# Patient Record
Sex: MALE | Race: BLACK OR AFRICAN AMERICAN | NOT HISPANIC OR LATINO | URBAN - METROPOLITAN AREA
[De-identification: names, ages, dates, MRNs, and addresses within clinical notes are randomized per-mention and may not be internally consistent; named-entity substitution may affect disease eponyms.]

---

## 2018-11-27 ENCOUNTER — INPATIENT (INPATIENT)
Facility: HOSPITAL | Age: 83
LOS: 7 days | Discharge: HOME HEALTH | End: 2018-12-05
Attending: SURGERY | Admitting: SURGERY
Payer: MEDICARE

## 2018-11-27 VITALS
WEIGHT: 175.05 LBS | TEMPERATURE: 100 F | OXYGEN SATURATION: 97 % | HEART RATE: 114 BPM | SYSTOLIC BLOOD PRESSURE: 135 MMHG | RESPIRATION RATE: 18 BRPM | DIASTOLIC BLOOD PRESSURE: 62 MMHG

## 2018-11-27 DIAGNOSIS — K80.01 CALCULUS OF GALLBLADDER WITH ACUTE CHOLECYSTITIS WITH OBSTRUCTION: ICD-10-CM

## 2018-11-27 DIAGNOSIS — D64.9 ANEMIA, UNSPECIFIED: ICD-10-CM

## 2018-11-27 DIAGNOSIS — E83.39 OTHER DISORDERS OF PHOSPHORUS METABOLISM: ICD-10-CM

## 2018-11-27 DIAGNOSIS — N17.9 ACUTE KIDNEY FAILURE, UNSPECIFIED: ICD-10-CM

## 2018-11-27 DIAGNOSIS — A41.9 SEPSIS, UNSPECIFIED ORGANISM: ICD-10-CM

## 2018-11-27 DIAGNOSIS — G40.909 EPILEPSY, UNSPECIFIED, NOT INTRACTABLE, WITHOUT STATUS EPILEPTICUS: ICD-10-CM

## 2018-11-27 DIAGNOSIS — G93.41 METABOLIC ENCEPHALOPATHY: ICD-10-CM

## 2018-11-27 DIAGNOSIS — K59.00 CONSTIPATION, UNSPECIFIED: ICD-10-CM

## 2018-11-27 DIAGNOSIS — I62.01 NONTRAUMATIC ACUTE SUBDURAL HEMORRHAGE: Chronic | ICD-10-CM

## 2018-11-27 DIAGNOSIS — I10 ESSENTIAL (PRIMARY) HYPERTENSION: ICD-10-CM

## 2018-11-27 LAB
ALBUMIN SERPL ELPH-MCNC: 3.5 G/DL — SIGNIFICANT CHANGE UP (ref 3.3–5)
ALP SERPL-CCNC: 95 U/L — SIGNIFICANT CHANGE UP (ref 40–120)
ALT FLD-CCNC: 33 U/L — SIGNIFICANT CHANGE UP (ref 12–78)
AMYLASE P1 CFR SERPL: 36 U/L — SIGNIFICANT CHANGE UP (ref 25–115)
ANION GAP SERPL CALC-SCNC: 10 MMOL/L — SIGNIFICANT CHANGE UP (ref 5–17)
APPEARANCE UR: ABNORMAL
APTT BLD: 30.3 SEC — SIGNIFICANT CHANGE UP (ref 27.5–36.3)
AST SERPL-CCNC: 31 U/L — SIGNIFICANT CHANGE UP (ref 15–37)
BACTERIA # UR AUTO: ABNORMAL
BASE EXCESS BLDA CALC-SCNC: -0.8 MMOL/L — SIGNIFICANT CHANGE UP (ref -2–2)
BASOPHILS # BLD AUTO: 0.02 K/UL — SIGNIFICANT CHANGE UP (ref 0–0.2)
BASOPHILS NFR BLD AUTO: 0.2 % — SIGNIFICANT CHANGE UP (ref 0–2)
BILIRUB DIRECT SERPL-MCNC: 0.57 MG/DL — HIGH (ref 0.05–0.2)
BILIRUB INDIRECT FLD-MCNC: 1 MG/DL — SIGNIFICANT CHANGE UP (ref 0.2–1)
BILIRUB SERPL-MCNC: 1.6 MG/DL — HIGH (ref 0.2–1.2)
BILIRUB UR-MCNC: ABNORMAL
BLD GP AB SCN SERPL QL: SIGNIFICANT CHANGE UP
BLOOD GAS COMMENTS: SIGNIFICANT CHANGE UP
BLOOD GAS COMMENTS: SIGNIFICANT CHANGE UP
BLOOD GAS SOURCE: SIGNIFICANT CHANGE UP
BUN SERPL-MCNC: 28 MG/DL — HIGH (ref 7–23)
CALCIUM SERPL-MCNC: 8.4 MG/DL — LOW (ref 8.5–10.1)
CHLORIDE SERPL-SCNC: 98 MMOL/L — SIGNIFICANT CHANGE UP (ref 96–108)
CO2 SERPL-SCNC: 26 MMOL/L — SIGNIFICANT CHANGE UP (ref 22–31)
COLOR SPEC: ABNORMAL
CREAT SERPL-MCNC: 2.62 MG/DL — HIGH (ref 0.5–1.3)
DIFF PNL FLD: ABNORMAL
EOSINOPHIL # BLD AUTO: 0 K/UL — SIGNIFICANT CHANGE UP (ref 0–0.5)
EOSINOPHIL NFR BLD AUTO: 0 % — SIGNIFICANT CHANGE UP (ref 0–6)
EPI CELLS # UR: SIGNIFICANT CHANGE UP
GLUCOSE SERPL-MCNC: 142 MG/DL — HIGH (ref 70–99)
GLUCOSE UR QL: NEGATIVE MG/DL — SIGNIFICANT CHANGE UP
HCO3 BLDA-SCNC: 21 MMOL/L — SIGNIFICANT CHANGE UP (ref 21–29)
HCT VFR BLD CALC: 37.8 % — LOW (ref 39–50)
HGB BLD-MCNC: 12.8 G/DL — LOW (ref 13–17)
HOROWITZ INDEX BLDA+IHG-RTO: 28 — SIGNIFICANT CHANGE UP
IMM GRANULOCYTES NFR BLD AUTO: 0.5 % — SIGNIFICANT CHANGE UP (ref 0–1.5)
INR BLD: 1.25 RATIO — HIGH (ref 0.88–1.16)
KETONES UR-MCNC: NEGATIVE — SIGNIFICANT CHANGE UP
LACTATE SERPL-SCNC: 2.1 MMOL/L — HIGH (ref 0.7–2)
LACTATE SERPL-SCNC: 2.5 MMOL/L — HIGH (ref 0.7–2)
LEUKOCYTE ESTERASE UR-ACNC: ABNORMAL
LIDOCAIN IGE QN: 73 U/L — SIGNIFICANT CHANGE UP (ref 73–393)
LYMPHOCYTES # BLD AUTO: 0.75 K/UL — LOW (ref 1–3.3)
LYMPHOCYTES # BLD AUTO: 5.7 % — LOW (ref 13–44)
MCHC RBC-ENTMCNC: 32 PG — SIGNIFICANT CHANGE UP (ref 27–34)
MCHC RBC-ENTMCNC: 33.9 GM/DL — SIGNIFICANT CHANGE UP (ref 32–36)
MCV RBC AUTO: 94.5 FL — SIGNIFICANT CHANGE UP (ref 80–100)
MONOCYTES # BLD AUTO: 1.4 K/UL — HIGH (ref 0–0.9)
MONOCYTES NFR BLD AUTO: 10.7 % — SIGNIFICANT CHANGE UP (ref 2–14)
NEUTROPHILS # BLD AUTO: 10.9 K/UL — HIGH (ref 1.8–7.4)
NEUTROPHILS NFR BLD AUTO: 82.9 % — HIGH (ref 43–77)
NITRITE UR-MCNC: NEGATIVE — SIGNIFICANT CHANGE UP
NRBC # BLD: 0 /100 WBCS — SIGNIFICANT CHANGE UP (ref 0–0)
PCO2 BLDA: 29 MMHG — LOW (ref 32–46)
PH BLD: 7.48 — HIGH (ref 7.35–7.45)
PH UR: 5 — SIGNIFICANT CHANGE UP (ref 5–8)
PLATELET # BLD AUTO: 275 K/UL — SIGNIFICANT CHANGE UP (ref 150–400)
PO2 BLDA: 90 MMHG — SIGNIFICANT CHANGE UP (ref 74–108)
POTASSIUM SERPL-MCNC: 4.4 MMOL/L — SIGNIFICANT CHANGE UP (ref 3.5–5.3)
POTASSIUM SERPL-SCNC: 4.4 MMOL/L — SIGNIFICANT CHANGE UP (ref 3.5–5.3)
PROT SERPL-MCNC: 8.9 GM/DL — HIGH (ref 6–8.3)
PROT UR-MCNC: 100 MG/DL
PROTHROM AB SERPL-ACNC: 14.1 SEC — HIGH (ref 10–12.9)
RBC # BLD: 4 M/UL — LOW (ref 4.2–5.8)
RBC # FLD: 12.9 % — SIGNIFICANT CHANGE UP (ref 10.3–14.5)
RBC CASTS # UR COMP ASSIST: ABNORMAL /HPF (ref 0–4)
SAO2 % BLDA: 97 % — HIGH (ref 92–96)
SODIUM SERPL-SCNC: 134 MMOL/L — LOW (ref 135–145)
SP GR SPEC: 1.02 — SIGNIFICANT CHANGE UP (ref 1.01–1.02)
UROBILINOGEN FLD QL: 1 MG/DL
WBC # BLD: 13.14 K/UL — HIGH (ref 3.8–10.5)
WBC # FLD AUTO: 13.14 K/UL — HIGH (ref 3.8–10.5)
WBC UR QL: SIGNIFICANT CHANGE UP

## 2018-11-27 PROCEDURE — 74176 CT ABD & PELVIS W/O CONTRAST: CPT | Mod: 26

## 2018-11-27 PROCEDURE — 71045 X-RAY EXAM CHEST 1 VIEW: CPT | Mod: 26

## 2018-11-27 PROCEDURE — 74019 RADEX ABDOMEN 2 VIEWS: CPT | Mod: 26

## 2018-11-27 PROCEDURE — 93010 ELECTROCARDIOGRAM REPORT: CPT

## 2018-11-27 PROCEDURE — 99285 EMERGENCY DEPT VISIT HI MDM: CPT

## 2018-11-27 RX ORDER — MORPHINE SULFATE 50 MG/1
4 CAPSULE, EXTENDED RELEASE ORAL ONCE
Qty: 0 | Refills: 0 | Status: DISCONTINUED | OUTPATIENT
Start: 2018-11-27 | End: 2018-11-27

## 2018-11-27 RX ORDER — MORPHINE SULFATE 50 MG/1
2 CAPSULE, EXTENDED RELEASE ORAL EVERY 4 HOURS
Qty: 0 | Refills: 0 | Status: DISCONTINUED | OUTPATIENT
Start: 2018-11-27 | End: 2018-11-30

## 2018-11-27 RX ORDER — FOLIC ACID 0.8 MG
1 TABLET ORAL DAILY
Qty: 0 | Refills: 0 | Status: DISCONTINUED | OUTPATIENT
Start: 2018-11-27 | End: 2018-12-05

## 2018-11-27 RX ORDER — SODIUM CHLORIDE 9 MG/ML
500 INJECTION INTRAMUSCULAR; INTRAVENOUS; SUBCUTANEOUS ONCE
Qty: 0 | Refills: 0 | Status: COMPLETED | OUTPATIENT
Start: 2018-11-27 | End: 2018-11-27

## 2018-11-27 RX ORDER — ONDANSETRON 8 MG/1
8 TABLET, FILM COATED ORAL ONCE
Qty: 0 | Refills: 0 | Status: COMPLETED | OUTPATIENT
Start: 2018-11-27 | End: 2018-11-27

## 2018-11-27 RX ORDER — ACETAMINOPHEN 500 MG
650 TABLET ORAL ONCE
Qty: 0 | Refills: 0 | Status: COMPLETED | OUTPATIENT
Start: 2018-11-27 | End: 2018-11-27

## 2018-11-27 RX ORDER — ACETAMINOPHEN 500 MG
1000 TABLET ORAL ONCE
Qty: 0 | Refills: 0 | Status: COMPLETED | OUTPATIENT
Start: 2018-11-27 | End: 2018-11-27

## 2018-11-27 RX ORDER — PIPERACILLIN AND TAZOBACTAM 4; .5 G/20ML; G/20ML
3.38 INJECTION, POWDER, LYOPHILIZED, FOR SOLUTION INTRAVENOUS EVERY 8 HOURS
Qty: 0 | Refills: 0 | Status: DISCONTINUED | OUTPATIENT
Start: 2018-11-27 | End: 2018-12-05

## 2018-11-27 RX ORDER — SODIUM CHLORIDE 9 MG/ML
1000 INJECTION INTRAMUSCULAR; INTRAVENOUS; SUBCUTANEOUS ONCE
Qty: 0 | Refills: 0 | Status: COMPLETED | OUTPATIENT
Start: 2018-11-27 | End: 2018-11-27

## 2018-11-27 RX ORDER — IOHEXOL 300 MG/ML
30 INJECTION, SOLUTION INTRAVENOUS ONCE
Qty: 0 | Refills: 0 | Status: COMPLETED | OUTPATIENT
Start: 2018-11-27 | End: 2018-11-27

## 2018-11-27 RX ORDER — DIGOXIN 250 MCG
0.12 TABLET ORAL DAILY
Qty: 0 | Refills: 0 | Status: DISCONTINUED | OUTPATIENT
Start: 2018-11-27 | End: 2018-12-02

## 2018-11-27 RX ORDER — SODIUM CHLORIDE 9 MG/ML
1000 INJECTION INTRAMUSCULAR; INTRAVENOUS; SUBCUTANEOUS
Qty: 0 | Refills: 0 | Status: DISCONTINUED | OUTPATIENT
Start: 2018-11-27 | End: 2018-11-30

## 2018-11-27 RX ORDER — FAMOTIDINE 10 MG/ML
20 INJECTION INTRAVENOUS ONCE
Qty: 0 | Refills: 0 | Status: COMPLETED | OUTPATIENT
Start: 2018-11-27 | End: 2018-11-27

## 2018-11-27 RX ORDER — LISINOPRIL 2.5 MG/1
5 TABLET ORAL DAILY
Qty: 0 | Refills: 0 | Status: DISCONTINUED | OUTPATIENT
Start: 2018-11-27 | End: 2018-12-05

## 2018-11-27 RX ORDER — LEVETIRACETAM 250 MG/1
500 TABLET, FILM COATED ORAL
Qty: 0 | Refills: 0 | Status: DISCONTINUED | OUTPATIENT
Start: 2018-11-27 | End: 2018-12-05

## 2018-11-27 RX ORDER — DILTIAZEM HCL 120 MG
240 CAPSULE, EXT RELEASE 24 HR ORAL DAILY
Qty: 0 | Refills: 0 | Status: DISCONTINUED | OUTPATIENT
Start: 2018-11-27 | End: 2018-12-05

## 2018-11-27 RX ORDER — ONDANSETRON 8 MG/1
4 TABLET, FILM COATED ORAL ONCE
Qty: 0 | Refills: 0 | Status: DISCONTINUED | OUTPATIENT
Start: 2018-11-27 | End: 2018-12-05

## 2018-11-27 RX ORDER — FERROUS SULFATE 325(65) MG
325 TABLET ORAL DAILY
Qty: 0 | Refills: 0 | Status: DISCONTINUED | OUTPATIENT
Start: 2018-11-27 | End: 2018-12-05

## 2018-11-27 RX ADMIN — FAMOTIDINE 20 MILLIGRAM(S): 10 INJECTION INTRAVENOUS at 13:07

## 2018-11-27 RX ADMIN — ONDANSETRON 8 MILLIGRAM(S): 8 TABLET, FILM COATED ORAL at 13:07

## 2018-11-27 RX ADMIN — Medication 400 MILLIGRAM(S): at 21:37

## 2018-11-27 RX ADMIN — SODIUM CHLORIDE 1000 MILLILITER(S): 9 INJECTION INTRAMUSCULAR; INTRAVENOUS; SUBCUTANEOUS at 22:37

## 2018-11-27 RX ADMIN — Medication 650 MILLIGRAM(S): at 13:07

## 2018-11-27 RX ADMIN — SODIUM CHLORIDE 1000 MILLILITER(S): 9 INJECTION INTRAMUSCULAR; INTRAVENOUS; SUBCUTANEOUS at 13:09

## 2018-11-27 RX ADMIN — Medication 650 MILLIGRAM(S): at 16:27

## 2018-11-27 RX ADMIN — MORPHINE SULFATE 4 MILLIGRAM(S): 50 CAPSULE, EXTENDED RELEASE ORAL at 16:27

## 2018-11-27 RX ADMIN — SODIUM CHLORIDE 75 MILLILITER(S): 9 INJECTION INTRAMUSCULAR; INTRAVENOUS; SUBCUTANEOUS at 20:49

## 2018-11-27 RX ADMIN — MORPHINE SULFATE 4 MILLIGRAM(S): 50 CAPSULE, EXTENDED RELEASE ORAL at 13:07

## 2018-11-27 RX ADMIN — IOHEXOL 30 MILLILITER(S): 300 INJECTION, SOLUTION INTRAVENOUS at 13:09

## 2018-11-27 RX ADMIN — PIPERACILLIN AND TAZOBACTAM 25 GRAM(S): 4; .5 INJECTION, POWDER, LYOPHILIZED, FOR SOLUTION INTRAVENOUS at 22:37

## 2018-11-27 NOTE — H&P ADULT - PROBLEM SELECTOR PLAN 5
patient with altered mental status likely due to sepsis.  Hydration, broad spectrum antibiotic, tele monitoring, VS q4h, Follow up repeat lactate, Follow up blood and urine cultures  continue constant observation. Ativan prn severe agitation

## 2018-11-27 NOTE — ED ADULT TRIAGE NOTE - CHIEF COMPLAINT QUOTE
pt BIB wife with c/o abdominal pain and 3 episodes of vomiting Thursday, No bowel movements as per wife prior to thanksgiving.

## 2018-11-27 NOTE — H&P ADULT - NSHPLABSRESULTS_GEN_ALL_CORE
< from: CT Abdomen and Pelvis w/ Oral Cont (11.27.18 @ 18:08) >    IMPRESSION: Distended gallbladder with pericholecystic fluid and 1 cm   stone in the gallbladder neck. Findings are compatible with acute   cholecystitis. There is right upper quadrant fat stranding and small free   fluid in the pelvis. Ultrasound may be obtained for further evaluation if   clinically indicated.

## 2018-11-27 NOTE — ED PROVIDER NOTE - OBJECTIVE STATEMENT
83 year old male presents today c/o two day h/o abdominal pain and distention worsening today, +nausea and vomiting with fever today, he points to his lower abdomen where his pain is located, rates his pain a 10/10 (-) bowel movement in two days

## 2018-11-27 NOTE — H&P ADULT - NSHPPHYSICALEXAM_GEN_ALL_CORE
Gen: agitated, WDWN  HEENT: NCAT  CV: +S1S2   Lung: good aeration b/l  Abd: softly distended, diffuse tenderness with voluntary guarding. No rigidity  Ext: no pedal edema  Vasc: radial and DP pulses intact  Neuro: alert and conscious, does not follow commands  : campoverde catheter indwelling draining clear yellow urine

## 2018-11-27 NOTE — H&P ADULT - ASSESSMENT
84yo male 84yo male PMH seizures, SDH, HTN admitted with acute cholecystitis with gallbladder neck stone

## 2018-11-27 NOTE — H&P ADULT - HISTORY OF PRESENT ILLNESS
83 year old male with PMH seizures, SDH, HTN was seen in ED, very agitated and requiring aides to restrain him as he actively attempted to get out of the stretcher. Per ED attending, on presentation to ED, "patient had c/o two day h/o abdominal pain and distention worsening today, +nausea and vomiting with fever today, he points to his lower abdomen where his pain is located, rates his pain a 10/10 (-) bowel movement in two days"

## 2018-11-27 NOTE — ED ADULT NURSE NOTE - NSIMPLEMENTINTERV_GEN_ALL_ED
Implemented All Fall with Harm Risk Interventions:  [place] to call system. Call bell, personal items and telephone within reach. Instruct patient to call for assistance. Room bathroom lighting operational. Non-slip footwear when patient is off stretcher. Physically safe environment: no spills, clutter or unnecessary equipment. Stretcher in lowest position, wheels locked, appropriate side rails in place. Provide visual cue, wrist band, yellow gown, etc. Monitor gait and stability. Monitor for mental status changes and reorient to person, place, and time. Review medications for side effects contributing to fall risk. Reinforce activity limits and safety measures with patient and family. Provide visual clues: red socks.

## 2018-11-27 NOTE — H&P ADULT - ATTENDING COMMENTS
Elderly male 6 yrs S/P Rx subdural hematoma on seizure meds presents with two day hx constipation, abdominal bloating and pain with nausea and vomiting. CT: distended gallbladder with pericholecystic fluid and 1 cm stone in the gallbladder neck and evidence of RUQ inflammation. Admit for AB Rx, hydration and likely percutaneous cholecystostomy.

## 2018-11-27 NOTE — ED ADULT NURSE NOTE - OBJECTIVE STATEMENT
Pt. is received in bed 8. Pt c/o of abdominal pain that started after thanksgiving. PEr patients wife states that his last bowel movement was before thanksgiving and pt. has vomited since then. Pt appetite decrease. Pt. is received in bed 8. Pt c/o of abdominal pain that started after thanksgiving. Per patients wife states that his last bowel movement was before thanksgiving and pt. has vomited since then. Pt appetite decrease. Pt presents with abdominal distention

## 2018-11-27 NOTE — ED ADULT NURSE REASSESSMENT NOTE - NS ED NURSE REASSESS COMMENT FT1
Valladares catheter size 16 F inserted for 200mls of dark anselmo urine at 1631, ua/C&S OBTAINED AND SENT TO LAB, PATIENT TOLERATED Well. dr. Anaya made aware

## 2018-11-27 NOTE — H&P ADULT - PROBLEM SELECTOR PLAN 1
as discussed with Dr Christiansen, admit, NPO, IVF, IV abx  patient may require IR intervention for percutaneous cholecystectomy as discussed with Dr Christiansen, admit, NPO, IVF, IV abx  patient may require IR intervention for percutaneous cholecystectomy  Follow up am labs  constant observation for fall risk and pulling at lines

## 2018-11-28 DIAGNOSIS — K81.0 ACUTE CHOLECYSTITIS: ICD-10-CM

## 2018-11-28 DIAGNOSIS — I10 ESSENTIAL (PRIMARY) HYPERTENSION: ICD-10-CM

## 2018-11-28 DIAGNOSIS — N17.9 ACUTE KIDNEY FAILURE, UNSPECIFIED: ICD-10-CM

## 2018-11-28 DIAGNOSIS — G93.41 METABOLIC ENCEPHALOPATHY: ICD-10-CM

## 2018-11-28 DIAGNOSIS — R56.9 UNSPECIFIED CONVULSIONS: ICD-10-CM

## 2018-11-28 LAB
ALBUMIN SERPL ELPH-MCNC: 2.9 G/DL — LOW (ref 3.3–5)
ALP SERPL-CCNC: 89 U/L — SIGNIFICANT CHANGE UP (ref 40–120)
ALT FLD-CCNC: 32 U/L — SIGNIFICANT CHANGE UP (ref 12–78)
ANION GAP SERPL CALC-SCNC: 7 MMOL/L — SIGNIFICANT CHANGE UP (ref 5–17)
APTT BLD: 29.9 SEC — SIGNIFICANT CHANGE UP (ref 28.5–37)
AST SERPL-CCNC: 37 U/L — SIGNIFICANT CHANGE UP (ref 15–37)
BASOPHILS # BLD AUTO: 0.03 K/UL — SIGNIFICANT CHANGE UP (ref 0–0.2)
BASOPHILS NFR BLD AUTO: 0.3 % — SIGNIFICANT CHANGE UP (ref 0–2)
BILIRUB DIRECT SERPL-MCNC: 0.63 MG/DL — HIGH (ref 0.05–0.2)
BILIRUB INDIRECT FLD-MCNC: 1 MG/DL — SIGNIFICANT CHANGE UP (ref 0.2–1)
BILIRUB SERPL-MCNC: 1.6 MG/DL — HIGH (ref 0.2–1.2)
BUN SERPL-MCNC: 29 MG/DL — HIGH (ref 7–23)
CALCIUM SERPL-MCNC: 7.7 MG/DL — LOW (ref 8.5–10.1)
CHLORIDE SERPL-SCNC: 105 MMOL/L — SIGNIFICANT CHANGE UP (ref 96–108)
CO2 SERPL-SCNC: 26 MMOL/L — SIGNIFICANT CHANGE UP (ref 22–31)
CREAT SERPL-MCNC: 1.79 MG/DL — HIGH (ref 0.5–1.3)
CULTURE RESULTS: NO GROWTH — SIGNIFICANT CHANGE UP
DIGOXIN SERPL-MCNC: 0.77 NG/ML — LOW (ref 0.8–2)
EOSINOPHIL # BLD AUTO: 0.09 K/UL — SIGNIFICANT CHANGE UP (ref 0–0.5)
EOSINOPHIL NFR BLD AUTO: 0.8 % — SIGNIFICANT CHANGE UP (ref 0–6)
GLUCOSE SERPL-MCNC: 96 MG/DL — SIGNIFICANT CHANGE UP (ref 70–99)
HCT VFR BLD CALC: 34.2 % — LOW (ref 39–50)
HGB BLD-MCNC: 11.2 G/DL — LOW (ref 13–17)
IMM GRANULOCYTES NFR BLD AUTO: 0.4 % — SIGNIFICANT CHANGE UP (ref 0–1.5)
INR BLD: 1.32 RATIO — HIGH (ref 0.88–1.16)
LACTATE SERPL-SCNC: 1.3 MMOL/L — SIGNIFICANT CHANGE UP (ref 0.7–2)
LYMPHOCYTES # BLD AUTO: 1.05 K/UL — SIGNIFICANT CHANGE UP (ref 1–3.3)
LYMPHOCYTES # BLD AUTO: 8.9 % — LOW (ref 13–44)
MAGNESIUM SERPL-MCNC: 2.5 MG/DL — SIGNIFICANT CHANGE UP (ref 1.6–2.6)
MCHC RBC-ENTMCNC: 31.3 PG — SIGNIFICANT CHANGE UP (ref 27–34)
MCHC RBC-ENTMCNC: 32.7 GM/DL — SIGNIFICANT CHANGE UP (ref 32–36)
MCV RBC AUTO: 95.5 FL — SIGNIFICANT CHANGE UP (ref 80–100)
MONOCYTES # BLD AUTO: 1.34 K/UL — HIGH (ref 0–0.9)
MONOCYTES NFR BLD AUTO: 11.4 % — SIGNIFICANT CHANGE UP (ref 2–14)
NEUTROPHILS # BLD AUTO: 9.22 K/UL — HIGH (ref 1.8–7.4)
NEUTROPHILS NFR BLD AUTO: 78.2 % — HIGH (ref 43–77)
NRBC # BLD: 0 /100 WBCS — SIGNIFICANT CHANGE UP (ref 0–0)
PHOSPHATE SERPL-MCNC: 2.1 MG/DL — LOW (ref 2.5–4.5)
PLATELET # BLD AUTO: 233 K/UL — SIGNIFICANT CHANGE UP (ref 150–400)
POTASSIUM SERPL-MCNC: 4.2 MMOL/L — SIGNIFICANT CHANGE UP (ref 3.5–5.3)
POTASSIUM SERPL-SCNC: 4.2 MMOL/L — SIGNIFICANT CHANGE UP (ref 3.5–5.3)
PROT SERPL-MCNC: 7.8 GM/DL — SIGNIFICANT CHANGE UP (ref 6–8.3)
PROTHROM AB SERPL-ACNC: 14.9 SEC — HIGH (ref 10–12.9)
RBC # BLD: 3.58 M/UL — LOW (ref 4.2–5.8)
RBC # FLD: 12.6 % — SIGNIFICANT CHANGE UP (ref 10.3–14.5)
SODIUM SERPL-SCNC: 138 MMOL/L — SIGNIFICANT CHANGE UP (ref 135–145)
SPECIMEN SOURCE: SIGNIFICANT CHANGE UP
WBC # BLD: 11.78 K/UL — HIGH (ref 3.8–10.5)
WBC # FLD AUTO: 11.78 K/UL — HIGH (ref 3.8–10.5)

## 2018-11-28 RX ORDER — HEPARIN SODIUM 5000 [USP'U]/ML
5000 INJECTION INTRAVENOUS; SUBCUTANEOUS EVERY 12 HOURS
Qty: 0 | Refills: 0 | Status: DISCONTINUED | OUTPATIENT
Start: 2018-11-28 | End: 2018-12-05

## 2018-11-28 RX ADMIN — Medication 240 MILLIGRAM(S): at 08:25

## 2018-11-28 RX ADMIN — SODIUM CHLORIDE 75 MILLILITER(S): 9 INJECTION INTRAMUSCULAR; INTRAVENOUS; SUBCUTANEOUS at 09:29

## 2018-11-28 RX ADMIN — Medication 0.12 MILLIGRAM(S): at 05:42

## 2018-11-28 RX ADMIN — Medication 1 MILLIGRAM(S): at 11:10

## 2018-11-28 RX ADMIN — LEVETIRACETAM 500 MILLIGRAM(S): 250 TABLET, FILM COATED ORAL at 05:42

## 2018-11-28 RX ADMIN — LEVETIRACETAM 500 MILLIGRAM(S): 250 TABLET, FILM COATED ORAL at 17:26

## 2018-11-28 RX ADMIN — PIPERACILLIN AND TAZOBACTAM 25 GRAM(S): 4; .5 INJECTION, POWDER, LYOPHILIZED, FOR SOLUTION INTRAVENOUS at 17:25

## 2018-11-28 RX ADMIN — PIPERACILLIN AND TAZOBACTAM 25 GRAM(S): 4; .5 INJECTION, POWDER, LYOPHILIZED, FOR SOLUTION INTRAVENOUS at 21:54

## 2018-11-28 RX ADMIN — HEPARIN SODIUM 5000 UNIT(S): 5000 INJECTION INTRAVENOUS; SUBCUTANEOUS at 17:26

## 2018-11-28 RX ADMIN — PIPERACILLIN AND TAZOBACTAM 25 GRAM(S): 4; .5 INJECTION, POWDER, LYOPHILIZED, FOR SOLUTION INTRAVENOUS at 09:30

## 2018-11-28 RX ADMIN — SODIUM CHLORIDE 75 MILLILITER(S): 9 INJECTION INTRAMUSCULAR; INTRAVENOUS; SUBCUTANEOUS at 21:52

## 2018-11-28 RX ADMIN — Medication 325 MILLIGRAM(S): at 11:10

## 2018-11-28 NOTE — CHART NOTE - NSCHARTNOTEFT_GEN_A_CORE
IR consulted for perc gus tube  Pts white count improving, pt afebrile.    alk phos and LFT's are WNL.   Abdomen NT, ND  -Continue conservative management/abx  -please reconsult if necessary IR consulted for perc gus tube  Pts white count improving and pt is afebrile on <24 hrs abx. RUQ mild TTP.   -Continue conservative management with abx.  -If pt develops worsening sx, increased wbc count or fever, then gus tube can be placed.

## 2018-11-28 NOTE — PROGRESS NOTE ADULT - SUBJECTIVE AND OBJECTIVE BOX
Patient seen and examined at bedside with Dr. Christiansen.  Wife bedside; states patient had been complaining of constipation for the last few days. She gave him laxatives with no relief.  She was prompted to bring him to the hospital when he started complaining of abdominal pain with associated nausea.    Vital Signs Last 24 Hrs  T(F): 98.4 (11-28-18 @ 11:21), Max: 100.5 (11-27-18 @ 20:45)  HR: 91 (11-28-18 @ 11:21)  BP: 125/50 (11-28-18 @ 11:21)  RR: 18 (11-28-18 @ 11:21)  SpO2: 96% (11-28-18 @ 11:21)    GENERAL: Alert, oriented, NAD  CHEST/LUNG: Respirations nonlabored  HEART: S1S2, RRR  ABDOMEN: Soft, distended, +TTP RUQ, no rigidity  EXTREMITIES: No pedal edema b/l     LABS:                        11.2   11.78 )-----------( 233      ( 28 Nov 2018 07:50 )             34.2     11-28    138  |  105  |  29<H>  ----------------------------<  96  4.2   |  26  |  1.79<H>    Ca    7.7<L>      28 Nov 2018 07:50  Phos  2.1     11-28  Mg     2.5     11-28    TPro  7.8  /  Alb  2.9<L>  /  TBili  1.6<H>  /  DBili  .63<H>  /  AST  37  /  ALT  32  /  AlkPhos  89  11-28    PT/INR - ( 28 Nov 2018 07:50 )   PT: 14.9 sec;   INR: 1.32 ratio         PTT - ( 28 Nov 2018 07:50 )  PTT:29.9 sec    Impression: 83 year old male with PMH HTN, subdural hematoma due to injury s/p craniotomy, now admitted with acute cholecystitis and cholelithiasis   Plan:  - IR to reevaluate for percutaneous cholecystostomy   - continue antibiotics  - NPO, IV hydration  - continue home meds  - monitor labs  - pain management PRN, antiemetics PRN, antipyretics PRN  - discussed with Dr. Christiansen

## 2018-11-28 NOTE — CHART NOTE - NSCHARTNOTEFT_GEN_A_CORE
Called by RN re:   campoverde.  Patient pulled out campoverde today.  Per report - campoverde was placed in ED for patient comfort.  Patient voiding incontinently and PVR recorded 48.  Will defer placement of campoverde and cont to monitor voiding and check PVR as needed.

## 2018-11-29 DIAGNOSIS — D64.9 ANEMIA, UNSPECIFIED: ICD-10-CM

## 2018-11-29 DIAGNOSIS — K59.00 CONSTIPATION, UNSPECIFIED: ICD-10-CM

## 2018-11-29 LAB
ALBUMIN SERPL ELPH-MCNC: 2.3 G/DL — LOW (ref 3.3–5)
ALP SERPL-CCNC: 81 U/L — SIGNIFICANT CHANGE UP (ref 40–120)
ALT FLD-CCNC: 31 U/L — SIGNIFICANT CHANGE UP (ref 12–78)
ANION GAP SERPL CALC-SCNC: 7 MMOL/L — SIGNIFICANT CHANGE UP (ref 5–17)
AST SERPL-CCNC: 37 U/L — SIGNIFICANT CHANGE UP (ref 15–37)
BILIRUB DIRECT SERPL-MCNC: 0.3 MG/DL — HIGH (ref 0.05–0.2)
BILIRUB INDIRECT FLD-MCNC: 0.6 MG/DL — SIGNIFICANT CHANGE UP (ref 0.2–1)
BILIRUB SERPL-MCNC: 0.9 MG/DL — SIGNIFICANT CHANGE UP (ref 0.2–1.2)
BLD GP AB SCN SERPL QL: SIGNIFICANT CHANGE UP
BUN SERPL-MCNC: 23 MG/DL — SIGNIFICANT CHANGE UP (ref 7–23)
CALCIUM SERPL-MCNC: 7.3 MG/DL — LOW (ref 8.5–10.1)
CHLORIDE SERPL-SCNC: 108 MMOL/L — SIGNIFICANT CHANGE UP (ref 96–108)
CO2 SERPL-SCNC: 25 MMOL/L — SIGNIFICANT CHANGE UP (ref 22–31)
CREAT SERPL-MCNC: 1.12 MG/DL — SIGNIFICANT CHANGE UP (ref 0.5–1.3)
GLUCOSE SERPL-MCNC: 86 MG/DL — SIGNIFICANT CHANGE UP (ref 70–99)
HCT VFR BLD CALC: 29.1 % — LOW (ref 39–50)
HGB BLD-MCNC: 9.6 G/DL — LOW (ref 13–17)
MAGNESIUM SERPL-MCNC: 2.6 MG/DL — SIGNIFICANT CHANGE UP (ref 1.6–2.6)
MCHC RBC-ENTMCNC: 31.7 PG — SIGNIFICANT CHANGE UP (ref 27–34)
MCHC RBC-ENTMCNC: 33 GM/DL — SIGNIFICANT CHANGE UP (ref 32–36)
MCV RBC AUTO: 96 FL — SIGNIFICANT CHANGE UP (ref 80–100)
NRBC # BLD: 0 /100 WBCS — SIGNIFICANT CHANGE UP (ref 0–0)
PHOSPHATE SERPL-MCNC: 2 MG/DL — LOW (ref 2.5–4.5)
PLATELET # BLD AUTO: 230 K/UL — SIGNIFICANT CHANGE UP (ref 150–400)
POTASSIUM SERPL-MCNC: 3.9 MMOL/L — SIGNIFICANT CHANGE UP (ref 3.5–5.3)
POTASSIUM SERPL-SCNC: 3.9 MMOL/L — SIGNIFICANT CHANGE UP (ref 3.5–5.3)
PROT SERPL-MCNC: 6.5 GM/DL — SIGNIFICANT CHANGE UP (ref 6–8.3)
RBC # BLD: 3.03 M/UL — LOW (ref 4.2–5.8)
RBC # FLD: 12.7 % — SIGNIFICANT CHANGE UP (ref 10.3–14.5)
SODIUM SERPL-SCNC: 140 MMOL/L — SIGNIFICANT CHANGE UP (ref 135–145)
WBC # BLD: 7.99 K/UL — SIGNIFICANT CHANGE UP (ref 3.8–10.5)
WBC # FLD AUTO: 7.99 K/UL — SIGNIFICANT CHANGE UP (ref 3.8–10.5)

## 2018-11-29 PROCEDURE — 99223 1ST HOSP IP/OBS HIGH 75: CPT

## 2018-11-29 RX ORDER — POTASSIUM PHOSPHATE, MONOBASIC POTASSIUM PHOSPHATE, DIBASIC 236; 224 MG/ML; MG/ML
15 INJECTION, SOLUTION INTRAVENOUS ONCE
Qty: 0 | Refills: 0 | Status: COMPLETED | OUTPATIENT
Start: 2018-11-29 | End: 2018-11-29

## 2018-11-29 RX ADMIN — POTASSIUM PHOSPHATE, MONOBASIC POTASSIUM PHOSPHATE, DIBASIC 63.75 MILLIMOLE(S): 236; 224 INJECTION, SOLUTION INTRAVENOUS at 11:25

## 2018-11-29 RX ADMIN — LISINOPRIL 5 MILLIGRAM(S): 2.5 TABLET ORAL at 05:02

## 2018-11-29 RX ADMIN — PIPERACILLIN AND TAZOBACTAM 25 GRAM(S): 4; .5 INJECTION, POWDER, LYOPHILIZED, FOR SOLUTION INTRAVENOUS at 15:00

## 2018-11-29 RX ADMIN — LEVETIRACETAM 500 MILLIGRAM(S): 250 TABLET, FILM COATED ORAL at 05:01

## 2018-11-29 RX ADMIN — Medication 0.12 MILLIGRAM(S): at 05:01

## 2018-11-29 RX ADMIN — Medication 1 MILLIGRAM(S): at 11:25

## 2018-11-29 RX ADMIN — HEPARIN SODIUM 5000 UNIT(S): 5000 INJECTION INTRAVENOUS; SUBCUTANEOUS at 05:01

## 2018-11-29 RX ADMIN — PIPERACILLIN AND TAZOBACTAM 25 GRAM(S): 4; .5 INJECTION, POWDER, LYOPHILIZED, FOR SOLUTION INTRAVENOUS at 05:01

## 2018-11-29 RX ADMIN — PIPERACILLIN AND TAZOBACTAM 25 GRAM(S): 4; .5 INJECTION, POWDER, LYOPHILIZED, FOR SOLUTION INTRAVENOUS at 21:17

## 2018-11-29 RX ADMIN — Medication 325 MILLIGRAM(S): at 11:25

## 2018-11-29 RX ADMIN — LEVETIRACETAM 500 MILLIGRAM(S): 250 TABLET, FILM COATED ORAL at 17:05

## 2018-11-29 RX ADMIN — Medication 240 MILLIGRAM(S): at 05:02

## 2018-11-29 RX ADMIN — HEPARIN SODIUM 5000 UNIT(S): 5000 INJECTION INTRAVENOUS; SUBCUTANEOUS at 17:05

## 2018-11-29 RX ADMIN — SODIUM CHLORIDE 75 MILLILITER(S): 9 INJECTION INTRAMUSCULAR; INTRAVENOUS; SUBCUTANEOUS at 11:25

## 2018-11-29 RX ADMIN — SODIUM CHLORIDE 75 MILLILITER(S): 9 INJECTION INTRAMUSCULAR; INTRAVENOUS; SUBCUTANEOUS at 21:31

## 2018-11-29 NOTE — CONSULT NOTE ADULT - SUBJECTIVE AND OBJECTIVE BOX
HPI:  83 year old male with PMH seizures, SDH, HTN was seen in ED, very agitated and requiring aides to restrain him as he actively attempted to get out of the stretcher. Per ED attending, on presentation to ED, "patient had c/o two day h/o abdominal pain and distention worsening today, +nausea and vomiting with fever today, he points to his lower abdomen where his pain is located, rates his pain a 10/10 (-) bowel movement in two days" (2018 23:21)      PAST MEDICAL & SURGICAL HISTORY:  Constipation  Hypertension  Seizure  Acute subdural hematoma: (May 2014 - subdural hematoma,  pt had SX)      REVIEW OF SYSTEMS:    CONSTITUTIONAL: No fever, weight loss, or fatigue  EYES: No eye pain, visual disturbances, or discharge  ENMT:  No difficulty hearing, tinnitus, vertigo; No sinus or throat pain  NECK: No pain or stiffness  BREASTS: No pain, masses, or nipple discharge  RESPIRATORY: No cough, wheezing, chills or hemoptysis; No shortness of breath  CARDIOVASCULAR: No chest pain, palpitations, dizziness, or leg swelling  GASTROINTESTINAL: No abdominal or epigastric pain. No nausea, vomiting, or hematemesis; No diarrhea or constipation. No melena or hematochezia.  GENITOURINARY: No dysuria, frequency, hematuria, or incontinence  NEUROLOGICAL: No headaches, memory loss, loss of strength, numbness, or tremors  SKIN: No itching, burning, rashes, or lesions   LYMPH NODES: No enlarged glands  ENDOCRINE: No heat or cold intolerance; No hair loss  MUSCULOSKELETAL: No joint pain or swelling; No muscle, back, or extremity pain  PSYCHIATRIC: No depression, anxiety, mood swings, or difficulty sleeping  HEME/LYMPH: No easy bruising, or bleeding gums  ALLERGY AND IMMUNOLOGIC: No hives or eczema      MEDICATIONS  (STANDING):  digoxin     Tablet 0.125 milliGRAM(s) Oral daily  diltiazem    milliGRAM(s) Oral daily  ferrous    sulfate 325 milliGRAM(s) Oral daily  folic acid 1 milliGRAM(s) Oral daily  heparin  Injectable 5000 Unit(s) SubCutaneous every 12 hours  levETIRAcetam 500 milliGRAM(s) Oral two times a day  lisinopril 5 milliGRAM(s) Oral daily  piperacillin/tazobactam IVPB. 3.375 Gram(s) IV Intermittent every 8 hours  sodium chloride 0.9%. 1000 milliLiter(s) (75 mL/Hr) IV Continuous <Continuous>    MEDICATIONS  (PRN):  LORazepam   Injectable 0.5 milliGRAM(s) IV Push every 6 hours PRN Self-Injurious behavior  morphine  - Injectable 2 milliGRAM(s) IV Push every 4 hours PRN Severe Pain (7 - 10)  ondansetron Injectable 4 milliGRAM(s) IV Push once PRN Nausea and/or Vomiting      Allergies    No Known Allergies    Intolerances        SOCIAL HISTORY:    FAMILY HISTORY:  No pertinent family history in first degree relatives      Vital Signs Last 24 Hrs  T(C): 36.8 (2018 11:55), Max: 37 (2018 15:55)  T(F): 98.2 (2018 11:55), Max: 98.6 (2018 15:55)  HR: 60 (2018 11:55) (60 - 86)  BP: 126/82 (2018 11:55) (122/62 - 126/82)  BP(mean): --  RR: 18 (2018 11:55) (18 - 19)  SpO2: 94% (2018 11:55) (94% - 100%)    PHYSICAL EXAM:    GENERAL: NAD, well-groomed, well-developed  HEAD:  Atraumatic, Normocephalic  EYES: EOMI, PERRLA, conjunctiva and sclera clear  ENMT: No tonsillar erythema, exudates, or enlargement; Moist mucous membranes, Good dentition, No lesions  NECK: Supple, No JVD, Normal thyroid  NERVOUS SYSTEM:  Alert & Oriented X2, Motor Strength 5/5 B/L upper and lower extremities; DTRs 2+ intact and symmetric  CHEST/LUNG: Clear to percussion bilaterally; No rales, rhonchi, wheezing, or rubs  HEART: Regular rate and rhythm; No murmurs, rubs, or gallops  ABDOMEN: Mild distention, RUQ and epigastric tenderness,  Bowel sounds present  EXTREMITIES:  2+ Peripheral Pulses, No clubbing, cyanosis, or edema  LYMPH: No lymphadenopathy noted  SKIN: No rashes or lesions      LABS:                        9.6    7.99  )-----------( 230      ( 2018 07:46 )             29.1     11-29    140  |  108  |  23  ----------------------------<  86  3.9   |  25  |  1.12    Ca    7.3<L>      2018 07:46  Phos  2.0       Mg     2.6         TPro  6.5  /  Alb  2.3<L>  /  TBili  0.9  /  DBili  .30<H>  /  AST  37  /  ALT  31  /  AlkPhos  81      PT/INR - ( 2018 07:50 )   PT: 14.9 sec;   INR: 1.32 ratio         PTT - ( 2018 07:50 )  PTT:29.9 sec  Urinalysis Basic - ( 2018 16:38 )    Color: Violeta / Appearance: Slightly Turbid / S.020 / pH: x  Gluc: x / Ketone: Negative  / Bili: Small / Urobili: 1 mg/dL   Blood: x / Protein: 100 mg/dL / Nitrite: Negative   Leuk Esterase: Trace / RBC: 3-5 /HPF / WBC 0-2   Sq Epi: x / Non Sq Epi: Occasional / Bacteria: Few        RADIOLOGY & ADDITIONAL STUDIES:

## 2018-11-29 NOTE — CONSULT NOTE ADULT - PROBLEM SELECTOR RECOMMENDATION 3
combination of septic/metabolic   monitor mental status, reality reorientation, avoid sedating medications

## 2018-11-29 NOTE — CONSULT NOTE ADULT - PROBLEM SELECTOR RECOMMENDATION 9
w/Sepsis POA  c/w abx, for possible IR percutaneous cholecystostomy   -F/u cultures  Management per surgery

## 2018-11-29 NOTE — PROGRESS NOTE ADULT - SUBJECTIVE AND OBJECTIVE BOX
SURGERY PROGRESS HPI:  Pt seen and examined at bedside. Denies pain or complaints. No acute events overnight. Pt denies nausea and vomiting. +flatus. Voiding well. Pt denies chest pain, SOB, dizziness, fever, chills.    Vital Signs Last 24 Hrs  T(C): 36.8 (2018 04:53), Max: 37 (2018 15:55)  T(F): 98.2 (2018 04:53), Max: 98.6 (2018 15:55)  HR: 67 (2018 04:53) (67 - 92)  BP: 122/62 (2018 04:53) (122/62 - 126/64)  BP(mean): --  RR: 18 (2018 04:53) (18 - 19)  SpO2: 100% (2018 04:53) (96% - 100%)      PHYSICAL EXAM:    GENERAL: NAD  CHEST/LUNG: Clear to ausculation, bilaterally   HEART: RRR S1S2  ABDOMEN: softly distended, +BS, soft, RUQ and epigastric tenderness, no guarding  EXTREMITIES:  calf soft, non tender b/l    I&O's Detail    2018 07:01  -  2018 07:00  --------------------------------------------------------  IN:    IV PiggyBack: 100 mL    Oral Fluid: 50 mL    sodium chloride 0.9%.: 820 mL    Solution: 100 mL  Total IN: 1070 mL    OUT:    Incontinent per Condom Catheter: 920 mL    Voided: 325 mL  Total OUT: 1245 mL    Total NET: -175 mL      2018 07:01  -  2018 05:48  --------------------------------------------------------  IN:    sodium chloride 0.9%.: 750 mL    Solution: 100 mL  Total IN: 850 mL    OUT:    Voided: 700 mL  Total OUT: 700 mL    Total NET: 150 mL          LABS:                        11.2   11.78 )-----------( 233      ( 2018 07:50 )             34.2         138  |  105  |  29<H>  ----------------------------<  96  4.2   |  26  |  1.79<H>    Ca    7.7<L>      2018 07:50  Phos  2.1       Mg     2.5         TPro  7.8  /  Alb  2.9<L>  /  TBili  1.6<H>  /  DBili  .63<H>  /  AST  37  /  ALT  32  /  AlkPhos  89      PT/INR - ( 2018 07:50 )   PT: 14.9 sec;   INR: 1.32 ratio         PTT - ( 2018 07:50 )  PTT:29.9 sec  Urinalysis Basic - ( 2018 16:38 )    Color: Violeta / Appearance: Slightly Turbid / S.020 / pH: x  Gluc: x / Ketone: Negative  / Bili: Small / Urobili: 1 mg/dL   Blood: x / Protein: 100 mg/dL / Nitrite: Negative   Leuk Esterase: Trace / RBC: 3-5 /HPF / WBC 0-2   Sq Epi: x / Non Sq Epi: Occasional / Bacteria: Few      Culture Results:   No growth ( @ 23:00)  Culture Results:   No growth to date. ( @ 14:25)  Culture Results:   No growth to date. ( @ 14:25)        Impression: 83 year old male with PMH HTN, subdural hematoma due to injury s/p craniotomy, now admitted with acute cholecystitis and cholelithiasis   Plan:  - IR to reevaluate for percutaneous cholecystostomy   - continue antibiotics  - NPO, IV hydration  - continue home meds  - monitor labs  - pain management PRN, antiemetics PRN, antipyretics PRN  - will discuss with attending

## 2018-11-30 LAB
ALBUMIN SERPL ELPH-MCNC: 2.9 G/DL — LOW (ref 3.3–5)
ALP SERPL-CCNC: 112 U/L — SIGNIFICANT CHANGE UP (ref 40–120)
ALT FLD-CCNC: 41 U/L — SIGNIFICANT CHANGE UP (ref 12–78)
ANION GAP SERPL CALC-SCNC: 7 MMOL/L — SIGNIFICANT CHANGE UP (ref 5–17)
APTT BLD: 27.9 SEC — LOW (ref 28.5–37)
AST SERPL-CCNC: 41 U/L — HIGH (ref 15–37)
BILIRUB DIRECT SERPL-MCNC: 0.33 MG/DL — HIGH (ref 0.05–0.2)
BILIRUB INDIRECT FLD-MCNC: 0.5 MG/DL — SIGNIFICANT CHANGE UP (ref 0.2–1)
BILIRUB SERPL-MCNC: 0.8 MG/DL — SIGNIFICANT CHANGE UP (ref 0.2–1.2)
BUN SERPL-MCNC: 17 MG/DL — SIGNIFICANT CHANGE UP (ref 7–23)
CALCIUM SERPL-MCNC: 8 MG/DL — LOW (ref 8.5–10.1)
CHLORIDE SERPL-SCNC: 108 MMOL/L — SIGNIFICANT CHANGE UP (ref 96–108)
CO2 SERPL-SCNC: 27 MMOL/L — SIGNIFICANT CHANGE UP (ref 22–31)
CREAT SERPL-MCNC: 0.95 MG/DL — SIGNIFICANT CHANGE UP (ref 0.5–1.3)
FERRITIN SERPL-MCNC: 697 NG/ML — HIGH (ref 30–400)
FOLATE SERPL-MCNC: >20 NG/ML — SIGNIFICANT CHANGE UP
GLUCOSE SERPL-MCNC: 88 MG/DL — SIGNIFICANT CHANGE UP (ref 70–99)
HCT VFR BLD CALC: 33.8 % — LOW (ref 39–50)
HGB BLD-MCNC: 11.2 G/DL — LOW (ref 13–17)
INR BLD: 1.1 RATIO — SIGNIFICANT CHANGE UP (ref 0.88–1.16)
IRON SATN MFR SERPL: 17 % — SIGNIFICANT CHANGE UP (ref 16–55)
IRON SATN MFR SERPL: 31 UG/DL — LOW (ref 45–165)
MCHC RBC-ENTMCNC: 31.6 PG — SIGNIFICANT CHANGE UP (ref 27–34)
MCHC RBC-ENTMCNC: 33.1 GM/DL — SIGNIFICANT CHANGE UP (ref 32–36)
MCV RBC AUTO: 95.5 FL — SIGNIFICANT CHANGE UP (ref 80–100)
NRBC # BLD: 0 /100 WBCS — SIGNIFICANT CHANGE UP (ref 0–0)
PLATELET # BLD AUTO: 323 K/UL — SIGNIFICANT CHANGE UP (ref 150–400)
POTASSIUM SERPL-MCNC: 3.9 MMOL/L — SIGNIFICANT CHANGE UP (ref 3.5–5.3)
POTASSIUM SERPL-SCNC: 3.9 MMOL/L — SIGNIFICANT CHANGE UP (ref 3.5–5.3)
PROT SERPL-MCNC: 8.3 GM/DL — SIGNIFICANT CHANGE UP (ref 6–8.3)
PROTHROM AB SERPL-ACNC: 12.4 SEC — SIGNIFICANT CHANGE UP (ref 10–12.9)
RBC # BLD: 3.54 M/UL — LOW (ref 4.2–5.8)
RBC # FLD: 12.5 % — SIGNIFICANT CHANGE UP (ref 10.3–14.5)
SODIUM SERPL-SCNC: 142 MMOL/L — SIGNIFICANT CHANGE UP (ref 135–145)
TIBC SERPL-MCNC: 181 UG/DL — LOW (ref 220–430)
UIBC SERPL-MCNC: 150 UG/DL — SIGNIFICANT CHANGE UP (ref 110–370)
VIT B12 SERPL-MCNC: 594 PG/ML — SIGNIFICANT CHANGE UP (ref 232–1245)
WBC # BLD: 9.41 K/UL — SIGNIFICANT CHANGE UP (ref 3.8–10.5)
WBC # FLD AUTO: 9.41 K/UL — SIGNIFICANT CHANGE UP (ref 3.8–10.5)

## 2018-11-30 PROCEDURE — 99232 SBSQ HOSP IP/OBS MODERATE 35: CPT

## 2018-11-30 RX ORDER — SODIUM CHLORIDE 9 MG/ML
1000 INJECTION, SOLUTION INTRAVENOUS
Qty: 0 | Refills: 0 | Status: DISCONTINUED | OUTPATIENT
Start: 2018-11-30 | End: 2018-12-05

## 2018-11-30 RX ORDER — DOCUSATE SODIUM 100 MG
100 CAPSULE ORAL THREE TIMES A DAY
Qty: 0 | Refills: 0 | Status: DISCONTINUED | OUTPATIENT
Start: 2018-11-30 | End: 2018-12-05

## 2018-11-30 RX ORDER — SENNA PLUS 8.6 MG/1
2 TABLET ORAL AT BEDTIME
Qty: 0 | Refills: 0 | Status: DISCONTINUED | OUTPATIENT
Start: 2018-11-30 | End: 2018-12-05

## 2018-11-30 RX ADMIN — PIPERACILLIN AND TAZOBACTAM 25 GRAM(S): 4; .5 INJECTION, POWDER, LYOPHILIZED, FOR SOLUTION INTRAVENOUS at 14:25

## 2018-11-30 RX ADMIN — SENNA PLUS 2 TABLET(S): 8.6 TABLET ORAL at 21:32

## 2018-11-30 RX ADMIN — PIPERACILLIN AND TAZOBACTAM 25 GRAM(S): 4; .5 INJECTION, POWDER, LYOPHILIZED, FOR SOLUTION INTRAVENOUS at 05:23

## 2018-11-30 RX ADMIN — HEPARIN SODIUM 5000 UNIT(S): 5000 INJECTION INTRAVENOUS; SUBCUTANEOUS at 17:58

## 2018-11-30 RX ADMIN — Medication 325 MILLIGRAM(S): at 11:46

## 2018-11-30 RX ADMIN — Medication 240 MILLIGRAM(S): at 05:24

## 2018-11-30 RX ADMIN — LEVETIRACETAM 500 MILLIGRAM(S): 250 TABLET, FILM COATED ORAL at 17:55

## 2018-11-30 RX ADMIN — Medication 100 MILLIGRAM(S): at 21:28

## 2018-11-30 RX ADMIN — Medication 100 MILLIGRAM(S): at 14:26

## 2018-11-30 RX ADMIN — LEVETIRACETAM 500 MILLIGRAM(S): 250 TABLET, FILM COATED ORAL at 05:24

## 2018-11-30 RX ADMIN — SODIUM CHLORIDE 75 MILLILITER(S): 9 INJECTION, SOLUTION INTRAVENOUS at 11:46

## 2018-11-30 RX ADMIN — PIPERACILLIN AND TAZOBACTAM 25 GRAM(S): 4; .5 INJECTION, POWDER, LYOPHILIZED, FOR SOLUTION INTRAVENOUS at 21:28

## 2018-11-30 RX ADMIN — Medication 1 MILLIGRAM(S): at 11:46

## 2018-11-30 RX ADMIN — LISINOPRIL 5 MILLIGRAM(S): 2.5 TABLET ORAL at 05:24

## 2018-11-30 RX ADMIN — Medication 0.12 MILLIGRAM(S): at 05:24

## 2018-11-30 NOTE — PROGRESS NOTE ADULT - ASSESSMENT
83 year old male with PMH seizures, SDH, HTN     Problem/Recommendation - 1:  Problem: Acute cholecystitis. Recommendation: w/Sepsis POA  c/w abx, for possible IR percutaneous cholecystostomy   -F/u cultures  Management per surgery.     Problem/Recommendation - 2:  ·  Problem: FRANCA (acute kidney injury).  Recommendation: POA  improving with IVF.      Problem/Recommendation - 3:  ·  Problem: Metabolic encephalopathy.  Recommendation: combination of septic/metabolic   monitor mental status, reality reorientation, avoid sedating medications.      Problem/Recommendation - 4:  ·  Problem: Seizure.  Recommendation: on keppra.      Problem/Recommendation - 5:  ·  Problem: Anemia, unspecified type.  Recommendation: f/u anemia labs  monitor cbc.      Problem/Recommendation - 6:  Problem: Hypertension, unspecified type. Recommendation: c/w home regiment.     Problem/Recommendation - 7:  Problem: Constipation, unspecified constipation type. Recommendation: laxatives per surgery.

## 2018-11-30 NOTE — PROGRESS NOTE ADULT - SUBJECTIVE AND OBJECTIVE BOX
Patient is a 83y old  Male who presents with a chief complaint of abd pain (30 Nov 2018 05:44)      INTERVAL HPI/OVERNIGHT EVENTS: no events     MEDICATIONS  (STANDING):  dextrose 5% + sodium chloride 0.45%. 1000 milliLiter(s) (75 mL/Hr) IV Continuous <Continuous>  digoxin     Tablet 0.125 milliGRAM(s) Oral daily  diltiazem    milliGRAM(s) Oral daily  docusate sodium 100 milliGRAM(s) Oral three times a day  ferrous    sulfate 325 milliGRAM(s) Oral daily  folic acid 1 milliGRAM(s) Oral daily  heparin  Injectable 5000 Unit(s) SubCutaneous every 12 hours  levETIRAcetam 500 milliGRAM(s) Oral two times a day  lisinopril 5 milliGRAM(s) Oral daily  piperacillin/tazobactam IVPB. 3.375 Gram(s) IV Intermittent every 8 hours  senna 2 Tablet(s) Oral at bedtime    MEDICATIONS  (PRN):  bisacodyl Suppository 10 milliGRAM(s) Rectal daily PRN Constipation  LORazepam   Injectable 0.5 milliGRAM(s) IV Push every 6 hours PRN Self-Injurious behavior  ondansetron Injectable 4 milliGRAM(s) IV Push once PRN Nausea and/or Vomiting      Allergies    No Known Allergies    Intolerances           Vital Signs Last 24 Hrs  T(C): 37 (30 Nov 2018 11:00), Max: 37 (30 Nov 2018 11:00)  T(F): 98.6 (30 Nov 2018 11:00), Max: 98.6 (30 Nov 2018 11:00)  HR: 77 (30 Nov 2018 11:00) (61 - 84)  BP: 169/79 (30 Nov 2018 11:00) (132/81 - 169/79)  BP(mean): --  RR: 18 (30 Nov 2018 11:00) (16 - 18)  SpO2: 100% (30 Nov 2018 11:00) (92% - 100%)    PHYSICAL EXAM:  GENERAL: NAD, well-groomed, well-developed  HEAD:  Atraumatic, Normocephalic  EYES: EOMI, PERRLA, conjunctiva and sclera clear  ENMT: No tonsillar erythema, exudates, or enlargement; Moist mucous membranes, Good dentition, No lesions  NECK: Supple, No JVD, Normal thyroid  NERVOUS SYSTEM:  Alert & Oriented X2, Motor Strength 5/5 B/L upper and lower extremities; DTRs 2+ intact and symmetric  CHEST/LUNG: Clear to percussion bilaterally; No rales, rhonchi, wheezing, or rubs  HEART: Regular rate and rhythm; No murmurs, rubs, or gallops  ABDOMEN: Mild distention, RUQ and epigastric tenderness,  Bowel sounds present  EXTREMITIES:  2+ Peripheral Pulses, No clubbing, cyanosis, or edema  LYMPH: No lymphadenopathy noted  SKIN: No rashes or lesions    LABS:                        11.2   9.41  )-----------( 323      ( 30 Nov 2018 08:13 )             33.8     11-30    142  |  108  |  17  ----------------------------<  88  3.9   |  27  |  0.95    Ca    8.0<L>      30 Nov 2018 08:13  Phos  2.0     11-29  Mg     2.6     11-29    TPro  8.3  /  Alb  2.9<L>  /  TBili  0.8  /  DBili  .33<H>  /  AST  41<H>  /  ALT  41  /  AlkPhos  112  11-30    PT/INR - ( 30 Nov 2018 08:13 )   PT: 12.4 sec;   INR: 1.10 ratio         PTT - ( 30 Nov 2018 08:13 )  PTT:27.9 sec    CAPILLARY BLOOD GLUCOSE          RADIOLOGY & ADDITIONAL TESTS:    Imaging Personally Reviewed:  [ X] YES  [ ] NO    Consultant(s) Notes Reviewed:  [ X] YES  [ ] NO    Care Discussed with Consultants/Other Providers [X ] YES  [ ] NO

## 2018-11-30 NOTE — PHYSICAL THERAPY INITIAL EVALUATION ADULT - PLANNED THERAPY INTERVENTIONS, PT EVAL
bed mobility training/balance training/gait training/postural re-education/transfer training/strengthening

## 2018-11-30 NOTE — PROGRESS NOTE ADULT - SUBJECTIVE AND OBJECTIVE BOX
SURGERY PROGRESS HPI:  Pt seen and examined at bedside. Denies pain or complaints. No acute events overnight. Pt denies nausea and vomiting. +flatus. Voiding well. Pt denies chest pain, SOB, dizziness, fever, chills.    Vital Signs Last 24 Hrs  T(C): 36.9 (30 Nov 2018 04:35), Max: 36.9 (29 Nov 2018 23:41)  T(F): 98.4 (30 Nov 2018 04:35), Max: 98.5 (29 Nov 2018 23:41)  HR: 84 (30 Nov 2018 04:35) (54 - 84)  BP: 160/95 (30 Nov 2018 04:35) (126/82 - 160/95)  BP(mean): --  RR: 17 (30 Nov 2018 04:35) (16 - 18)  SpO2: 99% (30 Nov 2018 04:35) (92% - 99%)      PHYSICAL EXAM:    GENERAL: NAD  CHEST/LUNG: Clear to ausculation, bilaterally   HEART: RRR S1S2  ABDOMEN: softly distended, +BS, soft, RUQ and epigastric tenderness, no guarding  EXTREMITIES:  calf soft, non tender b/l    I&O's Detail    28 Nov 2018 07:01  -  29 Nov 2018 07:00  --------------------------------------------------------  IN:    sodium chloride 0.9%.: 1650 mL    Solution: 100 mL  Total IN: 1750 mL    OUT:    Voided: 700 mL  Total OUT: 700 mL    Total NET: 1050 mL      29 Nov 2018 07:01  -  30 Nov 2018 05:44  --------------------------------------------------------  IN:    IV PiggyBack: 250 mL  Total IN: 250 mL    OUT:    Voided: 1225 mL  Total OUT: 1225 mL    Total NET: -975 mL      LABS:                        9.6    7.99  )-----------( 230      ( 29 Nov 2018 07:46 )             29.1     11-29    140  |  108  |  23  ----------------------------<  86  3.9   |  25  |  1.12    Ca    7.3<L>      29 Nov 2018 07:46  Phos  2.0     11-29  Mg     2.6     11-29    TPro  6.5  /  Alb  2.3<L>  /  TBili  0.9  /  DBili  .30<H>  /  AST  37  /  ALT  31  /  AlkPhos  81  11-29    PT/INR - ( 28 Nov 2018 07:50 )   PT: 14.9 sec;   INR: 1.32 ratio      PTT - ( 28 Nov 2018 07:50 )  PTT:29.9 sec    Culture Results:   No growth (11-27 @ 23:00)  Culture Results:   No growth to date. (11-27 @ 14:25)  Culture Results:   No growth to date. (11-27 @ 14:25)      Impression: 83 year old male with PMH HTN, subdural hematoma due to injury s/p craniotomy, now admitted with acute cholecystitis and cholelithiasis     Plan:  - IR to reevaluate for percutaneous cholecystostomy today  - continue antibiotics  - NPO, IV hydration  - continue home meds  - monitor labs  - pain management PRN, antiemetics PRN, antipyretics PRN  - will discuss with attending

## 2018-11-30 NOTE — PHYSICAL THERAPY INITIAL EVALUATION ADULT - IMPAIRMENTS FOUND, PT EVAL
gait, locomotion, and balance/ergonomics and body mechanics/aerobic capacity/endurance/joint integrity and mobility/muscle strength

## 2018-12-01 LAB
ALBUMIN SERPL ELPH-MCNC: 2.6 G/DL — LOW (ref 3.3–5)
ALP SERPL-CCNC: 104 U/L — SIGNIFICANT CHANGE UP (ref 40–120)
ALT FLD-CCNC: 39 U/L — SIGNIFICANT CHANGE UP (ref 12–78)
ANION GAP SERPL CALC-SCNC: 9 MMOL/L — SIGNIFICANT CHANGE UP (ref 5–17)
AST SERPL-CCNC: 36 U/L — SIGNIFICANT CHANGE UP (ref 15–37)
BILIRUB DIRECT SERPL-MCNC: 0.28 MG/DL — HIGH (ref 0.05–0.2)
BILIRUB INDIRECT FLD-MCNC: 0.7 MG/DL — SIGNIFICANT CHANGE UP (ref 0.2–1)
BILIRUB SERPL-MCNC: 1 MG/DL — SIGNIFICANT CHANGE UP (ref 0.2–1.2)
BUN SERPL-MCNC: 11 MG/DL — SIGNIFICANT CHANGE UP (ref 7–23)
CALCIUM SERPL-MCNC: 7.9 MG/DL — LOW (ref 8.5–10.1)
CHLORIDE SERPL-SCNC: 106 MMOL/L — SIGNIFICANT CHANGE UP (ref 96–108)
CO2 SERPL-SCNC: 23 MMOL/L — SIGNIFICANT CHANGE UP (ref 22–31)
CREAT SERPL-MCNC: 1.02 MG/DL — SIGNIFICANT CHANGE UP (ref 0.5–1.3)
GLUCOSE SERPL-MCNC: 110 MG/DL — HIGH (ref 70–99)
HCT VFR BLD CALC: 28.2 % — LOW (ref 39–50)
HGB BLD-MCNC: 9.5 G/DL — LOW (ref 13–17)
MAGNESIUM SERPL-MCNC: 2.3 MG/DL — SIGNIFICANT CHANGE UP (ref 1.6–2.6)
MCHC RBC-ENTMCNC: 31.6 PG — SIGNIFICANT CHANGE UP (ref 27–34)
MCHC RBC-ENTMCNC: 33.7 GM/DL — SIGNIFICANT CHANGE UP (ref 32–36)
MCV RBC AUTO: 93.7 FL — SIGNIFICANT CHANGE UP (ref 80–100)
NRBC # BLD: 0 /100 WBCS — SIGNIFICANT CHANGE UP (ref 0–0)
PHOSPHATE SERPL-MCNC: 2 MG/DL — LOW (ref 2.5–4.5)
PLATELET # BLD AUTO: 296 K/UL — SIGNIFICANT CHANGE UP (ref 150–400)
POTASSIUM SERPL-MCNC: 3.7 MMOL/L — SIGNIFICANT CHANGE UP (ref 3.5–5.3)
POTASSIUM SERPL-SCNC: 3.7 MMOL/L — SIGNIFICANT CHANGE UP (ref 3.5–5.3)
PROT SERPL-MCNC: 7.3 GM/DL — SIGNIFICANT CHANGE UP (ref 6–8.3)
RBC # BLD: 3.01 M/UL — LOW (ref 4.2–5.8)
RBC # FLD: 12.3 % — SIGNIFICANT CHANGE UP (ref 10.3–14.5)
SODIUM SERPL-SCNC: 138 MMOL/L — SIGNIFICANT CHANGE UP (ref 135–145)
WBC # BLD: 8.83 K/UL — SIGNIFICANT CHANGE UP (ref 3.8–10.5)
WBC # FLD AUTO: 8.83 K/UL — SIGNIFICANT CHANGE UP (ref 3.8–10.5)

## 2018-12-01 PROCEDURE — 99233 SBSQ HOSP IP/OBS HIGH 50: CPT

## 2018-12-01 RX ORDER — SODIUM,POTASSIUM PHOSPHATES 278-250MG
1 POWDER IN PACKET (EA) ORAL
Qty: 0 | Refills: 0 | Status: DISCONTINUED | OUTPATIENT
Start: 2018-12-01 | End: 2018-12-02

## 2018-12-01 RX ADMIN — PIPERACILLIN AND TAZOBACTAM 25 GRAM(S): 4; .5 INJECTION, POWDER, LYOPHILIZED, FOR SOLUTION INTRAVENOUS at 05:42

## 2018-12-01 RX ADMIN — SODIUM CHLORIDE 75 MILLILITER(S): 9 INJECTION, SOLUTION INTRAVENOUS at 05:41

## 2018-12-01 RX ADMIN — LEVETIRACETAM 500 MILLIGRAM(S): 250 TABLET, FILM COATED ORAL at 17:16

## 2018-12-01 RX ADMIN — PIPERACILLIN AND TAZOBACTAM 25 GRAM(S): 4; .5 INJECTION, POWDER, LYOPHILIZED, FOR SOLUTION INTRAVENOUS at 21:52

## 2018-12-01 RX ADMIN — Medication 1 MILLIGRAM(S): at 11:36

## 2018-12-01 RX ADMIN — Medication 100 MILLIGRAM(S): at 05:42

## 2018-12-01 RX ADMIN — Medication 325 MILLIGRAM(S): at 11:36

## 2018-12-01 RX ADMIN — Medication 240 MILLIGRAM(S): at 05:42

## 2018-12-01 RX ADMIN — HEPARIN SODIUM 5000 UNIT(S): 5000 INJECTION INTRAVENOUS; SUBCUTANEOUS at 17:16

## 2018-12-01 RX ADMIN — Medication 100 MILLIGRAM(S): at 13:13

## 2018-12-01 RX ADMIN — Medication 1 TABLET(S): at 21:52

## 2018-12-01 RX ADMIN — Medication 0.12 MILLIGRAM(S): at 05:42

## 2018-12-01 RX ADMIN — Medication 100 MILLIGRAM(S): at 21:52

## 2018-12-01 RX ADMIN — HEPARIN SODIUM 5000 UNIT(S): 5000 INJECTION INTRAVENOUS; SUBCUTANEOUS at 05:42

## 2018-12-01 RX ADMIN — LISINOPRIL 5 MILLIGRAM(S): 2.5 TABLET ORAL at 05:41

## 2018-12-01 RX ADMIN — LEVETIRACETAM 500 MILLIGRAM(S): 250 TABLET, FILM COATED ORAL at 05:41

## 2018-12-01 RX ADMIN — SENNA PLUS 2 TABLET(S): 8.6 TABLET ORAL at 21:52

## 2018-12-01 RX ADMIN — PIPERACILLIN AND TAZOBACTAM 25 GRAM(S): 4; .5 INJECTION, POWDER, LYOPHILIZED, FOR SOLUTION INTRAVENOUS at 13:12

## 2018-12-01 NOTE — DIETITIAN INITIAL EVALUATION ADULT. - NS AS NUTRI INTERV MEALS SNACK
Adv po diet when medically feasible Clear liquids to full liquids to Dash/TLC/Texture-modified diet/Other (specify)

## 2018-12-01 NOTE — PROGRESS NOTE ADULT - SUBJECTIVE AND OBJECTIVE BOX
Patient is a 83y old  Male who presents with a chief complaint of abd pain (01 Dec 2018 05:44)      INTERVAL HPI/OVERNIGHT EVENTS:    MEDICATIONS  (STANDING):  dextrose 5% + sodium chloride 0.45%. 1000 milliLiter(s) (75 mL/Hr) IV Continuous <Continuous>  digoxin     Tablet 0.125 milliGRAM(s) Oral daily  diltiazem    milliGRAM(s) Oral daily  docusate sodium 100 milliGRAM(s) Oral three times a day  ferrous    sulfate 325 milliGRAM(s) Oral daily  folic acid 1 milliGRAM(s) Oral daily  heparin  Injectable 5000 Unit(s) SubCutaneous every 12 hours  levETIRAcetam 500 milliGRAM(s) Oral two times a day  lisinopril 5 milliGRAM(s) Oral daily  piperacillin/tazobactam IVPB. 3.375 Gram(s) IV Intermittent every 8 hours  senna 2 Tablet(s) Oral at bedtime    MEDICATIONS  (PRN):  bisacodyl Suppository 10 milliGRAM(s) Rectal daily PRN Constipation  LORazepam   Injectable 0.5 milliGRAM(s) IV Push every 6 hours PRN Self-Injurious behavior  ondansetron Injectable 4 milliGRAM(s) IV Push once PRN Nausea and/or Vomiting      Allergies    No Known Allergies    Intolerances        Vital Signs Last 24 Hrs  T(C): 36.9 (01 Dec 2018 17:15), Max: 37.9 (01 Dec 2018 05:21)  T(F): 98.4 (01 Dec 2018 17:15), Max: 100.3 (01 Dec 2018 05:21)  HR: 80 (01 Dec 2018 17:15) (75 - 80)  BP: 152/80 (01 Dec 2018 17:15) (150/74 - 165/84)  BP(mean): --  RR: 18 (01 Dec 2018 17:15) (18 - 19)  SpO2: 96% (01 Dec 2018 17:15) (95% - 96%)    PHYSICAL EXAM:  GENERAL: NAD, well-groomed, well-developed  HEAD:  Atraumatic, Normocephalic  EYES: EOMI, PERRLA, conjunctiva and sclera clear  ENMT: No tonsillar erythema, exudates, or enlargement; Moist mucous membranes, Good dentition, No lesions  NECK: Supple, No JVD, Normal thyroid  NERVOUS SYSTEM:  Alert & Oriented X3, Good concentration; Motor Strength 5/5 B/L upper and lower extremities; DTRs 2+ intact and symmetric  CHEST/LUNG: Clear to auscultation bilaterally; No rales, rhonchi, wheezing, or rubs  HEART: Regular rate and rhythm; No murmurs, rubs, or gallops  ABDOMEN: Soft, Nontender, Nondistended; Bowel sounds present  EXTREMITIES:  2+ Peripheral Pulses, No clubbing, cyanosis, or edema  LYMPH: No lymphadenopathy noted  SKIN: No rashes or lesions    LABS:                        9.5    8.83  )-----------( 296      ( 01 Dec 2018 07:25 )             28.2     12-01    138  |  106  |  11  ----------------------------<  110<H>  3.7   |  23  |  1.02    Ca    7.9<L>      01 Dec 2018 07:25  Phos  2.0     12-01  Mg     2.3     12-01    TPro  7.3  /  Alb  2.6<L>  /  TBili  1.0  /  DBili  .28<H>  /  AST  36  /  ALT  39  /  AlkPhos  104  12-01    PT/INR - ( 30 Nov 2018 08:13 )   PT: 12.4 sec;   INR: 1.10 ratio         PTT - ( 30 Nov 2018 08:13 )  PTT:27.9 sec    CAPILLARY BLOOD GLUCOSE          Culture - Urine (collected 27 Nov 2018 23:00)  Source: .Urine Clean Catch (Midstream)  Final Report (28 Nov 2018 21:56):    No growth    Culture - Blood (collected 27 Nov 2018 14:25)  Source: .Blood Blood-Peripheral  Preliminary Report (28 Nov 2018 15:05):    No growth to date.    Culture - Blood (collected 27 Nov 2018 14:25)  Source: .Blood Blood-Peripheral  Preliminary Report (28 Nov 2018 15:05):    No growth to date.      RADIOLOGY & ADDITIONAL TESTS:    11-30-18 @ 07:01  -  12-01-18 @ 07:00  --------------------------------------------------------  IN:  Total IN: 0 mL    OUT:    Voided: 400 mL  Total OUT: 400 mL    Total NET: -400 mL      12-01-18 @ 07:01  -  12-01-18 @ 18:20  --------------------------------------------------------  IN:    Solution: 100 mL  Total IN: 100 mL    OUT:  Total OUT: 0 mL    Total NET: 100 mL 83 year old male with history of seizures, SDH, HTN who p/w acute cholecystitis. He is lying in bed in NAD.    MEDICATIONS  (STANDING):  dextrose 5% + sodium chloride 0.45%. 1000 milliLiter(s) (75 mL/Hr) IV Continuous <Continuous>  digoxin     Tablet 0.125 milliGRAM(s) Oral daily  diltiazem    milliGRAM(s) Oral daily  docusate sodium 100 milliGRAM(s) Oral three times a day  ferrous    sulfate 325 milliGRAM(s) Oral daily  folic acid 1 milliGRAM(s) Oral daily  heparin  Injectable 5000 Unit(s) SubCutaneous every 12 hours  levETIRAcetam 500 milliGRAM(s) Oral two times a day  lisinopril 5 milliGRAM(s) Oral daily  piperacillin/tazobactam IVPB. 3.375 Gram(s) IV Intermittent every 8 hours  senna 2 Tablet(s) Oral at bedtime    MEDICATIONS  (PRN):  bisacodyl Suppository 10 milliGRAM(s) Rectal daily PRN Constipation  LORazepam   Injectable 0.5 milliGRAM(s) IV Push every 6 hours PRN Self-Injurious behavior  ondansetron Injectable 4 milliGRAM(s) IV Push once PRN Nausea and/or Vomiting      Allergies    No Known Allergies    Intolerances        Vital Signs Last 24 Hrs  T(C): 36.9 (01 Dec 2018 17:15), Max: 37.9 (01 Dec 2018 05:21)  T(F): 98.4 (01 Dec 2018 17:15), Max: 100.3 (01 Dec 2018 05:21)  HR: 80 (01 Dec 2018 17:15) (75 - 80)  BP: 152/80 (01 Dec 2018 17:15) (150/74 - 165/84)  BP(mean): --  RR: 18 (01 Dec 2018 17:15) (18 - 19)  SpO2: 96% (01 Dec 2018 17:15) (95% - 96%)    PHYSICAL EXAM:  GENERAL: NAD, well-groomed, well-developed  HEAD:  Atraumatic, Normocephalic  EYES: EOMI, PERRLA   NECK: Supple   NERVOUS SYSTEM: Alert & Oriented to self    CHEST/LUNG: Clear to auscultation bilaterally; No rales, rhonchi, wheezing, or rubs  HEART: Regular rate and rhythm; No murmurs, rubs, or gallops  ABDOMEN: Soft, w/ distention and tympany on percussion, mild tenderness in RUQ w/o guarding   EXTREMITIES: No clubbing, cyanosis, or edema      LABS:                        9.5    8.83  )-----------( 296      ( 01 Dec 2018 07:25 )             28.2     12-01    138  |  106  |  11  ----------------------------<  110<H>  3.7   |  23  |  1.02    Ca    7.9<L>      01 Dec 2018 07:25  Phos  2.0     12-01  Mg     2.3     12-01    TPro  7.3  /  Alb  2.6<L>  /  TBili  1.0  /  DBili  .28<H>  /  AST  36  /  ALT  39  /  AlkPhos  104  12-01    PT/INR - ( 30 Nov 2018 08:13 )   PT: 12.4 sec;   INR: 1.10 ratio         PTT - ( 30 Nov 2018 08:13 )  PTT:27.9 sec    CAPILLARY BLOOD GLUCOSE          Culture - Urine (collected 27 Nov 2018 23:00)  Source: .Urine Clean Catch (Midstream)  Final Report (28 Nov 2018 21:56):    No growth    Culture - Blood (collected 27 Nov 2018 14:25)  Source: .Blood Blood-Peripheral  Preliminary Report (28 Nov 2018 15:05):    No growth to date.    Culture - Blood (collected 27 Nov 2018 14:25)  Source: .Blood Blood-Peripheral  Preliminary Report (28 Nov 2018 15:05):    No growth to date.      RADIOLOGY & ADDITIONAL TESTS:    11-30-18 @ 07:01  -  12-01-18 @ 07:00  --------------------------------------------------------  IN:  Total IN: 0 mL    OUT:    Voided: 400 mL  Total OUT: 400 mL    Total NET: -400 mL      12-01-18 @ 07:01  -  12-01-18 @ 18:20  --------------------------------------------------------  IN:    Solution: 100 mL  Total IN: 100 mL    OUT:  Total OUT: 0 mL    Total NET: 100 mL

## 2018-12-01 NOTE — DIETITIAN INITIAL EVALUATION ADULT. - PERTINENT LABORATORY DATA
12-01 Na 138 mmol/L Glu 110 mg/dL<H> K+ 3.7 mmol/L Cr 1.02 mg/dL BUN 11 mg/dL Phos 2.0 mg/dL<L> Alb 2.6 g/dL<L> PAB n/a   Hgb 9.5 g/dL<L> Hct 28.2 %<L> HgA1C n/a    Glucose, Serum: 110 mg/dL <H>

## 2018-12-01 NOTE — DIETITIAN INITIAL EVALUATION ADULT. - PHYSICAL APPEARANCE
obese/BMI=30; no edema; Nutrition focused physical exam conducted; Subcutaneous fat loss: [WNL ] Orbital fat pads region, [WNL ]Buccal fat region, [WNL ]Triceps region,  [WNL ]Ribs region.  Muscle wasting: [WNL ]Temples region, [WNL ]Clavicle region, [WNL ]Shoulder region, [unable ]Scapula region, [WNL ]Interosseous region,  [WNL ]thigh region, [WNL ]Calf region

## 2018-12-01 NOTE — DIETITIAN INITIAL EVALUATION ADULT. - PROBLEM SELECTOR PLAN 1
as discussed with Dr Christiansen, admit, NPO, IVF, IV abx  patient may require IR intervention for percutaneous cholecystectomy  Follow up am labs  constant observation for fall risk and pulling at lines

## 2018-12-01 NOTE — PROGRESS NOTE ADULT - ATTENDING COMMENTS
I saw and examined the pt and discussed the tx plan with the House Staff. I agree with the SPA's note from today.  Tm/c 37.9.  + RUQ tenderness that by reports appears to be slowly decreasing.  Continue ABX and monitoring. Await IR justine, which would be at this point Monday - or earlier if he worsens clinically over the weekend.   May Mcdowell MD

## 2018-12-01 NOTE — DIETITIAN INITIAL EVALUATION ADULT. - PERTINENT MEDS FT
bisacodyl Suppository PRN  dextrose 5% + sodium chloride 0.45%.  digoxin     Tablet  diltiazem   CD  docusate sodium  ferrous    sulfate  folic acid  heparin  Injectable  levETIRAcetam  lisinopril  LORazepam   Injectable PRN  ondansetron Injectable PRN  piperacillin/tazobactam IVPB.  senna

## 2018-12-01 NOTE — DIETITIAN INITIAL EVALUATION ADULT. - OTHER INFO
Pt seen for NPO x 4 days. Pt lives with girlfriend; pt's girlfriend does cooking & food shopping. Pt reports increased +abdominal pain x 1 week;<50% nutritional needs x 1 week; +constipation; pt does not recall last BM; +flatus. No N/V. Pt with acute cholecystitis & cholelithiasis pending IR to reevaluation for percutaneous cholecystomy. UBW taken from medical record 2015.

## 2018-12-01 NOTE — PROGRESS NOTE ADULT - SUBJECTIVE AND OBJECTIVE BOX
SURGERY PROGRESS HPI:  Pt seen and examined at bedside. Denies pain or complaints. No acute events overnight. Pt denies nausea and vomiting. +flatus. Voiding well. Pt denies chest pain, SOB, dizziness, fever, chills.    Vital Signs Last 24 Hrs  T(C): 37.9 (01 Dec 2018 05:21), Max: 37.9 (01 Dec 2018 05:21)  T(F): 100.3 (01 Dec 2018 05:21), Max: 100.3 (01 Dec 2018 05:21)  HR: 75 (01 Dec 2018 05:21) (66 - 77)  BP: 157/74 (01 Dec 2018 05:21) (150/77 - 169/79)  BP(mean): --  RR: 18 (01 Dec 2018 05:21) (17 - 19)  SpO2: 95% (01 Dec 2018 05:21) (95% - 100%)      PHYSICAL EXAM:    GENERAL: NAD  CHEST/LUNG: Clear to ausculation, bilaterally   HEART: RRR S1S2  ABDOMEN: softly distended, +BS, soft, RUQ and epigastric tenderness, no guarding  EXTREMITIES:  calf soft, non tender b/l    I&O's Detail    29 Nov 2018 07:01  -  30 Nov 2018 07:00  --------------------------------------------------------  IN:    IV PiggyBack: 250 mL  Total IN: 250 mL    OUT:    Voided: 1225 mL  Total OUT: 1225 mL    Total NET: -975 mL      30 Nov 2018 07:01  -  01 Dec 2018 05:44  --------------------------------------------------------  IN:  Total IN: 0 mL    OUT:    Voided: 400 mL  Total OUT: 400 mL    Total NET: -400 mL      LABS:                        11.2   9.41  )-----------( 323      ( 30 Nov 2018 08:13 )             33.8     11-30    142  |  108  |  17  ----------------------------<  88  3.9   |  27  |  0.95    Ca    8.0<L>      30 Nov 2018 08:13  Phos  2.0     11-29  Mg     2.6     11-29    TPro  8.3  /  Alb  2.9<L>  /  TBili  0.8  /  DBili  .33<H>  /  AST  41<H>  /  ALT  41  /  AlkPhos  112  11-30    PT/INR - ( 30 Nov 2018 08:13 )   PT: 12.4 sec;   INR: 1.10 ratio    PTT - ( 30 Nov 2018 08:13 )  PTT:27.9 sec      Impression: 83 year old male with PMH HTN, subdural hematoma due to injury s/p craniotomy, now admitted with acute cholecystitis and cholelithiasis. IR did not evaluate patient yesterday.     Plan:  - IR to reevaluate for percutaneous cholecystostomy  - continue antibiotics  - NPO, IV hydration  - continue home meds  - monitor labs  - pain management PRN, antiemetics PRN, antipyretics PRN  - will discuss with attending

## 2018-12-01 NOTE — PROGRESS NOTE ADULT - ASSESSMENT
83 year old male with history of seizures, SDH, HTN who p/w acute cholecystitis.    Acute cholecystitis  - Sepsis POA  - c/w Zosyn  - LFTs improving  - patient may go for possible IR percutaneous cholecystostomy pending evaluation on Monday  - NGTD on cultures   - Management per surgery    FRANCA   - resolved    Metabolic encephalopathy  - likely due to FRANCA & cholecystitis   - monitor mental status, reality reorientation, avoid sedating medications    Seizure  -c/w Keppra     Anemia  - c/w ferrous sulfate and folate    Hypertension  - c/w diltiazem and lisinopril    Constipation  - c/w colace, senna and dulcolax 83 year old male with history of seizures, SDH, HTN who p/w acute cholecystitis.    Acute cholecystitis  - Sepsis POA  - CT showed a distended gallbladder with pericholecystic fluid and 1 cm stone in the gallbladder neck c/w acute cholecystitis  - c/w Zosyn  - LFTs improving  - patient may go for possible IR percutaneous cholecystostomy pending evaluation on Monday  - NGTD on cultures   - Management per surgery    FRANCA   - resolved    Metabolic encephalopathy  - likely due to FRANCA & cholecystitis   - monitor mental status, reality reorientation, avoid sedating medications    Seizure  -c/w Keppra     Anemia  - iron panel shows anemia of chronic disease, recommend stopping ferrous sulfate as it's contributing to constipation     Hypertension  - c/w diltiazem and lisinopril    Constipation  - c/w colace, senna and dulcolax    Hypophosphatemia  - start K-Phos     Unclear why patient is on digoxin as there's no indication and it was stopped during his last admission in Feb 2015. Will Follow up w/ primary team to find out why it was started.

## 2018-12-02 LAB
ALBUMIN SERPL ELPH-MCNC: 2.4 G/DL — LOW (ref 3.3–5)
ALP SERPL-CCNC: 107 U/L — SIGNIFICANT CHANGE UP (ref 40–120)
ALT FLD-CCNC: 50 U/L — SIGNIFICANT CHANGE UP (ref 12–78)
ANION GAP SERPL CALC-SCNC: 10 MMOL/L — SIGNIFICANT CHANGE UP (ref 5–17)
AST SERPL-CCNC: 46 U/L — HIGH (ref 15–37)
BILIRUB SERPL-MCNC: 0.7 MG/DL — SIGNIFICANT CHANGE UP (ref 0.2–1.2)
BUN SERPL-MCNC: 9 MG/DL — SIGNIFICANT CHANGE UP (ref 7–23)
CALCIUM SERPL-MCNC: 8.1 MG/DL — LOW (ref 8.5–10.1)
CHLORIDE SERPL-SCNC: 106 MMOL/L — SIGNIFICANT CHANGE UP (ref 96–108)
CO2 SERPL-SCNC: 24 MMOL/L — SIGNIFICANT CHANGE UP (ref 22–31)
CREAT SERPL-MCNC: 0.98 MG/DL — SIGNIFICANT CHANGE UP (ref 0.5–1.3)
CULTURE RESULTS: SIGNIFICANT CHANGE UP
CULTURE RESULTS: SIGNIFICANT CHANGE UP
GLUCOSE SERPL-MCNC: 93 MG/DL — SIGNIFICANT CHANGE UP (ref 70–99)
HCT VFR BLD CALC: 30.2 % — LOW (ref 39–50)
HGB BLD-MCNC: 10.2 G/DL — LOW (ref 13–17)
MAGNESIUM SERPL-MCNC: 2.5 MG/DL — SIGNIFICANT CHANGE UP (ref 1.6–2.6)
MCHC RBC-ENTMCNC: 31.6 PG — SIGNIFICANT CHANGE UP (ref 27–34)
MCHC RBC-ENTMCNC: 33.8 GM/DL — SIGNIFICANT CHANGE UP (ref 32–36)
MCV RBC AUTO: 93.5 FL — SIGNIFICANT CHANGE UP (ref 80–100)
NRBC # BLD: 0 /100 WBCS — SIGNIFICANT CHANGE UP (ref 0–0)
PHOSPHATE SERPL-MCNC: 3.1 MG/DL — SIGNIFICANT CHANGE UP (ref 2.5–4.5)
PLATELET # BLD AUTO: 350 K/UL — SIGNIFICANT CHANGE UP (ref 150–400)
POTASSIUM SERPL-MCNC: 3.8 MMOL/L — SIGNIFICANT CHANGE UP (ref 3.5–5.3)
POTASSIUM SERPL-SCNC: 3.8 MMOL/L — SIGNIFICANT CHANGE UP (ref 3.5–5.3)
PROT SERPL-MCNC: 7.3 GM/DL — SIGNIFICANT CHANGE UP (ref 6–8.3)
RBC # BLD: 3.23 M/UL — LOW (ref 4.2–5.8)
RBC # FLD: 12.3 % — SIGNIFICANT CHANGE UP (ref 10.3–14.5)
SODIUM SERPL-SCNC: 140 MMOL/L — SIGNIFICANT CHANGE UP (ref 135–145)
SPECIMEN SOURCE: SIGNIFICANT CHANGE UP
SPECIMEN SOURCE: SIGNIFICANT CHANGE UP
WBC # BLD: 8.92 K/UL — SIGNIFICANT CHANGE UP (ref 3.8–10.5)
WBC # FLD AUTO: 8.92 K/UL — SIGNIFICANT CHANGE UP (ref 3.8–10.5)

## 2018-12-02 PROCEDURE — 99233 SBSQ HOSP IP/OBS HIGH 50: CPT

## 2018-12-02 RX ORDER — DIGOXIN 250 MCG
0.12 TABLET ORAL DAILY
Qty: 0 | Refills: 0 | Status: DISCONTINUED | OUTPATIENT
Start: 2018-12-02 | End: 2018-12-05

## 2018-12-02 RX ADMIN — PIPERACILLIN AND TAZOBACTAM 25 GRAM(S): 4; .5 INJECTION, POWDER, LYOPHILIZED, FOR SOLUTION INTRAVENOUS at 13:05

## 2018-12-02 RX ADMIN — Medication 100 MILLIGRAM(S): at 13:05

## 2018-12-02 RX ADMIN — Medication 0.12 MILLIGRAM(S): at 17:50

## 2018-12-02 RX ADMIN — LEVETIRACETAM 500 MILLIGRAM(S): 250 TABLET, FILM COATED ORAL at 05:07

## 2018-12-02 RX ADMIN — HEPARIN SODIUM 5000 UNIT(S): 5000 INJECTION INTRAVENOUS; SUBCUTANEOUS at 17:50

## 2018-12-02 RX ADMIN — LEVETIRACETAM 500 MILLIGRAM(S): 250 TABLET, FILM COATED ORAL at 17:50

## 2018-12-02 RX ADMIN — LISINOPRIL 5 MILLIGRAM(S): 2.5 TABLET ORAL at 05:07

## 2018-12-02 RX ADMIN — SENNA PLUS 2 TABLET(S): 8.6 TABLET ORAL at 21:37

## 2018-12-02 RX ADMIN — Medication 325 MILLIGRAM(S): at 11:02

## 2018-12-02 RX ADMIN — Medication 100 MILLIGRAM(S): at 05:07

## 2018-12-02 RX ADMIN — PIPERACILLIN AND TAZOBACTAM 25 GRAM(S): 4; .5 INJECTION, POWDER, LYOPHILIZED, FOR SOLUTION INTRAVENOUS at 05:09

## 2018-12-02 RX ADMIN — Medication 1 MILLIGRAM(S): at 11:03

## 2018-12-02 RX ADMIN — Medication 100 MILLIGRAM(S): at 21:38

## 2018-12-02 RX ADMIN — PIPERACILLIN AND TAZOBACTAM 25 GRAM(S): 4; .5 INJECTION, POWDER, LYOPHILIZED, FOR SOLUTION INTRAVENOUS at 23:11

## 2018-12-02 RX ADMIN — Medication 240 MILLIGRAM(S): at 05:07

## 2018-12-02 RX ADMIN — Medication 1 TABLET(S): at 07:40

## 2018-12-02 RX ADMIN — Medication 0.12 MILLIGRAM(S): at 05:07

## 2018-12-02 RX ADMIN — Medication 1 TABLET(S): at 11:09

## 2018-12-02 RX ADMIN — HEPARIN SODIUM 5000 UNIT(S): 5000 INJECTION INTRAVENOUS; SUBCUTANEOUS at 05:07

## 2018-12-02 NOTE — PROGRESS NOTE ADULT - ASSESSMENT
83 year old male with history of seizures, SDH, CKD II, HTN who p/w acute cholecystitis.    Acute cholecystitis  - Sepsis POA  - CT showed a distended gallbladder with pericholecystic fluid and 1 cm stone in the gallbladder neck c/w acute cholecystitis  - c/w Zosyn  - LFTs improving  - patient may go for possible IR percutaneous cholecystostomy pending evaluation on Monday  - NGTD on cultures   - Management per surgery    FRANCA on CKD II  - resolved    Metabolic encephalopathy  - likely due to FRANCA & cholecystitis   - monitor mental status, reality reorientation, avoid sedating medications    Seizure  -c/w Keppra     Anemia  - iron panel shows anemia of chronic disease, recommend stopping ferrous sulfate as it's contributing to constipation     Hypertension  - c/w diltiazem and lisinopril    Constipation  - c/w colace, senna and dulcolax    Hypophosphatemia  - Phos replaced    Unclear why patient is on digoxin as there's no indication and it was stopped during his last admission in Feb 2015. Spoke w/ the wife, who will get a list of his home meds. Will Follow up w/ primary team to find out why it was started. 83 year old male with history of seizures, SDH, CKD II, HTN who p/w acute cholecystitis.    Acute cholecystitis  - Sepsis POA  - CT showed a distended gallbladder with pericholecystic fluid and 1 cm stone in the gallbladder neck c/w acute cholecystitis  - c/w Zosyn  - LFTs improving  - patient may go for possible IR percutaneous cholecystostomy pending evaluation on Monday  - NGTD on cultures   - Management per surgery    FRANCA on CKD II  - resolved    Metabolic encephalopathy  - improving   - likely due to FRANCA & cholecystitis   - monitor mental status, reality reorientation, avoid sedating medications    Seizure  -c/w Keppra     Anemia  - iron panel shows anemia of chronic disease, recommend stopping ferrous sulfate as it's contributing to constipation     Hypertension  - c/w diltiazem and lisinopril    Constipation  - c/w colace, senna and dulcolax    Hypophosphatemia  - Phos replaced    Unclear why patient is on digoxin as there's no indication and it was stopped during his last admission in Feb 2015. Spoke w/ the wife, who says he's on digoxin 125mcg that was prescribed by his PMD, Lito Hampton ([phone]). She doesn't know why.  Called Dr. Hampton but no one picked up, will retry tomorrow.

## 2018-12-02 NOTE — PROGRESS NOTE ADULT - ATTENDING COMMENTS
Surgery Attending    Pt seen and examined; agree with above assessment and plan    Afebrile w nl WBC but still c/o RUQ pain  On exam, tender in RUQ w localized guarding    Continue antibiotics  NPO after midnight  IR tomorrow for percutaneous cholecystostomy

## 2018-12-02 NOTE — PROGRESS NOTE ADULT - SUBJECTIVE AND OBJECTIVE BOX
INTERVAL HPI/OVERNIGHT EVENTS:  Pt. seen and examined at bedside resting comfortably. No complaints offered, states abdominal pain has been improving. Tolerating full liquids, denies N/V. +flatus, +BM per patient, but no BM recorded in chart.  No acute overnight event.  Denies fever/chills, chest pain, dyspnea, cough, dizziness.     Vital Signs Last 24 Hrs  T(C): 36.9 (02 Dec 2018 04:42), Max: 36.9 (01 Dec 2018 17:15)  T(F): 98.4 (02 Dec 2018 04:42), Max: 98.4 (01 Dec 2018 17:15)  HR: 60 (02 Dec 2018 04:42) (60 - 80)  BP: 173/72 (02 Dec 2018 04:42) (150/74 - 173/72)  RR: 19 (02 Dec 2018 04:42) (16 - 19)  SpO2: 97% (02 Dec 2018 04:42) (96% - 98%)    PHYSICAL EXAM:    GENERAL: NAD  CHEST/LUNG: Clear to ausculation, bilaterally   HEART: S1S2  ABDOMEN: Softly distended, +BS, mild RUQ, epigastric tenderness, no guarding, no rebound   EXTREMITIES:  calf soft, non tender b/l. 2+ distal pulses b/l.     I&O's Detail    30 Nov 2018 07:01  -  01 Dec 2018 07:00  --------------------------------------------------------  IN:  Total IN: 0 mL    OUT:    Voided: 400 mL  Total OUT: 400 mL    Total NET: -400 mL      01 Dec 2018 07:01  -  02 Dec 2018 05:34  --------------------------------------------------------  IN:    Oral Fluid: 100 mL    Solution: 100 mL  Total IN: 200 mL    OUT:  Total OUT: 0 mL    Total NET: 200 mL      LABS:                        9.5    8.83  )-----------( 296      ( 01 Dec 2018 07:25 )             28.2     12-01    138  |  106  |  11  ----------------------------<  110<H>  3.7   |  23  |  1.02    Ca    7.9<L>      01 Dec 2018 07:25  Phos  2.0     12-01  Mg     2.3     12-01    TPro  7.3  /  Alb  2.6<L>  /  TBili  1.0  /  DBili  .28<H>  /  AST  36  /  ALT  39  /  AlkPhos  104  12-01    PT/INR - ( 30 Nov 2018 08:13 )   PT: 12.4 sec;   INR: 1.10 ratio         PTT - ( 30 Nov 2018 08:13 )  PTT:27.9 sec    Impression: 83 year old male with PMH HTN, subdural hematoma due to injury s/p craniotomy, now admitted with acute cholecystitis, cholelithiasis. IR was consulted for Perc Crystal Friday, but patient was not evaluated.   Patient clinically with slow improvement on IV ABX.     Plan:  - IR to reevaluate for percutaneous cholecystostomy Monday  - continue Zosyn  - Cont. Full liquids as tolerated  - continue home meds, medical management and supportive care  - F/u labs  - pain management PRN, antiemetics PRN, antipyretics PRN  - will discuss with surgical attending

## 2018-12-02 NOTE — PROGRESS NOTE ADULT - SUBJECTIVE AND OBJECTIVE BOX
83 year old male with history of seizures, SDH, HTN who p/w acute cholecystitis. He is lying in bed in NAD.    MEDICATIONS  (STANDING):  dextrose 5% + sodium chloride 0.45%. 1000 milliLiter(s) (75 mL/Hr) IV Continuous <Continuous>  digoxin     Tablet 0.125 milliGRAM(s) Oral daily  diltiazem    milliGRAM(s) Oral daily  docusate sodium 100 milliGRAM(s) Oral three times a day  ferrous    sulfate 325 milliGRAM(s) Oral daily  folic acid 1 milliGRAM(s) Oral daily  heparin  Injectable 5000 Unit(s) SubCutaneous every 12 hours  levETIRAcetam 500 milliGRAM(s) Oral two times a day  lisinopril 5 milliGRAM(s) Oral daily  piperacillin/tazobactam IVPB. 3.375 Gram(s) IV Intermittent every 8 hours  potassium acid phosphate/sodium acid phosphate tablet (K-PHOS No. 2) 1 Tablet(s) Oral four times a day with meals  senna 2 Tablet(s) Oral at bedtime    MEDICATIONS  (PRN):  bisacodyl Suppository 10 milliGRAM(s) Rectal daily PRN Constipation  LORazepam   Injectable 0.5 milliGRAM(s) IV Push every 6 hours PRN Self-Injurious behavior  ondansetron Injectable 4 milliGRAM(s) IV Push once PRN Nausea and/or Vomiting      Allergies    No Known Allergies    Intolerances    Vital Signs Last 24 Hrs  T(C): 36.9 (02 Dec 2018 11:06), Max: 36.9 (01 Dec 2018 17:15)  T(F): 98.4 (02 Dec 2018 11:06), Max: 98.4 (01 Dec 2018 17:15)  HR: 61 (02 Dec 2018 11:06) (60 - 80)  BP: 141/74 (02 Dec 2018 11:06) (141/74 - 173/72)  BP(mean): --  RR: 18 (02 Dec 2018 11:06) (16 - 19)  SpO2: 98% (02 Dec 2018 11:06) (96% - 98%)    PHYSICAL EXAM:  GENERAL: NAD, well-groomed, well-developed  HEAD:  Atraumatic, Normocephalic  EYES: EOMI, PERRLA   NECK: Supple   NERVOUS SYSTEM: Alert & Oriented to self    CHEST/LUNG: Clear to auscultation bilaterally; No rales, rhonchi, wheezing, or rubs  HEART: Regular rate and rhythm; No murmurs, rubs, or gallops  ABDOMEN: Soft, w/ distention and tympany on percussion, no more tenderness in RUQ    EXTREMITIES: No clubbing, cyanosis, or edema    LABS:                        10.2   8.92  )-----------( 350      ( 02 Dec 2018 06:28 )             30.2     12-02    140  |  106  |  9   ----------------------------<  93  3.8   |  24  |  0.98    Ca    8.1<L>      02 Dec 2018 06:28  Phos  3.1     12-02  Mg     2.5     12-02    TPro  7.3  /  Alb  2.4<L>  /  TBili  0.7  /  DBili  x   /  AST  46<H>  /  ALT  50  /  AlkPhos  107  12-02      Culture - Urine (collected 27 Nov 2018 23:00)  Source: .Urine Clean Catch (Midstream)  Final Report (28 Nov 2018 21:56):    No growth      RADIOLOGY & ADDITIONAL TESTS:    12-01-18 @ 07:01  -  12-02-18 @ 07:00  --------------------------------------------------------  IN:    Oral Fluid: 100 mL    Solution: 100 mL  Total IN: 200 mL    OUT:  Total OUT: 0 mL    Total NET: 200 mL 83 year old male with history of seizures, SDH, HTN who p/w acute cholecystitis. He is lying in bed in NAD.    MEDICATIONS  (STANDING):  dextrose 5% + sodium chloride 0.45%. 1000 milliLiter(s) (75 mL/Hr) IV Continuous <Continuous>  digoxin     Tablet 0.125 milliGRAM(s) Oral daily  diltiazem    milliGRAM(s) Oral daily  docusate sodium 100 milliGRAM(s) Oral three times a day  ferrous    sulfate 325 milliGRAM(s) Oral daily  folic acid 1 milliGRAM(s) Oral daily  heparin  Injectable 5000 Unit(s) SubCutaneous every 12 hours  levETIRAcetam 500 milliGRAM(s) Oral two times a day  lisinopril 5 milliGRAM(s) Oral daily  piperacillin/tazobactam IVPB. 3.375 Gram(s) IV Intermittent every 8 hours  potassium acid phosphate/sodium acid phosphate tablet (K-PHOS No. 2) 1 Tablet(s) Oral four times a day with meals  senna 2 Tablet(s) Oral at bedtime    MEDICATIONS  (PRN):  bisacodyl Suppository 10 milliGRAM(s) Rectal daily PRN Constipation  LORazepam   Injectable 0.5 milliGRAM(s) IV Push every 6 hours PRN Self-Injurious behavior  ondansetron Injectable 4 milliGRAM(s) IV Push once PRN Nausea and/or Vomiting      Allergies    No Known Allergies    Intolerances    Vital Signs Last 24 Hrs  T(C): 36.9 (02 Dec 2018 11:06), Max: 36.9 (01 Dec 2018 17:15)  T(F): 98.4 (02 Dec 2018 11:06), Max: 98.4 (01 Dec 2018 17:15)  HR: 61 (02 Dec 2018 11:06) (60 - 80)  BP: 141/74 (02 Dec 2018 11:06) (141/74 - 173/72)  BP(mean): --  RR: 18 (02 Dec 2018 11:06) (16 - 19)  SpO2: 98% (02 Dec 2018 11:06) (96% - 98%)    PHYSICAL EXAM:  GENERAL: NAD, well-groomed, well-developed  HEAD:  Atraumatic, Normocephalic  EYES: EOMI, PERRLA   NECK: Supple   NERVOUS SYSTEM: Alert & Oriented to self, [facility], not oriented to time (wife reports his mental status is usually a little better but that he's never fully oriented to time)    CHEST/LUNG: Clear to auscultation bilaterally; No rales, rhonchi, wheezing, or rubs  HEART: Regular rate and rhythm; No murmurs, rubs, or gallops  ABDOMEN: Soft, w/ distention and tympany on percussion, no more tenderness in RUQ    EXTREMITIES: No clubbing, cyanosis, or edema    LABS:                        10.2   8.92  )-----------( 350      ( 02 Dec 2018 06:28 )             30.2     12-02    140  |  106  |  9   ----------------------------<  93  3.8   |  24  |  0.98    Ca    8.1<L>      02 Dec 2018 06:28  Phos  3.1     12-02  Mg     2.5     12-02    TPro  7.3  /  Alb  2.4<L>  /  TBili  0.7  /  DBili  x   /  AST  46<H>  /  ALT  50  /  AlkPhos  107  12-02      Culture - Urine (collected 27 Nov 2018 23:00)  Source: .Urine Clean Catch (Midstream)  Final Report (28 Nov 2018 21:56):    No growth      RADIOLOGY & ADDITIONAL TESTS:    12-01-18 @ 07:01  -  12-02-18 @ 07:00  --------------------------------------------------------  IN:    Oral Fluid: 100 mL    Solution: 100 mL  Total IN: 200 mL    OUT:  Total OUT: 0 mL    Total NET: 200 mL

## 2018-12-03 LAB
ALBUMIN SERPL ELPH-MCNC: 2.5 G/DL — LOW (ref 3.3–5)
ALP SERPL-CCNC: 105 U/L — SIGNIFICANT CHANGE UP (ref 40–120)
ALT FLD-CCNC: 48 U/L — SIGNIFICANT CHANGE UP (ref 12–78)
ANION GAP SERPL CALC-SCNC: 7 MMOL/L — SIGNIFICANT CHANGE UP (ref 5–17)
AST SERPL-CCNC: 31 U/L — SIGNIFICANT CHANGE UP (ref 15–37)
BILIRUB DIRECT SERPL-MCNC: 0.21 MG/DL — HIGH (ref 0.05–0.2)
BILIRUB INDIRECT FLD-MCNC: 0.4 MG/DL — SIGNIFICANT CHANGE UP (ref 0.2–1)
BILIRUB SERPL-MCNC: 0.6 MG/DL — SIGNIFICANT CHANGE UP (ref 0.2–1.2)
BUN SERPL-MCNC: 8 MG/DL — SIGNIFICANT CHANGE UP (ref 7–23)
CALCIUM SERPL-MCNC: 8.2 MG/DL — LOW (ref 8.5–10.1)
CHLORIDE SERPL-SCNC: 107 MMOL/L — SIGNIFICANT CHANGE UP (ref 96–108)
CO2 SERPL-SCNC: 26 MMOL/L — SIGNIFICANT CHANGE UP (ref 22–31)
CREAT SERPL-MCNC: 1.1 MG/DL — SIGNIFICANT CHANGE UP (ref 0.5–1.3)
GLUCOSE BLDC GLUCOMTR-MCNC: 138 MG/DL — HIGH (ref 70–99)
GLUCOSE SERPL-MCNC: 105 MG/DL — HIGH (ref 70–99)
HCT VFR BLD CALC: 30.8 % — LOW (ref 39–50)
HGB BLD-MCNC: 10.3 G/DL — LOW (ref 13–17)
MAGNESIUM SERPL-MCNC: 2.3 MG/DL — SIGNIFICANT CHANGE UP (ref 1.6–2.6)
MCHC RBC-ENTMCNC: 31.2 PG — SIGNIFICANT CHANGE UP (ref 27–34)
MCHC RBC-ENTMCNC: 33.4 GM/DL — SIGNIFICANT CHANGE UP (ref 32–36)
MCV RBC AUTO: 93.3 FL — SIGNIFICANT CHANGE UP (ref 80–100)
NRBC # BLD: 0 /100 WBCS — SIGNIFICANT CHANGE UP (ref 0–0)
PHOSPHATE SERPL-MCNC: 3 MG/DL — SIGNIFICANT CHANGE UP (ref 2.5–4.5)
PLATELET # BLD AUTO: 384 K/UL — SIGNIFICANT CHANGE UP (ref 150–400)
POTASSIUM SERPL-MCNC: 3.9 MMOL/L — SIGNIFICANT CHANGE UP (ref 3.5–5.3)
POTASSIUM SERPL-SCNC: 3.9 MMOL/L — SIGNIFICANT CHANGE UP (ref 3.5–5.3)
PROT SERPL-MCNC: 7.4 GM/DL — SIGNIFICANT CHANGE UP (ref 6–8.3)
RBC # BLD: 3.3 M/UL — LOW (ref 4.2–5.8)
RBC # FLD: 12.4 % — SIGNIFICANT CHANGE UP (ref 10.3–14.5)
SODIUM SERPL-SCNC: 140 MMOL/L — SIGNIFICANT CHANGE UP (ref 135–145)
WBC # BLD: 7.19 K/UL — SIGNIFICANT CHANGE UP (ref 3.8–10.5)
WBC # FLD AUTO: 7.19 K/UL — SIGNIFICANT CHANGE UP (ref 3.8–10.5)

## 2018-12-03 PROCEDURE — 99232 SBSQ HOSP IP/OBS MODERATE 35: CPT

## 2018-12-03 PROCEDURE — 78226 HEPATOBILIARY SYSTEM IMAGING: CPT | Mod: 26

## 2018-12-03 RX ORDER — MORPHINE SULFATE 50 MG/1
4 CAPSULE, EXTENDED RELEASE ORAL ONCE
Qty: 0 | Refills: 0 | Status: DISCONTINUED | OUTPATIENT
Start: 2018-12-03 | End: 2018-12-03

## 2018-12-03 RX ADMIN — Medication 240 MILLIGRAM(S): at 05:19

## 2018-12-03 RX ADMIN — LISINOPRIL 5 MILLIGRAM(S): 2.5 TABLET ORAL at 05:19

## 2018-12-03 RX ADMIN — PIPERACILLIN AND TAZOBACTAM 25 GRAM(S): 4; .5 INJECTION, POWDER, LYOPHILIZED, FOR SOLUTION INTRAVENOUS at 22:16

## 2018-12-03 RX ADMIN — Medication 100 MILLIGRAM(S): at 22:16

## 2018-12-03 RX ADMIN — MORPHINE SULFATE 4 MILLIGRAM(S): 50 CAPSULE, EXTENDED RELEASE ORAL at 12:05

## 2018-12-03 RX ADMIN — PIPERACILLIN AND TAZOBACTAM 25 GRAM(S): 4; .5 INJECTION, POWDER, LYOPHILIZED, FOR SOLUTION INTRAVENOUS at 15:42

## 2018-12-03 RX ADMIN — HEPARIN SODIUM 5000 UNIT(S): 5000 INJECTION INTRAVENOUS; SUBCUTANEOUS at 17:15

## 2018-12-03 RX ADMIN — PIPERACILLIN AND TAZOBACTAM 25 GRAM(S): 4; .5 INJECTION, POWDER, LYOPHILIZED, FOR SOLUTION INTRAVENOUS at 05:18

## 2018-12-03 RX ADMIN — SODIUM CHLORIDE 75 MILLILITER(S): 9 INJECTION, SOLUTION INTRAVENOUS at 02:50

## 2018-12-03 RX ADMIN — SODIUM CHLORIDE 75 MILLILITER(S): 9 INJECTION, SOLUTION INTRAVENOUS at 22:24

## 2018-12-03 RX ADMIN — SENNA PLUS 2 TABLET(S): 8.6 TABLET ORAL at 22:16

## 2018-12-03 RX ADMIN — LEVETIRACETAM 500 MILLIGRAM(S): 250 TABLET, FILM COATED ORAL at 05:18

## 2018-12-03 RX ADMIN — Medication 0.12 MILLIGRAM(S): at 05:20

## 2018-12-03 RX ADMIN — Medication 100 MILLIGRAM(S): at 05:18

## 2018-12-03 RX ADMIN — LEVETIRACETAM 500 MILLIGRAM(S): 250 TABLET, FILM COATED ORAL at 17:15

## 2018-12-03 NOTE — PROGRESS NOTE ADULT - SUBJECTIVE AND OBJECTIVE BOX
INTERVAL HPI/OVERNIGHT EVENTS:  Pt. seen and examined at bedside resting comfortably. Denies pain or complaints. States he is doing "pretty good." Tolerating full liquids, denies N/V.   No acute overnight event. Has been NPO pMN for IR evaluation today.   +flatus, denies BM.   Denies fever/chills, chest pain, dyspnea, cough, dizziness.     Vital Signs Last 24 Hrs  T(C): 36.7 (03 Dec 2018 04:47), Max: 36.9 (02 Dec 2018 11:06)  T(F): 98 (03 Dec 2018 04:47), Max: 98.4 (02 Dec 2018 11:06)  HR: 59 (03 Dec 2018 04:47) (59 - 76)  BP: 136/55 (03 Dec 2018 04:47) (136/55 - 153/76)  RR: 16 (03 Dec 2018 04:47) (16 - 18)  SpO2: 97% (03 Dec 2018 04:47) (97% - 98%)    PHYSICAL EXAM:    GENERAL: NAD  CHEST/LUNG: Clear to ausculation, bilaterally   HEART: S1S2  ABDOMEN: softly distended, +BS, soft, +mild RUQ and epigastric tenderness (improved since my last exam), no guarding, no rebound   EXTREMITIES:  calf soft, non tender b/l. 2+ distal pulses b/l.     I&O's Detail    01 Dec 2018 07:01  -  02 Dec 2018 07:00  --------------------------------------------------------  IN:    Oral Fluid: 100 mL    Solution: 100 mL  Total IN: 200 mL    OUT:  Total OUT: 0 mL    Total NET: 200 mL      02 Dec 2018 07:01  -  03 Dec 2018 05:59  --------------------------------------------------------  IN:    Oral Fluid: 300 mL    Solution: 300 mL  Total IN: 600 mL    OUT:  Total OUT: 0 mL    Total NET: 600 mL      LABS:                        10.2   8.92  )-----------( 350      ( 02 Dec 2018 06:28 )             30.2     12-02    140  |  106  |  9   ----------------------------<  93  3.8   |  24  |  0.98    Ca    8.1<L>      02 Dec 2018 06:28  Phos  3.1     12-02  Mg     2.5     12-02    TPro  7.3  /  Alb  2.4<L>  /  TBili  0.7  /  DBili  x   /  AST  46<H>  /  ALT  50  /  AlkPhos  107  12-02    Impression: 83 year old male with PMH HTN, subdural hematoma due to injury s/p craniotomy, now admitted with acute cholecystitis, cholelithiasis. IR was consulted for Perc Crystal Friday, but patient was not evaluated.   Patient clinically with slow improvement on IV ABX.     Plan:  - Continue NPO for now with IV hydration, Await IR evaluation today for possible Perc Crystal tube  - continue Zosyn  - continue home meds, medical management and supportive care  - Bowel regimen for constipation  - F/u am labs  - DVt ppx  - will discuss with surgical attending

## 2018-12-03 NOTE — CHART NOTE - NSCHARTNOTEFT_GEN_A_CORE
AM lab review:                          10.3   7.19  )-----------( 384      ( 03 Dec 2018 08:06 )             30.8       12-03    140  |  107  |  8   ----------------------------<  105<H>  3.9   |  26  |  1.10    Ca    8.2<L>      03 Dec 2018 08:06  Phos  3.0     12-03  Mg     2.3     12-03    TPro  7.4  /  Alb  2.5<L>  /  TBili  0.6  /  DBili  .21<H>  /  AST  31  /  ALT  48  /  AlkPhos  105  12-03        Impression:  cholelithiasis    Plan:  per discussion with Dr. Christiansen, HIDA scan ordered.  possible IR percutaneous cholecystostomy today.  Await reevaluation by Dr. Christiansen.

## 2018-12-03 NOTE — PROGRESS NOTE ADULT - ASSESSMENT
83 year old male with history of seizures, SDH, CKD II, HTN who p/w acute cholecystitis.    Acute cholecystitis  - Sepsis POA  - CT showed a distended gallbladder with pericholecystic fluid and 1 cm stone in the gallbladder neck c/w acute cholecystitis  - c/w Zosyn  - LFTs improving  - no IR intervention   - NGTD on cultures   - Management per surgery    FRANCA on CKD II  - resolved    Metabolic encephalopathy  - improving   - likely due to FRANCA & cholecystitis   - monitor mental status, reality reorientation, avoid sedating medications    Seizure  -c/w Keppra     Anemia  - iron panel shows anemia of chronic disease, recommend stopping ferrous sulfate as it's contributing to constipation     Hypertension  - c/w diltiazem and lisinopril    Constipation  - c/w colace, senna and dulcolax    Hypophosphatemia  - Phos replaced

## 2018-12-03 NOTE — PROGRESS NOTE ADULT - SUBJECTIVE AND OBJECTIVE BOX
Patient is a 83y old  Male who presents with a chief complaint of abd pain (03 Dec 2018 05:59)      INTERVAL HPI/OVERNIGHT EVENTS:    MEDICATIONS  (STANDING):  dextrose 5% + sodium chloride 0.45%. 1000 milliLiter(s) (75 mL/Hr) IV Continuous <Continuous>  digoxin     Tablet 0.125 milliGRAM(s) Oral daily  diltiazem    milliGRAM(s) Oral daily  docusate sodium 100 milliGRAM(s) Oral three times a day  ferrous    sulfate 325 milliGRAM(s) Oral daily  folic acid 1 milliGRAM(s) Oral daily  heparin  Injectable 5000 Unit(s) SubCutaneous every 12 hours  levETIRAcetam 500 milliGRAM(s) Oral two times a day  lisinopril 5 milliGRAM(s) Oral daily  piperacillin/tazobactam IVPB. 3.375 Gram(s) IV Intermittent every 8 hours  senna 2 Tablet(s) Oral at bedtime    MEDICATIONS  (PRN):  bisacodyl Suppository 10 milliGRAM(s) Rectal daily PRN Constipation  LORazepam   Injectable 0.5 milliGRAM(s) IV Push every 6 hours PRN Self-Injurious behavior  ondansetron Injectable 4 milliGRAM(s) IV Push once PRN Nausea and/or Vomiting      Allergies    No Known Allergies    Intolerances        REVIEW OF SYSTEMS:  CONSTITUTIONAL: No fever, weight loss, or fatigue  EYES: No eye pain, visual disturbances, or discharge  ENMT:  No difficulty hearing, tinnitus, vertigo; No sinus or throat pain  NECK: No pain or stiffness  BREASTS: No pain, masses, or nipple discharge  RESPIRATORY: No cough, wheezing, chills or hemoptysis; No shortness of breath  CARDIOVASCULAR: No chest pain, palpitations, dizziness, or leg swelling  GASTROINTESTINAL: No abdominal or epigastric pain. No nausea, vomiting, or hematemesis; No diarrhea or constipation. No melena or hematochezia.  GENITOURINARY: No dysuria, frequency, hematuria, or incontinence  NEUROLOGICAL: No headaches, memory loss, loss of strength, numbness, or tremors  SKIN: No itching, burning, rashes, or lesions   LYMPH NODES: No enlarged glands  ENDOCRINE: No heat or cold intolerance; No hair loss  MUSCULOSKELETAL: No joint pain or swelling; No muscle, back, or extremity pain  PSYCHIATRIC: No depression, anxiety, mood swings, or difficulty sleeping  HEME/LYMPH: No easy bruising, or bleeding gums  ALLERGY AND IMMUNOLOGIC: No hives or eczema    Vital Signs Last 24 Hrs  T(C): 36.7 (03 Dec 2018 04:47), Max: 36.7 (03 Dec 2018 04:47)  T(F): 98 (03 Dec 2018 04:47), Max: 98 (03 Dec 2018 04:47)  HR: 59 (03 Dec 2018 04:47) (59 - 76)  BP: 136/55 (03 Dec 2018 04:47) (136/55 - 153/76)  BP(mean): --  RR: 16 (03 Dec 2018 04:47) (16 - 18)  SpO2: 97% (03 Dec 2018 04:47) (97% - 98%)    PHYSICAL EXAM:  GENERAL: NAD, well-groomed, well-developed  HEAD:  Atraumatic, Normocephalic  EYES: EOMI, PERRLA, conjunctiva and sclera clear  ENMT: No tonsillar erythema, exudates, or enlargement; Moist mucous membranes, Good dentition, No lesions  NECK: Supple, No JVD, Normal thyroid  NERVOUS SYSTEM:  Alert & Oriented X1,  ; Motor Strength 5/5 B/L upper and lower extremities; DTRs 2+ intact and symmetric  CHEST/LUNG: Clear to percussion bilaterally; No rales, rhonchi, wheezing, or rubs  HEART: Regular rate and rhythm; No murmurs, rubs, or gallops  ABDOMEN: Soft, Nontender, Nondistended; Bowel sounds present  EXTREMITIES:  2+ Peripheral Pulses, No clubbing, cyanosis, or edema  LYMPH: No lymphadenopathy noted  SKIN: No rashes or lesions    LABS:                        10.3   7.19  )-----------( 384      ( 03 Dec 2018 08:06 )             30.8     12-03    140  |  107  |  8   ----------------------------<  105<H>  3.9   |  26  |  1.10    Ca    8.2<L>      03 Dec 2018 08:06  Phos  3.0     12-03  Mg     2.3     12-03    TPro  7.4  /  Alb  2.5<L>  /  TBili  0.6  /  DBili  .21<H>  /  AST  31  /  ALT  48  /  AlkPhos  105  12-03        CAPILLARY BLOOD GLUCOSE          RADIOLOGY & ADDITIONAL TESTS:    Imaging Personally Reviewed:  [ X] YES  [ ] NO    Consultant(s) Notes Reviewed:  [ X] YES  [ ] NO    Care Discussed with Consultants/Other Providers [X ] YES  [ ] NO

## 2018-12-03 NOTE — CHART NOTE - NSCHARTNOTEFT_GEN_A_CORE
Patient improving clinically on conservative management, Alk phos and WBC normal. Obstruction likely resolved although awaiting HIDA. Keep NPO for possible gus tube placement. Consider re-evaluating surgical candidacy now that initial insult is resolved.

## 2018-12-03 NOTE — CHART NOTE - NSCHARTNOTEFT_GEN_A_CORE
IR Brief Progress Note    HIDA scan is normal confirming resolution of obstruction. No role for IR percutaneous cholecystostomy. D/w Dr. Christiansen.

## 2018-12-04 LAB
ALBUMIN SERPL ELPH-MCNC: 2.7 G/DL — LOW (ref 3.3–5)
ALP SERPL-CCNC: 107 U/L — SIGNIFICANT CHANGE UP (ref 40–120)
ALT FLD-CCNC: 55 U/L — SIGNIFICANT CHANGE UP (ref 12–78)
ANION GAP SERPL CALC-SCNC: 10 MMOL/L — SIGNIFICANT CHANGE UP (ref 5–17)
AST SERPL-CCNC: 33 U/L — SIGNIFICANT CHANGE UP (ref 15–37)
BASOPHILS # BLD AUTO: 0 K/UL — SIGNIFICANT CHANGE UP (ref 0–0.2)
BASOPHILS NFR BLD AUTO: 0 % — SIGNIFICANT CHANGE UP (ref 0–2)
BILIRUB DIRECT SERPL-MCNC: 0.21 MG/DL — HIGH (ref 0.05–0.2)
BILIRUB INDIRECT FLD-MCNC: 0.3 MG/DL — SIGNIFICANT CHANGE UP (ref 0.2–1)
BILIRUB SERPL-MCNC: 0.5 MG/DL — SIGNIFICANT CHANGE UP (ref 0.2–1.2)
BUN SERPL-MCNC: 7 MG/DL — SIGNIFICANT CHANGE UP (ref 7–23)
CALCIUM SERPL-MCNC: 8.5 MG/DL — SIGNIFICANT CHANGE UP (ref 8.5–10.1)
CHLORIDE SERPL-SCNC: 106 MMOL/L — SIGNIFICANT CHANGE UP (ref 96–108)
CO2 SERPL-SCNC: 25 MMOL/L — SIGNIFICANT CHANGE UP (ref 22–31)
CREAT SERPL-MCNC: 1.05 MG/DL — SIGNIFICANT CHANGE UP (ref 0.5–1.3)
EOSINOPHIL # BLD AUTO: 0 K/UL — SIGNIFICANT CHANGE UP (ref 0–0.5)
EOSINOPHIL NFR BLD AUTO: 0 % — SIGNIFICANT CHANGE UP (ref 0–6)
GLUCOSE SERPL-MCNC: 96 MG/DL — SIGNIFICANT CHANGE UP (ref 70–99)
HCT VFR BLD CALC: 33.3 % — LOW (ref 39–50)
HGB BLD-MCNC: 11.2 G/DL — LOW (ref 13–17)
LYMPHOCYTES # BLD AUTO: 1.85 K/UL — SIGNIFICANT CHANGE UP (ref 1–3.3)
LYMPHOCYTES # BLD AUTO: 27 % — SIGNIFICANT CHANGE UP (ref 13–44)
MAGNESIUM SERPL-MCNC: 2.3 MG/DL — SIGNIFICANT CHANGE UP (ref 1.6–2.6)
MCHC RBC-ENTMCNC: 31.5 PG — SIGNIFICANT CHANGE UP (ref 27–34)
MCHC RBC-ENTMCNC: 33.6 GM/DL — SIGNIFICANT CHANGE UP (ref 32–36)
MCV RBC AUTO: 93.8 FL — SIGNIFICANT CHANGE UP (ref 80–100)
MONOCYTES # BLD AUTO: 0.34 K/UL — SIGNIFICANT CHANGE UP (ref 0–0.9)
MONOCYTES NFR BLD AUTO: 5 % — SIGNIFICANT CHANGE UP (ref 2–14)
NEUTROPHILS # BLD AUTO: 4.67 K/UL — SIGNIFICANT CHANGE UP (ref 1.8–7.4)
NEUTROPHILS NFR BLD AUTO: 68 % — SIGNIFICANT CHANGE UP (ref 43–77)
PHOSPHATE SERPL-MCNC: 3.1 MG/DL — SIGNIFICANT CHANGE UP (ref 2.5–4.5)
PLATELET # BLD AUTO: 469 K/UL — HIGH (ref 150–400)
POTASSIUM SERPL-MCNC: 4 MMOL/L — SIGNIFICANT CHANGE UP (ref 3.5–5.3)
POTASSIUM SERPL-SCNC: 4 MMOL/L — SIGNIFICANT CHANGE UP (ref 3.5–5.3)
PROT SERPL-MCNC: 8.1 GM/DL — SIGNIFICANT CHANGE UP (ref 6–8.3)
RBC # BLD: 3.55 M/UL — LOW (ref 4.2–5.8)
RBC # FLD: 12.3 % — SIGNIFICANT CHANGE UP (ref 10.3–14.5)
SODIUM SERPL-SCNC: 141 MMOL/L — SIGNIFICANT CHANGE UP (ref 135–145)
WBC # BLD: 6.87 K/UL — SIGNIFICANT CHANGE UP (ref 3.8–10.5)
WBC # FLD AUTO: 6.87 K/UL — SIGNIFICANT CHANGE UP (ref 3.8–10.5)

## 2018-12-04 PROCEDURE — 99232 SBSQ HOSP IP/OBS MODERATE 35: CPT

## 2018-12-04 RX ADMIN — Medication 100 MILLIGRAM(S): at 14:13

## 2018-12-04 RX ADMIN — Medication 240 MILLIGRAM(S): at 05:21

## 2018-12-04 RX ADMIN — LEVETIRACETAM 500 MILLIGRAM(S): 250 TABLET, FILM COATED ORAL at 18:17

## 2018-12-04 RX ADMIN — HEPARIN SODIUM 5000 UNIT(S): 5000 INJECTION INTRAVENOUS; SUBCUTANEOUS at 05:21

## 2018-12-04 RX ADMIN — LEVETIRACETAM 500 MILLIGRAM(S): 250 TABLET, FILM COATED ORAL at 05:22

## 2018-12-04 RX ADMIN — SENNA PLUS 2 TABLET(S): 8.6 TABLET ORAL at 22:20

## 2018-12-04 RX ADMIN — PIPERACILLIN AND TAZOBACTAM 25 GRAM(S): 4; .5 INJECTION, POWDER, LYOPHILIZED, FOR SOLUTION INTRAVENOUS at 05:18

## 2018-12-04 RX ADMIN — PIPERACILLIN AND TAZOBACTAM 25 GRAM(S): 4; .5 INJECTION, POWDER, LYOPHILIZED, FOR SOLUTION INTRAVENOUS at 22:20

## 2018-12-04 RX ADMIN — Medication 100 MILLIGRAM(S): at 22:20

## 2018-12-04 RX ADMIN — HEPARIN SODIUM 5000 UNIT(S): 5000 INJECTION INTRAVENOUS; SUBCUTANEOUS at 18:16

## 2018-12-04 RX ADMIN — LISINOPRIL 5 MILLIGRAM(S): 2.5 TABLET ORAL at 05:21

## 2018-12-04 RX ADMIN — SODIUM CHLORIDE 75 MILLILITER(S): 9 INJECTION, SOLUTION INTRAVENOUS at 11:44

## 2018-12-04 RX ADMIN — Medication 325 MILLIGRAM(S): at 11:43

## 2018-12-04 RX ADMIN — Medication 0.12 MILLIGRAM(S): at 05:21

## 2018-12-04 RX ADMIN — PIPERACILLIN AND TAZOBACTAM 25 GRAM(S): 4; .5 INJECTION, POWDER, LYOPHILIZED, FOR SOLUTION INTRAVENOUS at 14:13

## 2018-12-04 RX ADMIN — Medication 100 MILLIGRAM(S): at 05:21

## 2018-12-04 RX ADMIN — Medication 1 MILLIGRAM(S): at 11:43

## 2018-12-04 NOTE — PROGRESS NOTE ADULT - ATTENDING COMMENTS
Patient seen/examined.  Agree w above note and plan and have discussed plan w house staff.  tolerating diet, abdomen soft, non-tender.  Pls re-consult as needed    Christopher Thomas MD

## 2018-12-04 NOTE — PROGRESS NOTE ADULT - SUBJECTIVE AND OBJECTIVE BOX
Patient seen and examined bedside resting comfortably.  C/O some pain abd. He points to RUQ  Denies nausea and vomiting. Tolerating diet.  Flatus/BM. +  Denies chest pain, dyspnea, cough.    T(F): 97.3 (12-04-18 @ 05:00), Max: 97.3 (12-04-18 @ 05:00)  HR: 55 (12-04-18 @ 05:00) (55 - 77)  BP: 150/73 (12-04-18 @ 05:00) (114/69 - 153/83)  RR: 16 (12-04-18 @ 05:00) (16 - 17)  SpO2: 97% (12-04-18 @ 05:00) (95% - 99%)    PHYSICAL EXAM:  General: NAD  Neuro:  Alert & oriented x 3  Abdomen: mildly distended. Mild tenderness in RUQ to deep palpation. BS+    LABS:                        11.2   6.87  )-----------( 469      ( 04 Dec 2018 07:13 )             33.3     12-04    141  |  106  |  7   ----------------------------<  96  4.0   |  25  |  1.05    Ca    8.5      04 Dec 2018 07:13  Phos  3.1     12-04  Mg     2.3     12-04    TPro  8.1  /  Alb  2.7<L>  /  TBili  0.5  /  DBili  .21<H>  /  AST  33  /  ALT  55  /  AlkPhos  107  12-04    < from: NM Hepatobiliary Scan w/wo Gall Bladder (12.03.18 @ 13:28) >    EXAM:  NM HEPATOBILIARY IMG                            PROCEDURE DATE:  12/03/2018          INTERPRETATION:  CLINICAL STATEMENT: 83-year-old male with persistent   right upper quadrant tenderness.    RADIOPHARMACEUTICAL: 3.0 mCi Tc-99m-Mebrofenin, I.V.; 2 doses    TECHNIQUE:  Dynamic images of the anterior abdomen were obtained for 1   hour following injection of radiotracer. Morphine 4 mg I.V. and a second   dose of radiotracer were administered at 1 hour. Dynamic imaging was   continued for 1hour followed by static images of the abdomen in the   right lateral and right anterior oblique views immediately thereafter.    FINDINGS: There is prompt, homogeneous uptake of radiotracer by the   hepatocytes. Activity is first seen in the bowel at15 minutes. The   gallbladder is not visualized in the first hour of imaging, but was seen   35 minutes after the administration of morphine. There is good clearance   of activity from the liver at the end of the study.    IMPRESSION: Normal morphine-augmented hepatobiliary scan.    No evidence of acute cholecystitis.          I&O's Detail    03 Dec 2018 07:01  -  04 Dec 2018 07:00  --------------------------------------------------------  IN:    dextrose 5% + sodium chloride 0.45%.: 900 mL    Solution: 200 mL  Total IN: 1100 mL    OUT:    Voided: 1400 mL  Total OUT: 1400 mL    Total NET: -300 mL      04 Dec 2018 07:01  -  04 Dec 2018 11:48  --------------------------------------------------------  IN:    dextrose 5% + sodium chloride 0.45%.: 1000 mL  Total IN: 1000 mL    OUT:  Total OUT: 0 mL    Total NET: 1000 mL          Impression: 83y Male with cholelithiasis      Plan:  -continue VTE prophylaxis   - continue IVAB   - continue medical f/u  -Increase activity with PT, OOB, Ambulate  -f/u AM labs  -diet was advanced to low fat regular diet  -Will discuss with Surgical attending regarding discharge plans.

## 2018-12-04 NOTE — PROGRESS NOTE ADULT - SUBJECTIVE AND OBJECTIVE BOX
Patient is a 83y old  Male who presents with a chief complaint of abd pain (04 Dec 2018 11:48)      INTERVAL HPI/OVERNIGHT EVENTS: no events     MEDICATIONS  (STANDING):  dextrose 5% + sodium chloride 0.45%. 1000 milliLiter(s) (75 mL/Hr) IV Continuous <Continuous>  digoxin     Tablet 0.125 milliGRAM(s) Oral daily  diltiazem    milliGRAM(s) Oral daily  docusate sodium 100 milliGRAM(s) Oral three times a day  ferrous    sulfate 325 milliGRAM(s) Oral daily  folic acid 1 milliGRAM(s) Oral daily  heparin  Injectable 5000 Unit(s) SubCutaneous every 12 hours  levETIRAcetam 500 milliGRAM(s) Oral two times a day  lisinopril 5 milliGRAM(s) Oral daily  piperacillin/tazobactam IVPB. 3.375 Gram(s) IV Intermittent every 8 hours  senna 2 Tablet(s) Oral at bedtime    MEDICATIONS  (PRN):  bisacodyl Suppository 10 milliGRAM(s) Rectal daily PRN Constipation  LORazepam   Injectable 0.5 milliGRAM(s) IV Push every 6 hours PRN Self-Injurious behavior  ondansetron Injectable 4 milliGRAM(s) IV Push once PRN Nausea and/or Vomiting      Allergies    No Known Allergies    Intolerances        REVIEW OF SYSTEMS:  CONSTITUTIONAL: No fever, weight loss, or fatigue  EYES: No eye pain, visual disturbances, or discharge  ENMT:  No difficulty hearing, tinnitus, vertigo; No sinus or throat pain  NECK: No pain or stiffness  BREASTS: No pain, masses, or nipple discharge  RESPIRATORY: No cough, wheezing, chills or hemoptysis; No shortness of breath  CARDIOVASCULAR: No chest pain, palpitations, dizziness, or leg swelling  GASTROINTESTINAL: No abdominal or epigastric pain. No nausea, vomiting, or hematemesis; No diarrhea or constipation. No melena or hematochezia.  GENITOURINARY: No dysuria, frequency, hematuria, or incontinence  NEUROLOGICAL: No headaches, memory loss, loss of strength, numbness, or tremors  SKIN: No itching, burning, rashes, or lesions   LYMPH NODES: No enlarged glands  ENDOCRINE: No heat or cold intolerance; No hair loss  MUSCULOSKELETAL: No joint pain or swelling; No muscle, back, or extremity pain  PSYCHIATRIC: No depression, anxiety, mood swings, or difficulty sleeping  HEME/LYMPH: No easy bruising, or bleeding gums  ALLERGY AND IMMUNOLOGIC: No hives or eczema    Vital Signs Last 24 Hrs  T(C): 36.3 (04 Dec 2018 05:00), Max: 36.3 (04 Dec 2018 05:00)  T(F): 97.3 (04 Dec 2018 05:00), Max: 97.3 (04 Dec 2018 05:00)  HR: 55 (04 Dec 2018 05:00) (55 - 77)  BP: 150/73 (04 Dec 2018 05:00) (114/69 - 153/83)  BP(mean): --  RR: 16 (04 Dec 2018 05:00) (16 - 17)  SpO2: 97% (04 Dec 2018 05:00) (95% - 99%)    PHYSICAL EXAM:  GENERAL: NAD, well-groomed, well-developed  HEAD:  Atraumatic, Normocephalic  EYES: EOMI, PERRLA, conjunctiva and sclera clear  ENMT: No tonsillar erythema, exudates, or enlargement; Moist mucous membranes, Good dentition, No lesions  NECK: Supple, No JVD, Normal thyroid  NERVOUS SYSTEM:  Alert & Oriented X1,  ; Motor Strength 5/5 B/L upper and lower extremities; DTRs 2+ intact and symmetric  CHEST/LUNG: Clear to percussion bilaterally; No rales, rhonchi, wheezing, or rubs  HEART: Regular rate and rhythm; No murmurs, rubs, or gallops  ABDOMEN: Soft, Nontender, Nondistended; Bowel sounds present  EXTREMITIES:  2+ Peripheral Pulses, No clubbing, cyanosis, or edema  LYMPH: No lymphadenopathy noted  SKIN: No rashes or lesions    LABS:                        11.2   6.87  )-----------( 469      ( 04 Dec 2018 07:13 )             33.3     12-04    141  |  106  |  7   ----------------------------<  96  4.0   |  25  |  1.05    Ca    8.5      04 Dec 2018 07:13  Phos  3.1     12-04  Mg     2.3     12-04    TPro  8.1  /  Alb  2.7<L>  /  TBili  0.5  /  DBili  .21<H>  /  AST  33  /  ALT  55  /  AlkPhos  107  12-04        CAPILLARY BLOOD GLUCOSE      POCT Blood Glucose.: 138 mg/dL (03 Dec 2018 16:20)      RADIOLOGY & ADDITIONAL TESTS:    Imaging Personally Reviewed:  [ X] YES  [ ] NO    Consultant(s) Notes Reviewed:  [ X] YES  [ ] NO    Care Discussed with Consultants/Other Providers [X ] YES  [ ] NO

## 2018-12-05 ENCOUNTER — TRANSCRIPTION ENCOUNTER (OUTPATIENT)
Age: 83
End: 2018-12-05

## 2018-12-05 VITALS
DIASTOLIC BLOOD PRESSURE: 65 MMHG | TEMPERATURE: 98 F | HEART RATE: 65 BPM | RESPIRATION RATE: 16 BRPM | SYSTOLIC BLOOD PRESSURE: 151 MMHG | OXYGEN SATURATION: 98 %

## 2018-12-05 LAB
ANION GAP SERPL CALC-SCNC: 7 MMOL/L — SIGNIFICANT CHANGE UP (ref 5–17)
BASOPHILS # BLD AUTO: 0.05 K/UL — SIGNIFICANT CHANGE UP (ref 0–0.2)
BASOPHILS NFR BLD AUTO: 0.8 % — SIGNIFICANT CHANGE UP (ref 0–2)
BUN SERPL-MCNC: 6 MG/DL — LOW (ref 7–23)
CALCIUM SERPL-MCNC: 8.4 MG/DL — LOW (ref 8.5–10.1)
CHLORIDE SERPL-SCNC: 108 MMOL/L — SIGNIFICANT CHANGE UP (ref 96–108)
CO2 SERPL-SCNC: 26 MMOL/L — SIGNIFICANT CHANGE UP (ref 22–31)
CREAT SERPL-MCNC: 1.06 MG/DL — SIGNIFICANT CHANGE UP (ref 0.5–1.3)
EOSINOPHIL # BLD AUTO: 0.29 K/UL — SIGNIFICANT CHANGE UP (ref 0–0.5)
EOSINOPHIL NFR BLD AUTO: 4.4 % — SIGNIFICANT CHANGE UP (ref 0–6)
GLUCOSE SERPL-MCNC: 93 MG/DL — SIGNIFICANT CHANGE UP (ref 70–99)
HCT VFR BLD CALC: 35.1 % — LOW (ref 39–50)
HGB BLD-MCNC: 11.7 G/DL — LOW (ref 13–17)
IMM GRANULOCYTES NFR BLD AUTO: 0.8 % — SIGNIFICANT CHANGE UP (ref 0–1.5)
LYMPHOCYTES # BLD AUTO: 2.12 K/UL — SIGNIFICANT CHANGE UP (ref 1–3.3)
LYMPHOCYTES # BLD AUTO: 32.2 % — SIGNIFICANT CHANGE UP (ref 13–44)
MCHC RBC-ENTMCNC: 31.1 PG — SIGNIFICANT CHANGE UP (ref 27–34)
MCHC RBC-ENTMCNC: 33.3 GM/DL — SIGNIFICANT CHANGE UP (ref 32–36)
MCV RBC AUTO: 93.4 FL — SIGNIFICANT CHANGE UP (ref 80–100)
MONOCYTES # BLD AUTO: 0.65 K/UL — SIGNIFICANT CHANGE UP (ref 0–0.9)
MONOCYTES NFR BLD AUTO: 9.9 % — SIGNIFICANT CHANGE UP (ref 2–14)
NEUTROPHILS # BLD AUTO: 3.43 K/UL — SIGNIFICANT CHANGE UP (ref 1.8–7.4)
NEUTROPHILS NFR BLD AUTO: 51.9 % — SIGNIFICANT CHANGE UP (ref 43–77)
PLATELET # BLD AUTO: 488 K/UL — HIGH (ref 150–400)
POTASSIUM SERPL-MCNC: 4.4 MMOL/L — SIGNIFICANT CHANGE UP (ref 3.5–5.3)
POTASSIUM SERPL-SCNC: 4.4 MMOL/L — SIGNIFICANT CHANGE UP (ref 3.5–5.3)
RBC # BLD: 3.76 M/UL — LOW (ref 4.2–5.8)
RBC # FLD: 12.3 % — SIGNIFICANT CHANGE UP (ref 10.3–14.5)
SODIUM SERPL-SCNC: 141 MMOL/L — SIGNIFICANT CHANGE UP (ref 135–145)
WBC # BLD: 6.59 K/UL — SIGNIFICANT CHANGE UP (ref 3.8–10.5)
WBC # FLD AUTO: 6.59 K/UL — SIGNIFICANT CHANGE UP (ref 3.8–10.5)

## 2018-12-05 RX ORDER — DOCUSATE SODIUM 100 MG
1 CAPSULE ORAL
Qty: 0 | Refills: 0 | COMMUNITY
Start: 2018-12-05

## 2018-12-05 RX ADMIN — HEPARIN SODIUM 5000 UNIT(S): 5000 INJECTION INTRAVENOUS; SUBCUTANEOUS at 06:43

## 2018-12-05 RX ADMIN — LEVETIRACETAM 500 MILLIGRAM(S): 250 TABLET, FILM COATED ORAL at 06:42

## 2018-12-05 RX ADMIN — Medication 100 MILLIGRAM(S): at 06:42

## 2018-12-05 RX ADMIN — LISINOPRIL 5 MILLIGRAM(S): 2.5 TABLET ORAL at 06:42

## 2018-12-05 RX ADMIN — PIPERACILLIN AND TAZOBACTAM 25 GRAM(S): 4; .5 INJECTION, POWDER, LYOPHILIZED, FOR SOLUTION INTRAVENOUS at 06:43

## 2018-12-05 RX ADMIN — Medication 240 MILLIGRAM(S): at 06:42

## 2018-12-05 NOTE — DISCHARGE NOTE ADULT - PATIENT PORTAL LINK FT
You can access the [facility] Patient Portal, offered by [facility], by registering with the following website: [email or web address]

## 2018-12-05 NOTE — DISCHARGE NOTE ADULT - CARE PROVIDER_API CALL
Luiz Christiansen (MD), Surgery  [address] [facility]  [address] [place] [address]  Phone: [phone]  Fax: [phone]

## 2018-12-05 NOTE — DISCHARGE NOTE ADULT - ADDITIONAL INSTRUCTIONS
Please call doctor's office for a follow up appointment to be seen in 10-14 days  Follow up with your private medical doctor.  Continue with home physical therapy

## 2018-12-05 NOTE — DISCHARGE NOTE ADULT - SECONDARY DIAGNOSIS.
FRANCA (acute kidney injury) Hypertension, unspecified type Seizure Calculus of gallbladder with acute cholecystitis without obstruction

## 2018-12-05 NOTE — DISCHARGE NOTE ADULT - NS AS ACTIVITY OBS
Showering allowed/No Heavy lifting/straining/as per physical therapy/Bathing allowed/Walking-Outdoors allowed/Do not drive or operate machinery/Walking-Indoors allowed

## 2018-12-05 NOTE — DISCHARGE NOTE ADULT - MEDICATION SUMMARY - MEDICATIONS TO TAKE
I will START or STAY ON the medications listed below when I get home from the hospital:    Tylenol 500 mg oral tablet  -- 2 tab(s) by mouth every 6 hours, As Needed - for moderate pain  -- Indication: For Mild pain    lisinopril  -- Indication: For High blood pressure    diltiazem 24 hour extended release 240 mg/24 hours oral capsule, extended release  -- 1 cap(s) by mouth once a day  -- Indication: For Antiarrythmic    digoxin  -- Indication: For Antiarrythmic    levETIRAcetam 500 mg oral tablet  -- 1 tab(s) by mouth 2 times a day  -- Indication: For Anticonvulsant    levETIRAcetam  -- Indication: For Anticonvulsant    atorvastatin  -- Indication: For High cholesterol    docusate sodium 100 mg oral capsule  -- 1 cap(s) by mouth 3 times a day  -- Indication: For Stool softener    Augmentin 875 mg-125 mg oral tablet  -- 1 tab(s) by mouth every 12 hours   -- Finish all this medication unless otherwise directed by prescriber.  Take with food or milk.    -- Indication: For Antibiotic    folic acid  -- Indication: For vitamin

## 2018-12-05 NOTE — DISCHARGE NOTE ADULT - CARE PLAN
Principal Discharge DX:	Acute cholecystitis  Goal:	relief of pain  Assessment and plan of treatment:	Low fat diet.  Monitor for return of pain  antibiotics for an additional 5 days  Secondary Diagnosis:	FRANCA (acute kidney injury)  Assessment and plan of treatment:	monitor urine output  Secondary Diagnosis:	Hypertension, unspecified type  Assessment and plan of treatment:	Follow up with your private medical doctor.  Secondary Diagnosis:	Seizure  Assessment and plan of treatment:	Follow up with your private medical doctor.  Secondary Diagnosis:	Calculus of gallbladder with acute cholecystitis without obstruction

## 2018-12-05 NOTE — PROGRESS NOTE ADULT - REASON FOR ADMISSION
abd pain

## 2018-12-05 NOTE — DISCHARGE NOTE ADULT - HOSPITAL COURSE
Pt admitted 11/27/18 with severe RUQ pain.  W/U showed acute cholecystitis/cholelithiasis.  Conservative treatment was opted for. Pt given IVAB.  Percutaneous cholecystostomy was contemplated. Initially, the pt was felt to be too unstable for IR.   Slowly, the pt's condition started to improve. He was started on diet & advanced as tolerated.  Decision was made not to operate.  Pt was cleared for discharge home 12/5/18. Will have physical therapy at home.

## 2018-12-05 NOTE — DISCHARGE NOTE ADULT - PLAN OF CARE
relief of pain Low fat diet.  Monitor for return of pain  antibiotics for an additional 5 days monitor urine output Follow up with your private medical doctor.

## 2018-12-27 ENCOUNTER — APPOINTMENT (OUTPATIENT)
Dept: SURGERY | Facility: CLINIC | Age: 83
End: 2018-12-27

## 2019-04-01 ENCOUNTER — OUTPATIENT (OUTPATIENT)
Dept: OUTPATIENT SERVICES | Facility: HOSPITAL | Age: 84
LOS: 1 days | End: 2019-04-01
Payer: MEDICARE

## 2019-04-01 DIAGNOSIS — I62.01 NONTRAUMATIC ACUTE SUBDURAL HEMORRHAGE: Chronic | ICD-10-CM

## 2019-04-09 ENCOUNTER — EMERGENCY (EMERGENCY)
Facility: HOSPITAL | Age: 84
LOS: 0 days | Discharge: HOME | End: 2019-04-09
Attending: EMERGENCY MEDICINE
Payer: MEDICARE

## 2019-04-09 VITALS
RESPIRATION RATE: 18 BRPM | TEMPERATURE: 99 F | HEART RATE: 60 BPM | SYSTOLIC BLOOD PRESSURE: 161 MMHG | DIASTOLIC BLOOD PRESSURE: 65 MMHG | OXYGEN SATURATION: 99 % | HEIGHT: 67 IN | WEIGHT: 175.05 LBS

## 2019-04-09 VITALS
DIASTOLIC BLOOD PRESSURE: 89 MMHG | HEART RATE: 66 BPM | TEMPERATURE: 97 F | SYSTOLIC BLOOD PRESSURE: 163 MMHG | OXYGEN SATURATION: 99 % | RESPIRATION RATE: 19 BRPM

## 2019-04-09 DIAGNOSIS — I62.01 NONTRAUMATIC ACUTE SUBDURAL HEMORRHAGE: Chronic | ICD-10-CM

## 2019-04-09 DIAGNOSIS — R11.10 VOMITING, UNSPECIFIED: ICD-10-CM

## 2019-04-09 DIAGNOSIS — I10 ESSENTIAL (PRIMARY) HYPERTENSION: ICD-10-CM

## 2019-04-09 DIAGNOSIS — G40.909 EPILEPSY, UNSPECIFIED, NOT INTRACTABLE, WITHOUT STATUS EPILEPTICUS: ICD-10-CM

## 2019-04-09 DIAGNOSIS — K59.00 CONSTIPATION, UNSPECIFIED: ICD-10-CM

## 2019-04-09 DIAGNOSIS — R10.30 LOWER ABDOMINAL PAIN, UNSPECIFIED: ICD-10-CM

## 2019-04-09 LAB
ALBUMIN SERPL ELPH-MCNC: 3.8 G/DL — SIGNIFICANT CHANGE UP (ref 3.3–5)
ALP SERPL-CCNC: 104 U/L — SIGNIFICANT CHANGE UP (ref 40–120)
ALT FLD-CCNC: 30 U/L — SIGNIFICANT CHANGE UP (ref 12–78)
ANION GAP SERPL CALC-SCNC: 3 MMOL/L — LOW (ref 5–17)
APPEARANCE UR: CLEAR — SIGNIFICANT CHANGE UP
APTT BLD: 31.6 SEC — SIGNIFICANT CHANGE UP (ref 27.5–36.3)
AST SERPL-CCNC: 26 U/L — SIGNIFICANT CHANGE UP (ref 15–37)
BACTERIA # UR AUTO: ABNORMAL
BASOPHILS # BLD AUTO: 0.02 K/UL — SIGNIFICANT CHANGE UP (ref 0–0.2)
BASOPHILS NFR BLD AUTO: 0.3 % — SIGNIFICANT CHANGE UP (ref 0–2)
BILIRUB DIRECT SERPL-MCNC: 0.13 MG/DL — SIGNIFICANT CHANGE UP (ref 0.05–0.2)
BILIRUB INDIRECT FLD-MCNC: 0.5 MG/DL — SIGNIFICANT CHANGE UP (ref 0.2–1)
BILIRUB SERPL-MCNC: 0.6 MG/DL — SIGNIFICANT CHANGE UP (ref 0.2–1.2)
BILIRUB UR-MCNC: NEGATIVE — SIGNIFICANT CHANGE UP
BUN SERPL-MCNC: 15 MG/DL — SIGNIFICANT CHANGE UP (ref 7–23)
CALCIUM SERPL-MCNC: 8.7 MG/DL — SIGNIFICANT CHANGE UP (ref 8.5–10.1)
CHLORIDE SERPL-SCNC: 105 MMOL/L — SIGNIFICANT CHANGE UP (ref 96–108)
CO2 SERPL-SCNC: 29 MMOL/L — SIGNIFICANT CHANGE UP (ref 22–31)
COLOR SPEC: YELLOW — SIGNIFICANT CHANGE UP
CREAT SERPL-MCNC: 1.09 MG/DL — SIGNIFICANT CHANGE UP (ref 0.5–1.3)
DIFF PNL FLD: NEGATIVE — SIGNIFICANT CHANGE UP
EOSINOPHIL # BLD AUTO: 0.05 K/UL — SIGNIFICANT CHANGE UP (ref 0–0.5)
EOSINOPHIL NFR BLD AUTO: 0.9 % — SIGNIFICANT CHANGE UP (ref 0–6)
EPI CELLS # UR: SIGNIFICANT CHANGE UP
GLUCOSE SERPL-MCNC: 119 MG/DL — HIGH (ref 70–99)
GLUCOSE UR QL: NEGATIVE MG/DL — SIGNIFICANT CHANGE UP
HCT VFR BLD CALC: 45.8 % — SIGNIFICANT CHANGE UP (ref 39–50)
HGB BLD-MCNC: 15.1 G/DL — SIGNIFICANT CHANGE UP (ref 13–17)
IMM GRANULOCYTES NFR BLD AUTO: 0.5 % — SIGNIFICANT CHANGE UP (ref 0–1.5)
KETONES UR-MCNC: NEGATIVE — SIGNIFICANT CHANGE UP
LACTATE SERPL-SCNC: 1.2 MMOL/L — SIGNIFICANT CHANGE UP (ref 0.7–2)
LEUKOCYTE ESTERASE UR-ACNC: NEGATIVE — SIGNIFICANT CHANGE UP
LIDOCAIN IGE QN: 104 U/L — SIGNIFICANT CHANGE UP (ref 73–393)
LYMPHOCYTES # BLD AUTO: 1.27 K/UL — SIGNIFICANT CHANGE UP (ref 1–3.3)
LYMPHOCYTES # BLD AUTO: 21.8 % — SIGNIFICANT CHANGE UP (ref 13–44)
MCHC RBC-ENTMCNC: 30.4 PG — SIGNIFICANT CHANGE UP (ref 27–34)
MCHC RBC-ENTMCNC: 33 GM/DL — SIGNIFICANT CHANGE UP (ref 32–36)
MCV RBC AUTO: 92.3 FL — SIGNIFICANT CHANGE UP (ref 80–100)
MONOCYTES # BLD AUTO: 0.52 K/UL — SIGNIFICANT CHANGE UP (ref 0–0.9)
MONOCYTES NFR BLD AUTO: 8.9 % — SIGNIFICANT CHANGE UP (ref 2–14)
NEUTROPHILS # BLD AUTO: 3.93 K/UL — SIGNIFICANT CHANGE UP (ref 1.8–7.4)
NEUTROPHILS NFR BLD AUTO: 67.6 % — SIGNIFICANT CHANGE UP (ref 43–77)
NITRITE UR-MCNC: NEGATIVE — SIGNIFICANT CHANGE UP
NRBC # BLD: 0 /100 WBCS — SIGNIFICANT CHANGE UP (ref 0–0)
PH UR: 8 — SIGNIFICANT CHANGE UP (ref 5–8)
PLATELET # BLD AUTO: 241 K/UL — SIGNIFICANT CHANGE UP (ref 150–400)
POTASSIUM SERPL-MCNC: 4.8 MMOL/L — SIGNIFICANT CHANGE UP (ref 3.5–5.3)
POTASSIUM SERPL-SCNC: 4.8 MMOL/L — SIGNIFICANT CHANGE UP (ref 3.5–5.3)
PROT SERPL-MCNC: 8.7 GM/DL — HIGH (ref 6–8.3)
PROT UR-MCNC: 100 MG/DL
RBC # BLD: 4.96 M/UL — SIGNIFICANT CHANGE UP (ref 4.2–5.8)
RBC # FLD: 12.4 % — SIGNIFICANT CHANGE UP (ref 10.3–14.5)
RBC CASTS # UR COMP ASSIST: ABNORMAL /HPF (ref 0–4)
SODIUM SERPL-SCNC: 137 MMOL/L — SIGNIFICANT CHANGE UP (ref 135–145)
SP GR SPEC: 1.01 — SIGNIFICANT CHANGE UP (ref 1.01–1.02)
UROBILINOGEN FLD QL: NEGATIVE MG/DL — SIGNIFICANT CHANGE UP
WBC # BLD: 5.82 K/UL — SIGNIFICANT CHANGE UP (ref 3.8–10.5)
WBC # FLD AUTO: 5.82 K/UL — SIGNIFICANT CHANGE UP (ref 3.8–10.5)
WBC UR QL: SIGNIFICANT CHANGE UP

## 2019-04-09 PROCEDURE — 99284 EMERGENCY DEPT VISIT MOD MDM: CPT

## 2019-04-09 PROCEDURE — 74177 CT ABD & PELVIS W/CONTRAST: CPT | Mod: 26

## 2019-04-09 PROCEDURE — 71045 X-RAY EXAM CHEST 1 VIEW: CPT | Mod: 26

## 2019-04-09 RX ORDER — ONDANSETRON 8 MG/1
4 TABLET, FILM COATED ORAL ONCE
Qty: 0 | Refills: 0 | Status: COMPLETED | OUTPATIENT
Start: 2019-04-09 | End: 2019-04-09

## 2019-04-09 RX ORDER — FAMOTIDINE 10 MG/ML
20 INJECTION INTRAVENOUS ONCE
Qty: 0 | Refills: 0 | Status: COMPLETED | OUTPATIENT
Start: 2019-04-09 | End: 2019-04-09

## 2019-04-09 RX ORDER — MORPHINE SULFATE 50 MG/1
2 CAPSULE, EXTENDED RELEASE ORAL ONCE
Qty: 0 | Refills: 0 | Status: DISCONTINUED | OUTPATIENT
Start: 2019-04-09 | End: 2019-04-09

## 2019-04-09 RX ORDER — SODIUM CHLORIDE 9 MG/ML
3 INJECTION INTRAMUSCULAR; INTRAVENOUS; SUBCUTANEOUS ONCE
Qty: 0 | Refills: 0 | Status: COMPLETED | OUTPATIENT
Start: 2019-04-09 | End: 2019-04-09

## 2019-04-09 RX ADMIN — ONDANSETRON 4 MILLIGRAM(S): 8 TABLET, FILM COATED ORAL at 15:48

## 2019-04-09 RX ADMIN — SODIUM CHLORIDE 3 MILLILITER(S): 9 INJECTION INTRAMUSCULAR; INTRAVENOUS; SUBCUTANEOUS at 15:45

## 2019-04-09 RX ADMIN — MORPHINE SULFATE 2 MILLIGRAM(S): 50 CAPSULE, EXTENDED RELEASE ORAL at 15:48

## 2019-04-09 RX ADMIN — FAMOTIDINE 20 MILLIGRAM(S): 10 INJECTION INTRAVENOUS at 15:48

## 2019-04-09 NOTE — ED PROVIDER NOTE - OBJECTIVE STATEMENT
83 yo male with history of htn presents with periumbilical pain, nausea and vomiting since this morning. Denies fever, chills, chest pain, sob, weakness, diarrhea, recent travel, sick contacts. Pain 9/10 at present.

## 2019-04-10 ENCOUNTER — INPATIENT (INPATIENT)
Facility: HOSPITAL | Age: 84
LOS: 7 days | Discharge: ANOTHER HOSPITAL | End: 2019-04-18
Attending: SURGERY | Admitting: SURGERY
Payer: MEDICARE

## 2019-04-10 VITALS
HEART RATE: 76 BPM | WEIGHT: 175.05 LBS | DIASTOLIC BLOOD PRESSURE: 97 MMHG | TEMPERATURE: 98 F | RESPIRATION RATE: 17 BRPM | HEIGHT: 66 IN | OXYGEN SATURATION: 98 % | SYSTOLIC BLOOD PRESSURE: 184 MMHG

## 2019-04-10 DIAGNOSIS — I62.01 NONTRAUMATIC ACUTE SUBDURAL HEMORRHAGE: Chronic | ICD-10-CM

## 2019-04-10 DIAGNOSIS — A41.50 GRAM-NEGATIVE SEPSIS, UNSPECIFIED: ICD-10-CM

## 2019-04-10 DIAGNOSIS — K59.00 CONSTIPATION, UNSPECIFIED: ICD-10-CM

## 2019-04-10 DIAGNOSIS — Z86.79 PERSONAL HISTORY OF OTHER DISEASES OF THE CIRCULATORY SYSTEM: ICD-10-CM

## 2019-04-10 DIAGNOSIS — R56.9 UNSPECIFIED CONVULSIONS: ICD-10-CM

## 2019-04-10 DIAGNOSIS — K81.9 CHOLECYSTITIS, UNSPECIFIED: ICD-10-CM

## 2019-04-10 DIAGNOSIS — I10 ESSENTIAL (PRIMARY) HYPERTENSION: ICD-10-CM

## 2019-04-10 LAB
ALBUMIN SERPL ELPH-MCNC: 3.7 G/DL — SIGNIFICANT CHANGE UP (ref 3.3–5)
ALP SERPL-CCNC: 109 U/L — SIGNIFICANT CHANGE UP (ref 40–120)
ALT FLD-CCNC: 30 U/L — SIGNIFICANT CHANGE UP (ref 12–78)
ANION GAP SERPL CALC-SCNC: 7 MMOL/L — SIGNIFICANT CHANGE UP (ref 5–17)
APTT BLD: 31.2 SEC — SIGNIFICANT CHANGE UP (ref 28.5–37)
AST SERPL-CCNC: 29 U/L — SIGNIFICANT CHANGE UP (ref 15–37)
BASOPHILS # BLD AUTO: 0.02 K/UL — SIGNIFICANT CHANGE UP (ref 0–0.2)
BASOPHILS NFR BLD AUTO: 0.2 % — SIGNIFICANT CHANGE UP (ref 0–2)
BILIRUB SERPL-MCNC: 0.9 MG/DL — SIGNIFICANT CHANGE UP (ref 0.2–1.2)
BLD GP AB SCN SERPL QL: SIGNIFICANT CHANGE UP
BUN SERPL-MCNC: 12 MG/DL — SIGNIFICANT CHANGE UP (ref 7–23)
CALCIUM SERPL-MCNC: 8.8 MG/DL — SIGNIFICANT CHANGE UP (ref 8.5–10.1)
CHLORIDE SERPL-SCNC: 102 MMOL/L — SIGNIFICANT CHANGE UP (ref 96–108)
CO2 SERPL-SCNC: 26 MMOL/L — SIGNIFICANT CHANGE UP (ref 22–31)
CREAT SERPL-MCNC: 1.1 MG/DL — SIGNIFICANT CHANGE UP (ref 0.5–1.3)
EOSINOPHIL # BLD AUTO: 0.03 K/UL — SIGNIFICANT CHANGE UP (ref 0–0.5)
EOSINOPHIL NFR BLD AUTO: 0.3 % — SIGNIFICANT CHANGE UP (ref 0–6)
GLUCOSE SERPL-MCNC: 107 MG/DL — HIGH (ref 70–99)
HCT VFR BLD CALC: 40.9 % — SIGNIFICANT CHANGE UP (ref 39–50)
HGB BLD-MCNC: 13.7 G/DL — SIGNIFICANT CHANGE UP (ref 13–17)
IMM GRANULOCYTES NFR BLD AUTO: 0.3 % — SIGNIFICANT CHANGE UP (ref 0–1.5)
INR BLD: 1.25 RATIO — HIGH (ref 0.88–1.16)
LACTATE SERPL-SCNC: 2.1 MMOL/L — HIGH (ref 0.7–2)
LACTATE SERPL-SCNC: 2.3 MMOL/L — HIGH (ref 0.7–2)
LIDOCAIN IGE QN: 61 U/L — LOW (ref 73–393)
LYMPHOCYTES # BLD AUTO: 1.19 K/UL — SIGNIFICANT CHANGE UP (ref 1–3.3)
LYMPHOCYTES # BLD AUTO: 10 % — LOW (ref 13–44)
MCHC RBC-ENTMCNC: 31.3 PG — SIGNIFICANT CHANGE UP (ref 27–34)
MCHC RBC-ENTMCNC: 33.5 GM/DL — SIGNIFICANT CHANGE UP (ref 32–36)
MCV RBC AUTO: 93.4 FL — SIGNIFICANT CHANGE UP (ref 80–100)
MONOCYTES # BLD AUTO: 1.17 K/UL — HIGH (ref 0–0.9)
MONOCYTES NFR BLD AUTO: 9.8 % — SIGNIFICANT CHANGE UP (ref 2–14)
NEUTROPHILS # BLD AUTO: 9.46 K/UL — HIGH (ref 1.8–7.4)
NEUTROPHILS NFR BLD AUTO: 79.4 % — HIGH (ref 43–77)
NRBC # BLD: 0 /100 WBCS — SIGNIFICANT CHANGE UP (ref 0–0)
PLATELET # BLD AUTO: 272 K/UL — SIGNIFICANT CHANGE UP (ref 150–400)
POTASSIUM SERPL-MCNC: 4.7 MMOL/L — SIGNIFICANT CHANGE UP (ref 3.5–5.3)
POTASSIUM SERPL-SCNC: 4.7 MMOL/L — SIGNIFICANT CHANGE UP (ref 3.5–5.3)
PROT SERPL-MCNC: 9.1 GM/DL — HIGH (ref 6–8.3)
PROTHROM AB SERPL-ACNC: 14.1 SEC — HIGH (ref 10–12.9)
RBC # BLD: 4.38 M/UL — SIGNIFICANT CHANGE UP (ref 4.2–5.8)
RBC # FLD: 12.6 % — SIGNIFICANT CHANGE UP (ref 10.3–14.5)
SODIUM SERPL-SCNC: 135 MMOL/L — SIGNIFICANT CHANGE UP (ref 135–145)
WBC # BLD: 11.91 K/UL — HIGH (ref 3.8–10.5)
WBC # FLD AUTO: 11.91 K/UL — HIGH (ref 3.8–10.5)

## 2019-04-10 PROCEDURE — 74177 CT ABD & PELVIS W/CONTRAST: CPT | Mod: 26

## 2019-04-10 PROCEDURE — 99223 1ST HOSP IP/OBS HIGH 75: CPT

## 2019-04-10 PROCEDURE — 76700 US EXAM ABDOM COMPLETE: CPT | Mod: 26

## 2019-04-10 PROCEDURE — 99285 EMERGENCY DEPT VISIT HI MDM: CPT

## 2019-04-10 PROCEDURE — 71045 X-RAY EXAM CHEST 1 VIEW: CPT | Mod: 26

## 2019-04-10 PROCEDURE — 93010 ELECTROCARDIOGRAM REPORT: CPT

## 2019-04-10 RX ORDER — SODIUM CHLORIDE 9 MG/ML
500 INJECTION INTRAMUSCULAR; INTRAVENOUS; SUBCUTANEOUS ONCE
Qty: 0 | Refills: 0 | Status: COMPLETED | OUTPATIENT
Start: 2019-04-10 | End: 2019-04-10

## 2019-04-10 RX ORDER — ONDANSETRON 8 MG/1
4 TABLET, FILM COATED ORAL ONCE
Qty: 0 | Refills: 0 | Status: COMPLETED | OUTPATIENT
Start: 2019-04-10 | End: 2019-04-10

## 2019-04-10 RX ORDER — ONDANSETRON 8 MG/1
4 TABLET, FILM COATED ORAL EVERY 6 HOURS
Qty: 0 | Refills: 0 | Status: DISCONTINUED | OUTPATIENT
Start: 2019-04-10 | End: 2019-04-18

## 2019-04-10 RX ORDER — DOCUSATE SODIUM 100 MG
100 CAPSULE ORAL THREE TIMES A DAY
Qty: 0 | Refills: 0 | Status: DISCONTINUED | OUTPATIENT
Start: 2019-04-10 | End: 2019-04-17

## 2019-04-10 RX ORDER — MORPHINE SULFATE 50 MG/1
2 CAPSULE, EXTENDED RELEASE ORAL EVERY 4 HOURS
Qty: 0 | Refills: 0 | Status: DISCONTINUED | OUTPATIENT
Start: 2019-04-10 | End: 2019-04-17

## 2019-04-10 RX ORDER — PIPERACILLIN AND TAZOBACTAM 4; .5 G/20ML; G/20ML
3.38 INJECTION, POWDER, LYOPHILIZED, FOR SOLUTION INTRAVENOUS ONCE
Qty: 0 | Refills: 0 | Status: COMPLETED | OUTPATIENT
Start: 2019-04-10 | End: 2019-04-10

## 2019-04-10 RX ORDER — SODIUM CHLORIDE 9 MG/ML
1000 INJECTION INTRAMUSCULAR; INTRAVENOUS; SUBCUTANEOUS
Qty: 0 | Refills: 0 | Status: DISCONTINUED | OUTPATIENT
Start: 2019-04-10 | End: 2019-04-11

## 2019-04-10 RX ORDER — MORPHINE SULFATE 50 MG/1
4 CAPSULE, EXTENDED RELEASE ORAL ONCE
Qty: 0 | Refills: 0 | Status: DISCONTINUED | OUTPATIENT
Start: 2019-04-10 | End: 2019-04-10

## 2019-04-10 RX ORDER — SODIUM CHLORIDE 9 MG/ML
1000 INJECTION INTRAMUSCULAR; INTRAVENOUS; SUBCUTANEOUS ONCE
Qty: 0 | Refills: 0 | Status: COMPLETED | OUTPATIENT
Start: 2019-04-10 | End: 2019-04-10

## 2019-04-10 RX ORDER — PIPERACILLIN AND TAZOBACTAM 4; .5 G/20ML; G/20ML
3.38 INJECTION, POWDER, LYOPHILIZED, FOR SOLUTION INTRAVENOUS EVERY 8 HOURS
Qty: 0 | Refills: 0 | Status: DISCONTINUED | OUTPATIENT
Start: 2019-04-11 | End: 2019-04-18

## 2019-04-10 RX ORDER — LEVETIRACETAM 250 MG/1
500 TABLET, FILM COATED ORAL EVERY 12 HOURS
Qty: 0 | Refills: 0 | Status: DISCONTINUED | OUTPATIENT
Start: 2019-04-10 | End: 2019-04-15

## 2019-04-10 RX ORDER — HEPARIN SODIUM 5000 [USP'U]/ML
5000 INJECTION INTRAVENOUS; SUBCUTANEOUS EVERY 12 HOURS
Qty: 0 | Refills: 0 | Status: DISCONTINUED | OUTPATIENT
Start: 2019-04-10 | End: 2019-04-18

## 2019-04-10 RX ADMIN — SODIUM CHLORIDE 1000 MILLILITER(S): 9 INJECTION INTRAMUSCULAR; INTRAVENOUS; SUBCUTANEOUS at 14:11

## 2019-04-10 RX ADMIN — MORPHINE SULFATE 4 MILLIGRAM(S): 50 CAPSULE, EXTENDED RELEASE ORAL at 14:47

## 2019-04-10 RX ADMIN — SODIUM CHLORIDE 1000 MILLILITER(S): 9 INJECTION INTRAMUSCULAR; INTRAVENOUS; SUBCUTANEOUS at 15:11

## 2019-04-10 RX ADMIN — SODIUM CHLORIDE 250 MILLILITER(S): 9 INJECTION INTRAMUSCULAR; INTRAVENOUS; SUBCUTANEOUS at 21:58

## 2019-04-10 RX ADMIN — MORPHINE SULFATE 4 MILLIGRAM(S): 50 CAPSULE, EXTENDED RELEASE ORAL at 14:17

## 2019-04-10 RX ADMIN — LEVETIRACETAM 420 MILLIGRAM(S): 250 TABLET, FILM COATED ORAL at 23:29

## 2019-04-10 RX ADMIN — PIPERACILLIN AND TAZOBACTAM 200 GRAM(S): 4; .5 INJECTION, POWDER, LYOPHILIZED, FOR SOLUTION INTRAVENOUS at 20:48

## 2019-04-10 RX ADMIN — ONDANSETRON 4 MILLIGRAM(S): 8 TABLET, FILM COATED ORAL at 14:18

## 2019-04-10 NOTE — ED PROVIDER NOTE - OBJECTIVE STATEMENT
82 year old 84 year old male with PMH of HTN, seizure d/o presenting to ED due to RUQ and LUQ abd pain and was evaluated yesterday - since pain persisted pt presenting for further evaluation and pain control. Denies any fever/chills, nausea noted but no vomiting.

## 2019-04-10 NOTE — CONSULT NOTE ADULT - SUBJECTIVE AND OBJECTIVE BOX
Patient is a 84y old  Male who presents with a chief complaint of Acute cholecystitis, cholelithiasis (10 Apr 2019 17:06)        HPI:  Patient is an 84 year old male PMH seizure disorder, subdural hematoma s/p craniotomy due to trauma, HTN, ETOH abuse, cholelithiasis, who presents to the emergency room complaining of 7/10, non-radiating, upper abdominal pain x 3 days with associated nausea and vomiting. States the pain began abruptly after eating a heavy dinner and has been episodic since. He was seen here yesterday, but his CT was negative and he was discharged home. When his pain persisted and he continued to vomit, he returned here. New Abdomen CT scan now revealed Distended GB  and Cholelithiasis with Pericholecystic fluid.   Has not eaten today. Admits to flatus. Last BM 2 days ago.   Of note, upon chart review, patient was admitted here for abdominal pain/cholelithiasis in 2018, he was medically a poor surgical candidate, so perc gus was considered at the time, but his HIDA was negative and his symptoms improved so he was discharged home to follow up as outpatient, which he did not.   He denies fever, chills, chest pain, shortness of breath, back pain, recent illness, recent travel. (10 Apr 2019 17:06)      SUBJECTIVE & OBJECTIVE: Pt seen and examined at bedside.     PHYSICAL EXAM:  T(C): 37.2 (04-10-19 @ 18:17), Max: 37.2 (04-10-19 @ 18:17)  HR: 89 (04-10-19 @ 18:17) (76 - 89)  BP: 166/95 (04-10-19 @ 18:17) (166/95 - 184/97)  RR: 18 (04-10-19 @ 18:17) (17 - 18)  SpO2: 98% (04-10-19 @ 18:17) (98% - 98%)  Wt(kg): -- Height (cm): 167.64 (04-10 @ 10:29)  Weight (kg): 79.4 (04-10 @ 10:29)  BMI (kg/m2): 28.3 (04-10 @ 10:29)  BSA (m2): 1.89 (04-10 @ 10:29)  I&O's Detail    GENERAL: NAD, well-groomed, well-developed  HEAD:  Atraumatic, Normocephalic  EYES: EOMI, PERRLA, conjunctiva and sclera clear  ENMT: Moist mucous membranes  NECK: Supple, No JVD  NERVOUS SYSTEM:  Alert & Oriented X3, Motor Strength 5/5 B/L upper and lower extremities; DTRs 2+ intact and symmetric  CHEST/LUNG: Clear to auscultation bilaterally; No rales, rhonchi, wheezing, or rubs  HEART: Regular rate and rhythm; No murmurs, rubs, or gallops  ABDOMEN: Soft, Nontender, Nondistended; Bowel sounds present  EXTREMITIES:  2+ Peripheral Pulses, No clubbing, cyanosis, or edema    MEDICATIONS  (STANDING):  docusate sodium 100 milliGRAM(s) Oral three times a day  heparin  Injectable 5000 Unit(s) SubCutaneous every 12 hours  piperacillin/tazobactam IVPB. 3.375 Gram(s) IV Intermittent once  sodium chloride 0.9%. 1000 milliLiter(s) (100 mL/Hr) IV Continuous <Continuous>    MEDICATIONS  (PRN):  morphine  - Injectable 2 milliGRAM(s) IV Push every 4 hours PRN Severe Pain (7 - 10)  ondansetron Injectable 4 milliGRAM(s) IV Push every 6 hours PRN Nausea and/or Vomiting      LABS:                        13.7   11.91 )-----------( 272      ( 10 Apr 2019 13:12 )             40.9     04-10    135  |  102  |  12  ----------------------------<  107<H>  4.7   |  26  |  1.10    Ca    8.8      10 Apr 2019 14:11    TPro  9.1<H>  /  Alb  3.7  /  TBili  0.9  /  DBili  x   /  AST  29  /  ALT  30  /  AlkPhos  109  04-10    PT/INR - ( 10 Apr 2019 16:05 )   PT: 14.1 sec;   INR: 1.25 ratio         PTT - ( 10 Apr 2019 16:05 )  PTT:31.2 sec  Urinalysis Basic - ( 2019 16:21 )    Color: Yellow / Appearance: Clear / S.010 / pH: x  Gluc: x / Ketone: Negative  / Bili: Negative / Urobili: Negative mg/dL   Blood: x / Protein: 100 mg/dL / Nitrite: Negative   Leuk Esterase: Negative / RBC: 3-5 /HPF / WBC 0-2   Sq Epi: x / Non Sq Epi: Occasional / Bacteria: Occasional      CAPILLARY BLOOD GLUCOSE                RECENT CULTURES:      RADIOLOGY & ADDITIONAL TESTS:      DVT/GI ppx  Discussed with pt @ bedside Patient is a 84y old  Male who presents with a chief complaint of Acute cholecystitis, cholelithiasis (10 Apr 2019 20:06)    HPI:  Patient is an 84 year old male PMH seizure disorder, subdural hematoma s/p craniotomy due to trauma, HTN, ETOH abuse, cholelithiasis, who presents to the emergency room complaining of 10, non-radiating, upper abdominal pain x 3 days with associated nausea and vomiting. States the pain began abruptly after eating a heavy dinner and has been episodic since. He was seen here yesterday, but his CT was negative and he was discharged home. When his pain persisted and he continued to vomit, he returned here. New Abdomen CT scan now revealed Distended GB  and Cholelithiasis with Pericholecystic fluid. Has not eaten today. Admits to flatus. Last BM 2 days ago. Of note, upon chart review, patient was admitted here for abdominal pain/cholelithiasis in 2018, he was medically a poor surgical candidate, so perc gus was considered at the time, but his HIDA was negative and his symptoms improved so he was discharged home to follow up as outpatient, which he did not. He denies fever, chills, chest pain, shortness of breath, back pain, recent illness, recent travel. Patient denies shortness of breath or chest pressure with exertion.  He states that he is able to climb 1 flight of stairs without chest pain or SOB    PHYSICAL EXAM:  T(C): 37.8 (04-10-19 @ 20:06), Max: 37.8 (04-10-19 @ 20:06)  HR: 90 (04-10-19 @ 20:06) (76 - 90)  BP: 145/91 (04-10-19 @ 20:06) (145/91 - 184/97)  RR: 18 (04-10-19 @ 20:06) (17 - 18)  SpO2: 94% (04-10-19 @ 20:06) (94% - 98%)  Wt(kg): -- Height (cm): 167.64 (04-10 @ 20:06)  Weight (kg): 78.8 (04-10 @ 20:06)  BMI (kg/m2): 28 (04-10 @ 20:06)  BSA (m2): 1.88 (04-10 @ 20:06)  I&O's Detail    GENERAL: NAD, well-groomed, well-developed  HEAD:  Atraumatic, Normocephalic  EYES: EOMI, PERRLA, conjunctiva and sclera clear  ENMT: Moist mucous membranes  NECK: Supple, No JVD  NERVOUS SYSTEM:  Alert & Oriented X3, Motor Strength 5/5 B/L upper and lower extremities; DTRs 2+ intact and symmetric  CHEST/LUNG: Clear to auscultation bilaterally; No rales, rhonchi, wheezing, or rubs  HEART: Regular rate and rhythm; No murmurs, rubs, or gallops  ABDOMEN: Soft, Nontender, Nondistended; Bowel sounds present  EXTREMITIES:  2+ Peripheral Pulses, No clubbing, cyanosis, or edema    MEDICATIONS  (STANDING):  docusate sodium 100 milliGRAM(s) Oral three times a day  heparin  Injectable 5000 Unit(s) SubCutaneous every 12 hours  sodium chloride 0.9% Bolus 500 milliLiter(s) IV Bolus once  sodium chloride 0.9%. 1000 milliLiter(s) (100 mL/Hr) IV Continuous <Continuous>    MEDICATIONS  (PRN):  morphine  - Injectable 2 milliGRAM(s) IV Push every 4 hours PRN Severe Pain (7 - 10)  ondansetron Injectable 4 milliGRAM(s) IV Push every 6 hours PRN Nausea and/or Vomiting      LABS:                        13.7   11.91 )-----------( 272      ( 10 Apr 2019 13:12 )             40.9     04-10    135  |  102  |  12  ----------------------------<  107<H>  4.7   |  26  |  1.10    Ca    8.8      10 Apr 2019 14:11    TPro  9.1<H>  /  Alb  3.7  /  TBili  0.9  /  DBili  x   /  AST  29  /  ALT  30  /  AlkPhos  109  04-10    PT/INR - ( 10 Apr 2019 16:05 )   PT: 14.1 sec;   INR: 1.25 ratio         PTT - ( 10 Apr 2019 16:05 )  PTT:31.2 sec  Urinalysis Basic - ( 2019 16:21 )    Color: Yellow / Appearance: Clear / S.010 / pH: x  Gluc: x / Ketone: Negative  / Bili: Negative / Urobili: Negative mg/dL   Blood: x / Protein: 100 mg/dL / Nitrite: Negative   Leuk Esterase: Negative / RBC: 3-5 /HPF / WBC 0-2   Sq Epi: x / Non Sq Epi: Occasional / Bacteria: Occasional      CAPILLARY BLOOD GLUCOSE                RECENT CULTURES:      RADIOLOGY & ADDITIONAL TESTS:      DVT/GI ppx  Discussed with pt @ bedside

## 2019-04-10 NOTE — H&P ADULT - ASSESSMENT
84 year old male PMH seizure disorder, subdural hematoma s/p craniotomy due to trauma, HTN, ETOH abuse, cholelithiasis, a/w acute cholecystitis, cholelithiasis 84 year old male PMH x  seizure disorder, subdural hematoma s/p craniotomy due to trauma, HTN, ETOH abuse, cholelithiasis,  Admitted with  acute cholecystitis, cholelithiasis, Abdominal pain for 3 days, vomiting

## 2019-04-10 NOTE — H&P ADULT - NSICDXPASTMEDICALHX_GEN_ALL_CORE_FT
PAST MEDICAL HISTORY:  Cholelithiasis     Constipation     History of subdural hematoma     Hypertension     Seizure

## 2019-04-10 NOTE — H&P ADULT - NSHPLABSRESULTS_GEN_ALL_CORE
< from: CT Abdomen and Pelvis w/ IV Cont (04.10.19 @ 15:04) >    EXAM:  CT ABDOMEN AND PELVIS IC                            PROCEDURE DATE:  04/10/2019          INTERPRETATION:  CLINICAL INFORMATION: Abdominal pain    COMPARISON: CT abdomen pelvis 4/9/2019    PROCEDURE:   CT of the Abdomen and Pelvis was performed with intravenous contrast.   Intravenous contrast: 90 ml Omnipaque 350. 10 ml discarded.  Oral contrast: None.  Sagittal and coronal reformats were performed.    FINDINGS:    LOWER CHEST: Emphysematous lung changes.    LIVER: Geographic area of hypoattenuation in the right hepatic lobe may   represent focal fatty infiltration versus perfusion anomaly.  BILE DUCTS: Normal caliber.  GALLBLADDER: Hydropic gallbladder with pericholecystic inflammation a 6   mm stone in the gallbladder neck..  SPLEEN: Within normal limits.  PANCREAS: Within normal limits.  ADRENALS: Within normal limits.  KIDNEYS/URETERS: Within normal limits.    BLADDER: Excreted intravenous contrast in the bladder lumen.  REPRODUCTIVE ORGANS: The prostate is within normal limits.     BOWEL: No bowel obstruction. Appendix normal.  PERITONEUM: No ascites.  VESSELS:  Within normal limits.  RETROPERITONEUM: No lymphadenopathy.    ABDOMINAL WALL: Within normal limits.  BONES: Within normal limits.    IMPRESSION: Cholelithiasis with gallbladder inflammation. Findings   concerning for acute cholecystitis which can be confirmed with a nuclear   medicine HIDA scan..    OLAF BERNARD M.D., ATTENDING RADIOLOGIST  This document has been electronically signed. Apr 10 2019  3:22PM   < end of copied text >

## 2019-04-10 NOTE — H&P ADULT - PROBLEM SELECTOR PLAN 1
Admit  IV zosyn  Clears for now, NPO p MN  IV hydration  Antiemetics PRN  Pain management PRN  Lap gus planning pending medical clearance, if not a surgical candidate, may need perc gus  As discussed with Dr. Ledezma, will obtain a HIDA scan

## 2019-04-10 NOTE — H&P ADULT - ATTENDING COMMENTS
I examined patient in Emergency room.  Plan discussed with him and ER attending.  Will Obtain HIDA scan.  Medical evaluation.  IV antibiotics, pain control.  Possible Lap-cholecystectomy

## 2019-04-10 NOTE — H&P ADULT - NSHPSOCIALHISTORY_GEN_ALL_CORE
, lives with wife.  Denies smoking, drinking, illicit drug use. Per chart, patient has h/o ETOH abuse.

## 2019-04-10 NOTE — CONSULT NOTE ADULT - PROBLEM SELECTOR RECOMMENDATION 9
- plan per primary team  - keep NPO (except medications)  - would use LR for IV hydration at 150 cc/hr

## 2019-04-10 NOTE — H&P ADULT - HISTORY OF PRESENT ILLNESS
Patient is an 84 year old male PMH seizure disorder, subdural hematoma s/p craniotomy due to trauma, HTN, ETOH abuse, cholelithiasis, who presents to the emergency room complaining of 7/10, non-radiating, upper abdominal pain x 3 days with associated nausea and vomiting. States the pain began abruptly after eating a heavy dinner and has been episodic since. He was seen here yesterday, but his CT was negative and he was discharged home. When his pain persisted and he continued to vomit, he returned here. Has not eaten today. Admits to flatus. Last BM 2 days ago.   Of note, upon chart review, patient was admitted here for abdominal pain/cholelithiasis in 12/2018, he was medically a poor surgical candidate, so perc gus was considered at the time, but his HIDA was negative and his symptoms improved so he was discharged home to follow up as outpatient, which he did not.   He denies fever, chills, chest pain, shortness of breath, back pain, recent illness, recent travel. Patient is an 84 year old male PMH seizure disorder, subdural hematoma s/p craniotomy due to trauma, HTN, ETOH abuse, cholelithiasis, who presents to the emergency room complaining of 7/10, non-radiating, upper abdominal pain x 3 days with associated nausea and vomiting. States the pain began abruptly after eating a heavy dinner and has been episodic since. He was seen here yesterday, but his CT was negative and he was discharged home. When his pain persisted and he continued to vomit, he returned here. New Abdomen CT scan now revealed Distended GB  and Cholelithiasis with Pericholecystic fluid.   Has not eaten today. Admits to flatus. Last BM 2 days ago.   Of note, upon chart review, patient was admitted here for abdominal pain/cholelithiasis in 12/2018, he was medically a poor surgical candidate, so perc gus was considered at the time, but his HIDA was negative and his symptoms improved so he was discharged home to follow up as outpatient, which he did not.   He denies fever, chills, chest pain, shortness of breath, back pain, recent illness, recent travel.

## 2019-04-10 NOTE — CONSULT NOTE ADULT - ASSESSMENT
84 year old male PMH seizure disorder, subdural hematoma s/p craniotomy due to trauma, HTN, ETOH abuse, and recent admission to [facility] for cholelithiasis.

## 2019-04-11 LAB
ALBUMIN SERPL ELPH-MCNC: 3 G/DL — LOW (ref 3.3–5)
ALP SERPL-CCNC: 83 U/L — SIGNIFICANT CHANGE UP (ref 40–120)
ALT FLD-CCNC: 21 U/L — SIGNIFICANT CHANGE UP (ref 12–78)
ANION GAP SERPL CALC-SCNC: 7 MMOL/L — SIGNIFICANT CHANGE UP (ref 5–17)
AST SERPL-CCNC: 17 U/L — SIGNIFICANT CHANGE UP (ref 15–37)
BASOPHILS # BLD AUTO: 0.03 K/UL — SIGNIFICANT CHANGE UP (ref 0–0.2)
BASOPHILS NFR BLD AUTO: 0.3 % — SIGNIFICANT CHANGE UP (ref 0–2)
BILIRUB SERPL-MCNC: 0.9 MG/DL — SIGNIFICANT CHANGE UP (ref 0.2–1.2)
BUN SERPL-MCNC: 12 MG/DL — SIGNIFICANT CHANGE UP (ref 7–23)
CALCIUM SERPL-MCNC: 7.6 MG/DL — LOW (ref 8.5–10.1)
CHLORIDE SERPL-SCNC: 106 MMOL/L — SIGNIFICANT CHANGE UP (ref 96–108)
CO2 SERPL-SCNC: 26 MMOL/L — SIGNIFICANT CHANGE UP (ref 22–31)
CREAT SERPL-MCNC: 1.13 MG/DL — SIGNIFICANT CHANGE UP (ref 0.5–1.3)
EOSINOPHIL # BLD AUTO: 0.02 K/UL — SIGNIFICANT CHANGE UP (ref 0–0.5)
EOSINOPHIL NFR BLD AUTO: 0.2 % — SIGNIFICANT CHANGE UP (ref 0–6)
GLUCOSE SERPL-MCNC: 93 MG/DL — SIGNIFICANT CHANGE UP (ref 70–99)
HCT VFR BLD CALC: 33 % — LOW (ref 39–50)
HGB BLD-MCNC: 11.2 G/DL — LOW (ref 13–17)
IMM GRANULOCYTES NFR BLD AUTO: 0.4 % — SIGNIFICANT CHANGE UP (ref 0–1.5)
LACTATE SERPL-SCNC: 0.7 MMOL/L — SIGNIFICANT CHANGE UP (ref 0.7–2)
LYMPHOCYTES # BLD AUTO: 1.19 K/UL — SIGNIFICANT CHANGE UP (ref 1–3.3)
LYMPHOCYTES # BLD AUTO: 10.9 % — LOW (ref 13–44)
MAGNESIUM SERPL-MCNC: 1.8 MG/DL — SIGNIFICANT CHANGE UP (ref 1.6–2.6)
MCHC RBC-ENTMCNC: 31 PG — SIGNIFICANT CHANGE UP (ref 27–34)
MCHC RBC-ENTMCNC: 33.9 GM/DL — SIGNIFICANT CHANGE UP (ref 32–36)
MCV RBC AUTO: 91.4 FL — SIGNIFICANT CHANGE UP (ref 80–100)
MONOCYTES # BLD AUTO: 1.16 K/UL — HIGH (ref 0–0.9)
MONOCYTES NFR BLD AUTO: 10.6 % — SIGNIFICANT CHANGE UP (ref 2–14)
NEUTROPHILS # BLD AUTO: 8.49 K/UL — HIGH (ref 1.8–7.4)
NEUTROPHILS NFR BLD AUTO: 77.6 % — HIGH (ref 43–77)
NRBC # BLD: 0 /100 WBCS — SIGNIFICANT CHANGE UP (ref 0–0)
PHOSPHATE SERPL-MCNC: 2.4 MG/DL — LOW (ref 2.5–4.5)
PLATELET # BLD AUTO: 219 K/UL — SIGNIFICANT CHANGE UP (ref 150–400)
POTASSIUM SERPL-MCNC: 3.5 MMOL/L — SIGNIFICANT CHANGE UP (ref 3.5–5.3)
POTASSIUM SERPL-SCNC: 3.5 MMOL/L — SIGNIFICANT CHANGE UP (ref 3.5–5.3)
PROT SERPL-MCNC: 7.2 GM/DL — SIGNIFICANT CHANGE UP (ref 6–8.3)
RBC # BLD: 3.61 M/UL — LOW (ref 4.2–5.8)
RBC # FLD: 12.7 % — SIGNIFICANT CHANGE UP (ref 10.3–14.5)
SODIUM SERPL-SCNC: 139 MMOL/L — SIGNIFICANT CHANGE UP (ref 135–145)
WBC # BLD: 10.93 K/UL — HIGH (ref 3.8–10.5)
WBC # FLD AUTO: 10.93 K/UL — HIGH (ref 3.8–10.5)

## 2019-04-11 PROCEDURE — 78226 HEPATOBILIARY SYSTEM IMAGING: CPT | Mod: 26

## 2019-04-11 PROCEDURE — 99233 SBSQ HOSP IP/OBS HIGH 50: CPT

## 2019-04-11 PROCEDURE — 93306 TTE W/DOPPLER COMPLETE: CPT | Mod: 26

## 2019-04-11 RX ORDER — MORPHINE SULFATE 50 MG/1
2 CAPSULE, EXTENDED RELEASE ORAL ONCE
Qty: 0 | Refills: 0 | Status: DISCONTINUED | OUTPATIENT
Start: 2019-04-11 | End: 2019-04-18

## 2019-04-11 RX ORDER — DILTIAZEM HCL 120 MG
120 CAPSULE, EXT RELEASE 24 HR ORAL DAILY
Qty: 0 | Refills: 0 | Status: DISCONTINUED | OUTPATIENT
Start: 2019-04-11 | End: 2019-04-11

## 2019-04-11 RX ORDER — SODIUM CHLORIDE 9 MG/ML
1000 INJECTION, SOLUTION INTRAVENOUS
Qty: 0 | Refills: 0 | Status: DISCONTINUED | OUTPATIENT
Start: 2019-04-11 | End: 2019-04-12

## 2019-04-11 RX ORDER — POTASSIUM PHOSPHATE, MONOBASIC POTASSIUM PHOSPHATE, DIBASIC 236; 224 MG/ML; MG/ML
15 INJECTION, SOLUTION INTRAVENOUS ONCE
Qty: 0 | Refills: 0 | Status: COMPLETED | OUTPATIENT
Start: 2019-04-11 | End: 2019-04-12

## 2019-04-11 RX ORDER — ACETAMINOPHEN 500 MG
1000 TABLET ORAL ONCE
Qty: 0 | Refills: 0 | Status: COMPLETED | OUTPATIENT
Start: 2019-04-11 | End: 2019-04-11

## 2019-04-11 RX ORDER — DILTIAZEM HCL 120 MG
120 CAPSULE, EXT RELEASE 24 HR ORAL DAILY
Qty: 0 | Refills: 0 | Status: DISCONTINUED | OUTPATIENT
Start: 2019-04-11 | End: 2019-04-12

## 2019-04-11 RX ORDER — SODIUM CHLORIDE 9 MG/ML
1000 INJECTION, SOLUTION INTRAVENOUS
Qty: 0 | Refills: 0 | Status: DISCONTINUED | OUTPATIENT
Start: 2019-04-11 | End: 2019-04-11

## 2019-04-11 RX ADMIN — SODIUM CHLORIDE 100 MILLILITER(S): 9 INJECTION INTRAMUSCULAR; INTRAVENOUS; SUBCUTANEOUS at 17:28

## 2019-04-11 RX ADMIN — LEVETIRACETAM 420 MILLIGRAM(S): 250 TABLET, FILM COATED ORAL at 21:25

## 2019-04-11 RX ADMIN — PIPERACILLIN AND TAZOBACTAM 25 GRAM(S): 4; .5 INJECTION, POWDER, LYOPHILIZED, FOR SOLUTION INTRAVENOUS at 06:19

## 2019-04-11 RX ADMIN — Medication 400 MILLIGRAM(S): at 06:18

## 2019-04-11 RX ADMIN — MORPHINE SULFATE 2 MILLIGRAM(S): 50 CAPSULE, EXTENDED RELEASE ORAL at 10:36

## 2019-04-11 RX ADMIN — HEPARIN SODIUM 5000 UNIT(S): 5000 INJECTION INTRAVENOUS; SUBCUTANEOUS at 06:19

## 2019-04-11 RX ADMIN — HEPARIN SODIUM 5000 UNIT(S): 5000 INJECTION INTRAVENOUS; SUBCUTANEOUS at 17:27

## 2019-04-11 RX ADMIN — Medication 100 MILLIGRAM(S): at 06:19

## 2019-04-11 RX ADMIN — PIPERACILLIN AND TAZOBACTAM 25 GRAM(S): 4; .5 INJECTION, POWDER, LYOPHILIZED, FOR SOLUTION INTRAVENOUS at 21:24

## 2019-04-11 NOTE — PROGRESS NOTE ADULT - SUBJECTIVE AND OBJECTIVE BOX
Patient is a 84y old  Male who presents with a chief complaint of Acute cholecystitis, cholelithiasis (10 Apr 2019 20:06)        HPI:  Patient is an 84 year old male PMH seizure disorder, subdural hematoma s/p craniotomy due to trauma, HTN, ETOH abuse, cholelithiasis, who presents to the emergency room complaining of 7/10, non-radiating, upper abdominal pain x 3 days with associated nausea and vomiting. States the pain began abruptly after eating a heavy dinner and has been episodic since. He was seen here yesterday, but his CT was negative and he was discharged home. When his pain persisted and he continued to vomit, he returned here. New Abdomen CT scan now revealed Distended GB  and Cholelithiasis with Pericholecystic fluid.   Has not eaten today. Admits to flatus. Last BM 2 days ago.   Of note, upon chart review, patient was admitted here for abdominal pain/cholelithiasis in 2018, he was medically a poor surgical candidate, so perc gus was considered at the time, but his HIDA was negative and his symptoms improved so he was discharged home to follow up as outpatient, which he did not.   He denies fever, chills, chest pain, shortness of breath, back pain, recent illness, recent travel. (10 Apr 2019 17:06)      SUBJECTIVE & OBJECTIVE: Pt seen and examined at bedside.     PHYSICAL EXAM:  T(C): 36.2 (19 @ 12:24), Max: 38 (19 @ 05:00)  HR: 81 (19 @ 12:24) (81 - 91)  BP: 146/63 (19 @ 12:24) (141/88 - 166/95)  RR: 17 (19 @ 12:24) (17 - 18)  SpO2: 95% (19 @ 12:24) (94% - 98%)  Wt(kg): -- Height (cm): 167.64 (04-10 @ 20:06)  Weight (kg): 78.8 (04-10 @ 20:06)  BMI (kg/m2): 28 (04-10 @ 20:06)  BSA (m2): 1.88 (04-10 @ 20:06)  I&O's Detail    2019 07:01  -  2019 15:56  --------------------------------------------------------  IN:  Total IN: 0 mL    OUT:    Voided: 200 mL  Total OUT: 200 mL    Total NET: -200 mL        GENERAL: NAD, well-groomed, well-developed  HEAD:  Atraumatic, Normocephalic  EYES: EOMI, PERRLA, conjunctiva and sclera clear  ENMT: Moist mucous membranes  NECK: Supple, No JVD  NERVOUS SYSTEM:  Alert & Oriented X3, Motor Strength 5/5 B/L upper and lower extremities; DTRs 2+ intact and symmetric  CHEST/LUNG: Clear to auscultation bilaterally; No rales, rhonchi, wheezing, or rubs  HEART: Regular rate and rhythm; No murmurs, rubs, or gallops  ABDOMEN: Soft, Nontender, Nondistended; Bowel sounds present  EXTREMITIES:  2+ Peripheral Pulses, No clubbing, cyanosis, or edema    MEDICATIONS  (STANDING):  docusate sodium 100 milliGRAM(s) Oral three times a day  heparin  Injectable 5000 Unit(s) SubCutaneous every 12 hours  levETIRAcetam  IVPB 500 milliGRAM(s) IV Intermittent every 12 hours  morphine  - Injectable 2 milliGRAM(s) IV Push once  piperacillin/tazobactam IVPB. 3.375 Gram(s) IV Intermittent every 8 hours  sodium chloride 0.9%. 1000 milliLiter(s) (100 mL/Hr) IV Continuous <Continuous>    MEDICATIONS  (PRN):  morphine  - Injectable 2 milliGRAM(s) IV Push every 4 hours PRN Severe Pain (7 - 10)  ondansetron Injectable 4 milliGRAM(s) IV Push every 6 hours PRN Nausea and/or Vomiting      LABS:                        11.2   10.93 )-----------( 219      ( 2019 07:55 )             33.0     04-11    139  |  106  |  12  ----------------------------<  93  3.5   |  26  |  1.13    Ca    7.6<L>      2019 07:55  Phos  2.4     04-11  Mg     1.8     04-11    TPro  7.2  /  Alb  3.0<L>  /  TBili  0.9  /  DBili  x   /  AST  17  /  ALT  21  /  AlkPhos  83  04-11    PT/INR - ( 10 Apr 2019 16:05 )   PT: 14.1 sec;   INR: 1.25 ratio         PTT - ( 10 Apr 2019 16:05 )  PTT:31.2 sec  Urinalysis Basic - ( 2019 16:21 )    Color: Yellow / Appearance: Clear / S.010 / pH: x  Gluc: x / Ketone: Negative  / Bili: Negative / Urobili: Negative mg/dL   Blood: x / Protein: 100 mg/dL / Nitrite: Negative   Leuk Esterase: Negative / RBC: 3-5 /HPF / WBC 0-2   Sq Epi: x / Non Sq Epi: Occasional / Bacteria: Occasional      RECENT CULTURES:      RADIOLOGY & ADDITIONAL TESTS:      DVT/GI ppx  Discussed with pt @ bedside

## 2019-04-11 NOTE — PROGRESS NOTE ADULT - ASSESSMENT
84 year old male PMH seizure disorder, subdural hematoma s/p craniotomy due to trauma, HTN, ETOH abuse, and recent admission to [facility] for cholelithiasis.       Problem/Recommendation - 1:  Problem: Cholecystitis. Recommendation: - plan for surgery in am  - keep NPO (except medications)  - would use LR for IV hydration at 150 cc/hr.    Problem/Recommendation - 2:  ·  Problem: Sepsis due to Gram negative bacteria.  Recommendation: - present on admission and now resolved. would give 30 ml/kg bolus  - recheck lactate in 4 hours  - check tte  - would continue Zosyn Q 8 hours.     Problem/Recommendation - 3:  ·  Problem: Seizure.  Recommendation: - Keppra 500 mg IVPB Q 12 hours.     Problem/Recommendation - 4:  ·  Problem: Essential hypertension.  Recommendation: may give hydralazine 5mg IVP Q 6 hours prn SBP >160  Cardizem 120 mg daily.     Problem/Recommendation - 5:  ·  Problem: History of subdural hematoma.  Recommendation: no acute management.     Problem/Recommendation - 6:  Problem: Constipation. Recommendation: hold prokinetics for now. 84 year old male PMH seizure disorder, subdural hematoma s/p craniotomy due to trauma, HTN, ETOH abuse, and recent admission to [facility] for cholelithiasis.      RCRI 6%  Patient is medically optimized at this time for the operative procedure.      Problem/Recommendation - 1:  Problem: Cholecystitis. Recommendation: - plan for surgery in am  - keep NPO (except medications)  - would use LR for IV hydration at 100 cc/hr.    Problem/Recommendation - 2:  ·  Problem: Sepsis due to Gram negative bacteria.    Recommendation:   - present on admission and now resolved. would give 30 ml/kg bolus  - lactate normalized  - check tte  - would continue Zosyn Q 8 hours.     Problem/Recommendation - 3:  ·  Problem: Seizure.  Recommendation: - Keppra 500 mg IVPB Q 12 hours.     Problem/Recommendation - 4:  ·  Problem: Essential hypertension .  Recommendation: may give hydralazine 5mg IVP Q 6 hours prn SBP >160  Cardizem 120 mg daily.   Give digoxin and cardizem in am    Problem/Recommendation - 5:  ·  Problem: History of subdural hematoma.  Recommendation: no acute management.     Problem/Recommendation - 6:  Problem: Constipation. Recommendation: hold prokinetics for now.

## 2019-04-11 NOTE — PROGRESS NOTE ADULT - SUBJECTIVE AND OBJECTIVE BOX
pt seen resting comfortably in bed, wife at bedside, no acute issues overnight, still c/o ruq abdominal pain, no n/v. low grade temp overnight    T(F): 100.4 (04-11-19 @ 17:58), Max: 100.4 (04-11-19 @ 05:00)  HR: 100 (04-11-19 @ 17:58) (81 - 100)  BP: 143/79 (04-11-19 @ 17:58) (141/88 - 146/63)  RR: 18 (04-11-19 @ 17:58) (17 - 18)  SpO2: 98% (04-11-19 @ 17:58) (94% - 98%)  Wt(kg): --  CAPILLARY BLOOD GLUCOSE    GENERAL: NAD, well-groomed, well-developed  HEAD:  Atraumatic, Normocephalic  EYES: EOMI, PERRLA, conjunctiva and sclera clear  ENMT: Moist mucous membranes  NECK: Supple, No JVD  NERVOUS SYSTEM:  Alert & Oriented X3, Motor Strength 5/5 B/L upper and lower extremities; DTRs 2+ intact and symmetric  CHEST/LUNG: Clear to auscultation bilaterally; No rales, rhonchi, wheezing, or rubs  HEART: Regular rate and rhythm; No murmurs, rubs, or gallops  ABDOMEN: Soft, Nontender, Nondistended; Bowel sounds present, +murphys  EXTREMITIES:  2+ Peripheral Pulses, No clubbing, cyanosis, or edema    LABS:                        11.2   10.93 )-----------( 219      ( 11 Apr 2019 07:55 )             33.0     04-11    139  |  106  |  12  ----------------------------<  93  3.5   |  26  |  1.13    Ca    7.6<L>      11 Apr 2019 07:55  Phos  2.4     04-11  Mg     1.8     04-11    TPro  7.2  /  Alb  3.0<L>  /  TBili  0.9  /  DBili  x   /  AST  17  /  ALT  21  /  AlkPhos  83  04-11    PT/INR - ( 10 Apr 2019 16:05 )   PT: 14.1 sec;   INR: 1.25 ratio         PTT - ( 10 Apr 2019 16:05 )  PTT:31.2 sec  I&O's Detail    11 Apr 2019 07:01  -  11 Apr 2019 18:47  --------------------------------------------------------  IN:  Total IN: 0 mL    OUT:    Voided: 200 mL  Total OUT: 200 mL    Total NET: -200 mL    < from: NM Hepatobiliary Imaging (04.11.19 @ 12:28) >  IMPRESSION:  Abnormal morphine-augmented hepatobiliary scan compatible   with acute cholecystitis.      Impression: 84y Male admitted with CHOLECYSTITIS      PMH   Cholelithiasis  History of subdural hematoma  Constipation  Hypertension  Seizure      Plan:  - Lap Crystal scheduled tomorrow  - medical risk assessment per medical team  - NPO after MN  -Ac to be held for surgery  -continue VTE prophylaxis with IPC  -discussed with surgical attending pt seen resting comfortably in bed,  wife at bedside, no acute issues overnight, still c/o ruq abdominal pain, no n/v. low grade temp overnight    T(F): 100.4 (04-11-19 @ 17:58), Max: 100.4 (04-11-19 @ 05:00)  HR: 100 (04-11-19 @ 17:58) (81 - 100)  BP: 143/79 (04-11-19 @ 17:58) (141/88 - 146/63)  RR: 18 (04-11-19 @ 17:58) (17 - 18)  SpO2: 98% (04-11-19 @ 17:58) (94% - 98%)  Wt(kg): --  CAPILLARY BLOOD GLUCOSE    GENERAL: NAD, well-groomed, well-developed  HEAD:  Atraumatic, Normocephalic  EYES: EOMI, PERRLA, conjunctiva and sclera clear  ENMT: Moist mucous membranes  NECK: Supple, No JVD  NERVOUS SYSTEM:  Alert & Oriented X3, Motor Strength 5/5 B/L upper and lower extremities; DTRs 2+ intact and symmetric  CHEST/LUNG: Clear to auscultation bilaterally; No rales, rhonchi, wheezing, or rubs  HEART: Regular rate and rhythm; No murmurs, rubs, or gallops  ABDOMEN: Soft, Nontender, Nondistended; Bowel sounds present, +murphys  EXTREMITIES:  2+ Peripheral Pulses, No clubbing, cyanosis, or edema    LABS:                        11.2   10.93 )-----------( 219      ( 11 Apr 2019 07:55 )             33.0     04-11    139  |  106  |  12  ----------------------------<  93  3.5   |  26  |  1.13    Ca    7.6<L>      11 Apr 2019 07:55  Phos  2.4     04-11  Mg     1.8     04-11    TPro  7.2  /  Alb  3.0<L>  /  TBili  0.9  /  DBili  x   /  AST  17  /  ALT  21  /  AlkPhos  83  04-11    PT/INR - ( 10 Apr 2019 16:05 )   PT: 14.1 sec;   INR: 1.25 ratio         PTT - ( 10 Apr 2019 16:05 )  PTT:31.2 sec  I&O's Detail    11 Apr 2019 07:01  -  11 Apr 2019 18:47  --------------------------------------------------------  IN:  Total IN: 0 mL    OUT:    Voided: 200 mL  Total OUT: 200 mL    Total NET: -200 mL    < from: NM Hepatobiliary Imaging (04.11.19 @ 12:28) >  IMPRESSION:  Abnormal morphine-augmented hepatobiliary scan compatible   with acute cholecystitis.      Impression: 84y Male admitted with CHOLECYSTITIS      PMH   Cholelithiasis  History of subdural hematoma  Constipation  Hypertension  Seizure      Plan:  - Lap Crystal scheduled tomorrow  - medical risk assessment per medical team  - NPO after MN  -Ac to be held for surgery  -continue VTE prophylaxis with IPC  -discussed with surgical attending

## 2019-04-12 LAB
ANION GAP SERPL CALC-SCNC: 9 MMOL/L — SIGNIFICANT CHANGE UP (ref 5–17)
BUN SERPL-MCNC: 13 MG/DL — SIGNIFICANT CHANGE UP (ref 7–23)
CALCIUM SERPL-MCNC: 8.5 MG/DL — SIGNIFICANT CHANGE UP (ref 8.5–10.1)
CHLORIDE SERPL-SCNC: 105 MMOL/L — SIGNIFICANT CHANGE UP (ref 96–108)
CO2 SERPL-SCNC: 22 MMOL/L — SIGNIFICANT CHANGE UP (ref 22–31)
CREAT SERPL-MCNC: 1.04 MG/DL — SIGNIFICANT CHANGE UP (ref 0.5–1.3)
GLUCOSE SERPL-MCNC: 89 MG/DL — SIGNIFICANT CHANGE UP (ref 70–99)
HCT VFR BLD CALC: 41.3 % — SIGNIFICANT CHANGE UP (ref 39–50)
HGB BLD-MCNC: 13.4 G/DL — SIGNIFICANT CHANGE UP (ref 13–17)
MCHC RBC-ENTMCNC: 30.6 PG — SIGNIFICANT CHANGE UP (ref 27–34)
MCHC RBC-ENTMCNC: 32.4 GM/DL — SIGNIFICANT CHANGE UP (ref 32–36)
MCV RBC AUTO: 94.3 FL — SIGNIFICANT CHANGE UP (ref 80–100)
NRBC # BLD: 0 /100 WBCS — SIGNIFICANT CHANGE UP (ref 0–0)
PLATELET # BLD AUTO: 237 K/UL — SIGNIFICANT CHANGE UP (ref 150–400)
POTASSIUM SERPL-MCNC: 3.7 MMOL/L — SIGNIFICANT CHANGE UP (ref 3.5–5.3)
POTASSIUM SERPL-SCNC: 3.7 MMOL/L — SIGNIFICANT CHANGE UP (ref 3.5–5.3)
RBC # BLD: 4.38 M/UL — SIGNIFICANT CHANGE UP (ref 4.2–5.8)
RBC # FLD: 12.6 % — SIGNIFICANT CHANGE UP (ref 10.3–14.5)
SODIUM SERPL-SCNC: 136 MMOL/L — SIGNIFICANT CHANGE UP (ref 135–145)
WBC # BLD: 13.87 K/UL — HIGH (ref 3.8–10.5)
WBC # FLD AUTO: 13.87 K/UL — HIGH (ref 3.8–10.5)

## 2019-04-12 PROCEDURE — 99233 SBSQ HOSP IP/OBS HIGH 50: CPT

## 2019-04-12 RX ORDER — ACETAMINOPHEN 500 MG
650 TABLET ORAL EVERY 6 HOURS
Qty: 0 | Refills: 0 | Status: DISCONTINUED | OUTPATIENT
Start: 2019-04-12 | End: 2019-04-18

## 2019-04-12 RX ORDER — LISINOPRIL 2.5 MG/1
0 TABLET ORAL
Qty: 0 | Refills: 0 | COMMUNITY

## 2019-04-12 RX ORDER — DILTIAZEM HCL 120 MG
120 CAPSULE, EXT RELEASE 24 HR ORAL ONCE
Qty: 0 | Refills: 0 | Status: COMPLETED | OUTPATIENT
Start: 2019-04-12 | End: 2019-04-12

## 2019-04-12 RX ORDER — ATORVASTATIN CALCIUM 80 MG/1
0 TABLET, FILM COATED ORAL
Qty: 0 | Refills: 0 | COMMUNITY

## 2019-04-12 RX ORDER — DIGOXIN 250 MCG
0 TABLET ORAL
Qty: 0 | Refills: 0 | COMMUNITY

## 2019-04-12 RX ORDER — DILTIAZEM HCL 120 MG
5 CAPSULE, EXT RELEASE 24 HR ORAL EVERY 6 HOURS
Qty: 0 | Refills: 0 | Status: DISCONTINUED | OUTPATIENT
Start: 2019-04-12 | End: 2019-04-12

## 2019-04-12 RX ORDER — LEVETIRACETAM 250 MG/1
0 TABLET, FILM COATED ORAL
Qty: 0 | Refills: 0 | COMMUNITY

## 2019-04-12 RX ORDER — FOLIC ACID 0.8 MG
0 TABLET ORAL
Qty: 0 | Refills: 0 | COMMUNITY

## 2019-04-12 RX ORDER — DIGOXIN 250 MCG
0.12 TABLET ORAL DAILY
Qty: 0 | Refills: 0 | Status: DISCONTINUED | OUTPATIENT
Start: 2019-04-12 | End: 2019-04-18

## 2019-04-12 RX ORDER — DILTIAZEM HCL 120 MG
5 CAPSULE, EXT RELEASE 24 HR ORAL ONCE
Qty: 0 | Refills: 0 | Status: COMPLETED | OUTPATIENT
Start: 2019-04-12 | End: 2019-04-12

## 2019-04-12 RX ORDER — DILTIAZEM HCL 120 MG
240 CAPSULE, EXT RELEASE 24 HR ORAL DAILY
Qty: 0 | Refills: 0 | Status: DISCONTINUED | OUTPATIENT
Start: 2019-04-12 | End: 2019-04-18

## 2019-04-12 RX ORDER — ACETAMINOPHEN 500 MG
2 TABLET ORAL
Qty: 0 | Refills: 0 | COMMUNITY

## 2019-04-12 RX ORDER — SODIUM CHLORIDE 9 MG/ML
1000 INJECTION, SOLUTION INTRAVENOUS
Qty: 0 | Refills: 0 | Status: DISCONTINUED | OUTPATIENT
Start: 2019-04-12 | End: 2019-04-13

## 2019-04-12 RX ADMIN — Medication 5 MILLIGRAM(S): at 15:06

## 2019-04-12 RX ADMIN — PIPERACILLIN AND TAZOBACTAM 25 GRAM(S): 4; .5 INJECTION, POWDER, LYOPHILIZED, FOR SOLUTION INTRAVENOUS at 21:01

## 2019-04-12 RX ADMIN — SODIUM CHLORIDE 125 MILLILITER(S): 9 INJECTION, SOLUTION INTRAVENOUS at 14:20

## 2019-04-12 RX ADMIN — LEVETIRACETAM 420 MILLIGRAM(S): 250 TABLET, FILM COATED ORAL at 21:04

## 2019-04-12 RX ADMIN — Medication 100 MILLIGRAM(S): at 14:31

## 2019-04-12 RX ADMIN — LEVETIRACETAM 420 MILLIGRAM(S): 250 TABLET, FILM COATED ORAL at 09:18

## 2019-04-12 RX ADMIN — HEPARIN SODIUM 5000 UNIT(S): 5000 INJECTION INTRAVENOUS; SUBCUTANEOUS at 17:23

## 2019-04-12 RX ADMIN — PIPERACILLIN AND TAZOBACTAM 25 GRAM(S): 4; .5 INJECTION, POWDER, LYOPHILIZED, FOR SOLUTION INTRAVENOUS at 05:00

## 2019-04-12 RX ADMIN — POTASSIUM PHOSPHATE, MONOBASIC POTASSIUM PHOSPHATE, DIBASIC 62.5 MILLIMOLE(S): 236; 224 INJECTION, SOLUTION INTRAVENOUS at 05:24

## 2019-04-12 RX ADMIN — Medication 0.12 MILLIGRAM(S): at 14:31

## 2019-04-12 RX ADMIN — Medication 100 MILLIGRAM(S): at 05:01

## 2019-04-12 RX ADMIN — Medication 120 MILLIGRAM(S): at 05:01

## 2019-04-12 RX ADMIN — Medication 100 MILLIGRAM(S): at 21:01

## 2019-04-12 RX ADMIN — Medication 120 MILLIGRAM(S): at 12:11

## 2019-04-12 RX ADMIN — PIPERACILLIN AND TAZOBACTAM 25 GRAM(S): 4; .5 INJECTION, POWDER, LYOPHILIZED, FOR SOLUTION INTRAVENOUS at 11:10

## 2019-04-12 NOTE — PROGRESS NOTE ADULT - SUBJECTIVE AND OBJECTIVE BOX
Patient is a 84y old  Male who presents with a chief complaint of Acute cholecystitis, cholelithiasis (12 Apr 2019 14:50)        HPI:  Patient is an 84 year old male PMH seizure disorder, subdural hematoma s/p craniotomy due to trauma, HTN, ETOH abuse, cholelithiasis, who presents to the emergency room complaining of 7/10, non-radiating, upper abdominal pain x 3 days with associated nausea and vomiting. States the pain began abruptly after eating a heavy dinner and has been episodic since. He was seen here yesterday, but his CT was negative and he was discharged home. When his pain persisted and he continued to vomit, he returned here. New Abdomen CT scan now revealed Distended GB  and Cholelithiasis with Pericholecystic fluid.   Has not eaten today. Admits to flatus. Last BM 2 days ago.   Of note, upon chart review, patient was admitted here for abdominal pain/cholelithiasis in 12/2018, he was medically a poor surgical candidate, so perc gus was considered at the time, but his HIDA was negative and his symptoms improved so he was discharged home to follow up as outpatient, which he did not.   He denies fever, chills, chest pain, shortness of breath, back pain, recent illness, recent travel. (10 Apr 2019 17:06)      SUBJECTIVE & OBJECTIVE: Pt seen and examined at bedside. HR uncontrolled at present.     PHYSICAL EXAM:  T(C): 37.8 (04-12-19 @ 11:23), Max: 38 (04-11-19 @ 17:58)  HR: 112 (04-12-19 @ 15:31) (96 - 164)  BP: 117/64 (04-12-19 @ 15:31) (111/65 - 174/96)  RR: 16 (04-12-19 @ 13:42) (16 - 18)  SpO2: 95% (04-12-19 @ 13:42) (92% - 98%)  Wt(kg): --   I&O's Detail    11 Apr 2019 07:01  -  12 Apr 2019 07:00  --------------------------------------------------------  IN:    IV PiggyBack: 350 mL    lactated ringers.: 900 mL  Total IN: 1250 mL    OUT:    Voided: 200 mL  Total OUT: 200 mL    Total NET: 1050 mL        GENERAL: NAD, well-groomed, well-developed  HEAD:  Atraumatic, Normocephalic  EYES: EOMI, PERRLA, conjunctiva and sclera clear  ENMT: Moist mucous membranes  NECK: Supple, No JVD  NERVOUS SYSTEM:  Alert & Oriented X3, Motor Strength 5/5 B/L upper and lower extremities; DTRs 2+ intact and symmetric  CHEST/LUNG: Clear to auscultation bilaterally; No rales, rhonchi, wheezing, or rubs  HEART: irregularly irregular HR in 160's   ABDOMEN: Soft, Nontender, Nondistended; Bowel sounds present  EXTREMITIES:  2+ Peripheral Pulses, No clubbing, cyanosis, or edema    MEDICATIONS  (STANDING):  digoxin     Tablet 0.125 milliGRAM(s) Oral daily  diltiazem    milliGRAM(s) Oral daily  docusate sodium 100 milliGRAM(s) Oral three times a day  heparin  Injectable 5000 Unit(s) SubCutaneous every 12 hours  lactated ringers. 1000 milliLiter(s) (125 mL/Hr) IV Continuous <Continuous>  levETIRAcetam  IVPB 500 milliGRAM(s) IV Intermittent every 12 hours  morphine  - Injectable 2 milliGRAM(s) IV Push once  piperacillin/tazobactam IVPB. 3.375 Gram(s) IV Intermittent every 8 hours    MEDICATIONS  (PRN):  acetaminophen   Tablet .. 650 milliGRAM(s) Oral every 6 hours PRN Temp greater or equal to 38C (100.4F), Mild Pain (1 - 3)  morphine  - Injectable 2 milliGRAM(s) IV Push every 4 hours PRN Severe Pain (7 - 10)  ondansetron Injectable 4 milliGRAM(s) IV Push every 6 hours PRN Nausea and/or Vomiting      LABS:                        13.4   13.87 )-----------( 237      ( 12 Apr 2019 06:46 )             41.3     04-12    136  |  105  |  13  ----------------------------<  89  3.7   |  22  |  1.04    Ca    8.5      12 Apr 2019 06:46  Phos  2.4     04-11  Mg     1.8     04-11    TPro  7.2  /  Alb  3.0<L>  /  TBili  0.9  /  DBili  x   /  AST  17  /  ALT  21  /  AlkPhos  83  04-11        CAPILLARY BLOOD GLUCOSE                RECENT CULTURES:      RADIOLOGY & ADDITIONAL TESTS:      DVT/GI ppx  Discussed with pt @ bedside

## 2019-04-12 NOTE — PROGRESS NOTE ADULT - SUBJECTIVE AND OBJECTIVE BOX
Surgery NP note  Patient seen and examined bedside resting comfortably.  No complaints offered. Abdominal pain is well controlled.  Denies nausea and vomiting. npo  Normal flatus/BM.     T(F): 100 (04-12-19 @ 11:23), Max: 100.4 (04-11-19 @ 17:58)  HR: 161 (04-12-19 @ 13:42) (96 - 161)  BP: 111/65 (04-12-19 @ 13:42) (111/65 - 174/96)  RR: 16 (04-12-19 @ 13:42) (16 - 18)  SpO2: 95% (04-12-19 @ 13:42) (92% - 98%)  Wt(kg): --  CAPILLARY BLOOD GLUCOSE          PHYSICAL EXAM:  General: NAD, WDWN.   Neuro:  Alert & responsive  HEENT: NCAT, EOMI, conjunctiva clear  CV: +S1+S2 regular rate and rhythm  Lung: clear to ausculation bilaterally, respirations nonlabored, good inspiratory effort  Abdomen: soft, + RUQ TTP, No Distension. Normal active BS  Extremities: no pedal edema or calf tenderness noted     LABS:                        13.4   13.87 )-----------( 237      ( 12 Apr 2019 06:46 )             41.3     04-12    136  |  105  |  13  ----------------------------<  89  3.7   |  22  |  1.04    Ca    8.5      12 Apr 2019 06:46  Phos  2.4     04-11  Mg     1.8     04-11    TPro  7.2  /  Alb  3.0<L>  /  TBili  0.9  /  DBili  x   /  AST  17  /  ALT  21  /  AlkPhos  83  04-11    LIVER FUNCTIONS - ( 11 Apr 2019 07:55 )  Alb: 3.0 g/dL / Pro: 7.2 gm/dL / ALK PHOS: 83 U/L / ALT: 21 U/L / AST: 17 U/L / GGT: x           PT/INR - ( 10 Apr 2019 16:05 )   PT: 14.1 sec;   INR: 1.25 ratio         PTT - ( 10 Apr 2019 16:05 )  PTT:31.2 sec  I&O's Detail    11 Apr 2019 07:01  -  12 Apr 2019 07:00  --------------------------------------------------------  IN:    IV PiggyBack: 350 mL    lactated ringers.: 900 mL  Total IN: 1250 mL    OUT:    Voided: 200 mL  Total OUT: 200 mL    Total NET: 1050 mL    < from: NM Hepatobiliary Imaging (04.11.19 @ 12:28) >    EXAM:  NM HEPATOBILIARY IMG                            PROCEDURE DATE:  04/11/2019          INTERPRETATION:  CLINICAL INFORMATION: 84 year with right upper quadrant   abdominal pain, cholelithiasis and gallbladder wall thickening, referred   to evaluate for acute cholecystitis.    RADIOPHARMACEUTICAL: 3.0 mCi Tc-99m-Mebrofenin, I.V.; 2 doses    TECHNIQUE: Dynamic imaging of the anterior abdomen was performed for 1   hour following injection of radiotracer. Morphine 2 mg I.V. and a second   doseof radiotracer were administered at 1 hour.  Dynamic imaging was   continued for 1 hour followed by static images of the abdomen in the   anterior and right anterior oblique projections.    COMPARISON: Previous hepatobiliary scan dated 12/3/2018- normal.    FINDINGS: There is prompt, homogeneous uptake of radiotracer by the   hepatocytes. Activity is first seen in the bowel at 15 minutes. The   gallbladder is not visualized at anytime during the study despite   administration of Morphine. There is good clearance of activity from the   liver at the end of the study.    IMPRESSION:  Abnormal morphine-augmented hepatobiliary scan compatible   with acute cholecystitis.      Surgical team (Mr. Behan) was notified of these results by Dr. Amin via  telephone on 4/11/2019 at 12:05 PM.    SY AMIN M.D., NUCLEAR MEDICINE ATTENDING  This document has been electronically signed. Apr 11 2019 12:08PM      Impression: 84y Male admitted with Acute cholecystitis, + HIDA scan  [facility]   Cholelithiasis  History of subdural hematoma  Constipation  Hypertension  Seizure      Plan:  - Lap Crystal scheduled today pending OR availability  - Appreciate medical clearance   - NPO  -Ac to be held for surgery  -continue VTE prophylaxis with IPC  -discussed with surgical attending Surgery NP note  Patient seen and examined bedside resting comfortably.  No complaints offered. Abdominal pain is well controlled.  Denies nausea and vomiting. npo  Normal flatus/BM.     T(F): 100 (04-12-19 @ 11:23), Max: 100.4 (04-11-19 @ 17:58)  HR: 161 (04-12-19 @ 13:42) (96 - 161)  BP: 111/65 (04-12-19 @ 13:42) (111/65 - 174/96)  RR: 16 (04-12-19 @ 13:42) (16 - 18)  SpO2: 95% (04-12-19 @ 13:42) (92% - 98%)  Wt(kg): --  CAPILLARY BLOOD GLUCOSE          PHYSICAL EXAM:  General: NAD, WDWN.   Neuro:  Alert & responsive  HEENT: NCAT, EOMI, conjunctiva clear  CV: +S1+S2 regular rate and rhythm  Lung: clear to ausculation bilaterally, respirations nonlabored, good inspiratory effort  Abdomen: soft, + RUQ TTP, No Distension. Normal active BS  Extremities: no pedal edema or calf tenderness noted     LABS:                        13.4   13.87 )-----------( 237      ( 12 Apr 2019 06:46 )             41.3     04-12    136  |  105  |  13  ----------------------------<  89  3.7   |  22  |  1.04    Ca    8.5      12 Apr 2019 06:46  Phos  2.4     04-11  Mg     1.8     04-11    TPro  7.2  /  Alb  3.0<L>  /  TBili  0.9  /  DBili  x   /  AST  17  /  ALT  21  /  AlkPhos  83  04-11    LIVER FUNCTIONS - ( 11 Apr 2019 07:55 )  Alb: 3.0 g/dL / Pro: 7.2 gm/dL / ALK PHOS: 83 U/L / ALT: 21 U/L / AST: 17 U/L / GGT: x           PT/INR - ( 10 Apr 2019 16:05 )   PT: 14.1 sec;   INR: 1.25 ratio         PTT - ( 10 Apr 2019 16:05 )  PTT:31.2 sec  I&O's Detail    11 Apr 2019 07:01  -  12 Apr 2019 07:00  --------------------------------------------------------  IN:    IV PiggyBack: 350 mL    lactated ringers.: 900 mL  Total IN: 1250 mL    OUT:    Voided: 200 mL  Total OUT: 200 mL    Total NET: 1050 mL    < from: NM Hepatobiliary Imaging (04.11.19 @ 12:28) >    EXAM:  NM HEPATOBILIARY IMG                            PROCEDURE DATE:  04/11/2019          INTERPRETATION:  CLINICAL INFORMATION: 84 year with right upper quadrant   abdominal pain, cholelithiasis and gallbladder wall thickening, referred   to evaluate for acute cholecystitis.    RADIOPHARMACEUTICAL: 3.0 mCi Tc-99m-Mebrofenin, I.V.; 2 doses    TECHNIQUE: Dynamic imaging of the anterior abdomen was performed for 1   hour following injection of radiotracer. Morphine 2 mg I.V. and a second   doseof radiotracer were administered at 1 hour.  Dynamic imaging was   continued for 1 hour followed by static images of the abdomen in the   anterior and right anterior oblique projections.    COMPARISON: Previous hepatobiliary scan dated 12/3/2018- normal.    FINDINGS: There is prompt, homogeneous uptake of radiotracer by the   hepatocytes. Activity is first seen in the bowel at 15 minutes. The   gallbladder is not visualized at anytime during the study despite   administration of Morphine. There is good clearance of activity from the   liver at the end of the study.    IMPRESSION:  Abnormal morphine-augmented hepatobiliary scan compatible   with acute cholecystitis.      Surgical team (Mr. Behan) was notified of these results by Dr. Amin via  telephone on 4/11/2019 at 12:05 PM.    SY AMIN M.D., NUCLEAR MEDICINE ATTENDING  This document has been electronically signed. Apr 11 2019 12:08PM      Impression:   84y Male admitted with Acute cholecystitis, + HIDA scan  [facility]   Cholelithiasis  History of subdural hematoma (2014)  Constipation  Hypertension  Seizure      Plan:  - Lap Crystal scheduled today pending OR availability  - Appreciate medical clearance   - NPO  - continue VTE prophylaxis with IPC  - discussed with surgical attending

## 2019-04-12 NOTE — PROGRESS NOTE ADULT - SUBJECTIVE AND OBJECTIVE BOX
Patient having episodes of SVT.   Vital Signs Last 24 Hrs  T(C): 37.8 (12 Apr 2019 11:23), Max: 38 (11 Apr 2019 17:58)  T(F): 100 (12 Apr 2019 11:23), Max: 100.4 (11 Apr 2019 17:58)  HR: 161 (12 Apr 2019 13:42) (96 - 161)  BP: 111/65 (12 Apr 2019 13:42) (111/65 - 174/96)  BP(mean): --  RR: 16 (12 Apr 2019 13:42) (16 - 18)  SpO2: 95% (12 Apr 2019 13:42) (92% - 98%)    As discussed with Dr Santiago, will cancel OR for today. Full liquid diet, will plan for OR when medically optimized.

## 2019-04-12 NOTE — PROGRESS NOTE ADULT - ASSESSMENT
84 year old male PMH seizure disorder, subdural hematoma s/p craniotomy due to trauma, HTN, ETOH abuse, and recent admission to [facility] for cholelithiasis.        Problem/Recommendation - 1:  Problem: Atrial Fibrillation with RVR  - would hold off on surgical procedure  - medications have been verified with Pharmacy  - start Cardizem 240 mg daily with Digoxin 0.125 mcg daily,   - increase IVF to 125 cc/hr    Problem/Recommendation - 2:  Cholecystitis. Recommendation: - plan for surgery in am  - keep NPO (except medications)  - would use LR for IV hydration at 100 cc/hr.    Problem/Recommendation - 3:  ·  Problem: Sepsis due to Gram negative bacteria.    Recommendation:   - present on admission and now resolved. would give 30 ml/kg bolus  - lactate normalized  - check tte  - would continue Zosyn Q 8 hours.     Problem/Recommendation - 3:  ·  Problem: Seizure.  Recommendation: - Keppra 500 mg IVPB Q 12 hours.     Problem/Recommendation - 4:  ·  Problem: Essential hypertension .  Recommendation: may give hydralazine 5mg IVP Q 6 hours prn SBP >160  Cardizem 120 mg daily.   Give digoxin and cardizem in am    Problem/Recommendation - 5:  ·  Problem: History of subdural hematoma.  Recommendation: no acute management.     Problem/Recommendation - 6:  Problem: Constipation. Recommendation: hold prokinetics for now.

## 2019-04-13 LAB
ALBUMIN SERPL ELPH-MCNC: 2.4 G/DL — LOW (ref 3.3–5)
ALP SERPL-CCNC: 84 U/L — SIGNIFICANT CHANGE UP (ref 40–120)
ALT FLD-CCNC: 25 U/L — SIGNIFICANT CHANGE UP (ref 12–78)
ANION GAP SERPL CALC-SCNC: 8 MMOL/L — SIGNIFICANT CHANGE UP (ref 5–17)
AST SERPL-CCNC: 24 U/L — SIGNIFICANT CHANGE UP (ref 15–37)
BASOPHILS # BLD AUTO: 0.02 K/UL — SIGNIFICANT CHANGE UP (ref 0–0.2)
BASOPHILS NFR BLD AUTO: 0.2 % — SIGNIFICANT CHANGE UP (ref 0–2)
BILIRUB SERPL-MCNC: 1.2 MG/DL — SIGNIFICANT CHANGE UP (ref 0.2–1.2)
BUN SERPL-MCNC: 21 MG/DL — SIGNIFICANT CHANGE UP (ref 7–23)
CALCIUM SERPL-MCNC: 7.7 MG/DL — LOW (ref 8.5–10.1)
CHLORIDE SERPL-SCNC: 105 MMOL/L — SIGNIFICANT CHANGE UP (ref 96–108)
CO2 SERPL-SCNC: 26 MMOL/L — SIGNIFICANT CHANGE UP (ref 22–31)
CREAT SERPL-MCNC: 1.14 MG/DL — SIGNIFICANT CHANGE UP (ref 0.5–1.3)
EOSINOPHIL # BLD AUTO: 0.11 K/UL — SIGNIFICANT CHANGE UP (ref 0–0.5)
EOSINOPHIL NFR BLD AUTO: 1.1 % — SIGNIFICANT CHANGE UP (ref 0–6)
GLUCOSE SERPL-MCNC: 96 MG/DL — SIGNIFICANT CHANGE UP (ref 70–99)
HCT VFR BLD CALC: 31.1 % — LOW (ref 39–50)
HGB BLD-MCNC: 10.3 G/DL — LOW (ref 13–17)
IMM GRANULOCYTES NFR BLD AUTO: 0.4 % — SIGNIFICANT CHANGE UP (ref 0–1.5)
LIDOCAIN IGE QN: 54 U/L — LOW (ref 73–393)
LYMPHOCYTES # BLD AUTO: 1.39 K/UL — SIGNIFICANT CHANGE UP (ref 1–3.3)
LYMPHOCYTES # BLD AUTO: 14.5 % — SIGNIFICANT CHANGE UP (ref 13–44)
MCHC RBC-ENTMCNC: 30.6 PG — SIGNIFICANT CHANGE UP (ref 27–34)
MCHC RBC-ENTMCNC: 33.1 GM/DL — SIGNIFICANT CHANGE UP (ref 32–36)
MCV RBC AUTO: 92.3 FL — SIGNIFICANT CHANGE UP (ref 80–100)
MONOCYTES # BLD AUTO: 0.99 K/UL — HIGH (ref 0–0.9)
MONOCYTES NFR BLD AUTO: 10.3 % — SIGNIFICANT CHANGE UP (ref 2–14)
NEUTROPHILS # BLD AUTO: 7.04 K/UL — SIGNIFICANT CHANGE UP (ref 1.8–7.4)
NEUTROPHILS NFR BLD AUTO: 73.5 % — SIGNIFICANT CHANGE UP (ref 43–77)
NRBC # BLD: 0 /100 WBCS — SIGNIFICANT CHANGE UP (ref 0–0)
PLATELET # BLD AUTO: 224 K/UL — SIGNIFICANT CHANGE UP (ref 150–400)
POTASSIUM SERPL-MCNC: 3.4 MMOL/L — LOW (ref 3.5–5.3)
POTASSIUM SERPL-SCNC: 3.4 MMOL/L — LOW (ref 3.5–5.3)
PROT SERPL-MCNC: 6.8 GM/DL — SIGNIFICANT CHANGE UP (ref 6–8.3)
RBC # BLD: 3.37 M/UL — LOW (ref 4.2–5.8)
RBC # FLD: 13 % — SIGNIFICANT CHANGE UP (ref 10.3–14.5)
SODIUM SERPL-SCNC: 139 MMOL/L — SIGNIFICANT CHANGE UP (ref 135–145)
WBC # BLD: 9.59 K/UL — SIGNIFICANT CHANGE UP (ref 3.8–10.5)
WBC # FLD AUTO: 9.59 K/UL — SIGNIFICANT CHANGE UP (ref 3.8–10.5)

## 2019-04-13 PROCEDURE — 99233 SBSQ HOSP IP/OBS HIGH 50: CPT

## 2019-04-13 RX ORDER — POTASSIUM CHLORIDE 20 MEQ
40 PACKET (EA) ORAL ONCE
Qty: 0 | Refills: 0 | Status: COMPLETED | OUTPATIENT
Start: 2019-04-13 | End: 2019-04-13

## 2019-04-13 RX ADMIN — Medication 40 MILLIEQUIVALENT(S): at 13:36

## 2019-04-13 RX ADMIN — Medication 100 MILLIGRAM(S): at 13:36

## 2019-04-13 RX ADMIN — HEPARIN SODIUM 5000 UNIT(S): 5000 INJECTION INTRAVENOUS; SUBCUTANEOUS at 18:20

## 2019-04-13 RX ADMIN — Medication 240 MILLIGRAM(S): at 05:21

## 2019-04-13 RX ADMIN — PIPERACILLIN AND TAZOBACTAM 25 GRAM(S): 4; .5 INJECTION, POWDER, LYOPHILIZED, FOR SOLUTION INTRAVENOUS at 20:33

## 2019-04-13 RX ADMIN — PIPERACILLIN AND TAZOBACTAM 25 GRAM(S): 4; .5 INJECTION, POWDER, LYOPHILIZED, FOR SOLUTION INTRAVENOUS at 11:16

## 2019-04-13 RX ADMIN — LEVETIRACETAM 420 MILLIGRAM(S): 250 TABLET, FILM COATED ORAL at 22:33

## 2019-04-13 RX ADMIN — PIPERACILLIN AND TAZOBACTAM 25 GRAM(S): 4; .5 INJECTION, POWDER, LYOPHILIZED, FOR SOLUTION INTRAVENOUS at 03:43

## 2019-04-13 RX ADMIN — Medication 0.12 MILLIGRAM(S): at 05:21

## 2019-04-13 RX ADMIN — HEPARIN SODIUM 5000 UNIT(S): 5000 INJECTION INTRAVENOUS; SUBCUTANEOUS at 05:20

## 2019-04-13 RX ADMIN — Medication 100 MILLIGRAM(S): at 22:33

## 2019-04-13 RX ADMIN — Medication 100 MILLIGRAM(S): at 05:21

## 2019-04-13 RX ADMIN — LEVETIRACETAM 420 MILLIGRAM(S): 250 TABLET, FILM COATED ORAL at 10:00

## 2019-04-13 NOTE — PROGRESS NOTE ADULT - SUBJECTIVE AND OBJECTIVE BOX
Patient is a 84y old  Male who presents with a chief complaint of Acute cholecystitis, cholelithiasis (13 Apr 2019 13:30)        HPI:  Patient is an 84 year old male PMH seizure disorder, subdural hematoma s/p craniotomy due to trauma, HTN, ETOH abuse, cholelithiasis, who presents to the emergency room complaining of 7/10, non-radiating, upper abdominal pain x 3 days with associated nausea and vomiting. States the pain began abruptly after eating a heavy dinner and has been episodic since. He was seen here yesterday, but his CT was negative and he was discharged home. When his pain persisted and he continued to vomit, he returned here. New Abdomen CT scan now revealed Distended GB  and Cholelithiasis with Pericholecystic fluid.   Has not eaten today. Admits to flatus. Last BM 2 days ago.   Of note, upon chart review, patient was admitted here for abdominal pain/cholelithiasis in 12/2018, he was medically a poor surgical candidate, so perc gus was considered at the time, but his HIDA was negative and his symptoms improved so he was discharged home to follow up as outpatient, which he did not.   He denies fever, chills, chest pain, shortness of breath, back pain, recent illness, recent travel. (10 Apr 2019 17:06)      SUBJECTIVE & OBJECTIVE: Pt seen and examined at bedside.     PHYSICAL EXAM:  T(C): 36.8 (04-13-19 @ 11:16), Max: 37.2 (04-12-19 @ 23:15)  HR: 69 (04-13-19 @ 11:16) (69 - 164)  BP: 109/64 (04-13-19 @ 11:16) (97/62 - 122/66)  RR: 16 (04-13-19 @ 11:16) (16 - 18)  SpO2: 94% (04-13-19 @ 11:16) (94% - 98%)  Wt(kg): --   I&O's Detail    12 Apr 2019 07:01  -  13 Apr 2019 07:00  --------------------------------------------------------  IN:    IV PiggyBack: 400 mL    lactated ringers.: 1500 mL    Oral Fluid: 360 mL  Total IN: 2260 mL    OUT:    Voided: 300 mL  Total OUT: 300 mL    Total NET: 1960 mL      13 Apr 2019 07:01  -  13 Apr 2019 13:44  --------------------------------------------------------  IN:    Oral Fluid: 120 mL  Total IN: 120 mL    OUT:    Voided: 350 mL  Total OUT: 350 mL    Total NET: -230 mL        GENERAL: NAD, well-groomed, well-developed  HEAD:  Atraumatic, Normocephalic  EYES: EOMI, PERRLA, conjunctiva and sclera clear  ENMT: Moist mucous membranes  NECK: Supple, No JVD  NERVOUS SYSTEM:  Alert & Oriented X3, Motor Strength 5/5 B/L upper and lower extremities; DTRs 2+ intact and symmetric  CHEST/LUNG: Clear to auscultation bilaterally; No rales, rhonchi, wheezing, or rubs  HEART: Regular rate and rhythm; No murmurs, rubs, or gallops  ABDOMEN: Soft, Nontender, Nondistended; Bowel sounds present  EXTREMITIES:  2+ Peripheral Pulses, No clubbing, cyanosis, or edema    MEDICATIONS  (STANDING):  digoxin     Tablet 0.125 milliGRAM(s) Oral daily  diltiazem    milliGRAM(s) Oral daily  docusate sodium 100 milliGRAM(s) Oral three times a day  heparin  Injectable 5000 Unit(s) SubCutaneous every 12 hours  lactated ringers. 1000 milliLiter(s) (125 mL/Hr) IV Continuous <Continuous>  levETIRAcetam  IVPB 500 milliGRAM(s) IV Intermittent every 12 hours  morphine  - Injectable 2 milliGRAM(s) IV Push once  piperacillin/tazobactam IVPB. 3.375 Gram(s) IV Intermittent every 8 hours    MEDICATIONS  (PRN):  acetaminophen   Tablet .. 650 milliGRAM(s) Oral every 6 hours PRN Temp greater or equal to 38C (100.4F), Mild Pain (1 - 3)  morphine  - Injectable 2 milliGRAM(s) IV Push every 4 hours PRN Severe Pain (7 - 10)  ondansetron Injectable 4 milliGRAM(s) IV Push every 6 hours PRN Nausea and/or Vomiting      LABS:                        10.3   9.59  )-----------( 224      ( 13 Apr 2019 06:49 )             31.1     04-13    139  |  105  |  21  ----------------------------<  96  3.4<L>   |  26  |  1.14    Ca    7.7<L>      13 Apr 2019 06:49    TPro  6.8  /  Alb  2.4<L>  /  TBili  1.2  /  DBili  x   /  AST  24  /  ALT  25  /  AlkPhos  84  04-13        CAPILLARY BLOOD GLUCOSE                RECENT CULTURES:      RADIOLOGY & ADDITIONAL TESTS:      DVT/GI ppx  Discussed with pt @ bedside

## 2019-04-13 NOTE — PROGRESS NOTE ADULT - ASSESSMENT
84 year old male PMH seizure disorder, subdural hematoma s/p craniotomy due to trauma, HTN, ETOH abuse, and recent admission to [facility] for cholelithiasis.        Problem/Recommendation - 1:  Problem: Atrial Fibrillation with RVR  - now rate controlled  - medications have been verified with Pharmacy  - start Cardizem 240 mg daily with Digoxin 0.125 mcg daily,   - d/c IVF    Problem/Recommendation - 2:  Cholecystitis. Recommendation: - plan for surgery in am  - Clear liquid    Problem/Recommendation - 3:  ·  Problem: Sepsis due to Gram negative bacteria.    Recommendation:   - present on admission and now resolved. would give 30 ml/kg bolus  - lactate normalized  - check tte  - would continue Zosyn Q 8 hours.     Problem/Recommendation - 3:  ·  Problem: Seizure.  Recommendation: - Keppra 500 mg IVPB Q 12 hours.     Problem/Recommendation - 4:  ·  Problem: Essential hypertension .  Recommendation: may give hydralazine 5mg IVP Q 6 hours prn SBP >160  Cardizem 120 mg daily.   Give digoxin and cardizem in am    Problem/Recommendation - 5:  ·  Problem: History of subdural hematoma.  Recommendation: no acute management.     Problem/Recommendation - 6:  Problem: Constipation. Recommendation: hold prokinetics for now.

## 2019-04-13 NOTE — PROGRESS NOTE ADULT - SUBJECTIVE AND OBJECTIVE BOX
Surgery NP note  Patient seen and examined bedside resting comfortably.  No complaints offered. Abdominal pain is well controlled.  Denies nausea and vomiting. Tolerating diet.  Normal flatus/BM.     T(F): 98.3 (04-13-19 @ 11:16), Max: 99 (04-12-19 @ 23:15)  HR: 69 (04-13-19 @ 11:16) (69 - 164)  BP: 109/64 (04-13-19 @ 11:16) (97/62 - 122/66)  RR: 16 (04-13-19 @ 11:16) (16 - 18)  SpO2: 94% (04-13-19 @ 11:16) (94% - 98%)  Wt(kg): --  CAPILLARY BLOOD GLUCOSE    PHYSICAL EXAM:  General: NAD, WDWN.   Neuro:  Alert & responsive  HEENT: NCAT, EOMI, conjunctiva clear  CV: +S1+S2 regular rate and rhythm  Lung: clear to ausculation bilaterally, respirations nonlabored, good inspiratory effort  Abdomen: soft, + RUQ tender, No Distension. Normal active BS  Extremities: no pedal edema or calf tenderness noted     LABS:                        10.3   9.59  )-----------( 224      ( 13 Apr 2019 06:49 )             31.1     04-13    139  |  105  |  21  ----------------------------<  96  3.4<L>   |  26  |  1.14    Ca    7.7<L>      13 Apr 2019 06:49    TPro  6.8  /  Alb  2.4<L>  /  TBili  1.2  /  DBili  x   /  AST  24  /  ALT  25  /  AlkPhos  84  04-13    LIVER FUNCTIONS - ( 13 Apr 2019 06:49 )  Alb: 2.4 g/dL / Pro: 6.8 gm/dL / ALK PHOS: 84 U/L / ALT: 25 U/L / AST: 24 U/L / GGT: x             I&O's Detail    12 Apr 2019 07:01  -  13 Apr 2019 07:00  --------------------------------------------------------  IN:    IV PiggyBack: 400 mL    lactated ringers.: 1500 mL    Oral Fluid: 360 mL  Total IN: 2260 mL    OUT:    Voided: 300 mL  Total OUT: 300 mL    Total NET: 1960 mL      13 Apr 2019 07:01  -  13 Apr 2019 13:31  --------------------------------------------------------  IN:    Oral Fluid: 120 mL  Total IN: 120 mL    OUT:    Voided: 350 mL  Total OUT: 350 mL    Total NET: -230 mL          Impression:   84y Male admitted with Acute cholecystitis, + HIDA scan, SVT - improved today  PMH   Cholelithiasis  History of subdural hematoma (2014)  Constipation  Hypertension  Seizure      Plan:  -Continue IV antibiotics.  -Lap- Cholecystectomy when patient medically optimized.  - continue medical management Surgery NP note  Patient seen and examined bedside resting comfortably.  No complaints offered. Abdominal pain is well controlled.  Denies nausea and vomiting. Tolerating diet.  Normal flatus/BM.     T(F): 98.3 (04-13-19 @ 11:16), Max: 99 (04-12-19 @ 23:15)  HR: 69 (04-13-19 @ 11:16) (69 - 164)  BP: 109/64 (04-13-19 @ 11:16) (97/62 - 122/66)  RR: 16 (04-13-19 @ 11:16) (16 - 18)  SpO2: 94% (04-13-19 @ 11:16) (94% - 98%)  Wt(kg): --  CAPILLARY BLOOD GLUCOSE    PHYSICAL EXAM:  General: NAD, WDWN.   Neuro:  Alert & responsive  HEENT: NCAT, EOMI, conjunctiva clear  CV: +S1+S2 regular rate and rhythm  Lung: clear to ausculation bilaterally, respirations nonlabored, good inspiratory effort  Abdomen: soft, + RUQ tender, No Distension. Normal active BS  Extremities: no pedal edema or calf tenderness noted     LABS:                        10.3   9.59  )-----------( 224      ( 13 Apr 2019 06:49 )             31.1     04-13    139  |  105  |  21  ----------------------------<  96  3.4<L>   |  26  |  1.14    Ca    7.7<L>      13 Apr 2019 06:49    TPro  6.8  /  Alb  2.4<L>  /  TBili  1.2  /  DBili  x   /  AST  24  /  ALT  25  /  AlkPhos  84  04-13    LIVER FUNCTIONS - ( 13 Apr 2019 06:49 )  Alb: 2.4 g/dL / Pro: 6.8 gm/dL / ALK PHOS: 84 U/L / ALT: 25 U/L / AST: 24 U/L / GGT: x             I&O's Detail    12 Apr 2019 07:01  -  13 Apr 2019 07:00  --------------------------------------------------------  IN:    IV PiggyBack: 400 mL    lactated ringers.: 1500 mL    Oral Fluid: 360 mL  Total IN: 2260 mL    OUT:    Voided: 300 mL  Total OUT: 300 mL    Total NET: 1960 mL      13 Apr 2019 07:01  -  13 Apr 2019 13:31  --------------------------------------------------------  IN:    Oral Fluid: 120 mL  Total IN: 120 mL    OUT:    Voided: 350 mL  Total OUT: 350 mL    Total NET: -230 mL          Impression:    84y Male admitted with Acute cholecystitis, + HIDA scan, SVT - improving.  [facility]   Cholelithiasis  History of subdural hematoma (2014)  Constipation  Hypertension  Seizure      Plan:  -Continue IV antibiotics.  -Lap- Cholecystectomy when patient medically optimized.  - continue medical management.  - Oral diet as tolerated.

## 2019-04-14 LAB
ALBUMIN SERPL ELPH-MCNC: 2.6 G/DL — LOW (ref 3.3–5)
ALP SERPL-CCNC: 90 U/L — SIGNIFICANT CHANGE UP (ref 40–120)
ALT FLD-CCNC: 28 U/L — SIGNIFICANT CHANGE UP (ref 12–78)
ANION GAP SERPL CALC-SCNC: 9 MMOL/L — SIGNIFICANT CHANGE UP (ref 5–17)
AST SERPL-CCNC: 24 U/L — SIGNIFICANT CHANGE UP (ref 15–37)
BILIRUB DIRECT SERPL-MCNC: 0.3 MG/DL — HIGH (ref 0.05–0.2)
BILIRUB INDIRECT FLD-MCNC: 0.4 MG/DL — SIGNIFICANT CHANGE UP (ref 0.2–1)
BILIRUB SERPL-MCNC: 0.7 MG/DL — SIGNIFICANT CHANGE UP (ref 0.2–1.2)
BUN SERPL-MCNC: 16 MG/DL — SIGNIFICANT CHANGE UP (ref 7–23)
CALCIUM SERPL-MCNC: 8.3 MG/DL — LOW (ref 8.5–10.1)
CHLORIDE SERPL-SCNC: 106 MMOL/L — SIGNIFICANT CHANGE UP (ref 96–108)
CO2 SERPL-SCNC: 25 MMOL/L — SIGNIFICANT CHANGE UP (ref 22–31)
CREAT SERPL-MCNC: 1.03 MG/DL — SIGNIFICANT CHANGE UP (ref 0.5–1.3)
GLUCOSE SERPL-MCNC: 84 MG/DL — SIGNIFICANT CHANGE UP (ref 70–99)
HCT VFR BLD CALC: 30.2 % — LOW (ref 39–50)
HGB BLD-MCNC: 10 G/DL — LOW (ref 13–17)
MAGNESIUM SERPL-MCNC: 2.1 MG/DL — SIGNIFICANT CHANGE UP (ref 1.6–2.6)
MCHC RBC-ENTMCNC: 31.3 PG — SIGNIFICANT CHANGE UP (ref 27–34)
MCHC RBC-ENTMCNC: 33.1 GM/DL — SIGNIFICANT CHANGE UP (ref 32–36)
MCV RBC AUTO: 94.4 FL — SIGNIFICANT CHANGE UP (ref 80–100)
NRBC # BLD: 0 /100 WBCS — SIGNIFICANT CHANGE UP (ref 0–0)
PHOSPHATE SERPL-MCNC: 2.3 MG/DL — LOW (ref 2.5–4.5)
PLATELET # BLD AUTO: 258 K/UL — SIGNIFICANT CHANGE UP (ref 150–400)
POTASSIUM SERPL-MCNC: 3.9 MMOL/L — SIGNIFICANT CHANGE UP (ref 3.5–5.3)
POTASSIUM SERPL-SCNC: 3.9 MMOL/L — SIGNIFICANT CHANGE UP (ref 3.5–5.3)
PROT SERPL-MCNC: 7.1 GM/DL — SIGNIFICANT CHANGE UP (ref 6–8.3)
RBC # BLD: 3.2 M/UL — LOW (ref 4.2–5.8)
RBC # FLD: 12.7 % — SIGNIFICANT CHANGE UP (ref 10.3–14.5)
SODIUM SERPL-SCNC: 140 MMOL/L — SIGNIFICANT CHANGE UP (ref 135–145)
WBC # BLD: 6.28 K/UL — SIGNIFICANT CHANGE UP (ref 3.8–10.5)
WBC # FLD AUTO: 6.28 K/UL — SIGNIFICANT CHANGE UP (ref 3.8–10.5)

## 2019-04-14 PROCEDURE — 99233 SBSQ HOSP IP/OBS HIGH 50: CPT

## 2019-04-14 RX ORDER — POTASSIUM PHOSPHATE, MONOBASIC POTASSIUM PHOSPHATE, DIBASIC 236; 224 MG/ML; MG/ML
15 INJECTION, SOLUTION INTRAVENOUS ONCE
Qty: 0 | Refills: 0 | Status: COMPLETED | OUTPATIENT
Start: 2019-04-14 | End: 2019-04-14

## 2019-04-14 RX ORDER — SODIUM CHLORIDE 9 MG/ML
1000 INJECTION, SOLUTION INTRAVENOUS
Qty: 0 | Refills: 0 | Status: DISCONTINUED | OUTPATIENT
Start: 2019-04-14 | End: 2019-04-16

## 2019-04-14 RX ADMIN — Medication 240 MILLIGRAM(S): at 05:08

## 2019-04-14 RX ADMIN — Medication 100 MILLIGRAM(S): at 05:08

## 2019-04-14 RX ADMIN — HEPARIN SODIUM 5000 UNIT(S): 5000 INJECTION INTRAVENOUS; SUBCUTANEOUS at 17:43

## 2019-04-14 RX ADMIN — PIPERACILLIN AND TAZOBACTAM 25 GRAM(S): 4; .5 INJECTION, POWDER, LYOPHILIZED, FOR SOLUTION INTRAVENOUS at 21:05

## 2019-04-14 RX ADMIN — Medication 100 MILLIGRAM(S): at 21:05

## 2019-04-14 RX ADMIN — LEVETIRACETAM 420 MILLIGRAM(S): 250 TABLET, FILM COATED ORAL at 21:05

## 2019-04-14 RX ADMIN — PIPERACILLIN AND TAZOBACTAM 25 GRAM(S): 4; .5 INJECTION, POWDER, LYOPHILIZED, FOR SOLUTION INTRAVENOUS at 05:05

## 2019-04-14 RX ADMIN — Medication 0.12 MILLIGRAM(S): at 05:08

## 2019-04-14 RX ADMIN — SODIUM CHLORIDE 55 MILLILITER(S): 9 INJECTION, SOLUTION INTRAVENOUS at 22:04

## 2019-04-14 RX ADMIN — HEPARIN SODIUM 5000 UNIT(S): 5000 INJECTION INTRAVENOUS; SUBCUTANEOUS at 05:08

## 2019-04-14 RX ADMIN — LEVETIRACETAM 420 MILLIGRAM(S): 250 TABLET, FILM COATED ORAL at 09:58

## 2019-04-14 RX ADMIN — POTASSIUM PHOSPHATE, MONOBASIC POTASSIUM PHOSPHATE, DIBASIC 62.5 MILLIMOLE(S): 236; 224 INJECTION, SOLUTION INTRAVENOUS at 15:01

## 2019-04-14 RX ADMIN — PIPERACILLIN AND TAZOBACTAM 25 GRAM(S): 4; .5 INJECTION, POWDER, LYOPHILIZED, FOR SOLUTION INTRAVENOUS at 11:25

## 2019-04-14 NOTE — PROGRESS NOTE ADULT - ASSESSMENT
84 year old male PMH seizure disorder, subdural hematoma s/p craniotomy due to trauma, HTN, ETOH abuse, and recent admission to [facility] for cholelithiasis.        Assessment and Plan:   #Cholecystitis, plan for  lap gus tomorrow   -Sepsis due to presumed Gram negative bacteria, lactate normalized  -c/w zosyn    -NPO MN    #Hypophosphatemia, replete and monitor     #Atrial Fibrillation with RVR  - now rate controlled  - continue with Cardizem 240 mg daily with Digoxin 0.125 mcg daily     #Chronic diastolic CHF, clinically euvolemic   -ECHO: pEF, grade 1 DD     #Seizure Disorder  -c/w  Keppra 500 mg IVPB Q 12 hours, will change to PO tomorrow     # Essential hypertension - may give hydralazine 5mg IVP Q 6 hours prn SBP >160  Cardizem 240 mg daily.     # History of subdural hematoma, no acute management.     # Preventative measure,   -heparin SQ-dvt ppx  -fall and seizurte precautions   -PT evaluation 84 year old male PMH seizure disorder, subdural hematoma s/p craniotomy due to trauma, HTN, ETOH abuse, and recent admission to [facility] for cholelithiasis.        Assessment and Plan:   #Cholecystitis, plan for  lap gus tomorrow   -Sepsis due to presumed Gram negative bacteria, lactate normalized  -c/w zosyn    -NPO MN    #Hypophosphatemia, replete and monitor     #Atrial Fibrillation with RVR  - now rate controlled  - continue with Cardizem 240 mg daily with Digoxin 0.125 mcg daily     #Chronic diastolic CHF, clinically euvolemic   -ECHO: pEF, grade 1 DD     #Seizure Disorder  -c/w  Keppra 500 mg IVPB Q 12 hours, will change to PO tomorrow     # Essential hypertension - continue with Cardizem 240 mg daily.     # History of subdural hematoma, no acute management.     #Constipation, continue with bowel regimen     # Preventative measure,   -heparin SQ-dvt ppx  -fall and seizurte precautions   -PT evaluation

## 2019-04-14 NOTE — PROGRESS NOTE ADULT - SUBJECTIVE AND OBJECTIVE BOX
HPI:  Patient is an 84 year old male PMH seizure disorder, subdural hematoma s/p craniotomy due to trauma, HTN, ETOH abuse, cholelithiasis, who presents to the emergency room complaining of 7/10, non-radiating, upper abdominal pain x 3 days with associated nausea and vomiting. States the pain began abruptly after eating a heavy dinner and has been episodic since. He was seen here yesterday, but his CT was negative and he was discharged home. When his pain persisted and he continued to vomit, he returned here. New Abdomen CT scan now revealed Distended GB  and Cholelithiasis with Pericholecystic fluid.   Has not eaten today. Admits to flatus. Last BM 2 days ago.   Of note, upon chart review, patient was admitted here for abdominal pain/cholelithiasis in 12/2018, he was medically a poor surgical candidate, so perc gus was considered at the time, but his HIDA was negative and his symptoms improved so he was discharged home to follow up as outpatient, which he did not.   He denies fever, chills, chest pain, shortness of breath, back pain, recent illness, recent travel. (10 Apr 2019 17:06)      SUBJECTIVE & OBJECTIVE:   Pt seen and examined at bedside.   No overnight events.   No complaints.     Denies fever, chills, N/V, dizziness, HA, cough, CP, palpitations, SOB, abdominal pain, dysuria, diarrhea, constipation.     PHYSICAL EXAM:    Vital Signs Last 24 Hrs  T(C): 36.6 (14 Apr 2019 17:23), Max: 36.6 (14 Apr 2019 00:25)  T(F): 97.9 (14 Apr 2019 17:23), Max: 97.9 (14 Apr 2019 00:25)  HR: 78 (14 Apr 2019 17:23) (65 - 79)  BP: 125/70 (14 Apr 2019 17:23) (125/70 - 141/67)  BP(mean): --  RR: 17 (14 Apr 2019 17:23) (17 - 17)  SpO2: 99% (14 Apr 2019 17:23) (95% - 99%)      GENERAL: NAD,   HEAD:  Atraumatic, Normocephalic  EYES: EOMI, PERRLA, conjunctiva and sclera clear  ENMT: Moist mucous membranes  NECK: Supple, No JVD  NERVOUS SYSTEM:  Alert & Oriented X3, moving all extremities   CHEST/LUNG: Clear to auscultation bilaterally; No rales, rhonchi, wheezing, or rubs  HEART: Regular rate and rhythm; No murmurs, rubs, or gallops  ABDOMEN: Soft, Nontender, Nondistended; Bowel sounds present  EXTREMITIES:  2+ Peripheral Pulses b/l, No clubbing, cyanosis, or edema b/l     MEDICATIONS  (STANDING):  digoxin     Tablet 0.125 milliGRAM(s) Oral daily  diltiazem    milliGRAM(s) Oral daily  docusate sodium 100 milliGRAM(s) Oral three times a day  heparin  Injectable 5000 Unit(s) SubCutaneous every 12 hours  lactated ringers. 1000 milliLiter(s) (125 mL/Hr) IV Continuous <Continuous>  levETIRAcetam  IVPB 500 milliGRAM(s) IV Intermittent every 12 hours  morphine  - Injectable 2 milliGRAM(s) IV Push once  piperacillin/tazobactam IVPB. 3.375 Gram(s) IV Intermittent every 8 hours    MEDICATIONS  (PRN):  acetaminophen   Tablet .. 650 milliGRAM(s) Oral every 6 hours PRN Temp greater or equal to 38C (100.4F), Mild Pain (1 - 3)  morphine  - Injectable 2 milliGRAM(s) IV Push every 4 hours PRN Severe Pain (7 - 10)  ondansetron Injectable 4 milliGRAM(s) IV Push every 6 hours PRN Nausea and/or Vomiting      Labs and imaging reviewed             Consultant(s) Notes Reveiwed [x ] Yes     Care Discussed with [x ] Consultants  [ x] Patient  [ ] Family  [ ] /   [ x] Other; RN  Care discussed in detail with patient.  All questions and concerns addressed.

## 2019-04-14 NOTE — PROGRESS NOTE ADULT - SUBJECTIVE AND OBJECTIVE BOX
Patient seen and examined at bedside resting comfortably. No complaints offered.  Denies abdominal pain, nausea or vomiting. Tolerating full liquids.  Denies fevers, chills, dyspnea, chest pain, palpitations.      T(F): 97.4 (04-14-19 @ 10:56), Max: 97.9 (04-14-19 @ 00:25)  HR: 79 (04-14-19 @ 10:56) (65 - 79)  BP: 132/76 (04-14-19 @ 10:56) (101/62 - 141/67)  RR: 17 (04-14-19 @ 10:56) (17 - 17)  SpO2: 96% (04-14-19 @ 10:56) (95% - 96%)  Wt(kg): --  CAPILLARY BLOOD GLUCOSE      PHYSICAL EXAM:  General: NAD, A&Ox3  CV: +S1+S2 regular rate and rhythm  Lung: clear to auscultation bilaterally, respirations nonlabored   Abdomen: non-distended, normoactive BS, soft, nontender to palpation. no rebound/guarding  Extremities: no pedal edema or calf tenderness noted     LABS:                        10.0   6.28  )-----------( 258      ( 14 Apr 2019 07:08 )             30.2     04-14    140  |  106  |  16  ----------------------------<  84  3.9   |  25  |  1.03    Ca    8.3<L>      14 Apr 2019 07:08  Phos  2.3     04-14  Mg     2.1     04-14    TPro  7.1  /  Alb  2.6<L>  /  TBili  0.7  /  DBili  .30<H>  /  AST  24  /  ALT  28  /  AlkPhos  90  04-14        I&O:         Impression: 84y Male admitted with Acute cholecystitis, + HIDA scan, SVT - resolved.  PMH   Cholelithiasis  History of subdural hematoma (2014)  Constipation  Hypertension  Seizure    Plan:  - Continue IV antibiotics.  - Lap- Cholecystectomy added on for tomorrow .  - continue medical management, supportive care and prophylactic measure   - NPO after midnight, pre op labs ordered, consent to be obtained by attending.  - Discussed with Dr. Ledezma

## 2019-04-15 ENCOUNTER — TRANSCRIPTION ENCOUNTER (OUTPATIENT)
Age: 84
End: 2019-04-15

## 2019-04-15 LAB
ALBUMIN SERPL ELPH-MCNC: 2.7 G/DL — LOW (ref 3.3–5)
ALP SERPL-CCNC: 100 U/L — SIGNIFICANT CHANGE UP (ref 40–120)
ALT FLD-CCNC: 31 U/L — SIGNIFICANT CHANGE UP (ref 12–78)
ANION GAP SERPL CALC-SCNC: 10 MMOL/L — SIGNIFICANT CHANGE UP (ref 5–17)
AST SERPL-CCNC: 27 U/L — SIGNIFICANT CHANGE UP (ref 15–37)
BILIRUB SERPL-MCNC: 0.5 MG/DL — SIGNIFICANT CHANGE UP (ref 0.2–1.2)
BUN SERPL-MCNC: 15 MG/DL — SIGNIFICANT CHANGE UP (ref 7–23)
CALCIUM SERPL-MCNC: 8.6 MG/DL — SIGNIFICANT CHANGE UP (ref 8.5–10.1)
CHLORIDE SERPL-SCNC: 106 MMOL/L — SIGNIFICANT CHANGE UP (ref 96–108)
CO2 SERPL-SCNC: 22 MMOL/L — SIGNIFICANT CHANGE UP (ref 22–31)
CREAT SERPL-MCNC: 0.91 MG/DL — SIGNIFICANT CHANGE UP (ref 0.5–1.3)
GLUCOSE SERPL-MCNC: 87 MG/DL — SIGNIFICANT CHANGE UP (ref 70–99)
HCT VFR BLD CALC: 31.8 % — LOW (ref 39–50)
HGB BLD-MCNC: 10.5 G/DL — LOW (ref 13–17)
INR BLD: 1.08 RATIO — SIGNIFICANT CHANGE UP (ref 0.88–1.16)
MAGNESIUM SERPL-MCNC: 2.2 MG/DL — SIGNIFICANT CHANGE UP (ref 1.6–2.6)
MCHC RBC-ENTMCNC: 31 PG — SIGNIFICANT CHANGE UP (ref 27–34)
MCHC RBC-ENTMCNC: 33 GM/DL — SIGNIFICANT CHANGE UP (ref 32–36)
MCV RBC AUTO: 93.8 FL — SIGNIFICANT CHANGE UP (ref 80–100)
NRBC # BLD: 0 /100 WBCS — SIGNIFICANT CHANGE UP (ref 0–0)
PHOSPHATE SERPL-MCNC: 2.9 MG/DL — SIGNIFICANT CHANGE UP (ref 2.5–4.5)
PLATELET # BLD AUTO: 242 K/UL — SIGNIFICANT CHANGE UP (ref 150–400)
POTASSIUM SERPL-MCNC: 4.2 MMOL/L — SIGNIFICANT CHANGE UP (ref 3.5–5.3)
POTASSIUM SERPL-SCNC: 4.2 MMOL/L — SIGNIFICANT CHANGE UP (ref 3.5–5.3)
PROT SERPL-MCNC: 7.5 GM/DL — SIGNIFICANT CHANGE UP (ref 6–8.3)
PROTHROM AB SERPL-ACNC: 12.1 SEC — SIGNIFICANT CHANGE UP (ref 10–12.9)
RBC # BLD: 3.39 M/UL — LOW (ref 4.2–5.8)
RBC # FLD: 12.7 % — SIGNIFICANT CHANGE UP (ref 10.3–14.5)
SODIUM SERPL-SCNC: 138 MMOL/L — SIGNIFICANT CHANGE UP (ref 135–145)
WBC # BLD: 5.63 K/UL — SIGNIFICANT CHANGE UP (ref 3.8–10.5)
WBC # FLD AUTO: 5.63 K/UL — SIGNIFICANT CHANGE UP (ref 3.8–10.5)

## 2019-04-15 PROCEDURE — 99233 SBSQ HOSP IP/OBS HIGH 50: CPT

## 2019-04-15 RX ORDER — LEVETIRACETAM 250 MG/1
500 TABLET, FILM COATED ORAL
Qty: 0 | Refills: 0 | Status: DISCONTINUED | OUTPATIENT
Start: 2019-04-15 | End: 2019-04-18

## 2019-04-15 RX ADMIN — PIPERACILLIN AND TAZOBACTAM 25 GRAM(S): 4; .5 INJECTION, POWDER, LYOPHILIZED, FOR SOLUTION INTRAVENOUS at 05:37

## 2019-04-15 RX ADMIN — HEPARIN SODIUM 5000 UNIT(S): 5000 INJECTION INTRAVENOUS; SUBCUTANEOUS at 17:05

## 2019-04-15 RX ADMIN — Medication 100 MILLIGRAM(S): at 05:38

## 2019-04-15 RX ADMIN — Medication 240 MILLIGRAM(S): at 05:38

## 2019-04-15 RX ADMIN — Medication 0.12 MILLIGRAM(S): at 05:39

## 2019-04-15 RX ADMIN — PIPERACILLIN AND TAZOBACTAM 25 GRAM(S): 4; .5 INJECTION, POWDER, LYOPHILIZED, FOR SOLUTION INTRAVENOUS at 11:01

## 2019-04-15 RX ADMIN — Medication 100 MILLIGRAM(S): at 13:01

## 2019-04-15 RX ADMIN — LEVETIRACETAM 420 MILLIGRAM(S): 250 TABLET, FILM COATED ORAL at 09:35

## 2019-04-15 RX ADMIN — PIPERACILLIN AND TAZOBACTAM 25 GRAM(S): 4; .5 INJECTION, POWDER, LYOPHILIZED, FOR SOLUTION INTRAVENOUS at 21:11

## 2019-04-15 RX ADMIN — SODIUM CHLORIDE 55 MILLILITER(S): 9 INJECTION, SOLUTION INTRAVENOUS at 13:02

## 2019-04-15 RX ADMIN — LEVETIRACETAM 500 MILLIGRAM(S): 250 TABLET, FILM COATED ORAL at 17:05

## 2019-04-15 NOTE — PROGRESS NOTE ADULT - SUBJECTIVE AND OBJECTIVE BOX
Pre-operative Note    - Pre-operative Diagnosis: Acute cholecystitis, cholelithiasis    - Procedure: Laparoscopic cholecystectomy, possible open    - Labs:                        10.5   5.63  )-----------( 242      ( 15 Apr 2019 06:28 )             31.8     04-15    138  |  106  |  15  ----------------------------<  87  4.2   |  22  |  0.91    Ca    8.6      15 Apr 2019 06:28  Phos  2.9     04-15  Mg     2.2     04-15    TPro  7.5  /  Alb  2.7<L>  /  TBili  0.5  /  DBili  x   /  AST  27  /  ALT  31  /  AlkPhos  100  04-15    PT/INR - ( 15 Apr 2019 06:28 )   PT: 12.1 sec;   INR: 1.08 ratio        Type & Screen #1: A POS    - CXR: No acute findings    - Blood: Not needed.     - Orders:  > NPO   > Perioperative Zosyn  > Awaiting medical clearance for OR; call put out to medical attending    - Permits:  > Consent to be obtained by surgeon Pre-operative Note    - Pre-operative Diagnosis: Acute cholecystitis, cholelithiasis    - Procedure: Laparoscopic cholecystectomy, possible open    - Labs:                        10.5   5.63  )-----------( 242      ( 15 Apr 2019 06:28 )             31.8     04-15    138  |  106  |  15  ----------------------------<  87  4.2   |  22  |  0.91    Ca    8.6      15 Apr 2019 06:28  Phos  2.9     04-15  Mg     2.2     04-15    TPro  7.5  /  Alb  2.7<L>  /  TBili  0.5  /  DBili  x   /  AST  27  /  ALT  31  /  AlkPhos  100  04-15    PT/INR - ( 15 Apr 2019 06:28 )   PT: 12.1 sec;   INR: 1.08 ratio        Type & Screen #1: A POS    - CXR: No acute findings    - Blood: No indicated    - Orders:  > NPO   > Perioperative Zosyn  > Awaiting medical clearance for OR; call put out to medical attending    - Permits:  > Consent to be obtained by surgeon

## 2019-04-15 NOTE — PROGRESS NOTE ADULT - SUBJECTIVE AND OBJECTIVE BOX
HPI:  Patient is an 84 year old male PMH seizure disorder, subdural hematoma s/p craniotomy due to trauma, HTN, ETOH abuse, cholelithiasis, who presents to the emergency room complaining of 7/10, non-radiating, upper abdominal pain x 3 days with associated nausea and vomiting. States the pain began abruptly after eating a heavy dinner and has been episodic since. He was seen here yesterday, but his CT was negative and he was discharged home. When his pain persisted and he continued to vomit, he returned here. New Abdomen CT scan now revealed Distended GB  and Cholelithiasis with Pericholecystic fluid.   Has not eaten today. Admits to flatus. Last BM 2 days ago.   Of note, upon chart review, patient was admitted here for abdominal pain/cholelithiasis in 12/2018, he was medically a poor surgical candidate, so perc gus was considered at the time, but his HIDA was negative and his symptoms improved so he was discharged home to follow up as outpatient, which he did not.   He denies fever, chills, chest pain, shortness of breath, back pain, recent illness, recent travel. (10 Apr 2019 17:06)      SUBJECTIVE & OBJECTIVE:   Pt seen and examined at bedside.   No overnight events.   No complaints.     Denies fever, chills, N/V, dizziness, HA, cough, CP, palpitations, SOB, abdominal pain, dysuria, diarrhea, constipation.     PHYSICAL EXAM:    Vital Signs Last 24 Hrs  T(C): 36.8 (15 Apr 2019 05:29), Max: 36.8 (15 Apr 2019 05:29)  T(F): 98.2 (15 Apr 2019 05:29), Max: 98.2 (15 Apr 2019 05:29)  HR: 71 (15 Apr 2019 05:29) (65 - 78)  BP: 142/89 (15 Apr 2019 05:29) (125/70 - 142/89)  BP(mean): --  RR: 17 (15 Apr 2019 05:29) (17 - 17)  SpO2: 97% (15 Apr 2019 05:29) (96% - 99%)      GENERAL: NAD,   HEAD:  Atraumatic, Normocephalic  EYES: EOMI, PERRLA, conjunctiva and sclera clear  ENMT: Moist mucous membranes  NECK: Supple, No JVD  NERVOUS SYSTEM:  Alert & Oriented X3, moving all extremities   CHEST/LUNG: Clear to auscultation bilaterally; No rales, rhonchi, wheezing, or rubs  HEART: Regular rate and rhythm; No murmurs, rubs, or gallops  ABDOMEN: Soft, Nontender, Nondistended; Bowel sounds present  EXTREMITIES:  2+ Peripheral Pulses b/l, No clubbing, cyanosis, or edema b/l     MEDICATIONS  (STANDING):  digoxin     Tablet 0.125 milliGRAM(s) Oral daily  diltiazem    milliGRAM(s) Oral daily  docusate sodium 100 milliGRAM(s) Oral three times a day  heparin  Injectable 5000 Unit(s) SubCutaneous every 12 hours  lactated ringers. 1000 milliLiter(s) (125 mL/Hr) IV Continuous <Continuous>  levETIRAcetam  IVPB 500 milliGRAM(s) IV Intermittent every 12 hours  morphine  - Injectable 2 milliGRAM(s) IV Push once  piperacillin/tazobactam IVPB. 3.375 Gram(s) IV Intermittent every 8 hours    MEDICATIONS  (PRN):  acetaminophen   Tablet .. 650 milliGRAM(s) Oral every 6 hours PRN Temp greater or equal to 38C (100.4F), Mild Pain (1 - 3)  morphine  - Injectable 2 milliGRAM(s) IV Push every 4 hours PRN Severe Pain (7 - 10)  ondansetron Injectable 4 milliGRAM(s) IV Push every 6 hours PRN Nausea and/or Vomiting      Labs and imaging reviewed                           10.5   5.63  )-----------( 242      ( 15 Apr 2019 06:28 )             31.8   04-15    138  |  106  |  15  ----------------------------<  87  4.2   |  22  |  0.91    Ca    8.6      15 Apr 2019 06:28  Phos  2.9     04-15  Mg     2.2     04-15    TPro  7.5  /  Alb  2.7<L>  /  TBili  0.5  /  DBili  x   /  AST  27  /  ALT  31  /  AlkPhos  100  04-15      Consultant(s) Notes Reveiwed [x ] Yes     Care Discussed with [x ] Consultants  [ x] Patient  [ ] Family  [ ] /   [ x] Other; RN  Care discussed in detail with patient.  All questions and concerns addressed.

## 2019-04-15 NOTE — PROGRESS NOTE ADULT - ASSESSMENT
84 year old male PMH seizure disorder, subdural hematoma s/p craniotomy due to trauma, HTN, ETOH abuse, and recent admission to [facility] for cholelithiasis.      MEDICAL CLEARANCE NOTE: FOR LAP GUS, possibly open   CXR-clear   EKG: NSR, NO QTc prolongation   RCRI - 3.9% risk of MACE   STOP BANG 0   IVF, incentive spirometry, WBAT   DVT ppx per protocol  pain control and bowel regimen   Patient is medically optimized for the above procedure     Assessment and Plan:   #Cholecystitis, plan for  lap gus in afternoon today   -Sepsis due to presumed Gram negative bacteria, lactate normalized  -Leukocytosis resolved, lactate normalized, afebrile   -c/w jossy-op zosyn      #Hypophosphatemia, repleted, resolved     #Atrial Fibrillation with RVR  - now rate controlled  - continue with Cardizem 240 mg daily with Digoxin 0.125 mcg daily     #Chronic diastolic CHF, clinically euvolemic   -ECHO: pEF, grade 1 DD     #Seizure Disorder  -c/w  keppra 500mg PO bid     # Essential hypertension - continue with Cardizem 240 mg daily.     # History of subdural hematoma, no acute management.     #Constipation, continue with bowel regimen     # Preventative measure,   -heparin SQ-dvt ppx  -fall and seizurte precautions   -PT evaluation

## 2019-04-16 ENCOUNTER — RESULT REVIEW (OUTPATIENT)
Age: 84
End: 2019-04-16

## 2019-04-16 LAB
ANION GAP SERPL CALC-SCNC: 9 MMOL/L — SIGNIFICANT CHANGE UP (ref 5–17)
BUN SERPL-MCNC: 13 MG/DL — SIGNIFICANT CHANGE UP (ref 7–23)
CALCIUM SERPL-MCNC: 8.8 MG/DL — SIGNIFICANT CHANGE UP (ref 8.5–10.1)
CHLORIDE SERPL-SCNC: 106 MMOL/L — SIGNIFICANT CHANGE UP (ref 96–108)
CO2 SERPL-SCNC: 23 MMOL/L — SIGNIFICANT CHANGE UP (ref 22–31)
CREAT SERPL-MCNC: 1 MG/DL — SIGNIFICANT CHANGE UP (ref 0.5–1.3)
GLUCOSE SERPL-MCNC: 86 MG/DL — SIGNIFICANT CHANGE UP (ref 70–99)
HCT VFR BLD CALC: 35.2 % — LOW (ref 39–50)
HGB BLD-MCNC: 11.7 G/DL — LOW (ref 13–17)
MCHC RBC-ENTMCNC: 30.3 PG — SIGNIFICANT CHANGE UP (ref 27–34)
MCHC RBC-ENTMCNC: 33.2 GM/DL — SIGNIFICANT CHANGE UP (ref 32–36)
MCV RBC AUTO: 91.2 FL — SIGNIFICANT CHANGE UP (ref 80–100)
NRBC # BLD: 0 /100 WBCS — SIGNIFICANT CHANGE UP (ref 0–0)
PLATELET # BLD AUTO: 332 K/UL — SIGNIFICANT CHANGE UP (ref 150–400)
POTASSIUM SERPL-MCNC: 3.8 MMOL/L — SIGNIFICANT CHANGE UP (ref 3.5–5.3)
POTASSIUM SERPL-SCNC: 3.8 MMOL/L — SIGNIFICANT CHANGE UP (ref 3.5–5.3)
RBC # BLD: 3.86 M/UL — LOW (ref 4.2–5.8)
RBC # FLD: 12.5 % — SIGNIFICANT CHANGE UP (ref 10.3–14.5)
SODIUM SERPL-SCNC: 138 MMOL/L — SIGNIFICANT CHANGE UP (ref 135–145)
WBC # BLD: 6.05 K/UL — SIGNIFICANT CHANGE UP (ref 3.8–10.5)
WBC # FLD AUTO: 6.05 K/UL — SIGNIFICANT CHANGE UP (ref 3.8–10.5)

## 2019-04-16 PROCEDURE — 47562 LAPAROSCOPIC CHOLECYSTECTOMY: CPT | Mod: AS

## 2019-04-16 PROCEDURE — 99233 SBSQ HOSP IP/OBS HIGH 50: CPT

## 2019-04-16 PROCEDURE — 88304 TISSUE EXAM BY PATHOLOGIST: CPT | Mod: 26

## 2019-04-16 RX ORDER — SODIUM CHLORIDE 9 MG/ML
1000 INJECTION, SOLUTION INTRAVENOUS
Qty: 0 | Refills: 0 | Status: DISCONTINUED | OUTPATIENT
Start: 2019-04-16 | End: 2019-04-18

## 2019-04-16 RX ORDER — ACETAMINOPHEN 500 MG
1000 TABLET ORAL ONCE
Qty: 0 | Refills: 0 | Status: COMPLETED | OUTPATIENT
Start: 2019-04-16 | End: 2019-04-16

## 2019-04-16 RX ORDER — FENTANYL CITRATE 50 UG/ML
25 INJECTION INTRAVENOUS
Qty: 0 | Refills: 0 | Status: DISCONTINUED | OUTPATIENT
Start: 2019-04-16 | End: 2019-04-16

## 2019-04-16 RX ORDER — ACETAMINOPHEN 500 MG
650 TABLET ORAL EVERY 6 HOURS
Qty: 0 | Refills: 0 | Status: DISCONTINUED | OUTPATIENT
Start: 2019-04-16 | End: 2019-04-16

## 2019-04-16 RX ORDER — SODIUM CHLORIDE 9 MG/ML
1000 INJECTION, SOLUTION INTRAVENOUS
Qty: 0 | Refills: 0 | Status: DISCONTINUED | OUTPATIENT
Start: 2019-04-16 | End: 2019-04-16

## 2019-04-16 RX ADMIN — SODIUM CHLORIDE 50 MILLILITER(S): 9 INJECTION, SOLUTION INTRAVENOUS at 16:48

## 2019-04-16 RX ADMIN — HEPARIN SODIUM 5000 UNIT(S): 5000 INJECTION INTRAVENOUS; SUBCUTANEOUS at 18:57

## 2019-04-16 RX ADMIN — Medication 100 MILLIGRAM(S): at 05:39

## 2019-04-16 RX ADMIN — Medication 1000 MILLIGRAM(S): at 17:00

## 2019-04-16 RX ADMIN — Medication 400 MILLIGRAM(S): at 16:48

## 2019-04-16 RX ADMIN — Medication 0.12 MILLIGRAM(S): at 05:40

## 2019-04-16 RX ADMIN — PIPERACILLIN AND TAZOBACTAM 25 GRAM(S): 4; .5 INJECTION, POWDER, LYOPHILIZED, FOR SOLUTION INTRAVENOUS at 12:15

## 2019-04-16 RX ADMIN — Medication 240 MILLIGRAM(S): at 05:40

## 2019-04-16 RX ADMIN — LEVETIRACETAM 500 MILLIGRAM(S): 250 TABLET, FILM COATED ORAL at 18:56

## 2019-04-16 RX ADMIN — PIPERACILLIN AND TAZOBACTAM 25 GRAM(S): 4; .5 INJECTION, POWDER, LYOPHILIZED, FOR SOLUTION INTRAVENOUS at 05:32

## 2019-04-16 RX ADMIN — LEVETIRACETAM 500 MILLIGRAM(S): 250 TABLET, FILM COATED ORAL at 05:39

## 2019-04-16 RX ADMIN — HEPARIN SODIUM 5000 UNIT(S): 5000 INJECTION INTRAVENOUS; SUBCUTANEOUS at 05:39

## 2019-04-16 NOTE — BRIEF OPERATIVE NOTE - NSICDXBRIEFPREOP_GEN_ALL_CORE_FT
PRE-OP DIAGNOSIS:  Acute cholecystitis 16-Apr-2019 16:19:58 acute on chronic cholecystitis Behan, James M
PRE-OP DIAGNOSIS:  Cholelithiasis with acute on chronic cholecystitis 16-Apr-2019 16:16:27  Feliciano Ledezma

## 2019-04-16 NOTE — PROGRESS NOTE ADULT - SUBJECTIVE AND OBJECTIVE BOX
INTERVAL HPI/OVERNIGHT EVENTS:  Pt. seen and examined at bedside resting comfortably. Complains of minimal postop pain, well controlled with medication. Denies N/V. Tolerating clear liquids. Denies flatus.   Denies fever/chills, chest pain, dyspnea, cough, dizziness.     Vital Signs Last 24 Hrs  T(C): 36 (16 Apr 2019 20:35), Max: 36.9 (16 Apr 2019 05:20)  T(F): 96.8 (16 Apr 2019 20:35), Max: 98.5 (16 Apr 2019 10:59)  HR: 62 (16 Apr 2019 20:35) (51 - 70)  BP: 144/74 (16 Apr 2019 20:35) (108/50 - 160/74)  RR: 18 (16 Apr 2019 20:35) (16 - 23)  SpO2: 97% (16 Apr 2019 20:35) (93% - 100%)    PHYSICAL EXAM:    GENERAL: NAD  CHEST/LUNG: Clear to ausculation, bilaterally   HEART: S1S2, RRR  ABDOMEN:  Dressings over port sites CDI. ANAYELI drain intact with sanguinous fluid in bulb. Mildly distended, soft, non tender, no guarding  EXTREMITIES:  calf soft, non tender b/l. 2+ distal pulses b/l. IPCs in place b/l.     I&O's Detail    15 Apr 2019 07:01  -  16 Apr 2019 07:00  --------------------------------------------------------  IN:    lactated ringers.: 550 mL    Oral Fluid: 240 mL    Solution: 300 mL  Total IN: 1090 mL    OUT:    Voided: 350 mL  Total OUT: 350 mL    Total NET: 740 mL      16 Apr 2019 07:01  -  16 Apr 2019 21:26  --------------------------------------------------------  IN:    lactated ringers.: 100 mL  Total IN: 100 mL    OUT:    Bulb: 35 mL  Total OUT: 35 mL    Total NET: 65 mL      LABS:                        11.7   6.05  )-----------( 332      ( 16 Apr 2019 06:43 )             35.2     04-16    138  |  106  |  13  ----------------------------<  86  3.8   |  23  |  1.00    Ca    8.8      16 Apr 2019 06:43  Phos  2.9     04-15  Mg     2.2     04-15    TPro  7.5  /  Alb  2.7<L>  /  TBili  0.5  /  DBili  x   /  AST  27  /  ALT  31  /  AlkPhos  100  04-15    PT/INR - ( 15 Apr 2019 06:28 )   PT: 12.1 sec;   INR: 1.08 ratio        Impression: 83yo Male admitted with Acute cholecystitis, s/p lap gus POD 0    PMH   Cholelithiasis  History of subdural hematoma (2014)  Constipation  Hypertension  Seizure    Plan:  - advance diet as tolerated  - Continue IV antibiotics  - Local wound care, ANAYELI drain care - monitor output  - pain control  - continue medical management, supportive care and prophylactic measures  - Encourage use of incentive spirometer   - f/u am labs

## 2019-04-16 NOTE — PROGRESS NOTE ADULT - SUBJECTIVE AND OBJECTIVE BOX
INTERVAL HPI/OVERNIGHT EVENTS:  Pt seen and examined at bedside.     Allergies/Intolerance: No Known Allergies      MEDICATIONS  (STANDING):  digoxin     Tablet 0.125 milliGRAM(s) Oral daily  diltiazem    milliGRAM(s) Oral daily  docusate sodium 100 milliGRAM(s) Oral three times a day  heparin  Injectable 5000 Unit(s) SubCutaneous every 12 hours  lactated ringers. 1000 milliLiter(s) (55 mL/Hr) IV Continuous <Continuous>  levETIRAcetam 500 milliGRAM(s) Oral two times a day  morphine  - Injectable 2 milliGRAM(s) IV Push once  piperacillin/tazobactam IVPB. 3.375 Gram(s) IV Intermittent every 8 hours    MEDICATIONS  (PRN):  acetaminophen   Tablet .. 650 milliGRAM(s) Oral every 6 hours PRN Temp greater or equal to 38C (100.4F), Mild Pain (1 - 3)  morphine  - Injectable 2 milliGRAM(s) IV Push every 4 hours PRN Severe Pain (7 - 10)  ondansetron Injectable 4 milliGRAM(s) IV Push every 6 hours PRN Nausea and/or Vomiting        ROS: all systems reviewed and wnl      PHYSICAL EXAMINATION:  Vital Signs Last 24 Hrs  T(C): 36.9 (16 Apr 2019 10:59), Max: 36.9 (16 Apr 2019 05:20)  T(F): 98.5 (16 Apr 2019 10:59), Max: 98.5 (16 Apr 2019 10:59)  HR: 65 (16 Apr 2019 10:59) (51 - 65)  BP: 129/70 (16 Apr 2019 10:59) (117/71 - 160/74)  BP(mean): --  RR: 18 (16 Apr 2019 05:20) (17 - 19)  SpO2: 100% (16 Apr 2019 10:59) (94% - 100%)  CAPILLARY BLOOD GLUCOSE          04-15 @ 07:01  -  04-16 @ 07:00  --------------------------------------------------------  IN: 1090 mL / OUT: 350 mL / NET: 740 mL        GENERAL:   NECK: supple, No JVD  CHEST/LUNG: clear to auscultation bilaterally; no rales, rhonchi, or wheezing b/l  HEART: normal S1, S2  ABDOMEN: BS+, soft, ND, NT   EXTREMITIES:  pulses palpable; no clubbing, cyanosis, or edema b/l LEs  SKIN: no rashes or lesions      LABS:                        11.7   6.05  )-----------( 332      ( 16 Apr 2019 06:43 )             35.2     04-16    138  |  106  |  13  ----------------------------<  86  3.8   |  23  |  1.00    Ca    8.8      16 Apr 2019 06:43  Phos  2.9     04-15  Mg     2.2     04-15    TPro  7.5  /  Alb  2.7<L>  /  TBili  0.5  /  DBili  x   /  AST  27  /  ALT  31  /  AlkPhos  100  04-15    PT/INR - ( 15 Apr 2019 06:28 )   PT: 12.1 sec;   INR: 1.08 ratio INTERVAL HPI/OVERNIGHT EVENTS:  Pt seen and examined at bedside.     Allergies/Intolerance: No Known Allergies      MEDICATIONS  (STANDING):  digoxin     Tablet 0.125 milliGRAM(s) Oral daily  diltiazem    milliGRAM(s) Oral daily  docusate sodium 100 milliGRAM(s) Oral three times a day  heparin  Injectable 5000 Unit(s) SubCutaneous every 12 hours  lactated ringers. 1000 milliLiter(s) (55 mL/Hr) IV Continuous <Continuous>  levETIRAcetam 500 milliGRAM(s) Oral two times a day  morphine  - Injectable 2 milliGRAM(s) IV Push once  piperacillin/tazobactam IVPB. 3.375 Gram(s) IV Intermittent every 8 hours    MEDICATIONS  (PRN):  acetaminophen   Tablet .. 650 milliGRAM(s) Oral every 6 hours PRN Temp greater or equal to 38C (100.4F), Mild Pain (1 - 3)  morphine  - Injectable 2 milliGRAM(s) IV Push every 4 hours PRN Severe Pain (7 - 10)  ondansetron Injectable 4 milliGRAM(s) IV Push every 6 hours PRN Nausea and/or Vomiting        ROS: all systems reviewed and wnl      PHYSICAL EXAMINATION:  Vital Signs Last 24 Hrs  T(C): 36.9 (16 Apr 2019 10:59), Max: 36.9 (16 Apr 2019 05:20)  T(F): 98.5 (16 Apr 2019 10:59), Max: 98.5 (16 Apr 2019 10:59)  HR: 65 (16 Apr 2019 10:59) (51 - 65)  BP: 129/70 (16 Apr 2019 10:59) (117/71 - 160/74)  BP(mean): --  RR: 18 (16 Apr 2019 05:20) (17 - 19)  SpO2: 100% (16 Apr 2019 10:59) (94% - 100%)  CAPILLARY BLOOD GLUCOSE          04-15 @ 07:01  -  04-16 @ 07:00  --------------------------------------------------------  IN: 1090 mL / OUT: 350 mL / NET: 740 mL        GENERAL: stable after lap choley, in bed, NAD, no CP, SOB or fevers  NECK: supple, No JVD  CHEST/LUNG: clear to auscultation bilaterally; no rales, rhonchi, or wheezing b/l  HEART: normal S1, S2  ABDOMEN: BS+, soft, ND, NT, ANAYELI drain in place    EXTREMITIES:  pulses palpable; no clubbing, cyanosis, or edema b/l LEs  SKIN: no rashes or lesions      LABS:                        11.7   6.05  )-----------( 332      ( 16 Apr 2019 06:43 )             35.2     04-16    138  |  106  |  13  ----------------------------<  86  3.8   |  23  |  1.00    Ca    8.8      16 Apr 2019 06:43  Phos  2.9     04-15  Mg     2.2     04-15    TPro  7.5  /  Alb  2.7<L>  /  TBili  0.5  /  DBili  x   /  AST  27  /  ALT  31  /  AlkPhos  100  04-15    PT/INR - ( 15 Apr 2019 06:28 )   PT: 12.1 sec;   INR: 1.08 ratio

## 2019-04-16 NOTE — BRIEF OPERATIVE NOTE - NSICDXBRIEFPOSTOP_GEN_ALL_CORE_FT
POST-OP DIAGNOSIS:  Abdominal adhesions 16-Apr-2019 16:21:54 around the gall bladder Behan, James M  Cholecystitis, acute 16-Apr-2019 16:20:54 acute on chronic cholecystitis Behan, James M
POST-OP DIAGNOSIS:  Cholelithiasis with acute on chronic cholecystitis 16-Apr-2019 16:17:10  Feliciano Ledezma

## 2019-04-16 NOTE — PROGRESS NOTE ADULT - ASSESSMENT
84 year old male PMH seizure disorder, subdural hematoma s/p craniotomy due to trauma, HTN, ETOH abuse, and recent admission to [facility] for cholelithiasis.      MEDICAL CLEARANCE NOTE: FOR LAP GUS, possibly open   CXR-clear, EKG: NSR, NO QTc prolongation   IVF taper to off as diet is resumed. WBAT, DVT ppx per protocol  pain control and bowel regimen.      Assessment and Plan:   Had lap gus today. Sepsis due to presumed Gram negative bacteria, lactate normalized  -Leukocytosis resolved, lactate normalized, afebrile   -c/w jossy-op zosyn 3.375 grams every 8 hours.      #Hypophosphatemia, repleted, resolved     #Atrial Fibrillation with RVR  - now rate controlled, continue with Cardizem 240 mg daily with Digoxin 0.125 mcg daily     #Chronic diastolic CHF, clinically euvolemic   -ECHO: pEF, grade 1 DD     #Seizure Disorder  -c/w  keppra 500mg PO bid     # Essential hypertension - continue with Cardizem 240 mg daily.     # History of subdural hematoma, no acute management.     #Constipation, continue with bowel regimen     # Preventative measure,   -heparin SQ-dvt ppx  -fall and seizurte precautions   -PT evaluation 84 year old male PMH seizure disorder, subdural hematoma s/p craniotomy due to trauma, HTN, ETOH abuse, and recent admission to [facility] for cholelithiasis.      MEDICAL CLEARANCE NOTE: FOR LAP GUS, possibly open   CXR-clear, EKG: NSR, NO QTc prolongation   IVF taper to off as diet is resumed. WBAT, DVT ppx per protocol  pain control and bowel regimen.      Assessment and Plan:   Had lap gus today. Sepsis due to presumed Gram negative bacteria, lactate normalized.  -Leukocytosis resolved, lactate normalized, afebrile   -c/w jossy-op zosyn 3.375 grams every 8 hours.  Doubt need for prolonged ABX after surgery.     #Hypophosphatemia, repleted, resolved     #Atrial Fibrillation with RVR  - now rate controlled, continue with Cardizem 240 mg daily with Digoxin 0.125 mcg daily     #Chronic diastolic CHF, clinically euvolemic   -ECHO: pEF, grade 1 DD     #Seizure Disorder  -c/w  keppra 500mg PO bid     # Essential hypertension - continue with Cardizem 240 mg daily.     # History of subdural hematoma, no acute management.     #Constipation, continue with bowel regimen     # Preventative measure,   -heparin SQ-dvt ppx  -fall and seizurte precautions   -PT evaluation.      Discharge to home and advance diet in AM as per surgery.

## 2019-04-16 NOTE — BRIEF OPERATIVE NOTE - OPERATION/FINDINGS
large [id number] of omental adhesions around the gall bladder; mainly intrahepatic gall bladder; friable wall
Extensive adhesions, GB empyema.

## 2019-04-16 NOTE — BRIEF OPERATIVE NOTE - NSICDXBRIEFPROCEDURE_GEN_ALL_CORE_FT
PROCEDURES:  Laparoscopic cholecystec 16-Apr-2019 16:15:27  Behan, James M
PROCEDURES:  Peritoneal lavage 16-Apr-2019 16:21:31  Feliciano Ledezma  Laparoscopic cholecystectomy 16-Apr-2019 16:18:04  Feliciano Ledezma

## 2019-04-17 LAB
ALBUMIN SERPL ELPH-MCNC: 2.8 G/DL — LOW (ref 3.3–5)
ALP SERPL-CCNC: 367 U/L — HIGH (ref 40–120)
ALT FLD-CCNC: 522 U/L — HIGH (ref 12–78)
ANION GAP SERPL CALC-SCNC: 10 MMOL/L — SIGNIFICANT CHANGE UP (ref 5–17)
AST SERPL-CCNC: 551 U/L — HIGH (ref 15–37)
BASOPHILS # BLD AUTO: 0.02 K/UL — SIGNIFICANT CHANGE UP (ref 0–0.2)
BASOPHILS NFR BLD AUTO: 0.2 % — SIGNIFICANT CHANGE UP (ref 0–2)
BILIRUB DIRECT SERPL-MCNC: 1.97 MG/DL — HIGH (ref 0.05–0.2)
BILIRUB INDIRECT FLD-MCNC: 0.7 MG/DL — SIGNIFICANT CHANGE UP (ref 0.2–1)
BILIRUB SERPL-MCNC: 2.7 MG/DL — HIGH (ref 0.2–1.2)
BUN SERPL-MCNC: 13 MG/DL — SIGNIFICANT CHANGE UP (ref 7–23)
CALCIUM SERPL-MCNC: 8.6 MG/DL — SIGNIFICANT CHANGE UP (ref 8.5–10.1)
CHLORIDE SERPL-SCNC: 103 MMOL/L — SIGNIFICANT CHANGE UP (ref 96–108)
CO2 SERPL-SCNC: 24 MMOL/L — SIGNIFICANT CHANGE UP (ref 22–31)
CREAT SERPL-MCNC: 0.93 MG/DL — SIGNIFICANT CHANGE UP (ref 0.5–1.3)
EOSINOPHIL # BLD AUTO: 0 K/UL — SIGNIFICANT CHANGE UP (ref 0–0.5)
EOSINOPHIL NFR BLD AUTO: 0 % — SIGNIFICANT CHANGE UP (ref 0–6)
GLUCOSE SERPL-MCNC: 137 MG/DL — HIGH (ref 70–99)
HCT VFR BLD CALC: 37.5 % — LOW (ref 39–50)
HGB BLD-MCNC: 12.6 G/DL — LOW (ref 13–17)
IMM GRANULOCYTES NFR BLD AUTO: 0.4 % — SIGNIFICANT CHANGE UP (ref 0–1.5)
LYMPHOCYTES # BLD AUTO: 1.04 K/UL — SIGNIFICANT CHANGE UP (ref 1–3.3)
LYMPHOCYTES # BLD AUTO: 9.3 % — LOW (ref 13–44)
MCHC RBC-ENTMCNC: 31.1 PG — SIGNIFICANT CHANGE UP (ref 27–34)
MCHC RBC-ENTMCNC: 33.6 GM/DL — SIGNIFICANT CHANGE UP (ref 32–36)
MCV RBC AUTO: 92.6 FL — SIGNIFICANT CHANGE UP (ref 80–100)
MONOCYTES # BLD AUTO: 0.7 K/UL — SIGNIFICANT CHANGE UP (ref 0–0.9)
MONOCYTES NFR BLD AUTO: 6.3 % — SIGNIFICANT CHANGE UP (ref 2–14)
NEUTROPHILS # BLD AUTO: 9.35 K/UL — HIGH (ref 1.8–7.4)
NEUTROPHILS NFR BLD AUTO: 83.8 % — HIGH (ref 43–77)
NRBC # BLD: 0 /100 WBCS — SIGNIFICANT CHANGE UP (ref 0–0)
PLATELET # BLD AUTO: 411 K/UL — HIGH (ref 150–400)
POTASSIUM SERPL-MCNC: 3.9 MMOL/L — SIGNIFICANT CHANGE UP (ref 3.5–5.3)
POTASSIUM SERPL-SCNC: 3.9 MMOL/L — SIGNIFICANT CHANGE UP (ref 3.5–5.3)
PROT SERPL-MCNC: 8 GM/DL — SIGNIFICANT CHANGE UP (ref 6–8.3)
RBC # BLD: 4.05 M/UL — LOW (ref 4.2–5.8)
RBC # FLD: 12.5 % — SIGNIFICANT CHANGE UP (ref 10.3–14.5)
SODIUM SERPL-SCNC: 137 MMOL/L — SIGNIFICANT CHANGE UP (ref 135–145)
WBC # BLD: 11.16 K/UL — HIGH (ref 3.8–10.5)
WBC # FLD AUTO: 11.16 K/UL — HIGH (ref 3.8–10.5)

## 2019-04-17 PROCEDURE — 99233 SBSQ HOSP IP/OBS HIGH 50: CPT

## 2019-04-17 PROCEDURE — 74181 MRI ABDOMEN W/O CONTRAST: CPT | Mod: 26

## 2019-04-17 RX ADMIN — LEVETIRACETAM 500 MILLIGRAM(S): 250 TABLET, FILM COATED ORAL at 05:30

## 2019-04-17 RX ADMIN — Medication 0.12 MILLIGRAM(S): at 05:30

## 2019-04-17 RX ADMIN — PIPERACILLIN AND TAZOBACTAM 25 GRAM(S): 4; .5 INJECTION, POWDER, LYOPHILIZED, FOR SOLUTION INTRAVENOUS at 05:29

## 2019-04-17 RX ADMIN — MORPHINE SULFATE 2 MILLIGRAM(S): 50 CAPSULE, EXTENDED RELEASE ORAL at 08:54

## 2019-04-17 RX ADMIN — Medication 240 MILLIGRAM(S): at 05:30

## 2019-04-17 RX ADMIN — HEPARIN SODIUM 5000 UNIT(S): 5000 INJECTION INTRAVENOUS; SUBCUTANEOUS at 05:30

## 2019-04-17 RX ADMIN — HEPARIN SODIUM 5000 UNIT(S): 5000 INJECTION INTRAVENOUS; SUBCUTANEOUS at 17:24

## 2019-04-17 RX ADMIN — SODIUM CHLORIDE 60 MILLILITER(S): 9 INJECTION, SOLUTION INTRAVENOUS at 22:38

## 2019-04-17 RX ADMIN — PIPERACILLIN AND TAZOBACTAM 25 GRAM(S): 4; .5 INJECTION, POWDER, LYOPHILIZED, FOR SOLUTION INTRAVENOUS at 22:37

## 2019-04-17 RX ADMIN — LEVETIRACETAM 500 MILLIGRAM(S): 250 TABLET, FILM COATED ORAL at 17:24

## 2019-04-17 RX ADMIN — ONDANSETRON 4 MILLIGRAM(S): 8 TABLET, FILM COATED ORAL at 11:28

## 2019-04-17 RX ADMIN — Medication 100 MILLIGRAM(S): at 05:30

## 2019-04-17 RX ADMIN — PIPERACILLIN AND TAZOBACTAM 25 GRAM(S): 4; .5 INJECTION, POWDER, LYOPHILIZED, FOR SOLUTION INTRAVENOUS at 11:32

## 2019-04-17 RX ADMIN — MORPHINE SULFATE 2 MILLIGRAM(S): 50 CAPSULE, EXTENDED RELEASE ORAL at 09:10

## 2019-04-17 NOTE — PROGRESS NOTE ADULT - SUBJECTIVE AND OBJECTIVE BOX
Patient seen and examined in chair in no distress.  C/o moderate abdominal pain.   Hungry; awaiting breakfast.  Denies nausea/vomiting.  Denies fever, chills, chest pain, sob.     Vital Signs Last 24 Hrs  T(F): 97.9 (04-17-19 @ 08:35), Max: 98.5 (04-16-19 @ 10:59)  HR: 69 (04-17-19 @ 08:35)  BP: 169/85 (04-17-19 @ 08:35)  RR: 17 (04-17-19 @ 08:35)  SpO2: 95% (04-17-19 @ 08:35)    GENERAL: Alert, oriented, NAD  CHEST/LUNG: Clear to auscultation bilaterally, respirations nonlabored  HEART: S1S2, RRR  ABDOMEN: + Bowel sounds, softly distended, ANAYELI in place with s/s OP, dressing c/d/i over port sites. TTP RUQ, no rigidity  EXTREMITIES: No calf tenderness, no pedal edema b/l     LABS:                        12.6   11.16 )-----------( 411      ( 17 Apr 2019 07:46 )             37.5     04-17    137  |  103  |  13  ----------------------------<  137<H>  3.9   |  24  |  0.93    Ca    8.6      17 Apr 2019 07:16    TPro  8.0  /  Alb  2.8<L>  /  TBili  2.7<H>  /  DBili  1.97<H>  /  AST  551<H>  /  ALT  522<H>  /  AlkPhos  367<H>  04-17    A/P: 84M PMH HTN, seizures, SDH, now POD1 s/p lap gus 2/2 acute on chronic cholecystitis. Elevated t bili/LFTs, likely post op  - continue ANAYELI, monitor output  - diet as tolerated  - pain management PRN  - monitor labs  - DVT ppx, incentive spirometer, OOB to ambulate as tolerated  - continue medicine team f/u  - discharge planning  - will d/w Dr. Ledezam Patient seen and examined in chair in no distress.  C/o moderate abdominal pain.   Hungry; awaiting breakfast.  Denies nausea/vomiting.  Denies fever, chills, chest pain, sob.     Vital Signs Last 24 Hrs  T(F): 97.9 (04-17-19 @ 08:35), Max: 98.5 (04-16-19 @ 10:59)  HR: 69 (04-17-19 @ 08:35)  BP: 169/85 (04-17-19 @ 08:35)  RR: 17 (04-17-19 @ 08:35)  SpO2: 95% (04-17-19 @ 08:35)    GENERAL: Alert, oriented, NAD  CHEST/LUNG: Clear to auscultation bilaterally, respirations nonlabored  HEART: S1S2, RRR  ABDOMEN: + Bowel sounds, softly distended, ANAYELI in place with s/s OP, dressing c/d/i over port sites. TTP RUQ, no rigidity  EXTREMITIES: No calf tenderness, no pedal edema b/l     LABS:                        12.6   11.16 )-----------( 411      ( 17 Apr 2019 07:46 )             37.5     04-17    137  |  103  |  13  ----------------------------<  137<H>  3.9   |  24  |  0.93    Ca    8.6      17 Apr 2019 07:16    TPro  8.0  /  Alb  2.8<L>  /  TBili  2.7<H>  /  DBili  1.97<H>  /  AST  551<H>  /  ALT  522<H>  /  AlkPhos  367<H>  04-17    A/P: 84M PMH HTN, seizures, SDH, now POD1 s/p lap gus 2/2 acute on chronic cholecystitis. Elevated t bili/LFTs, likely post op  - continue ANAYELI, monitor output  - diet as tolerated  - pain management PRN  - monitor labs  - DVT ppx, incentive spirometer, OOB to ambulate as tolerated  - continue medicine team f/u  - discharge planning  - will d/w Dr. Ledezma

## 2019-04-17 NOTE — PHYSICAL THERAPY INITIAL EVALUATION ADULT - BALANCE TRAINING, PT EVAL
GOAL: Patient will demonstrate independent bed mobility with safe and efficient technique in 4 weeks.

## 2019-04-17 NOTE — DIETITIAN INITIAL EVALUATION ADULT. - NS AS NUTRI INTERV MEALS SNACK
Texture-modified diet/As medically appropriate advance to solid diet; recommend Low Fat, soft/Fat - modified diet

## 2019-04-17 NOTE — DIETITIAN INITIAL EVALUATION ADULT. - FACTORS AFF FOOD INTAKE
vomiting/N/V PTA x 3 days & continues now; NPO/clears diet x several day during hospitalization/persistent nausea

## 2019-04-17 NOTE — DIETITIAN INITIAL EVALUATION ADULT. - ENERGY NEEDS
Height (cm): 167.64 (04-15)  Weight (kg): 79.9 (04-17)  BMI (kg/m2): 28.4 (04-17)  IBW: 64.5 kg +/- 10%    % IBW:  124%      UBW:  unknown      %UBW: unknown

## 2019-04-17 NOTE — PROGRESS NOTE ADULT - ASSESSMENT
84 year old male PMH seizure disorder, subdural hematoma s/p craniotomy due to trauma, HTN, ETOH abuse, and recent admission to [facility] for cholelithiasis.      MEDICAL CLEARANCE NOTE: FOR LAP GUS, possibly open   CXR-clear, EKG: NSR, NO QTc prolongation   IVF decreased to 60 ml/h, DVT ppx per protocol, pain control and bowel regimen.      Assessment and Plan:   Had lap gus yesterday. Sepsis due to presumed Gram negative bacteria, lactate normalized.  -Leukocytosis resolved, lactate normalized, afebrile. Increased LFT's noted post-op.    -c/w jossy-op zosyn 3.375 grams every 8 hours.  MRCP today notes dilated CBD ducts, no clear CBD obstruction.    Surgery ordered post-op HIDA scan. Continue NPO.        #Hypophosphatemia, repleted, resolved     #Atrial Fibrillation with RVR  - now rate controlled, continue with Cardizem 240 mg daily with Digoxin 0.125 mcg daily     #Chronic diastolic CHF, clinically euvolemic   -ECHO: pEF, grade 1 DD     #Seizure Disorder  -c/w  keppra 500mg PO bid     # Essential hypertension - continue with Cardizem 240 mg daily.     # History of subdural hematoma, no acute management.     #Constipation, continue with bowel regimen     # Preventative measure,   -heparin SQ-dvt ppx  -fall and seizurte precautions   -PT evaluation.      Discussed plan with surgery PA.

## 2019-04-17 NOTE — PROGRESS NOTE ADULT - SUBJECTIVE AND OBJECTIVE BOX
INTERVAL HPI/OVERNIGHT EVENTS:  Pt seen and examined at bedside.     Allergies/Intolerance: No Known Allergies      MEDICATIONS  (STANDING):  dextrose 5% + sodium chloride 0.45%. 1000 milliLiter(s) (100 mL/Hr) IV Continuous <Continuous>  digoxin     Tablet 0.125 milliGRAM(s) Oral daily  diltiazem    milliGRAM(s) Oral daily  docusate sodium 100 milliGRAM(s) Oral three times a day  heparin  Injectable 5000 Unit(s) SubCutaneous every 12 hours  levETIRAcetam 500 milliGRAM(s) Oral two times a day  morphine  - Injectable 2 milliGRAM(s) IV Push once  piperacillin/tazobactam IVPB. 3.375 Gram(s) IV Intermittent every 8 hours    MEDICATIONS  (PRN):  acetaminophen   Tablet .. 650 milliGRAM(s) Oral every 6 hours PRN Temp greater or equal to 38C (100.4F), Mild Pain (1 - 3)  morphine  - Injectable 2 milliGRAM(s) IV Push every 4 hours PRN Severe Pain (7 - 10)  ondansetron Injectable 4 milliGRAM(s) IV Push every 6 hours PRN Nausea and/or Vomiting        ROS: all systems reviewed and wnl      PHYSICAL EXAMINATION:  Vital Signs Last 24 Hrs  T(C): 36.1 (17 Apr 2019 12:34), Max: 36.9 (16 Apr 2019 17:15)  T(F): 97 (17 Apr 2019 12:34), Max: 98.4 (16 Apr 2019 17:15)  HR: 73 (17 Apr 2019 12:34) (62 - 73)  BP: 184/72 (17 Apr 2019 12:34) (108/50 - 184/72)  BP(mean): --  RR: 18 (17 Apr 2019 12:34) (16 - 23)  SpO2: 98% (17 Apr 2019 12:34) (93% - 100%)  CAPILLARY BLOOD GLUCOSE          04-16 @ 07:01  -  04-17 @ 07:00  --------------------------------------------------------  IN: 1100 mL / OUT: 70 mL / NET: 1030 mL    04-17 @ 07:01 - 04-17 @ 13:12  --------------------------------------------------------  IN: 300 mL / OUT: 200 mL / NET: 100 mL        GENERAL:   NECK: supple, No JVD  CHEST/LUNG: clear to auscultation bilaterally; no rales, rhonchi, or wheezing b/l  HEART: normal S1, S2  ABDOMEN: BS+, soft, ND, NT   EXTREMITIES:  pulses palpable; no clubbing, cyanosis, or edema b/l LEs  SKIN: no rashes or lesions      LABS:                        12.6   11.16 )-----------( 411      ( 17 Apr 2019 07:46 )             37.5     04-17    137  |  103  |  13  ----------------------------<  137<H>  3.9   |  24  |  0.93    Ca    8.6      17 Apr 2019 07:16    TPro  8.0  /  Alb  2.8<L>  /  TBili  2.7<H>  /  DBili  1.97<H>  /  AST  551<H>  /  ALT  522<H>  /  AlkPhos  367<H>  04-17 INTERVAL HPI/OVERNIGHT EVENTS:  Pt seen and examined at bedside.     Allergies/Intolerance: No Known Allergies      MEDICATIONS  (STANDING):  dextrose 5% + sodium chloride 0.45%. 1000 milliLiter(s) (100 mL/Hr) IV Continuous <Continuous>  digoxin     Tablet 0.125 milliGRAM(s) Oral daily  diltiazem    milliGRAM(s) Oral daily  docusate sodium 100 milliGRAM(s) Oral three times a day  heparin  Injectable 5000 Unit(s) SubCutaneous every 12 hours  levETIRAcetam 500 milliGRAM(s) Oral two times a day  morphine  - Injectable 2 milliGRAM(s) IV Push once  piperacillin/tazobactam IVPB. 3.375 Gram(s) IV Intermittent every 8 hours    MEDICATIONS  (PRN):  acetaminophen   Tablet .. 650 milliGRAM(s) Oral every 6 hours PRN Temp greater or equal to 38C (100.4F), Mild Pain (1 - 3)  morphine  - Injectable 2 milliGRAM(s) IV Push every 4 hours PRN Severe Pain (7 - 10)  ondansetron Injectable 4 milliGRAM(s) IV Push every 6 hours PRN Nausea and/or Vomiting        ROS: all systems reviewed and wnl      PHYSICAL EXAMINATION:  Vital Signs Last 24 Hrs  T(C): 36.1 (17 Apr 2019 12:34), Max: 36.9 (16 Apr 2019 17:15)  T(F): 97 (17 Apr 2019 12:34), Max: 98.4 (16 Apr 2019 17:15)  HR: 73 (17 Apr 2019 12:34) (62 - 73)  BP: 184/72 (17 Apr 2019 12:34) (108/50 - 184/72)  BP(mean): --  RR: 18 (17 Apr 2019 12:34) (16 - 23)  SpO2: 98% (17 Apr 2019 12:34) (93% - 100%)  CAPILLARY BLOOD GLUCOSE          04-16 @ 07:01  -  04-17 @ 07:00  --------------------------------------------------------  IN: 1100 mL / OUT: 70 mL / NET: 1030 mL    04-17 @ 07:01 - 04-17 @ 13:12  --------------------------------------------------------  IN: 300 mL / OUT: 200 mL / NET: 100 mL        GENERAL: stable in bed, no clear abdominal discomfort, no fevers  NECK: supple, No JVD  CHEST/LUNG: clear to auscultation bilaterally; no rales, rhonchi, or wheezing b/l  HEART: normal S1, S2  ABDOMEN: BS+, soft, ND, NT, ANAYELI drain in place.    EXTREMITIES:  pulses palpable; no clubbing, cyanosis, or edema b/l LEs  SKIN: no rashes or lesions      LABS:                        12.6   11.16 )-----------( 411      ( 17 Apr 2019 07:46 )             37.5     04-17    137  |  103  |  13  ----------------------------<  137<H>  3.9   |  24  |  0.93    Ca    8.6      17 Apr 2019 07:16    TPro  8.0  /  Alb  2.8<L>  /  TBili  2.7<H>  /  DBili  1.97<H>  /  AST  551<H>  /  ALT  522<H>  /  AlkPhos  367<H>  04-17

## 2019-04-17 NOTE — PHYSICAL THERAPY INITIAL EVALUATION ADULT - GAIT TRAINING, PT EVAL
In 4 weeks, patient will demonstrate safe gait in and outdoors c use of appropriate mobility device with improved falls risk on [place] ARELY.

## 2019-04-17 NOTE — DIETITIAN INITIAL EVALUATION ADULT. - OTHER INFO
Pt seen fro LOS & [employer] referral.  Pt very confused presently; limited information obtained. Per medical record, pt lives c girl friend; mostly independent. POD #1 lap gus. N/V present; no diarrhea; bowl regimen rx by surgery for chronic constipation issue. RD observed pt attempting to eat breakfast; very little consumed due to episode of N/V; RN aware. Food Insecurity left bedside; message left for program RD. Pt seen fro LOS & [employer] referral.  Pt very confused presently; limited information obtained. Per medical record, pt lives c girl friend; mostly independent. POD #1 lap gsu. N/V present; no diarrhea; bowel regimen rx by surgery for chronic constipation issue. RD observed pt attempting to eat breakfast; very little consumed due to episode of N/V; RN aware. Pt presently NPO for further testing; diet rx managed by surgery.  Food Insecurity left bedside; message left for program RD. Pt seen fro LOS & [employer] As [employer] referral.  Pt very confused presently; limited information obtained. Per medical record, pt lives c girl friend; mostly independent. POD #1 lap gus. N/V present; no diarrhea; bowel regimen rx by surgery for chronic constipation issue. RD observed pt attempting to eat breakfast; very little consumed due to episode of N/V; RN aware. Pt presently NPO for further testing; diet rx managed by surgery.  due to pt mental status at this time, RD called significant other to discuss food insecurity referral; Lizzeth Leslie [phone] advised no food issues in the home; pt must have misunderstood questions; no referral given.

## 2019-04-17 NOTE — DIETITIAN INITIAL EVALUATION ADULT. - PERTINENT LABORATORY DATA
04-17 Na137 mmol/L Glu 137 mg/dL<H> K+ 3.9 mmol/L Cr  0.93 mg/dL BUN 13 mg/dL 04-15 Phos 2.9 mg/dL 04-17 Alb 2.8 g/dL<L>

## 2019-04-17 NOTE — PHYSICAL THERAPY INITIAL EVALUATION ADULT - MUSCLE TONE ASSESSMENT, REHAB EVAL
right-side extremities/normal/left-side extremities
right-side extremities/normal/left-side extremities

## 2019-04-17 NOTE — DIETITIAN INITIAL EVALUATION ADULT. - NS AS NUTRI INTERV MEDICAL AND FOOD SUPPLEMENTS
As medically appropriate add Ensure Clear x 2/day (provides 480 kcal, 16 g protein)/Commercial beverage

## 2019-04-18 ENCOUNTER — TRANSCRIPTION ENCOUNTER (OUTPATIENT)
Age: 84
End: 2019-04-18

## 2019-04-18 ENCOUNTER — INPATIENT (INPATIENT)
Facility: HOSPITAL | Age: 84
LOS: 21 days | Discharge: HOME | End: 2019-05-10
Attending: SURGERY | Admitting: SURGERY
Payer: MEDICARE

## 2019-04-18 VITALS
TEMPERATURE: 97 F | HEART RATE: 72 BPM | OXYGEN SATURATION: 93 % | RESPIRATION RATE: 17 BRPM | DIASTOLIC BLOOD PRESSURE: 82 MMHG | SYSTOLIC BLOOD PRESSURE: 137 MMHG

## 2019-04-18 VITALS
DIASTOLIC BLOOD PRESSURE: 73 MMHG | HEART RATE: 76 BPM | OXYGEN SATURATION: 96 % | TEMPERATURE: 98 F | WEIGHT: 160.5 LBS | RESPIRATION RATE: 17 BRPM | SYSTOLIC BLOOD PRESSURE: 138 MMHG

## 2019-04-18 DIAGNOSIS — I62.01 NONTRAUMATIC ACUTE SUBDURAL HEMORRHAGE: Chronic | ICD-10-CM

## 2019-04-18 DIAGNOSIS — T81.9XXA UNSPECIFIED COMPLICATION OF PROCEDURE, INITIAL ENCOUNTER: ICD-10-CM

## 2019-04-18 LAB
ALBUMIN SERPL ELPH-MCNC: 2.7 G/DL — LOW (ref 3.3–5)
ALP SERPL-CCNC: 365 U/L — HIGH (ref 40–120)
ALT FLD-CCNC: 495 U/L — HIGH (ref 12–78)
ANION GAP SERPL CALC-SCNC: 10 MMOL/L — SIGNIFICANT CHANGE UP (ref 5–17)
AST SERPL-CCNC: 300 U/L — HIGH (ref 15–37)
BILIRUB DIRECT SERPL-MCNC: 3.25 MG/DL — HIGH (ref 0.05–0.2)
BILIRUB SERPL-MCNC: 4.5 MG/DL — HIGH (ref 0.2–1.2)
BUN SERPL-MCNC: 9 MG/DL — SIGNIFICANT CHANGE UP (ref 7–23)
CALCIUM SERPL-MCNC: 8.6 MG/DL — SIGNIFICANT CHANGE UP (ref 8.5–10.1)
CHLORIDE SERPL-SCNC: 103 MMOL/L — SIGNIFICANT CHANGE UP (ref 96–108)
CO2 SERPL-SCNC: 25 MMOL/L — SIGNIFICANT CHANGE UP (ref 22–31)
CREAT SERPL-MCNC: 0.97 MG/DL — SIGNIFICANT CHANGE UP (ref 0.5–1.3)
GLUCOSE SERPL-MCNC: 112 MG/DL — HIGH (ref 70–99)
HCT VFR BLD CALC: 38.2 % — LOW (ref 39–50)
HGB BLD-MCNC: 12.6 G/DL — LOW (ref 13–17)
MAGNESIUM SERPL-MCNC: 2.2 MG/DL — SIGNIFICANT CHANGE UP (ref 1.6–2.6)
MCHC RBC-ENTMCNC: 30.2 PG — SIGNIFICANT CHANGE UP (ref 27–34)
MCHC RBC-ENTMCNC: 33 GM/DL — SIGNIFICANT CHANGE UP (ref 32–36)
MCV RBC AUTO: 91.6 FL — SIGNIFICANT CHANGE UP (ref 80–100)
NRBC # BLD: 0 /100 WBCS — SIGNIFICANT CHANGE UP (ref 0–0)
PHOSPHATE SERPL-MCNC: 2.4 MG/DL — LOW (ref 2.5–4.5)
PLATELET # BLD AUTO: 422 K/UL — HIGH (ref 150–400)
POTASSIUM SERPL-MCNC: 3.8 MMOL/L — SIGNIFICANT CHANGE UP (ref 3.5–5.3)
POTASSIUM SERPL-SCNC: 3.8 MMOL/L — SIGNIFICANT CHANGE UP (ref 3.5–5.3)
PROT SERPL-MCNC: 7.8 GM/DL — SIGNIFICANT CHANGE UP (ref 6–8.3)
RBC # BLD: 4.17 M/UL — LOW (ref 4.2–5.8)
RBC # FLD: 13 % — SIGNIFICANT CHANGE UP (ref 10.3–14.5)
SODIUM SERPL-SCNC: 138 MMOL/L — SIGNIFICANT CHANGE UP (ref 135–145)
SURGICAL PATHOLOGY STUDY: SIGNIFICANT CHANGE UP
WBC # BLD: 9.62 K/UL — SIGNIFICANT CHANGE UP (ref 3.8–10.5)
WBC # FLD AUTO: 9.62 K/UL — SIGNIFICANT CHANGE UP (ref 3.8–10.5)

## 2019-04-18 PROCEDURE — 78226 HEPATOBILIARY SYSTEM IMAGING: CPT | Mod: 26

## 2019-04-18 PROCEDURE — 99233 SBSQ HOSP IP/OBS HIGH 50: CPT

## 2019-04-18 RX ORDER — LANOLIN ALCOHOL/MO/W.PET/CERES
3 CREAM (GRAM) TOPICAL ONCE
Qty: 0 | Refills: 0 | Status: COMPLETED | OUTPATIENT
Start: 2019-04-18 | End: 2019-04-18

## 2019-04-18 RX ORDER — POTASSIUM PHOSPHATE, MONOBASIC POTASSIUM PHOSPHATE, DIBASIC 236; 224 MG/ML; MG/ML
15 INJECTION, SOLUTION INTRAVENOUS ONCE
Qty: 0 | Refills: 0 | Status: COMPLETED | OUTPATIENT
Start: 2019-04-18 | End: 2019-04-18

## 2019-04-18 RX ORDER — ONDANSETRON 8 MG/1
4 TABLET, FILM COATED ORAL
Qty: 0 | Refills: 0 | DISCHARGE
Start: 2019-04-18

## 2019-04-18 RX ORDER — HEPARIN SODIUM 5000 [USP'U]/ML
5000 INJECTION INTRAVENOUS; SUBCUTANEOUS
Qty: 0 | Refills: 0 | DISCHARGE
Start: 2019-04-18

## 2019-04-18 RX ORDER — ACETAMINOPHEN 500 MG
2 TABLET ORAL
Qty: 0 | Refills: 0 | DISCHARGE
Start: 2019-04-18

## 2019-04-18 RX ORDER — MORPHINE SULFATE 50 MG/1
2 CAPSULE, EXTENDED RELEASE ORAL
Qty: 0 | Refills: 0 | DISCHARGE
Start: 2019-04-18

## 2019-04-18 RX ORDER — PIPERACILLIN AND TAZOBACTAM 4; .5 G/20ML; G/20ML
3.38 INJECTION, POWDER, LYOPHILIZED, FOR SOLUTION INTRAVENOUS
Qty: 0 | Refills: 0 | DISCHARGE
Start: 2019-04-18

## 2019-04-18 RX ORDER — SODIUM CHLORIDE 9 MG/ML
1000 INJECTION, SOLUTION INTRAVENOUS
Qty: 0 | Refills: 0 | DISCHARGE
Start: 2019-04-18

## 2019-04-18 RX ADMIN — SODIUM CHLORIDE 60 MILLILITER(S): 9 INJECTION, SOLUTION INTRAVENOUS at 17:23

## 2019-04-18 RX ADMIN — HEPARIN SODIUM 5000 UNIT(S): 5000 INJECTION INTRAVENOUS; SUBCUTANEOUS at 17:22

## 2019-04-18 RX ADMIN — POTASSIUM PHOSPHATE, MONOBASIC POTASSIUM PHOSPHATE, DIBASIC 62.5 MILLIMOLE(S): 236; 224 INJECTION, SOLUTION INTRAVENOUS at 19:53

## 2019-04-18 RX ADMIN — HEPARIN SODIUM 5000 UNIT(S): 5000 INJECTION INTRAVENOUS; SUBCUTANEOUS at 06:20

## 2019-04-18 RX ADMIN — PIPERACILLIN AND TAZOBACTAM 25 GRAM(S): 4; .5 INJECTION, POWDER, LYOPHILIZED, FOR SOLUTION INTRAVENOUS at 11:57

## 2019-04-18 RX ADMIN — LEVETIRACETAM 500 MILLIGRAM(S): 250 TABLET, FILM COATED ORAL at 17:22

## 2019-04-18 RX ADMIN — SODIUM CHLORIDE 60 MILLILITER(S): 9 INJECTION, SOLUTION INTRAVENOUS at 11:57

## 2019-04-18 RX ADMIN — Medication 0.12 MILLIGRAM(S): at 06:20

## 2019-04-18 RX ADMIN — Medication 240 MILLIGRAM(S): at 06:20

## 2019-04-18 RX ADMIN — Medication 3 MILLIGRAM(S): at 02:54

## 2019-04-18 RX ADMIN — PIPERACILLIN AND TAZOBACTAM 25 GRAM(S): 4; .5 INJECTION, POWDER, LYOPHILIZED, FOR SOLUTION INTRAVENOUS at 20:39

## 2019-04-18 RX ADMIN — MORPHINE SULFATE 2 MILLIGRAM(S): 50 CAPSULE, EXTENDED RELEASE ORAL at 01:45

## 2019-04-18 RX ADMIN — LEVETIRACETAM 500 MILLIGRAM(S): 250 TABLET, FILM COATED ORAL at 06:20

## 2019-04-18 RX ADMIN — SODIUM CHLORIDE 60 MILLILITER(S): 9 INJECTION, SOLUTION INTRAVENOUS at 06:20

## 2019-04-18 RX ADMIN — MORPHINE SULFATE 2 MILLIGRAM(S): 50 CAPSULE, EXTENDED RELEASE ORAL at 01:24

## 2019-04-18 RX ADMIN — PIPERACILLIN AND TAZOBACTAM 25 GRAM(S): 4; .5 INJECTION, POWDER, LYOPHILIZED, FOR SOLUTION INTRAVENOUS at 06:19

## 2019-04-18 NOTE — DISCHARGE NOTE PROVIDER - NSDCCPTREATMENT_GEN_ALL_CORE_FT
PRINCIPAL PROCEDURE  Procedure: Laparoscopic cholecystectomy  Findings and Treatment:       SECONDARY PROCEDURE  Procedure: Peritoneal lavage  Findings and Treatment:

## 2019-04-18 NOTE — PROGRESS NOTE ADULT - ATTENDING COMMENTS
I examined patient and plan discussed with him and his wife.
I examined patient, New vital signs today showing tachycardia .  Medical management has been installed, As per nurse Albina  and Digoxin ordered by PMD and given.  Patient examined at bed site on NAD.  Low grade fever.  Abdomen soft , mild RUQ tenderness.  Labs:  Leukocytosis 13.5           T.bili:  0.9    Assessm/Plan    Acute Cholecystitis, Cholelithiasis.  Patient with SVT's.  Medical mangement provided.    Will cancel Surgery for today,   Continue IV antibiotics.  Monitor VS.  Lap- Cholecystectomy when patient medically optimized.  Plan discussed with Dr. Santiago. MD
I examined patient and plan discussed with him and his wife.  Patient vomited after eating. Denies severe Abdominal pain, feeling "bloated"  VSSA  Abdomen soft, + hypoactive BS, mild distended, No guarding, Wound clean, ANAYELI sanguinous  fluid 25 chief complaint    Labs, + leukocytosis.  Elevated LFT, T. bili 2.7    Assessment:    Post Lap Crystal, vomiting, elevated LFT's T. bili  R/O CBD obstruction.    Plan:  NPO, IVF, continue IV antibiotics.  MRCP   Plan discussed with patient and wife at bed site.
I examined patient and plan discussed with wife.  Agree with note.  lap-Cholecystectomy tomorrow, pending Medical clearance.
I examined patient and plan discussed.  Consent obtained.  Medical clearance in chart.    Plan:     Lap-Cholecystectomy, possible open today.  Awaiting OR time.
I examined patient and plan discussed.  VSSA  Abdomen soft, no tenderness   Labs  LFT"S WNL  Lap-Crystal postponed until Patient medically optimized.  Continue IV antibiotics.  PO diet as tolerated.
I examined patient and plan discussed.

## 2019-04-18 NOTE — ACUTE INTERFACILITY TRANSFER NOTE - PLAN OF CARE
needs operative repair transfer to [facility] to service of Dr. Berrios for surgical repair continue medical treatment .med needs operative repair in a tertiary, specialized center transfer to [facility] to service of Dr. Harp for surgical repair

## 2019-04-18 NOTE — PROGRESS NOTE ADULT - NSHPATTENDINGPLANDISCUSS_GEN_ALL_CORE
Patient, Wife, . ALLA Ritter. ALLA Smith, Beham, Dr, Loyd at [facility].
Patient, PA, Dr. Santiago, OR. will discussed with patient's wife.
ALLA Ospina, RN
Patient, Wife, PA, RN
Patient, Wife, PA, RN, OR
Patient, wife , RN
Patient, PA RN.

## 2019-04-18 NOTE — DISCHARGE NOTE PROVIDER - NSDCCPCAREPLAN_GEN_ALL_CORE_FT
PRINCIPAL DISCHARGE DIAGNOSIS  Diagnosis: Common bile duct transection, initial encounter  Assessment and Plan of Treatment:       SECONDARY DISCHARGE DIAGNOSES  Diagnosis: History of subdural hematoma  Assessment and Plan of Treatment:     Diagnosis: Seizure  Assessment and Plan of Treatment: Seizure    Diagnosis: Essential hypertension  Assessment and Plan of Treatment: Essential hypertension

## 2019-04-18 NOTE — PROGRESS NOTE ADULT - ASSESSMENT
84 years old male patient S/P Lap-Cholecystectomy due to Acute on chronic Cholecystitis.  POD # 2  Elevated T bili/D. bili, MRCP done, discussed with radiologist Dr. Moreno, recommend HIDA scan, been bone now.  Possible injury/obstructio of CBD.  Abdomen mild tender, No fever, normal WBC.    Plan:  Obtain official  report of HIDA   Continue IV antibiotics.  NPO, IVF.  Transfer patient to [facility] to Hepatobiliary Surgeon.  for managing of this complication.  I Informed about this complication to Surgery Chief, Dr. Ritter. and I spoke with Dr James Harp  at [facility] and discussed patient's condition. Loyd Olivarez accepted patient.  Patient to be transferred to [facility]  Patient and wife informed about complication.

## 2019-04-18 NOTE — DISCHARGE NOTE PROVIDER - HOSPITAL COURSE
Patient is an 85 y/o M patient admitted with cholecystitis s/p cholecystectomy and now with persistent abdominal pain and elevated LFTs concerning for surgical complication or CBD issue. Please see Surgeon's transfer note for full hospital course. The patient is on the medical service for co-management of medical co-morbidities which are currently stable. I examined the patient today as I am the covering hospitalist and discussed his case with the patient and his wife at the bedside. The patient is currently clinically stable. I discussed with the patient and his wife that the surgical team is in charge of his primary issue and the management is solely per the surgical team and we as medicine are here to help co-manage any co-morbid conditions he has which are all stable at the present time. I answered any questions they had.

## 2019-04-18 NOTE — ACUTE INTERFACILITY TRANSFER NOTE - HOSPITAL COURSE
Reason for Admission: Acute cholecystitis, cholelithiasis	  History of Present Illness: 	  Patient is an 84 year old male PMH seizure disorder, subdural hematoma s/p craniotomy due to trauma, HTN, ETOH abuse, cholelithiasis, who presents to the emergency room complaining of 7/10, non-radiating, upper abdominal pain x 3 days with associated nausea and vomiting. States the pain began abruptly after eating a heavy dinner and has been episodic since. He was seen here yesterday, but his CT was negative and he was discharged home. When his pain persisted and he continued to vomit, he returned here. New Abdomen CT scan now revealed Distended GB  and Cholelithiasis with Pericholecystic fluid.   Has not eaten today. Admits to flatus. Last BM 2 days ago.   Of note, upon chart review, patient was admitted here for abdominal pain/cholelithiasis in 12/2018, he was medically a poor surgical candidate, so perc gus was considered at the time, but his HIDA was negative and his symptoms improved so he was discharged home to follow up as outpatient, which he did not.   He denies fever, chills, chest pain, shortness of breath, back pain, recent illness, recent travel.    He was admitted 4/10/19. He was placed on IV zosyn & w/u showed acute cholecystitis    He was taken to OR 4/16/19 for a laparoscopic cholecystectomy.    POD #1, LFTs markedly increased.     MRCP was done: POD #1  IMPRESSION:    Status post cholecystectomy.  Mild intrahepatic biliary ductal dilatation.  Poor/nonvisualization of proximal and mid CBD.  Distal CBD within normal limits.  No choledocholithiasis within visualized biliary ducts.  Nuclear medicine hepatobiliary scan may be obtained for further   assessment of the biliary tree.      HIDA scan was done POD# 2: results not available at time of discharge.    also found to have bile in ANAYELI drainage POD #2.    Ace Ledezma & Riya decided it would be best to transfer the pt to [facility]. Dr. Berrios accepted pt. Reason for Admission: Acute cholecystitis, cholelithiasis	  History of Present Illness: 	  Patient is an 84 year old male PMH seizure disorder, subdural hematoma s/p craniotomy due to trauma, HTN, ETOH abuse, cholelithiasis, who presents to the emergency room complaining of 7/10, non-radiating, upper abdominal pain x 3 days with associated nausea and vomiting. States the pain began abruptly after eating a heavy dinner and has been episodic since. He was seen here yesterday, but his CT was negative and he was discharged home. When his pain persisted and he continued to vomit, he returned here. New Abdomen CT scan now revealed Distended GB  and Cholelithiasis with Pericholecystic fluid.   Has not eaten today. Admits to flatus. Last BM 2 days ago.   Of note, upon chart review, patient was admitted here for abdominal pain/cholelithiasis in 12/2018, he was medically a poor surgical candidate, so perc gus was considered at the time, but his HIDA was negative and his symptoms improved so he was discharged home to follow up as outpatient, which he did not.   He denies fever, chills, chest pain, shortness of breath, back pain, recent illness, recent travel.    He came again and was admitted 4/10/19. He was placed on IV zosyn & w/u showed acute cholecystitis    He was taken to OR 4/16/19 for a laparoscopic cholecystectomy.    POD #1, LFTs markedly increased.     MRCP was done: POD #1  IMPRESSION:    Status post cholecystectomy.  Mild intrahepatic biliary ductal dilatation.  Poor/nonvisualization of proximal and mid CBD.  Distal CBD within normal limits.  No choledocholithiasis within visualized biliary ducts.  Nuclear medicine hepatobiliary scan may be obtained for further   assessment of the biliary tree.      HIDA scan was done POD# 2: results not available at time of discharge.    also found to have bile in ANAYELI drainage POD #2.    Ace Ledezma & Riya decided it would be best to transfer the pt to [facility].   Dr. Ledezma spoke with Dr. James Harp MD. and discussed case and the actual complications. Dr. Harp   accepted patient at [facility]. for management of this complication.

## 2019-04-18 NOTE — PROGRESS NOTE ADULT - SUBJECTIVE AND OBJECTIVE BOX
Dr. Ledezma's progress note:    STATUS POST:    POST OPERATIVE DAY #: 2    Patient has been NPO, denies severe abdominal pain, but feel discomfort at RUQ.  Denies flatus or BM, no vomiting.      MEDICATIONS  (STANDING):  dextrose 5% + sodium chloride 0.45%. 1000 milliLiter(s) (60 mL/Hr) IV Continuous <Continuous>  digoxin     Tablet 0.125 milliGRAM(s) Oral daily  diltiazem    milliGRAM(s) Oral daily  heparin  Injectable 5000 Unit(s) SubCutaneous every 12 hours  levETIRAcetam 500 milliGRAM(s) Oral two times a day  morphine  - Injectable 2 milliGRAM(s) IV Push once  piperacillin/tazobactam IVPB. 3.375 Gram(s) IV Intermittent every 8 hours  potassium phosphate IVPB 15 milliMole(s) IV Intermittent once    MEDICATIONS  (PRN):  acetaminophen   Tablet .. 650 milliGRAM(s) Oral every 6 hours PRN Temp greater or equal to 38C (100.4F), Mild Pain (1 - 3)  ondansetron Injectable 4 milliGRAM(s) IV Push every 6 hours PRN Nausea and/or Vomiting    Vital Signs Last 24 Hrs  T(C): 36.5 (18 Apr 2019 05:07), Max: 36.7 (17 Apr 2019 16:46)  T(F): 97.7 (18 Apr 2019 05:07), Max: 98 (17 Apr 2019 16:46)  HR: 71 (18 Apr 2019 05:07) (69 - 76)  BP: 152/77 (18 Apr 2019 05:07) (152/77 - 184/72)  BP(mean): --  RR: 18 (18 Apr 2019 05:07) (17 - 18)  SpO2: 96% (18 Apr 2019 05:07) (96% - 99%)  I&O's Summary    17 Apr 2019 07:01  -  18 Apr 2019 07:00  --------------------------------------------------------  IN: 1870 mL / OUT: 915 mL / NET: 955 mL      PHYSICAL EXAM:  Constitutional: Patient well nourish. well developed.  Eyes:  PERRL, EOMI, Conjunctiva, mild jaundice   ENMT:  WNL  Neck:  Supple.  Respiratory:  Lungs CTA, B/L, no rales , no wheezing, no rhonchi.  Cardiovascular:  S1, S2, RRR  Gastrointestinal: Abdomen soft, mild  distended, + BS, mild RUQ tenderness, no rigidity                          ANAYELI:  Serous sanguinous  fluid with bilious fluid, Bile leak  Genitourinary:  Normal  Extremities:  No edema, no calf tenderness,  Neurological: AxAxOx3    LABS:                        12.6   9.62  )-----------( 422      ( 18 Apr 2019 07:07 )             38.2     04-18    138  |  103  |  9   ----------------------------<  112<H>  3.8   |  25  |  0.97    Ca    8.6      18 Apr 2019 07:07  Phos  2.4     04-18  Mg     2.2     04-18    TPro  7.8  /  Alb  2.7<L>  /  TBili  4.5<H>  /  DBili  3.25<H>  /  AST  300<H>  /  ALT  495<H>  /  AlkPhos  365<H>  04-18      LIVER FUNCTIONS - ( 18 Apr 2019 07:07 )  Alb: 2.7 g/dL / Pro: 7.8 gm/dL / ALK PHOS: 365 U/L / ALT: 495 U/L / AST: 300 U/L / GGT: x           RADIOLOGY & ADDITIONAL STUDIES:    < from: MR MRCP No Cont (04.17.19 @ 15:22) >  EXAM:  MR MRCP                            PROCEDURE DATE:  04/17/2019          INTERPRETATION:  CLINICAL INFORMATION: Elevated liver function tests    COMPARISON: CT dated 4/10/2019    PROCEDURE:     The following sequences were obtained:  1. AxialDualecho in and out-of-phase, 2D FIESTA, and T2 SSFSE  2. Coronal T2 SSFSE  3. 3D and radial MRCP    FINDINGS:    There are trace bilateral pleural effusions.    There are no focal hepatic lesions identified. Mild intrahepatic biliary   ductal dilatation is noted. The proximal and mid CBD are poorly/not   visualized. The distal CBD measures 3 mm. No intraductal biliary calculi   are identified in the visualized ducts. The gallbladder is surgically   absent. Gallbladder fossa postsurgical changes are noted.    The pancreas demonstrates normal T1 signal without focal lesion. The   pancreatic duct is normal in caliber.     The spleen, adrenal glands, and kidneys are unremarkable in appearance.      There is no retroperitoneal adenopathy. There is trace ascites.      IMPRESSION:      Status post cholecystectomy.  Mild intrahepatic biliary ductal dilatation.  Poor/nonvisualization of proximal and mid CBD.  Distal CBD within normal limits.  No choledocholithiasis within visualized biliary ducts.  Nuclear medicine hepatobiliary scan may be obtained for further   assessment of the biliary tree.    Findings were discussed with Dr. Ledezma in person on 4/17/2019 at 1535   hours.      URI CROWLEY M.D., ATTENDING RADIOLOGIST  This document has been electronically signed. Apr 17 2019  3:40PM    < end of copied text >        HEALTH ISSUES - PROBLEM Dx:  Essential hypertension: Essential hypertension  Sepsis due to Gram negative bacteria: Sepsis due to Gram negative bacteria  History of subdural hematoma: History of subdural hematoma  Seizure: Seizure  Hypertension: Hypertension  Constipation: Constipation  Cholecystitis: Cholecystitis

## 2019-04-18 NOTE — CHART NOTE - NSCHARTNOTEFT_GEN_A_CORE
Have contacted the transfer center. Spoke with Debbie. She said pt has been accepted for transfer by Dr. Berrios.  Pt will be transferred as soon as a bed is available.  Interfacility transfer form completed.  Discussed pt with Dr. Ledezma.

## 2019-04-18 NOTE — PROGRESS NOTE ADULT - REASON FOR ADMISSION
Acute cholecystitis, cholelithiasis

## 2019-04-19 PROBLEM — Z86.79 PERSONAL HISTORY OF OTHER DISEASES OF THE CIRCULATORY SYSTEM: Chronic | Status: ACTIVE | Noted: 2019-04-10

## 2019-04-19 PROBLEM — K80.20 CALCULUS OF GALLBLADDER WITHOUT CHOLECYSTITIS WITHOUT OBSTRUCTION: Chronic | Status: ACTIVE | Noted: 2019-04-10

## 2019-04-19 LAB
ALBUMIN SERPL ELPH-MCNC: 3.3 G/DL — SIGNIFICANT CHANGE UP (ref 3.3–5)
ALP SERPL-CCNC: 315 U/L — HIGH (ref 40–120)
ALT FLD-CCNC: 378 U/L — HIGH (ref 4–41)
ANION GAP SERPL CALC-SCNC: 14 MMO/L — SIGNIFICANT CHANGE UP (ref 7–14)
APTT BLD: 30.6 SEC — SIGNIFICANT CHANGE UP (ref 27.5–36.3)
AST SERPL-CCNC: 175 U/L — HIGH (ref 4–40)
BILIRUB DIRECT SERPL-MCNC: 1.7 MG/DL — HIGH (ref 0.1–0.2)
BILIRUB SERPL-MCNC: 2.6 MG/DL — HIGH (ref 0.2–1.2)
BLD GP AB SCN SERPL QL: NEGATIVE — SIGNIFICANT CHANGE UP
BUN SERPL-MCNC: 12 MG/DL — SIGNIFICANT CHANGE UP (ref 7–23)
CALCIUM SERPL-MCNC: 9 MG/DL — SIGNIFICANT CHANGE UP (ref 8.4–10.5)
CHLORIDE SERPL-SCNC: 100 MMOL/L — SIGNIFICANT CHANGE UP (ref 98–107)
CO2 SERPL-SCNC: 22 MMOL/L — SIGNIFICANT CHANGE UP (ref 22–31)
CREAT SERPL-MCNC: 0.94 MG/DL — SIGNIFICANT CHANGE UP (ref 0.5–1.3)
GLUCOSE SERPL-MCNC: 115 MG/DL — HIGH (ref 70–99)
HCT VFR BLD CALC: 36.9 % — LOW (ref 39–50)
HGB BLD-MCNC: 12 G/DL — LOW (ref 13–17)
INR BLD: 1.1 — SIGNIFICANT CHANGE UP (ref 0.88–1.17)
MAGNESIUM SERPL-MCNC: 2.1 MG/DL — SIGNIFICANT CHANGE UP (ref 1.6–2.6)
MCHC RBC-ENTMCNC: 30.2 PG — SIGNIFICANT CHANGE UP (ref 27–34)
MCHC RBC-ENTMCNC: 32.5 % — SIGNIFICANT CHANGE UP (ref 32–36)
MCV RBC AUTO: 92.9 FL — SIGNIFICANT CHANGE UP (ref 80–100)
NRBC # FLD: 0 K/UL — SIGNIFICANT CHANGE UP (ref 0–0)
PHOSPHATE SERPL-MCNC: 3.5 MG/DL — SIGNIFICANT CHANGE UP (ref 2.5–4.5)
PLATELET # BLD AUTO: 435 K/UL — HIGH (ref 150–400)
PMV BLD: 9.2 FL — SIGNIFICANT CHANGE UP (ref 7–13)
POTASSIUM SERPL-MCNC: 4.3 MMOL/L — SIGNIFICANT CHANGE UP (ref 3.5–5.3)
POTASSIUM SERPL-SCNC: 4.3 MMOL/L — SIGNIFICANT CHANGE UP (ref 3.5–5.3)
PROT SERPL-MCNC: 8.1 G/DL — SIGNIFICANT CHANGE UP (ref 6–8.3)
PROTHROM AB SERPL-ACNC: 12.6 SEC — SIGNIFICANT CHANGE UP (ref 9.8–13.1)
RBC # BLD: 3.97 M/UL — LOW (ref 4.2–5.8)
RBC # FLD: 13.2 % — SIGNIFICANT CHANGE UP (ref 10.3–14.5)
RH IG SCN BLD-IMP: POSITIVE — SIGNIFICANT CHANGE UP
SODIUM SERPL-SCNC: 136 MMOL/L — SIGNIFICANT CHANGE UP (ref 135–145)
WBC # BLD: 7.48 K/UL — SIGNIFICANT CHANGE UP (ref 3.8–10.5)
WBC # FLD AUTO: 7.48 K/UL — SIGNIFICANT CHANGE UP (ref 3.8–10.5)

## 2019-04-19 PROCEDURE — 47533 PLMT BILIARY DRAINAGE CATH: CPT

## 2019-04-19 PROCEDURE — 99223 1ST HOSP IP/OBS HIGH 75: CPT

## 2019-04-19 RX ORDER — SODIUM CHLORIDE 9 MG/ML
1000 INJECTION, SOLUTION INTRAVENOUS
Qty: 0 | Refills: 0 | Status: DISCONTINUED | OUTPATIENT
Start: 2019-04-19 | End: 2019-04-19

## 2019-04-19 RX ORDER — SODIUM CHLORIDE 9 MG/ML
1000 INJECTION INTRAMUSCULAR; INTRAVENOUS; SUBCUTANEOUS
Qty: 0 | Refills: 0 | Status: DISCONTINUED | OUTPATIENT
Start: 2019-04-19 | End: 2019-04-19

## 2019-04-19 RX ORDER — DILTIAZEM HCL 120 MG
240 CAPSULE, EXT RELEASE 24 HR ORAL DAILY
Qty: 0 | Refills: 0 | Status: DISCONTINUED | OUTPATIENT
Start: 2019-04-19 | End: 2019-04-26

## 2019-04-19 RX ORDER — TRAZODONE HCL 50 MG
50 TABLET ORAL AT BEDTIME
Qty: 0 | Refills: 0 | Status: DISCONTINUED | OUTPATIENT
Start: 2019-04-19 | End: 2019-04-26

## 2019-04-19 RX ORDER — LEVETIRACETAM 250 MG/1
500 TABLET, FILM COATED ORAL
Qty: 0 | Refills: 0 | Status: DISCONTINUED | OUTPATIENT
Start: 2019-04-19 | End: 2019-04-26

## 2019-04-19 RX ORDER — PIPERACILLIN AND TAZOBACTAM 4; .5 G/20ML; G/20ML
3.38 INJECTION, POWDER, LYOPHILIZED, FOR SOLUTION INTRAVENOUS EVERY 8 HOURS
Qty: 0 | Refills: 0 | Status: DISCONTINUED | OUTPATIENT
Start: 2019-04-19 | End: 2019-04-30

## 2019-04-19 RX ORDER — LISINOPRIL 2.5 MG/1
20 TABLET ORAL DAILY
Qty: 0 | Refills: 0 | Status: DISCONTINUED | OUTPATIENT
Start: 2019-04-19 | End: 2019-04-26

## 2019-04-19 RX ORDER — HEPARIN SODIUM 5000 [USP'U]/ML
5000 INJECTION INTRAVENOUS; SUBCUTANEOUS EVERY 8 HOURS
Qty: 0 | Refills: 0 | Status: DISCONTINUED | OUTPATIENT
Start: 2019-04-19 | End: 2019-04-22

## 2019-04-19 RX ORDER — DIGOXIN 250 MCG
0.12 TABLET ORAL DAILY
Qty: 0 | Refills: 0 | Status: DISCONTINUED | OUTPATIENT
Start: 2019-04-19 | End: 2019-04-26

## 2019-04-19 RX ORDER — DEXTROSE MONOHYDRATE, SODIUM CHLORIDE, AND POTASSIUM CHLORIDE 50; .745; 4.5 G/1000ML; G/1000ML; G/1000ML
1000 INJECTION, SOLUTION INTRAVENOUS
Qty: 0 | Refills: 0 | Status: DISCONTINUED | OUTPATIENT
Start: 2019-04-19 | End: 2019-04-19

## 2019-04-19 RX ORDER — SODIUM CHLORIDE 9 MG/ML
1000 INJECTION INTRAMUSCULAR; INTRAVENOUS; SUBCUTANEOUS
Qty: 0 | Refills: 0 | Status: DISCONTINUED | OUTPATIENT
Start: 2019-04-19 | End: 2019-04-20

## 2019-04-19 RX ADMIN — LEVETIRACETAM 500 MILLIGRAM(S): 250 TABLET, FILM COATED ORAL at 05:07

## 2019-04-19 RX ADMIN — Medication 240 MILLIGRAM(S): at 05:07

## 2019-04-19 RX ADMIN — Medication 0.12 MILLIGRAM(S): at 05:07

## 2019-04-19 RX ADMIN — PIPERACILLIN AND TAZOBACTAM 25 GRAM(S): 4; .5 INJECTION, POWDER, LYOPHILIZED, FOR SOLUTION INTRAVENOUS at 05:07

## 2019-04-19 RX ADMIN — LEVETIRACETAM 500 MILLIGRAM(S): 250 TABLET, FILM COATED ORAL at 17:46

## 2019-04-19 RX ADMIN — LISINOPRIL 20 MILLIGRAM(S): 2.5 TABLET ORAL at 05:07

## 2019-04-19 RX ADMIN — DEXTROSE MONOHYDRATE, SODIUM CHLORIDE, AND POTASSIUM CHLORIDE 125 MILLILITER(S): 50; .745; 4.5 INJECTION, SOLUTION INTRAVENOUS at 00:35

## 2019-04-19 RX ADMIN — SODIUM CHLORIDE 100 MILLILITER(S): 9 INJECTION, SOLUTION INTRAVENOUS at 22:49

## 2019-04-19 RX ADMIN — PIPERACILLIN AND TAZOBACTAM 25 GRAM(S): 4; .5 INJECTION, POWDER, LYOPHILIZED, FOR SOLUTION INTRAVENOUS at 22:50

## 2019-04-19 RX ADMIN — SODIUM CHLORIDE 125 MILLILITER(S): 9 INJECTION INTRAMUSCULAR; INTRAVENOUS; SUBCUTANEOUS at 14:32

## 2019-04-19 RX ADMIN — HEPARIN SODIUM 5000 UNIT(S): 5000 INJECTION INTRAVENOUS; SUBCUTANEOUS at 05:07

## 2019-04-19 RX ADMIN — Medication 50 MILLIGRAM(S): at 22:55

## 2019-04-19 RX ADMIN — PIPERACILLIN AND TAZOBACTAM 25 GRAM(S): 4; .5 INJECTION, POWDER, LYOPHILIZED, FOR SOLUTION INTRAVENOUS at 14:32

## 2019-04-19 RX ADMIN — SODIUM CHLORIDE 75 MILLILITER(S): 9 INJECTION INTRAMUSCULAR; INTRAVENOUS; SUBCUTANEOUS at 23:48

## 2019-04-19 RX ADMIN — HEPARIN SODIUM 5000 UNIT(S): 5000 INJECTION INTRAVENOUS; SUBCUTANEOUS at 22:50

## 2019-04-19 RX ADMIN — SODIUM CHLORIDE 100 MILLILITER(S): 9 INJECTION, SOLUTION INTRAVENOUS at 17:46

## 2019-04-19 NOTE — H&P ADULT - ATTENDING COMMENTS
85 y/o male s/p laparoscopic cholecystectomy.  Found to have bile leak in ANAYELI drain with elevation in bilirubin.  HIDA scan confirm bile leak.  Clinically stable.  Plan for PTC for biliary diversion and evaluation of injury.  Likely to need repeat imaging prior to definitive operative repair.  Continue antibiotics.  Discussed with patient and his spouse.

## 2019-04-19 NOTE — H&P ADULT - HISTORY OF PRESENT ILLNESS
84M with PMH of seizure disorder, subdural hematoma s/p craniotomy due to trauma, HTN, ETOH abuse, cholelithiasis, who presented to OSH ED with abdominal pain, nausea/vomiting and was found to have cholecystitis and cholelithiasis on CT. He underwent a laparoscopic cholecystectomy. On POD1, he was noted to have increase in LFTs and bilious output from ANAYELI drain on POD2. 84M with PMH of seizure disorder, subdural hematoma s/p craniotomy due to trauma, HTN, ETOH abuse, cholelithiasis, who presented to OSH ED with abdominal pain, nausea/vomiting and was found to have cholecystitis and cholelithiasis on CT. He underwent a laparoscopic cholecystectomy. On POD1, he was noted to have increase in LFTs and bilious output from ANAYELI drain on POD2.    MRCP and HIDA scan were performed at outside hospital. Patient was transferred to [facility] for further care.     Patient states that he has some abdominal pain and feels bloated. Cannot remember when he last had a bowel movement Denies nausea/vomiting.

## 2019-04-19 NOTE — H&P ADULT - ASSESSMENT
84M transferred from OSH for elevated LFTs and bilious ANAYELI output POD2 from laparoscopic cholecystectomy.    - pain control  - antibiotics  - NPO for now  - f/u labs  - monitor drain output    Discussed with Dr. Loyd CRAMER Team   [id number]

## 2019-04-19 NOTE — CHART NOTE - NSCHARTNOTEFT_GEN_A_CORE
SURGICAL POST-OP CHECK NOTE:    Procedure: Biliary drain placement with IR    Subjective: Patient feels well since procedure, pain well controlled, no complaints     Vital Signs Last 24 Hrs  T(C): 37 (19 Apr 2019 23:00), Max: 37 (19 Apr 2019 23:00)  T(F): 98.6 (19 Apr 2019 23:00), Max: 98.6 (19 Apr 2019 23:00)  HR: 67 (19 Apr 2019 23:30) (62 - 87)  BP: 140/73 (19 Apr 2019 23:30) (130/67 - 164/85)  BP(mean): 99 (19 Apr 2019 22:45) (90 - 110)  RR: 19 (19 Apr 2019 23:30) (17 - 23)  SpO2: 93% (19 Apr 2019 23:30) (93% - 100%)  I&O's Summary    18 Apr 2019 07:01  -  19 Apr 2019 07:00  --------------------------------------------------------  IN: 750 mL / OUT: 395 mL / NET: 355 mL    19 Apr 2019 07:01  -  19 Apr 2019 23:45  --------------------------------------------------------  IN: 1625 mL / OUT: 225 mL / NET: 1400 mL                            12.0   7.48  )-----------( 435      ( 19 Apr 2019 00:30 )             36.9     04-19    136  |  100  |  12  ----------------------------<  115<H>  4.3   |  22  |  0.94    Ca    9.0      19 Apr 2019 00:30  Phos  3.5     04-19  Mg     2.1     04-19    TPro  8.1  /  Alb  3.3  /  TBili  2.6<H>  /  DBili  1.7<H>  /  AST  175<H>  /  ALT  378<H>  /  AlkPhos  315<H>  04-19   PT/INR - ( 19 Apr 2019 05:58 )   PT: 12.6 SEC;   INR: 1.10          PTT - ( 19 Apr 2019 05:58 )  PTT:30.6 SEC    PHYSICAL EXAM:  Gen: NAD  Resp: nonlabored  ABd: soft, mildly tender, mildly distended  incision dressings c/d/i  IR drain dressing c/d/i, drain with dark brown output  ANAYELI drain dressing c/d/i, drain with dark brown output      A/P: 84yoM s/p Biliary drain placement with IR      Neuro: Pain control  Resp: IS  GI: CLD->Regular diet, bowel reg  MSK: WBAT, PT/OT  Heme: DVT PPX: subq hep  ID: [id number]

## 2019-04-19 NOTE — CONSULT NOTE ADULT - ASSESSMENT
pt s/p lap choly w/ inc fluid accumulation  ? leak  f/u testing as per sx  History of subdural hematoma  will check ct head   Hypertension c/w meds  if npo   iv meds   Seizure hx  c/w keppra  dvt proph   iv fluids as needed

## 2019-04-19 NOTE — CHART NOTE - NSCHARTNOTEFT_GEN_A_CORE
Vascular & Interventional Radiology Pre-Procedure Note    Procedure Name: Biliary drain    HPI: 84y Male with CBD injury after cholecystectomy.    Allergies:   Medications (Abx/Cardiac/Anticoagulation/Blood Products)  digoxin     Tablet: 0.125 milliGRAM(s) Oral (04-19 @ 05:07)  diltiazem   CD: 240 milliGRAM(s) Oral (04-19 @ 05:07)  heparin  Injectable: 5000 Unit(s) SubCutaneous (04-19 @ 05:07)  lisinopril: 20 milliGRAM(s) Oral (04-19 @ 05:07)  piperacillin/tazobactam IVPB.: 25 mL/Hr IV Intermittent (04-19 @ 14:32)    Data:    T(C): 36.3  HR: 64  BP: 143/68  RR: 18  SpO2: 96%    Exam  General: NAD  Chest: non labored breathing  Abdomen: n/a  Extremities: n/a    -WBC 7.48 / HgB 12.0 / Hct 36.9 / Plt 435  -Na 136 / Cl 100 / BUN 12 / Glucose 115  -K 4.3 / CO2 22 / Cr 0.94  - / Alk Phos 315 / T.Bili 2.6  -INR1.10    Imaging: non dilated biliary system    Plan:   -84y Male presents for external biliary drain placement.  -Risks/Benefits/alternatives explained with the patient and/or healthcare proxy and witnessed informed consent obtained.

## 2019-04-19 NOTE — CONSULT NOTE ADULT - SUBJECTIVE AND OBJECTIVE BOX
Patient is a 84y old  Male who presents with a chief complaint of elevated LFTs post cholecystectomy (19 Apr 2019 00:58)      INTERVAL HPI/OVERNIGHT EVENTS:    Medications:MEDICATIONS  (STANDING):  digoxin     Tablet 0.125 milliGRAM(s) Oral daily  diltiazem    milliGRAM(s) Oral daily  heparin  Injectable 5000 Unit(s) SubCutaneous every 8 hours  levETIRAcetam 500 milliGRAM(s) Oral two times a day  lisinopril 20 milliGRAM(s) Oral daily  piperacillin/tazobactam IVPB. 3.375 Gram(s) IV Intermittent every 8 hours  sodium chloride 0.9%. 1000 milliLiter(s) (125 mL/Hr) IV Continuous <Continuous>  traZODone 50 milliGRAM(s) Oral at bedtime    MEDICATIONS  (PRN):      Allergies: Allergies    No Known Allergies    Intolerances          FAMILY HISTORY:        PAST MEDICAL & SURGICAL HISTORY:  Cholelithiasis  History of subdural hematoma  Constipation  Hypertension  Seizure  Acute subdural hematoma: (May 2014 - subdural hematoma,  pt had SX)      REVIEW OF SYSTEMS:  CONSTITUTIONAL: No fever, weight loss, or fatigue  EYES: No eye pain, visual disturbances, or discharge  ENMT:  No difficulty hearing, tinnitus, vertigo; No sinus or throat pain  NECK: No pain or stiffness  BREASTS: No pain, masses, or nipple discharge  RESPIRATORY: No cough, wheezing, chills or hemoptysis; No shortness of breath  CARDIOVASCULAR: No chest pain, palpitations, dizziness, or leg swelling  GASTROINTESTINAL: No abdominal or epigastric pain. No nausea, vomiting, or hematemesis; No diarrhea or constipation. No melena or hematochezia.  GENITOURINARY: No dysuria, frequency, hematuria, or incontinence  NEUROLOGICAL: No headaches, memory loss, loss of strength, numbness, or tremors  SKIN: No itching, burning, rashes, or lesions   LYMPH NODES: No enlarged glands  ENDOCRINE: No heat or cold intolerance; No hair loss  MUSCULOSKELETAL: No joint pain or swelling; No muscle, back, or extremity pain  PSYCHIATRIC: No depression, anxiety, mood swings, or difficulty sleeping  HEME/LYMPH: No easy bruising, or bleeding gums  ALLERY AND IMMUNOLOGIC: No hives or eczema    T(C): 36.9 (04-19-19 @ 05:05), Max: 36.9 (04-19-19 @ 05:05)  HR: 66 (04-19-19 @ 05:05) (66 - 76)  BP: 144/65 (04-19-19 @ 05:05) (137/82 - 144/65)  RR: 17 (04-19-19 @ 05:05) (17 - 17)  SpO2: 96% (04-19-19 @ 05:05) (93% - 96%)  Wt(kg): --Vital Signs Last 24 Hrs  T(C): 36.9 (19 Apr 2019 05:05), Max: 36.9 (19 Apr 2019 05:05)  T(F): 98.4 (19 Apr 2019 05:05), Max: 98.4 (19 Apr 2019 05:05)  HR: 66 (19 Apr 2019 05:05) (66 - 76)  BP: 144/65 (19 Apr 2019 05:05) (137/82 - 144/65)  BP(mean): --  RR: 17 (19 Apr 2019 05:05) (17 - 17)  SpO2: 96% (19 Apr 2019 05:05) (93% - 96%)  I&O's Summary    18 Apr 2019 07:01  -  19 Apr 2019 07:00  --------------------------------------------------------  IN: 750 mL / OUT: 395 mL / NET: 355 mL        PHYSICAL EXAM:  GENERAL: NAD, well-groomed, well-developed  HEAD:  Atraumatic, Normocephalic  EYES: EOMI, PERRLA, conjunctiva and sclera clear  ENMT: No tonsillar erythema, exudates, or enlargement; Moist mucous membranes, Good dentition, No lesions  NECK: Supple, No JVD, Normal thyroid  NERVOUS SYSTEM:  Alert & Oriented X3, Good concentration; Motor Strength 5/5 B/L upper and lower extremities; DTRs 2+ intact and symmetric  CHEST/LUNG: Clear to percussion bilaterally; No rales, rhonchi, wheezing, or rubs  HEART: Regular rate and rhythm; No murmurs, rubs, or gallops  ABDOMEN: Soft, Nontender, Nondistended; Bowel sounds present  EXTREMITIES:  2+ Peripheral Pulses, No clubbing, cyanosis, or edema  LYMPH: No lymphadenopathy noted  SKIN: No rashes or lesions    Consultant(s) Notes Reviewed:  [x ] YES  [ ] NO  Care Discussed with Consultants/Other Providerscpk [ x] YES  [ ] NO    LABS:                    CBC Full  -  ( 19 Apr 2019 00:30 )  WBC Count : 7.48 K/uL  RBC Count : 3.97 M/uL  Hemoglobin : 12.0 g/dL  Hematocrit : 36.9 %  Platelet Count - Automated : 435 K/uL  Mean Cell Volume : 92.9 fL  Mean Cell Hemoglobin : 30.2 pg  Mean Cell Hemoglobin Concentration : 32.5 %  Auto Neutrophil # : x  Auto Lymphocyte # : x  Auto Monocyte # : x  Auto Eosinophil # : x  Auto Basophil # : x  Auto Neutrophil % : x  Auto Lymphocyte % : x  Auto Monocyte % : x  Auto Eosinophil % : x  Auto Basophil % : x      04-19    136  |  100  |  12  ----------------------------<  115<H>  4.3   |  22  |  0.94    Ca    9.0      19 Apr 2019 00:30  Phos  3.5     04-19  Mg     2.1     04-19    TPro  8.1  /  Alb  3.3  /  TBili  2.6<H>  /  DBili  1.7<H>  /  AST  175<H>  /  ALT  378<H>  /  AlkPhos  315<H>  04-19          PT/INR - ( 19 Apr 2019 05:58 )   PT: 12.6 SEC;   INR: 1.10          PTT - ( 19 Apr 2019 05:58 )  PTT:30.6 SEC  RADIOLOGY & ADDITIONAL TESTS:    Imaging Personally Reviewed:  [ ] YES  [ ] NO 83 y/o male  with PMH of seizure disorder, subdural hematoma s/p craniotomy due to trauma, HTN, ETOH abuse, cholelithiasis, who presented to outside hospital  with abdominal pain, nausea/vomiting and was found to have cholecystitis and cholelithiasis on CT.  pt  underwent a laparoscopic cholecystectomy  . On POD1, he was noted to have increase in LFTs and bilious output from ANAYELI drain on POD2.    MRCP and HIDA scan were performed at outside hospital. Patient was transferred to [facility] for further care.and possible sx     Patient states that he has some abdominal pain   t Denies nausea/vomiting.   no cp or sob    INTERVAL HPI/OVERNIGHT EVENTS:    Medications:MEDICATIONS  (STANDING):  digoxin     Tablet 0.125 milliGRAM(s) Oral daily  diltiazem    milliGRAM(s) Oral daily  heparin  Injectable 5000 Unit(s) SubCutaneous every 8 hours  levETIRAcetam 500 milliGRAM(s) Oral two times a day  lisinopril 20 milliGRAM(s) Oral daily  piperacillin/tazobactam IVPB. 3.375 Gram(s) IV Intermittent every 8 hours  sodium chloride 0.9%. 1000 milliLiter(s) (125 mL/Hr) IV Continuous <Continuous>  traZODone 50 milliGRAM(s) Oral at bedtime    MEDICATIONS  (PRN):      Allergies: Allergies    No Known Allergies    Intolerances          FAMILY HISTORY:        PAST MEDICAL & SURGICAL HISTORY:  Cholelithiasis  History of subdural hematoma  Constipation  Hypertension  Seizure  Acute subdural hematoma: (May 2014 - subdural hematoma,  pt had SX)      REVIEW OF SYSTEMS:  CONSTITUTIONALweak  EYES: No eye pain, visual disturbances, or discharge  ENMT:  No difficulty hearing, tinnitus, vertigo; No sinus or throat pain  NECK: No pain or stiffness  BREASTS: No pain, masses, or nipple discharge  RESPIRATORY: No cough, wheezing, chills or hemoptysis; No shortness of breath  CARDIOVASCULAR: No chest pain, palpitations, dizziness, or leg swelling  GASTROINTESTINAL: abd discomfort   GENITOURINARY: No dysuria, frequency, hematuria, or incontinence  NEUROLOGICAL: No headaches,   MUSCULOSKELETAL: No joint pain or swelling; No muscle, back, or extremity pain    T(C): 36.9 (04-19-19 @ 05:05), Max: 36.9 (04-19-19 @ 05:05)  HR: 66 (04-19-19 @ 05:05) (66 - 76)  BP: 144/65 (04-19-19 @ 05:05) (137/82 - 144/65)  RR: 17 (04-19-19 @ 05:05) (17 - 17)  SpO2: 96% (04-19-19 @ 05:05) (93% - 96%)  Wt(kg): --Vital Signs Last 24 Hrs  T(C): 36.9 (19 Apr 2019 05:05), Max: 36.9 (19 Apr 2019 05:05)  T(F): 98.4 (19 Apr 2019 05:05), Max: 98.4 (19 Apr 2019 05:05)  HR: 66 (19 Apr 2019 05:05) (66 - 76)  BP: 144/65 (19 Apr 2019 05:05) (137/82 - 144/65)  BP(mean): --  RR: 17 (19 Apr 2019 05:05) (17 - 17)  SpO2: 96% (19 Apr 2019 05:05) (93% - 96%)  I&O's Summary    18 Apr 2019 07:01  -  19 Apr 2019 07:00  --------------------------------------------------------  IN: 750 mL / OUT: 395 mL / NET: 355 mL        PHYSICAL EXAM:  GENERAL: NAD,  HEAD:  Atraumatic, Normocephalic  EYES: EOMI, PERRLA, conjunctiva and sclera clear  ENMT: No tonsillar erythema, exudates, or enlargement; Moist mucous membranes, Good dentition, No lesions  NECK: Supple, No JVD, Normal thyroid  NERVOUS SYSTEM:  Alert & Oriented   seems sluggish ; Motor Strength 5/5 B/L upper and lower extremities; DTRs 2+ intact and symmetric  CHEST/LUNG: Clear to percussion bilaterally; No rales, rhonchi, wheezing, or rubs  HEART: Regular rate and rhythm; No murmurs, rubs, or gallops  ABDOMENdistended drain in place   no r/g  EXTREMITIES:  2+ Peripheral Pulses, No clubbing, cyanosis, or edema    Consultant(s) Notes Reviewed:  [x ] YES  [ ] NO  Care Discussed with Consultants/Other Providerscpk [ x] YES  [ ] NO    LABS:                    CBC Full  -  ( 19 Apr 2019 00:30 )  WBC Count : 7.48 K/uL  RBC Count : 3.97 M/uL  Hemoglobin : 12.0 g/dL  Hematocrit : 36.9 %  Platelet Count - Automated : 435 K/uL  Mean Cell Volume : 92.9 fL  Mean Cell Hemoglobin : 30.2 pg  Mean Cell Hemoglobin Concentration : 32.5 %  Auto Neutrophil # : x  Auto Lymphocyte # : x  Auto Monocyte # : x  Auto Eosinophil # : x  Auto Basophil # : x  Auto Neutrophil % : x  Auto Lymphocyte % : x  Auto Monocyte % : x  Auto Eosinophil % : x  Auto Basophil % : x      04-19    136  |  100  |  12  ----------------------------<  115<H>  4.3   |  22  |  0.94    Ca    9.0      19 Apr 2019 00:30  Phos  3.5     04-19  Mg     2.1     04-19    TPro  8.1  /  Alb  3.3  /  TBili  2.6<H>  /  DBili  1.7<H>  /  AST  175<H>  /  ALT  378<H>  /  AlkPhos  315<H>  04-19          PT/INR - ( 19 Apr 2019 05:58 )   PT: 12.6 SEC;   INR: 1.10          PTT - ( 19 Apr 2019 05:58 )  PTT:30.6 SEC  RADIOLOGY & ADDITIONAL TESTS:    Imaging Personally Reviewed:  [ ] YES  [ ] NO

## 2019-04-19 NOTE — H&P ADULT - NSHPLABSRESULTS_GEN_ALL_CORE
12.0   7.48  )-----------( 435      ( 19 Apr 2019 00:30 )             36.9     04-19    136  |  100  |  12  ----------------------------<  115<H>  4.3   |  22  |  0.94    Ca    9.0      19 Apr 2019 00:30  Phos  3.5     04-19  Mg     2.1     04-19    TPro  8.1  /  Alb  3.3  /  TBili  2.6<H>  /  DBili  1.7<H>  /  AST  175<H>  /  ALT  378<H>  /  AlkPhos  315<H>  04-19    LIVER FUNCTIONS - ( 19 Apr 2019 00:30 )  Alb: 3.3 g/dL / Pro: 8.1 g/dL / ALK PHOS: 315 u/L / ALT: 378 u/L / AST: 175 u/L / GGT: x           < from: NM Hepatobiliary Imaging (04.18.19 @ 15:18) >      EXAM:  NM HEPATOBILIARY IMG                            PROCEDURE DATE:  04/18/2019          INTERPRETATION:  RADIOPHARMACEUTICAL: 3.0 mCi Tc-99m-Mebrofenin, I.V.    CLINICAL INFORMATION: 84 year-old male, status post lap cholecystectomy,   elevatedliver function test, referred to evaluate for post operative   bile leak and obstruction    TECHNIQUE: Dynamic images of the anterior abdomen were obtained for 2   hours  immediately following intravenous administration of the   radiotracer. Static images of the abdomen in the anterior, LERMA and right   lateral projections were obtained at 6 hours post tracer injection     COMPARISON: Hepatobiliary scan dated 4/11/2019    FINDINGS:  There is prompt homogeneous tracer uptake by the liver. There   is tracer movement from the region of the gallbladder foci into the   drainage tubing located on the right side of the abdomen. There is no   tracer activity in the intestinal tract at anytime during the study. The   gallbladder is not visualized, consistent with patient history of   cholecystectomy.     IMPRESSION: Postoperative hepatobiliary scan demonstrates:    Absent tracer activity in the intestinal tract suspicious for common bile   duct obstruction.    Bile leak from the region of the gallbladder fossa into the drainage   tubing on the right side of the abdomen.    Above findings are new since the previous hepatobiliary scan dated   4/11/2019.      ________________________________________________________________________________________________      EXAM:  MR MRCP                          IMPRESSION:      Status post cholecystectomy.  Mild intrahepatic biliary ductal dilatation.  Poor/nonvisualization of proximal and mid CBD.  Distal CBD within normal limits.  No choledocholithiasis within visualized biliary ducts.  Nuclear medicine hepatobiliary scan may be obtained for further   assessment of the biliary tree.      < end of copied text >

## 2019-04-19 NOTE — CHART NOTE - NSCHARTNOTEFT_GEN_A_CORE
Pre-Interventional Radiology Procedure Note    84y    Male    Procedure: PTC     Diagnosis/Indication: Patient is a 84y old  Male who presents with a chief complaint of elevated LFTs post cholecystectomy (19 Apr 2019 08:59)      Interventional Radiology Attending Physician: Dr. Seals    Ordering Attending Physician: Dr. Harp     PAST MEDICAL & SURGICAL HISTORY:  Cholelithiasis  History of subdural hematoma  Constipation  Hypertension  Seizure  Acute subdural hematoma: (May 2014 - subdural hematoma,  pt had SX)       CBC Full  -  ( 19 Apr 2019 00:30 )  WBC Count : 7.48 K/uL  RBC Count : 3.97 M/uL  Hemoglobin : 12.0 g/dL  Hematocrit : 36.9 %  Platelet Count - Automated : 435 K/uL  Mean Cell Volume : 92.9 fL  Mean Cell Hemoglobin : 30.2 pg  Mean Cell Hemoglobin Concentration : 32.5 %  Auto Neutrophil # : x  Auto Lymphocyte # : x  Auto Monocyte # : x  Auto Eosinophil # : x  Auto Basophil # : x  Auto Neutrophil % : x  Auto Lymphocyte % : x  Auto Monocyte % : x  Auto Eosinophil % : x  Auto Basophil % : x    04-19    136  |  100  |  12  ----------------------------<  115<H>  4.3   |  22  |  0.94    Ca    9.0      19 Apr 2019 00:30  Phos  3.5     04-19  Mg     2.1     04-19    TPro  8.1  /  Alb  3.3  /  TBili  2.6<H>  /  DBili  1.7<H>  /  AST  175<H>  /  ALT  378<H>  /  AlkPhos  315<H>  04-19    PT/INR - ( 19 Apr 2019 05:58 )   PT: 12.6 SEC;   INR: 1.10          PTT - ( 19 Apr 2019 05:58 )  PTT:30.6 SEC

## 2019-04-20 LAB
ALBUMIN SERPL ELPH-MCNC: 3.1 G/DL — LOW (ref 3.3–5)
ALP SERPL-CCNC: 252 U/L — HIGH (ref 40–120)
ALT FLD-CCNC: 291 U/L — HIGH (ref 4–41)
ANION GAP SERPL CALC-SCNC: 12 MMO/L — SIGNIFICANT CHANGE UP (ref 7–14)
AST SERPL-CCNC: 126 U/L — HIGH (ref 4–40)
BILIRUB DIRECT SERPL-MCNC: 0.7 MG/DL — HIGH (ref 0.1–0.2)
BILIRUB SERPL-MCNC: 1.4 MG/DL — HIGH (ref 0.2–1.2)
BUN SERPL-MCNC: 11 MG/DL — SIGNIFICANT CHANGE UP (ref 7–23)
CALCIUM SERPL-MCNC: 8.5 MG/DL — SIGNIFICANT CHANGE UP (ref 8.4–10.5)
CHLORIDE SERPL-SCNC: 100 MMOL/L — SIGNIFICANT CHANGE UP (ref 98–107)
CO2 SERPL-SCNC: 21 MMOL/L — LOW (ref 22–31)
CREAT SERPL-MCNC: 0.92 MG/DL — SIGNIFICANT CHANGE UP (ref 0.5–1.3)
GLUCOSE SERPL-MCNC: 84 MG/DL — SIGNIFICANT CHANGE UP (ref 70–99)
HCT VFR BLD CALC: 35.8 % — LOW (ref 39–50)
HGB BLD-MCNC: 11.6 G/DL — LOW (ref 13–17)
MAGNESIUM SERPL-MCNC: 2.1 MG/DL — SIGNIFICANT CHANGE UP (ref 1.6–2.6)
MCHC RBC-ENTMCNC: 30.1 PG — SIGNIFICANT CHANGE UP (ref 27–34)
MCHC RBC-ENTMCNC: 32.4 % — SIGNIFICANT CHANGE UP (ref 32–36)
MCV RBC AUTO: 93 FL — SIGNIFICANT CHANGE UP (ref 80–100)
NRBC # FLD: 0.02 K/UL — SIGNIFICANT CHANGE UP (ref 0–0)
PHOSPHATE SERPL-MCNC: 2.3 MG/DL — LOW (ref 2.5–4.5)
PLATELET # BLD AUTO: 488 K/UL — HIGH (ref 150–400)
PMV BLD: 9 FL — SIGNIFICANT CHANGE UP (ref 7–13)
POTASSIUM SERPL-MCNC: 3.5 MMOL/L — SIGNIFICANT CHANGE UP (ref 3.5–5.3)
POTASSIUM SERPL-SCNC: 3.5 MMOL/L — SIGNIFICANT CHANGE UP (ref 3.5–5.3)
PROT SERPL-MCNC: 7.5 G/DL — SIGNIFICANT CHANGE UP (ref 6–8.3)
RBC # BLD: 3.85 M/UL — LOW (ref 4.2–5.8)
RBC # FLD: 13.2 % — SIGNIFICANT CHANGE UP (ref 10.3–14.5)
SODIUM SERPL-SCNC: 133 MMOL/L — LOW (ref 135–145)
WBC # BLD: 6.25 K/UL — SIGNIFICANT CHANGE UP (ref 3.8–10.5)
WBC # FLD AUTO: 6.25 K/UL — SIGNIFICANT CHANGE UP (ref 3.8–10.5)

## 2019-04-20 PROCEDURE — 70450 CT HEAD/BRAIN W/O DYE: CPT | Mod: 26

## 2019-04-20 RX ORDER — POTASSIUM CHLORIDE 20 MEQ
40 PACKET (EA) ORAL ONCE
Qty: 0 | Refills: 0 | Status: COMPLETED | OUTPATIENT
Start: 2019-04-20 | End: 2019-04-20

## 2019-04-20 RX ORDER — POTASSIUM PHOSPHATE, MONOBASIC POTASSIUM PHOSPHATE, DIBASIC 236; 224 MG/ML; MG/ML
15 INJECTION, SOLUTION INTRAVENOUS ONCE
Qty: 0 | Refills: 0 | Status: COMPLETED | OUTPATIENT
Start: 2019-04-20 | End: 2019-04-20

## 2019-04-20 RX ADMIN — Medication 240 MILLIGRAM(S): at 08:38

## 2019-04-20 RX ADMIN — Medication 50 MILLIGRAM(S): at 21:31

## 2019-04-20 RX ADMIN — PIPERACILLIN AND TAZOBACTAM 25 GRAM(S): 4; .5 INJECTION, POWDER, LYOPHILIZED, FOR SOLUTION INTRAVENOUS at 06:03

## 2019-04-20 RX ADMIN — PIPERACILLIN AND TAZOBACTAM 25 GRAM(S): 4; .5 INJECTION, POWDER, LYOPHILIZED, FOR SOLUTION INTRAVENOUS at 13:00

## 2019-04-20 RX ADMIN — POTASSIUM PHOSPHATE, MONOBASIC POTASSIUM PHOSPHATE, DIBASIC 62.5 MILLIMOLE(S): 236; 224 INJECTION, SOLUTION INTRAVENOUS at 17:13

## 2019-04-20 RX ADMIN — LEVETIRACETAM 500 MILLIGRAM(S): 250 TABLET, FILM COATED ORAL at 08:38

## 2019-04-20 RX ADMIN — Medication 40 MILLIEQUIVALENT(S): at 12:51

## 2019-04-20 RX ADMIN — PIPERACILLIN AND TAZOBACTAM 25 GRAM(S): 4; .5 INJECTION, POWDER, LYOPHILIZED, FOR SOLUTION INTRAVENOUS at 21:31

## 2019-04-20 RX ADMIN — HEPARIN SODIUM 5000 UNIT(S): 5000 INJECTION INTRAVENOUS; SUBCUTANEOUS at 21:31

## 2019-04-20 RX ADMIN — LEVETIRACETAM 500 MILLIGRAM(S): 250 TABLET, FILM COATED ORAL at 19:34

## 2019-04-20 RX ADMIN — SODIUM CHLORIDE 75 MILLILITER(S): 9 INJECTION INTRAMUSCULAR; INTRAVENOUS; SUBCUTANEOUS at 12:51

## 2019-04-20 NOTE — PROGRESS NOTE ADULT - ASSESSMENT
pt s/p lap choly w/ inc fluid accumulation  ? leak  stent as per sx  History of subdural hematoma/mental status better   will check ct head for completness  Hypertension c/w meds  Seizure hx  c/w keppra  dvt proph   iv fluids as needed   oob pt s/p lap choly w/ inc fluid accumulation  ? leak  drains as per sx  History of subdural hematoma/mental status better   will check ct head for completness  Hypertension c/w meds  Seizure hx  c/w keppra  dvt proph   iv fluids as needed   oob  medically stable for sx

## 2019-04-20 NOTE — PROGRESS NOTE ADULT - ASSESSMENT
84y M transferred from OSH due to bile duct injury following lap gus now s/p PTC (4/19)    -pain control as needed  -monitor drain outputs  -OOB and ambulating as tolerated  -subq heparin for DVT ppx  -f/u plan for eventual surgical repair    D Team Surgery  [id number]

## 2019-04-20 NOTE — PROVIDER CONTACT NOTE (OTHER) - ASSESSMENT
Denies chest pain, SOB, palpitations, dizziness. Refusing medication, pt calm, states 'No I don't want them" risks explained of not taking medication and pt non compliant.

## 2019-04-20 NOTE — PROGRESS NOTE ADULT - SUBJECTIVE AND OBJECTIVE BOX
CHIEF COMPLAINT:Patient is a 84y old  Male who presents with a chief complaint of elevated LFTs post cholecystectomy (20 Apr 2019 08:53)    	        PAST MEDICAL & SURGICAL HISTORY:  Cholelithiasis  History of subdural hematoma  Constipation  Hypertension  Seizure  Acute subdural hematoma: (May 2014 - subdural hematoma,  pt had SX)          REVIEW OF SYSTEMS:  pt awake alert    sitting in chair  mental status improved  no cp or sob  no h.a s     Medications:  MEDICATIONS  (STANDING):  digoxin     Tablet 0.125 milliGRAM(s) Oral daily  diltiazem    milliGRAM(s) Oral daily  heparin  Injectable 5000 Unit(s) SubCutaneous every 8 hours  levETIRAcetam 500 milliGRAM(s) Oral two times a day  lisinopril 20 milliGRAM(s) Oral daily  piperacillin/tazobactam IVPB. 3.375 Gram(s) IV Intermittent every 8 hours  potassium chloride    Tablet ER 40 milliEquivalent(s) Oral once  potassium phosphate IVPB 15 milliMole(s) IV Intermittent once  sodium chloride 0.9%. 1000 milliLiter(s) (75 mL/Hr) IV Continuous <Continuous>  traZODone 50 milliGRAM(s) Oral at bedtime    MEDICATIONS  (PRN):    	    PHYSICAL EXAM:  T(C): 36.8 (04-20-19 @ 08:35), Max: 37 (04-19-19 @ 23:00)  HR: 68 (04-20-19 @ 08:35) (62 - 87)  BP: 157/72 (04-20-19 @ 08:35) (130/67 - 164/85)  RR: 18 (04-20-19 @ 08:35) (17 - 23)  SpO2: 97% (04-20-19 @ 08:35) (93% - 100%)  Wt(kg): --  I&O's Summary    19 Apr 2019 07:01  -  20 Apr 2019 07:00  --------------------------------------------------------  IN: 1700 mL / OUT: 1130 mL / NET: 570 mL    20 Apr 2019 07:01  -  20 Apr 2019 11:22  --------------------------------------------------------  IN: 465 mL / OUT: 300 mL / NET: 165 mL        Appearance: Normal	  HEENT:   Normal oral mucosa, P  Lymphatic: No lymphadenopathy  Cardiovascular: Normal S1 S2, No JVD,  Respiratory: Lungs clear to auscultation	  Gastrointestinal:  Soft,, + BS	/+tender   Skin: No rashes, No ecchymoses, No cyanosis	  Neurologic: Non-focal/ from motor equal b/l   Extremities: Normal range of motion, No clubbing, cyanosis or edema  Vascular: Peripheral pulses palpable 2+ bilaterally    TELEMETRY: 	    ECG:  	  RADIOLOGY:  OTHER: 	  	  LABS:	 	    CARDIAC MARKERS:                                11.6   6.25  )-----------( 488      ( 20 Apr 2019 05:57 )             35.8     04-20    133<L>  |  100  |  11  ----------------------------<  84  3.5   |  21<L>  |  0.92    Ca    8.5      20 Apr 2019 05:57  Phos  2.3     04-20  Mg     2.1     04-20    TPro  7.5  /  Alb  3.1<L>  /  TBili  1.4<H>  /  DBili  0.7<H>  /  AST  126<H>  /  ALT  291<H>  /  AlkPhos  252<H>  04-20    proBNP:   Lipid Profile:   HgA1c:   TSH:

## 2019-04-20 NOTE — PROGRESS NOTE ADULT - SUBJECTIVE AND OBJECTIVE BOX
Surgery Progress Note    S: Patient seen and examined. No acute events overnight. patient got PTC by IR yesterday. no complaints this AM.     O:  Vital Signs Last 24 Hrs  T(C): 36.8 (20 Apr 2019 08:35), Max: 37 (19 Apr 2019 23:00)  T(F): 98.2 (20 Apr 2019 08:35), Max: 98.6 (19 Apr 2019 23:00)  HR: 68 (20 Apr 2019 08:35) (62 - 87)  BP: 157/72 (20 Apr 2019 08:35) (130/67 - 164/85)  BP(mean): 93 (20 Apr 2019 08:35) (90 - 110)  RR: 18 (20 Apr 2019 08:35) (17 - 23)  SpO2: 97% (20 Apr 2019 08:35) (93% - 100%)    I&O's Detail    19 Apr 2019 07:01  -  20 Apr 2019 07:00  --------------------------------------------------------  IN:    dextrose 5% + sodium chloride 0.9%: 300 mL    IV PiggyBack: 125 mL    Oral Fluid: 75 mL    sodium chloride 0.9%: 1125 mL    sodium chloride 0.9%.: 75 mL  Total IN: 1700 mL    OUT:    Bulb: 230 mL    Bulb: 150 mL    Voided: 750 mL  Total OUT: 1130 mL    Total NET: 570 mL          MEDICATIONS  (STANDING):  digoxin     Tablet 0.125 milliGRAM(s) Oral daily  diltiazem    milliGRAM(s) Oral daily  heparin  Injectable 5000 Unit(s) SubCutaneous every 8 hours  levETIRAcetam 500 milliGRAM(s) Oral two times a day  lisinopril 20 milliGRAM(s) Oral daily  piperacillin/tazobactam IVPB. 3.375 Gram(s) IV Intermittent every 8 hours  potassium chloride    Tablet ER 40 milliEquivalent(s) Oral once  potassium phosphate IVPB 15 milliMole(s) IV Intermittent once  sodium chloride 0.9%. 1000 milliLiter(s) (75 mL/Hr) IV Continuous <Continuous>  traZODone 50 milliGRAM(s) Oral at bedtime    MEDICATIONS  (PRN):                            11.6   6.25  )-----------( 488      ( 20 Apr 2019 05:57 )             35.8       04-20    133<L>  |  100  |  11  ----------------------------<  84  3.5   |  21<L>  |  0.92    Ca    8.5      20 Apr 2019 05:57  Phos  2.3     04-20  Mg     2.1     04-20    TPro  7.5  /  Alb  3.1<L>  /  TBili  1.4<H>  /  DBili  0.7<H>  /  AST  126<H>  /  ALT  291<H>  /  AlkPhos  252<H>  04-20      Physical Exam:  Gen: Laying in bed, NAD  Resp: Unlabored breathing  Abd: soft, min RUQ TTP, ND, epigastric PTC with bilious output, RUQ ANAYELI with bilious output, no rebound or guarding  Ext: WWP

## 2019-04-20 NOTE — PROVIDER CONTACT NOTE (OTHER) - SITUATION
Pt refusing all medications this morning including digoxin, cardizem, lisinopril, keppra, and heparing. Blood pressure 147/67 heart rate 75.

## 2019-04-21 LAB
ALBUMIN SERPL ELPH-MCNC: 3.1 G/DL — LOW (ref 3.3–5)
ALP SERPL-CCNC: 235 U/L — HIGH (ref 40–120)
ALT FLD-CCNC: 263 U/L — HIGH (ref 4–41)
ANION GAP SERPL CALC-SCNC: 15 MMO/L — HIGH (ref 7–14)
AST SERPL-CCNC: 118 U/L — HIGH (ref 4–40)
BILIRUB DIRECT SERPL-MCNC: 0.5 MG/DL — HIGH (ref 0.1–0.2)
BILIRUB SERPL-MCNC: 1.1 MG/DL — SIGNIFICANT CHANGE UP (ref 0.2–1.2)
BUN SERPL-MCNC: 10 MG/DL — SIGNIFICANT CHANGE UP (ref 7–23)
CALCIUM SERPL-MCNC: 8.8 MG/DL — SIGNIFICANT CHANGE UP (ref 8.4–10.5)
CHLORIDE SERPL-SCNC: 103 MMOL/L — SIGNIFICANT CHANGE UP (ref 98–107)
CO2 SERPL-SCNC: 19 MMOL/L — LOW (ref 22–31)
CREAT SERPL-MCNC: 0.98 MG/DL — SIGNIFICANT CHANGE UP (ref 0.5–1.3)
GLUCOSE SERPL-MCNC: 88 MG/DL — SIGNIFICANT CHANGE UP (ref 70–99)
HCT VFR BLD CALC: 36.1 % — LOW (ref 39–50)
HGB BLD-MCNC: 11.4 G/DL — LOW (ref 13–17)
MAGNESIUM SERPL-MCNC: 1.9 MG/DL — SIGNIFICANT CHANGE UP (ref 1.6–2.6)
MCHC RBC-ENTMCNC: 30.2 PG — SIGNIFICANT CHANGE UP (ref 27–34)
MCHC RBC-ENTMCNC: 31.6 % — LOW (ref 32–36)
MCV RBC AUTO: 95.8 FL — SIGNIFICANT CHANGE UP (ref 80–100)
NRBC # FLD: 0 K/UL — SIGNIFICANT CHANGE UP (ref 0–0)
PHOSPHATE SERPL-MCNC: 2.5 MG/DL — SIGNIFICANT CHANGE UP (ref 2.5–4.5)
PLATELET # BLD AUTO: 412 K/UL — HIGH (ref 150–400)
PMV BLD: 8.9 FL — SIGNIFICANT CHANGE UP (ref 7–13)
POTASSIUM SERPL-MCNC: 4 MMOL/L — SIGNIFICANT CHANGE UP (ref 3.5–5.3)
POTASSIUM SERPL-SCNC: 4 MMOL/L — SIGNIFICANT CHANGE UP (ref 3.5–5.3)
PROT SERPL-MCNC: 7.1 G/DL — SIGNIFICANT CHANGE UP (ref 6–8.3)
RBC # BLD: 3.77 M/UL — LOW (ref 4.2–5.8)
RBC # FLD: 13 % — SIGNIFICANT CHANGE UP (ref 10.3–14.5)
SODIUM SERPL-SCNC: 137 MMOL/L — SIGNIFICANT CHANGE UP (ref 135–145)
WBC # BLD: 6.45 K/UL — SIGNIFICANT CHANGE UP (ref 3.8–10.5)
WBC # FLD AUTO: 6.45 K/UL — SIGNIFICANT CHANGE UP (ref 3.8–10.5)

## 2019-04-21 PROCEDURE — 74174 CTA ABD&PLVS W/CONTRAST: CPT | Mod: 26

## 2019-04-21 RX ORDER — GLYCERIN ADULT
1 SUPPOSITORY, RECTAL RECTAL ONCE
Qty: 0 | Refills: 0 | Status: DISCONTINUED | OUTPATIENT
Start: 2019-04-21 | End: 2019-04-26

## 2019-04-21 RX ORDER — SENNA PLUS 8.6 MG/1
2 TABLET ORAL AT BEDTIME
Qty: 0 | Refills: 0 | Status: DISCONTINUED | OUTPATIENT
Start: 2019-04-21 | End: 2019-04-26

## 2019-04-21 RX ORDER — SODIUM,POTASSIUM PHOSPHATES 278-250MG
2 POWDER IN PACKET (EA) ORAL ONCE
Qty: 0 | Refills: 0 | Status: COMPLETED | OUTPATIENT
Start: 2019-04-21 | End: 2019-04-21

## 2019-04-21 RX ORDER — DOCUSATE SODIUM 100 MG
100 CAPSULE ORAL
Qty: 0 | Refills: 0 | Status: DISCONTINUED | OUTPATIENT
Start: 2019-04-21 | End: 2019-04-26

## 2019-04-21 RX ADMIN — SENNA PLUS 2 TABLET(S): 8.6 TABLET ORAL at 22:13

## 2019-04-21 RX ADMIN — PIPERACILLIN AND TAZOBACTAM 25 GRAM(S): 4; .5 INJECTION, POWDER, LYOPHILIZED, FOR SOLUTION INTRAVENOUS at 13:20

## 2019-04-21 RX ADMIN — HEPARIN SODIUM 5000 UNIT(S): 5000 INJECTION INTRAVENOUS; SUBCUTANEOUS at 22:11

## 2019-04-21 RX ADMIN — Medication 0.12 MILLIGRAM(S): at 06:08

## 2019-04-21 RX ADMIN — LEVETIRACETAM 500 MILLIGRAM(S): 250 TABLET, FILM COATED ORAL at 17:25

## 2019-04-21 RX ADMIN — Medication 240 MILLIGRAM(S): at 06:09

## 2019-04-21 RX ADMIN — HEPARIN SODIUM 5000 UNIT(S): 5000 INJECTION INTRAVENOUS; SUBCUTANEOUS at 13:19

## 2019-04-21 RX ADMIN — LEVETIRACETAM 500 MILLIGRAM(S): 250 TABLET, FILM COATED ORAL at 06:08

## 2019-04-21 RX ADMIN — Medication 100 MILLIGRAM(S): at 22:12

## 2019-04-21 RX ADMIN — PIPERACILLIN AND TAZOBACTAM 25 GRAM(S): 4; .5 INJECTION, POWDER, LYOPHILIZED, FOR SOLUTION INTRAVENOUS at 06:08

## 2019-04-21 RX ADMIN — Medication 2 PACKET(S): at 13:19

## 2019-04-21 RX ADMIN — PIPERACILLIN AND TAZOBACTAM 25 GRAM(S): 4; .5 INJECTION, POWDER, LYOPHILIZED, FOR SOLUTION INTRAVENOUS at 22:10

## 2019-04-21 RX ADMIN — LISINOPRIL 20 MILLIGRAM(S): 2.5 TABLET ORAL at 06:09

## 2019-04-21 RX ADMIN — Medication 50 MILLIGRAM(S): at 22:11

## 2019-04-21 NOTE — PROGRESS NOTE ADULT - SUBJECTIVE AND OBJECTIVE BOX
Surgery Progress Note    S: Patient seen and examined. No acute events overnight. tolerating regular diet without n/v. denies pain this AM.     O:  Vital Signs Last 24 Hrs  T(C): 36.7 (21 Apr 2019 06:03), Max: 36.9 (20 Apr 2019 12:57)  T(F): 98 (21 Apr 2019 06:03), Max: 98.4 (20 Apr 2019 12:57)  HR: 65 (21 Apr 2019 06:03) (63 - 78)  BP: 167/70 (21 Apr 2019 06:03) (121/97 - 167/70)  BP(mean): 86 (20 Apr 2019 12:57) (86 - 86)  RR: 16 (21 Apr 2019 06:03) (16 - 18)  SpO2: 97% (21 Apr 2019 06:03) (97% - 99%)    I&O's Detail    20 Apr 2019 07:01  -  21 Apr 2019 07:00  --------------------------------------------------------  IN:    IV PiggyBack: 200 mL    Oral Fluid: 960 mL    sodium chloride 0.9%: 675 mL  Total IN: 1835 mL    OUT:    Bulb: 345 mL    Bulb: 125 mL    Voided: 1550 mL  Total OUT: 2020 mL    Total NET: -185 mL          MEDICATIONS  (STANDING):  digoxin     Tablet 0.125 milliGRAM(s) Oral daily  diltiazem    milliGRAM(s) Oral daily  glycerin Suppository - Adult 1 Suppository(s) Rectal once  heparin  Injectable 5000 Unit(s) SubCutaneous every 8 hours  levETIRAcetam 500 milliGRAM(s) Oral two times a day  lisinopril 20 milliGRAM(s) Oral daily  piperacillin/tazobactam IVPB. 3.375 Gram(s) IV Intermittent every 8 hours  potassium phosphate / sodium phosphate powder 2 Packet(s) Oral once  traZODone 50 milliGRAM(s) Oral at bedtime    MEDICATIONS  (PRN):                            11.4   6.45  )-----------( 412      ( 21 Apr 2019 05:41 )             36.1       04-21    137  |  103  |  10  ----------------------------<  88  4.0   |  19<L>  |  0.98    Ca    8.8      21 Apr 2019 05:41  Phos  2.5     04-21  Mg     1.9     04-21    TPro  7.1  /  Alb  3.1<L>  /  TBili  1.1  /  DBili  0.5<H>  /  AST  118<H>  /  ALT  263<H>  /  AlkPhos  235<H>  04-21      Physical Exam:  Gen: Laying in bed, NAD  Resp: Unlabored breathing  Abd: soft, min RUQ TTP, mildly distended, epigastric PTC with bilious output, RUQ ANAYELI with bilious output, no rebound or guarding  Ext: WWP

## 2019-04-21 NOTE — PROGRESS NOTE ADULT - ASSESSMENT
84y M transferred from OSH due to bile duct injury following lap gus now s/p PTC (4/19)    -pain control as needed  -CTA abdomen to eval hepatic vasculature today  -monitor drain outputs  -OOB and ambulating as tolerated  -subq heparin for DVT ppx  -f/u plan for eventual surgical repair    D Team Surgery  [id number]

## 2019-04-21 NOTE — PROGRESS NOTE ADULT - SUBJECTIVE AND OBJECTIVE BOX
CHIEF COMPLAINT:Patient is a 84y old  Male who presents with a chief complaint of elevated LFTs post cholecystectomy (20 Apr 2019 11:22)    	        PAST MEDICAL & SURGICAL HISTORY:  Cholelithiasis  History of subdural hematoma  Constipation  Hypertension  Seizure  Acute subdural hematoma: (May 2014 - subdural hematoma,  pt had SX)          REVIEW OF SYSTEMS:  CONSTITUTIONAL: weak  EYES: No eye pain, visual disturbances, or discharge  NECK: No pain or stiffness  RESPIRATORY: No cough, wheezing, chills or hemoptysis; No Shortness of Breath  CARDIOVASCULAR: No chest pain, palpitations, passing out, dizziness, or leg swelling  GASTROINTESTINAL: some abd discomfort    No nausea, vomiting, or hematemesis; No diarrhea or constipation.   GENITOURINARY: No dysuria, frequency, hematuria, or incontinence  NEUROLOGICAL: No headaches,    Medications:  MEDICATIONS  (STANDING):  digoxin     Tablet 0.125 milliGRAM(s) Oral daily  diltiazem    milliGRAM(s) Oral daily  heparin  Injectable 5000 Unit(s) SubCutaneous every 8 hours  levETIRAcetam 500 milliGRAM(s) Oral two times a day  lisinopril 20 milliGRAM(s) Oral daily  piperacillin/tazobactam IVPB. 3.375 Gram(s) IV Intermittent every 8 hours  potassium phosphate / sodium phosphate powder 2 Packet(s) Oral once  traZODone 50 milliGRAM(s) Oral at bedtime    MEDICATIONS  (PRN):    	    PHYSICAL EXAM:  T(C): 36.7 (04-21-19 @ 06:03), Max: 36.9 (04-20-19 @ 12:57)  HR: 65 (04-21-19 @ 06:03) (63 - 78)  BP: 167/70 (04-21-19 @ 06:03) (121/97 - 167/70)  RR: 16 (04-21-19 @ 06:03) (16 - 18)  SpO2: 97% (04-21-19 @ 06:03) (97% - 99%)  Wt(kg): --  I&O's Summary    20 Apr 2019 07:01  -  21 Apr 2019 07:00  --------------------------------------------------------  IN: 1835 mL / OUT: 2020 mL / NET: -185 mL        Appearance: Normal	  HEENT:   Normal oral mucosa, PERRL,   Lymphatic: No lymphadenopathy  Cardiovascular: Normal S1 S2, No JVD  Respiratory: Lungs clear to auscultation	  Gastrointestinal:  Soft, Non-tender, + BS	drain in place   Skin: No rashes, No ecchymoses, No cyanosis	  Neurologic: Non-focal  Extremities: Normal range of motion, No clubbing, cyanosis or edema  Vascular: Peripheral pulses palpable     TELEMETRY: 	    ECG:  	  RADIOLOGY:  OTHER: 	  	  LABS:	 	    CARDIAC MARKERS:                                11.4   6.45  )-----------( 412      ( 21 Apr 2019 05:41 )             36.1     04-21    137  |  103  |  10  ----------------------------<  88  4.0   |  19<L>  |  0.98    Ca    8.8      21 Apr 2019 05:41  Phos  2.5     04-21  Mg     1.9     04-21    TPro  7.1  /  Alb  3.1<L>  /  TBili  1.1  /  DBili  0.5<H>  /  AST  118<H>  /  ALT  263<H>  /  AlkPhos  235<H>  04-21    proBNP:   Lipid Profile:   HgA1c:   TSH:

## 2019-04-21 NOTE — PROGRESS NOTE ADULT - ASSESSMENT
pt s/p lap choly w/ inc fluid accumulation  ? leak  drains as per sx  History of subdural hematoma/mental status better   ct head no acute findings  Hypertension c/w meds  Seizure hx  c/w keppra  dvt proph   iv fluids as needed   oob  medically stable for sx

## 2019-04-21 NOTE — PHYSICAL THERAPY INITIAL EVALUATION ADULT - RANGE OF MOTION EXAMINATION, REHAB EVAL
ex. b/l sh/: 0-90/deficits as listed below/bilateral lower extremity ROM was WFL (within functional limits)/bilateral upper extremity ROM was WFL (within functional limits)

## 2019-04-22 LAB
ALBUMIN SERPL ELPH-MCNC: 3.3 G/DL — SIGNIFICANT CHANGE UP (ref 3.3–5)
ALP SERPL-CCNC: 251 U/L — HIGH (ref 40–120)
ALT FLD-CCNC: 289 U/L — HIGH (ref 4–41)
ANION GAP SERPL CALC-SCNC: 14 MMO/L — SIGNIFICANT CHANGE UP (ref 7–14)
AST SERPL-CCNC: 130 U/L — HIGH (ref 4–40)
BILIRUB DIRECT SERPL-MCNC: 0.5 MG/DL — HIGH (ref 0.1–0.2)
BILIRUB SERPL-MCNC: 1.2 MG/DL — SIGNIFICANT CHANGE UP (ref 0.2–1.2)
BUN SERPL-MCNC: 14 MG/DL — SIGNIFICANT CHANGE UP (ref 7–23)
CALCIUM SERPL-MCNC: 8.9 MG/DL — SIGNIFICANT CHANGE UP (ref 8.4–10.5)
CHLORIDE SERPL-SCNC: 103 MMOL/L — SIGNIFICANT CHANGE UP (ref 98–107)
CO2 SERPL-SCNC: 20 MMOL/L — LOW (ref 22–31)
CREAT SERPL-MCNC: 1.08 MG/DL — SIGNIFICANT CHANGE UP (ref 0.5–1.3)
GLUCOSE SERPL-MCNC: 93 MG/DL — SIGNIFICANT CHANGE UP (ref 70–99)
HCT VFR BLD CALC: 39.2 % — SIGNIFICANT CHANGE UP (ref 39–50)
HGB BLD-MCNC: 12.3 G/DL — LOW (ref 13–17)
MAGNESIUM SERPL-MCNC: 2.2 MG/DL — SIGNIFICANT CHANGE UP (ref 1.6–2.6)
MCHC RBC-ENTMCNC: 30.4 PG — SIGNIFICANT CHANGE UP (ref 27–34)
MCHC RBC-ENTMCNC: 31.4 % — LOW (ref 32–36)
MCV RBC AUTO: 96.8 FL — SIGNIFICANT CHANGE UP (ref 80–100)
NRBC # FLD: 0 K/UL — SIGNIFICANT CHANGE UP (ref 0–0)
PHOSPHATE SERPL-MCNC: 3.2 MG/DL — SIGNIFICANT CHANGE UP (ref 2.5–4.5)
PLATELET # BLD AUTO: 511 K/UL — HIGH (ref 150–400)
PMV BLD: 8.9 FL — SIGNIFICANT CHANGE UP (ref 7–13)
POTASSIUM SERPL-MCNC: 4.1 MMOL/L — SIGNIFICANT CHANGE UP (ref 3.5–5.3)
POTASSIUM SERPL-SCNC: 4.1 MMOL/L — SIGNIFICANT CHANGE UP (ref 3.5–5.3)
PROT SERPL-MCNC: 8 G/DL — SIGNIFICANT CHANGE UP (ref 6–8.3)
RBC # BLD: 4.05 M/UL — LOW (ref 4.2–5.8)
RBC # FLD: 13.2 % — SIGNIFICANT CHANGE UP (ref 10.3–14.5)
SODIUM SERPL-SCNC: 137 MMOL/L — SIGNIFICANT CHANGE UP (ref 135–145)
WBC # BLD: 7.4 K/UL — SIGNIFICANT CHANGE UP (ref 3.8–10.5)
WBC # FLD AUTO: 7.4 K/UL — SIGNIFICANT CHANGE UP (ref 3.8–10.5)

## 2019-04-22 PROCEDURE — 99232 SBSQ HOSP IP/OBS MODERATE 35: CPT

## 2019-04-22 RX ORDER — TAMSULOSIN HYDROCHLORIDE 0.4 MG/1
0.4 CAPSULE ORAL AT BEDTIME
Qty: 0 | Refills: 0 | Status: DISCONTINUED | OUTPATIENT
Start: 2019-04-22 | End: 2019-04-26

## 2019-04-22 RX ORDER — ENOXAPARIN SODIUM 100 MG/ML
40 INJECTION SUBCUTANEOUS DAILY
Qty: 0 | Refills: 0 | Status: DISCONTINUED | OUTPATIENT
Start: 2019-04-22 | End: 2019-04-26

## 2019-04-22 RX ADMIN — Medication 0.12 MILLIGRAM(S): at 05:15

## 2019-04-22 RX ADMIN — LEVETIRACETAM 500 MILLIGRAM(S): 250 TABLET, FILM COATED ORAL at 17:22

## 2019-04-22 RX ADMIN — PIPERACILLIN AND TAZOBACTAM 25 GRAM(S): 4; .5 INJECTION, POWDER, LYOPHILIZED, FOR SOLUTION INTRAVENOUS at 23:02

## 2019-04-22 RX ADMIN — Medication 100 MILLIGRAM(S): at 17:22

## 2019-04-22 RX ADMIN — LISINOPRIL 20 MILLIGRAM(S): 2.5 TABLET ORAL at 05:16

## 2019-04-22 RX ADMIN — PIPERACILLIN AND TAZOBACTAM 25 GRAM(S): 4; .5 INJECTION, POWDER, LYOPHILIZED, FOR SOLUTION INTRAVENOUS at 05:15

## 2019-04-22 RX ADMIN — HEPARIN SODIUM 5000 UNIT(S): 5000 INJECTION INTRAVENOUS; SUBCUTANEOUS at 14:29

## 2019-04-22 RX ADMIN — Medication 240 MILLIGRAM(S): at 05:16

## 2019-04-22 RX ADMIN — Medication 100 MILLIGRAM(S): at 05:19

## 2019-04-22 RX ADMIN — LEVETIRACETAM 500 MILLIGRAM(S): 250 TABLET, FILM COATED ORAL at 05:15

## 2019-04-22 RX ADMIN — HEPARIN SODIUM 5000 UNIT(S): 5000 INJECTION INTRAVENOUS; SUBCUTANEOUS at 05:16

## 2019-04-22 RX ADMIN — PIPERACILLIN AND TAZOBACTAM 25 GRAM(S): 4; .5 INJECTION, POWDER, LYOPHILIZED, FOR SOLUTION INTRAVENOUS at 14:29

## 2019-04-22 NOTE — PROGRESS NOTE ADULT - ATTENDING COMMENTS
S/p PTC via left hepatic duct access - demonstrating complete cutoff at hepatic duct bifurcation, consistent with CBD injury.  CTA abdomen demonstrates patent main left and right hepatic arteries.  Right posterior hepatic artery branch appears attenuated.  No intra-abdominal collection.  Will plan exploration with bile duct repair/reconstruction this admission.  Continue drains and antibiotics.

## 2019-04-22 NOTE — PROGRESS NOTE ADULT - SUBJECTIVE AND OBJECTIVE BOX
Surgery Progress Note    S: Patient seen and examined. No acute events overnight. 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 - flatus, - BM. patient refused suppository yesterday.     O:  Vital Signs Last 24 Hrs  T(C): 36.8 (21 Apr 2019 23:38), Max: 36.8 (21 Apr 2019 09:22)  T(F): 98.3 (21 Apr 2019 23:38), Max: 98.3 (21 Apr 2019 09:22)  HR: 79 (21 Apr 2019 23:38) (63 - 79)  BP: 142/43 (21 Apr 2019 23:38) (131/69 - 167/70)  BP(mean): --  RR: 18 (21 Apr 2019 23:38) (16 - 18)  SpO2: 97% (21 Apr 2019 23:38) (97% - 99%)    I&O's Detail    20 Apr 2019 07:01  -  21 Apr 2019 07:00  --------------------------------------------------------  IN:    IV PiggyBack: 200 mL    Oral Fluid: 960 mL    sodium chloride 0.9%: 675 mL  Total IN: 1835 mL    OUT:    Bulb: 345 mL    Bulb: 125 mL    Voided: 1550 mL  Total OUT: 2020 mL    Total NET: -185 mL      21 Apr 2019 07:01  -  22 Apr 2019 00:07  --------------------------------------------------------  IN:    IV PiggyBack: 200 mL    Oral Fluid: 350 mL  Total IN: 550 mL    OUT:    Bulb: 30 mL    Bulb: 280 mL    Voided: 850 mL  Total OUT: 1160 mL    Total NET: -610 mL          MEDICATIONS  (STANDING):  digoxin     Tablet 0.125 milliGRAM(s) Oral daily  diltiazem    milliGRAM(s) Oral daily  docusate sodium 100 milliGRAM(s) Oral two times a day  glycerin Suppository - Adult 1 Suppository(s) Rectal once  heparin  Injectable 5000 Unit(s) SubCutaneous every 8 hours  levETIRAcetam 500 milliGRAM(s) Oral two times a day  lisinopril 20 milliGRAM(s) Oral daily  piperacillin/tazobactam IVPB. 3.375 Gram(s) IV Intermittent every 8 hours  senna 2 Tablet(s) Oral at bedtime  traZODone 50 milliGRAM(s) Oral at bedtime    MEDICATIONS  (PRN):                            11.4   6.45  )-----------( 412      ( 21 Apr 2019 05:41 )             36.1       04-21    137  |  103  |  10  ----------------------------<  88  4.0   |  19<L>  |  0.98    Ca    8.8      21 Apr 2019 05:41  Phos  2.5     04-21  Mg     1.9     04-21    TPro  7.1  /  Alb  3.1<L>  /  TBili  1.1  /  DBili  0.5<H>  /  AST  118<H>  /  ALT  263<H>  /  AlkPhos  235<H>  04-21      Physical Exam:  Gen: Laying in bed, NAD  Resp: Unlabored breathing  Abd: soft, min RUQ TTP, mildly distended, epigastric PTC with bilious output, RUQ ANAYELI with bilious output, no rebound or guarding  Ext: WWP

## 2019-04-22 NOTE — PROGRESS NOTE ADULT - SUBJECTIVE AND OBJECTIVE BOX
CHIEF COMPLAINT:Patient is a 84y old  Male who presents with a chief complaint of elevated LFTs post cholecystectomy (22 Apr 2019 00:06)    	        PAST MEDICAL & SURGICAL HISTORY:  Cholelithiasis  History of subdural hematoma  Constipation  Hypertension  Seizure  Acute subdural hematoma: (May 2014 - subdural hematoma,  pt had SX)          REVIEW OF SYSTEMS:  CONSTITUTIONAL:feels better   EYES: No eye pain, visual disturbances, or discharge  NECK: No pain or stiffness  RESPIRATORY: No cough, wheezing, chills or hemoptysis; No Shortness of Breath  CARDIOVASCULAR: No chest pain, palpitations, passing out, dizziness, or leg swelling  GASTROINTESTINAL: No abdominal or epigastric pain. No nausea, vomiting, or hematemesis; No diarrhea or constipation. No melena or hematochezia.  GENITOURINARY: No dysuria, frequency, hematuria, or incontinence  NEUROLOGICAL: No headaches,    Medications:  MEDICATIONS  (STANDING):  digoxin     Tablet 0.125 milliGRAM(s) Oral daily  diltiazem    milliGRAM(s) Oral daily  docusate sodium 100 milliGRAM(s) Oral two times a day  glycerin Suppository - Adult 1 Suppository(s) Rectal once  heparin  Injectable 5000 Unit(s) SubCutaneous every 8 hours  levETIRAcetam 500 milliGRAM(s) Oral two times a day  lisinopril 20 milliGRAM(s) Oral daily  piperacillin/tazobactam IVPB. 3.375 Gram(s) IV Intermittent every 8 hours  senna 2 Tablet(s) Oral at bedtime  traZODone 50 milliGRAM(s) Oral at bedtime    MEDICATIONS  (PRN):    	    PHYSICAL EXAM:  T(C): 36.8 (04-22-19 @ 10:16), Max: 36.9 (04-22-19 @ 05:13)  HR: 74 (04-22-19 @ 10:16) (63 - 79)  BP: 143/64 (04-22-19 @ 10:16) (131/69 - 148/77)  RR: 18 (04-22-19 @ 10:16) (18 - 18)  SpO2: 96% (04-22-19 @ 10:16) (96% - 99%)  Wt(kg): --  I&O's Summary    21 Apr 2019 07:01  -  22 Apr 2019 07:00  --------------------------------------------------------  IN: 550 mL / OUT: 1317.5 mL / NET: -767.5 mL        Appearance: Normal	  HEENT:   Normal oral mucosa, PERRL, EOMI	  Lymphatic: No lymphadenopathy  Cardiovascular: Normal S1 S2, No JVD, No murmurs, No edema  Respiratory: Lungs clear to auscultation	  Gastrointestinal:  Soft, Non-tender, + BS	=drain  Skin: No rashes, No ecchymoses, No cyanosis	  Neurologic: Non-focal  Extremities: Normal range of motion, No clubbing, cyanosis or edema  Vascular: Peripheral pulses palpable 2+ bilaterally    TELEMETRY: 	    ECG:  	  RADIOLOGY:  OTHER: 	  	  LABS:	 	    CARDIAC MARKERS:                                12.3   7.40  )-----------( 511      ( 22 Apr 2019 06:17 )             39.2     04-22    137  |  103  |  14  ----------------------------<  93  4.1   |  20<L>  |  1.08    Ca    8.9      22 Apr 2019 06:17  Phos  3.2     04-22  Mg     2.2     04-22    TPro  8.0  /  Alb  3.3  /  TBili  1.2  /  DBili  0.5<H>  /  AST  130<H>  /  ALT  289<H>  /  AlkPhos  251<H>  04-22    proBNP:   Lipid Profile:   HgA1c:   TSH:

## 2019-04-22 NOTE — PROGRESS NOTE ADULT - ASSESSMENT
84y M transferred from OSH due to bile duct injury following lap gus now s/p PTC (4/19)    -pain control as needed  -monitor drain outputs  -OOB and ambulating as tolerated  -subq heparin for DVT ppx  -OR planning  -PT-->no needs    D Team Surgery  [id number]

## 2019-04-23 DIAGNOSIS — G31.84 MILD COGNITIVE IMPAIRMENT OF UNCERTAIN OR UNKNOWN ETIOLOGY: ICD-10-CM

## 2019-04-23 DIAGNOSIS — S06.9X9A UNSPECIFIED INTRACRANIAL INJURY WITH LOSS OF CONSCIOUSNESS OF UNSPECIFIED DURATION, INITIAL ENCOUNTER: ICD-10-CM

## 2019-04-23 DIAGNOSIS — Z71.89 OTHER SPECIFIED COUNSELING: ICD-10-CM

## 2019-04-23 LAB
ANION GAP SERPL CALC-SCNC: 13 MMO/L — SIGNIFICANT CHANGE UP (ref 7–14)
BUN SERPL-MCNC: 16 MG/DL — SIGNIFICANT CHANGE UP (ref 7–23)
CALCIUM SERPL-MCNC: 8.8 MG/DL — SIGNIFICANT CHANGE UP (ref 8.4–10.5)
CHLORIDE SERPL-SCNC: 105 MMOL/L — SIGNIFICANT CHANGE UP (ref 98–107)
CHLORIDE UR-SCNC: 24 MMOL/L — SIGNIFICANT CHANGE UP
CO2 SERPL-SCNC: 21 MMOL/L — LOW (ref 22–31)
CREAT ?TM UR-MCNC: 342.7 MG/DL — SIGNIFICANT CHANGE UP
CREAT SERPL-MCNC: 1.18 MG/DL — SIGNIFICANT CHANGE UP (ref 0.5–1.3)
GLUCOSE SERPL-MCNC: 94 MG/DL — SIGNIFICANT CHANGE UP (ref 70–99)
HCT VFR BLD CALC: 36.7 % — LOW (ref 39–50)
HGB BLD-MCNC: 12 G/DL — LOW (ref 13–17)
MAGNESIUM SERPL-MCNC: 2.2 MG/DL — SIGNIFICANT CHANGE UP (ref 1.6–2.6)
MCHC RBC-ENTMCNC: 30.8 PG — SIGNIFICANT CHANGE UP (ref 27–34)
MCHC RBC-ENTMCNC: 32.7 % — SIGNIFICANT CHANGE UP (ref 32–36)
MCV RBC AUTO: 94.1 FL — SIGNIFICANT CHANGE UP (ref 80–100)
NRBC # FLD: 0 K/UL — SIGNIFICANT CHANGE UP (ref 0–0)
PHOSPHATE SERPL-MCNC: 2.9 MG/DL — SIGNIFICANT CHANGE UP (ref 2.5–4.5)
PLATELET # BLD AUTO: 489 K/UL — HIGH (ref 150–400)
PMV BLD: 9.3 FL — SIGNIFICANT CHANGE UP (ref 7–13)
POTASSIUM SERPL-MCNC: 3.9 MMOL/L — SIGNIFICANT CHANGE UP (ref 3.5–5.3)
POTASSIUM SERPL-SCNC: 3.9 MMOL/L — SIGNIFICANT CHANGE UP (ref 3.5–5.3)
POTASSIUM UR-SCNC: 67.9 MMOL/L — SIGNIFICANT CHANGE UP
RBC # BLD: 3.9 M/UL — LOW (ref 4.2–5.8)
RBC # FLD: 13.2 % — SIGNIFICANT CHANGE UP (ref 10.3–14.5)
SODIUM SERPL-SCNC: 139 MMOL/L — SIGNIFICANT CHANGE UP (ref 135–145)
SODIUM UR-SCNC: 65 MMOL/L — SIGNIFICANT CHANGE UP
WBC # BLD: 6.38 K/UL — SIGNIFICANT CHANGE UP (ref 3.8–10.5)
WBC # FLD AUTO: 6.38 K/UL — SIGNIFICANT CHANGE UP (ref 3.8–10.5)

## 2019-04-23 PROCEDURE — 99223 1ST HOSP IP/OBS HIGH 75: CPT

## 2019-04-23 PROCEDURE — 71045 X-RAY EXAM CHEST 1 VIEW: CPT | Mod: 26

## 2019-04-23 PROCEDURE — 99232 SBSQ HOSP IP/OBS MODERATE 35: CPT

## 2019-04-23 RX ORDER — SODIUM CHLORIDE 9 MG/ML
1000 INJECTION INTRAMUSCULAR; INTRAVENOUS; SUBCUTANEOUS
Qty: 0 | Refills: 0 | Status: DISCONTINUED | OUTPATIENT
Start: 2019-04-23 | End: 2019-04-26

## 2019-04-23 RX ADMIN — LEVETIRACETAM 500 MILLIGRAM(S): 250 TABLET, FILM COATED ORAL at 05:04

## 2019-04-23 RX ADMIN — Medication 0.12 MILLIGRAM(S): at 05:04

## 2019-04-23 RX ADMIN — TAMSULOSIN HYDROCHLORIDE 0.4 MILLIGRAM(S): 0.4 CAPSULE ORAL at 21:02

## 2019-04-23 RX ADMIN — SODIUM CHLORIDE 50 MILLILITER(S): 9 INJECTION INTRAMUSCULAR; INTRAVENOUS; SUBCUTANEOUS at 21:03

## 2019-04-23 RX ADMIN — PIPERACILLIN AND TAZOBACTAM 25 GRAM(S): 4; .5 INJECTION, POWDER, LYOPHILIZED, FOR SOLUTION INTRAVENOUS at 05:05

## 2019-04-23 RX ADMIN — Medication 100 MILLIGRAM(S): at 17:10

## 2019-04-23 RX ADMIN — PIPERACILLIN AND TAZOBACTAM 25 GRAM(S): 4; .5 INJECTION, POWDER, LYOPHILIZED, FOR SOLUTION INTRAVENOUS at 13:28

## 2019-04-23 RX ADMIN — SODIUM CHLORIDE 50 MILLILITER(S): 9 INJECTION INTRAMUSCULAR; INTRAVENOUS; SUBCUTANEOUS at 17:10

## 2019-04-23 RX ADMIN — LEVETIRACETAM 500 MILLIGRAM(S): 250 TABLET, FILM COATED ORAL at 17:10

## 2019-04-23 RX ADMIN — ENOXAPARIN SODIUM 40 MILLIGRAM(S): 100 INJECTION SUBCUTANEOUS at 13:28

## 2019-04-23 RX ADMIN — Medication 240 MILLIGRAM(S): at 05:04

## 2019-04-23 RX ADMIN — Medication 50 MILLIGRAM(S): at 21:02

## 2019-04-23 RX ADMIN — SENNA PLUS 2 TABLET(S): 8.6 TABLET ORAL at 21:02

## 2019-04-23 RX ADMIN — PIPERACILLIN AND TAZOBACTAM 25 GRAM(S): 4; .5 INJECTION, POWDER, LYOPHILIZED, FOR SOLUTION INTRAVENOUS at 21:02

## 2019-04-23 RX ADMIN — LISINOPRIL 20 MILLIGRAM(S): 2.5 TABLET ORAL at 05:04

## 2019-04-23 RX ADMIN — Medication 100 MILLIGRAM(S): at 05:04

## 2019-04-23 NOTE — BEHAVIORAL HEALTH ASSESSMENT NOTE - NSBHCHARTREVIEWVS_PSY_A_CORE FT
Vital Signs Last 24 Hrs  T(C): 36.5 (23 Apr 2019 11:58), Max: 37 (23 Apr 2019 08:31)  T(F): 97.7 (23 Apr 2019 11:58), Max: 98.6 (23 Apr 2019 08:31)  HR: 64 (23 Apr 2019 11:58) (64 - 77)  BP: 108/53 (23 Apr 2019 11:58) (101/63 - 150/73)  BP(mean): 76 (23 Apr 2019 08:31) (76 - 76)  RR: 17 (23 Apr 2019 11:58) (17 - 18)  SpO2: 98% (23 Apr 2019 11:58) (95% - 99%)

## 2019-04-23 NOTE — BEHAVIORAL HEALTH ASSESSMENT NOTE - NSBHCHARTREVIEWINVESTIGATE_PSY_A_CORE FT
4/10   EKG QTc 439 ms      Ventricular Rate 75 BPM    Atrial Rate 75 BPM    P-R Interval 194 ms    QRS Duration 96 ms    Q-T Interval 394 ms    QTC Calculation(Bezet) 439 ms    P Axis 56 degrees    R Axis -25 degrees    T Axis 14 degrees    Diagnosis Line Sinus rhythm with premature atrial complexes  Otherwise normal ECG  No previous ECGs available  Confirmed by JOSSY TORO, FRANCHESCA ([id number]) on 4/11/2019 12:24:08 PM

## 2019-04-23 NOTE — BEHAVIORAL HEALTH ASSESSMENT NOTE - HPI (INCLUDE ILLNESS QUALITY, SEVERITY, DURATION, TIMING, CONTEXT, MODIFYING FACTORS, ASSOCIATED SIGNS AND SYMPTOMS)
Patient is a 84 y o  male, , domiciled with wife, with no past psych history, h/o etoh abuse per chart, though denies current use, pmh of seizure disorder, subdural hematoma s/p craniotomy due to trauma, HTN,  cholelithiasis, who presented to [facility] ED with cholecystitis and underwent a laparoscopic cholecystectomy. He was then found to have bile duct leak on post op day 1 and transferred to [facility] for further care. Patient had a drain placed by IR on 4/19 and there is a plan to operate on 4/25, psych consult called to assess capacity to consent to treatment.   On assessment, patient is sitting in chair at bedside eating breakfast. He is calm and cooperative with interview. He is oriented to self and situation, but not to place. He is not oriented to time. He is aware that he was transferred here after undergoing surgery in another hospital. He understands that he now has a leak, which requires surgical repair. Patient initially states he does not wish to have surgery, since he is not sure if it will help him. He states his original surgery was not successful, so he has doubts as to whether another surgery would be helpful. Risks and benefits of surgery as well as risks of refusing surgery were explained to patient (risk of untreated bile duct leak leading to infection, sepsis, and death). Patient was able to explain back to writer that foregoing surgery can lead to infection and death. After reviewing these risks, patient stated he would like to have the surgery. He denies depressed mood, anhedonia, poor sleep, and poor appetite. He denies SI/HI/I/P. He denies symptoms of psychosis. He denies history of mental illness. States he enjoys reading, watching tv, and hanging out at home. Has friends in his neighborhood. States he cares for his ADLs and that his wife prepares meals.

## 2019-04-23 NOTE — BEHAVIORAL HEALTH ASSESSMENT NOTE - NSBHREFERDETAILS_PSY_A_CORE_FT
had laparoscopic cholecystectomy at another hospital, found to have bile duct leak, s/p drain placement by IR On 4/19, now in need of surgical repair, plan to operate on Friday 4/25, psych eval for capacity to consent to surgery

## 2019-04-23 NOTE — BEHAVIORAL HEALTH ASSESSMENT NOTE - NSBHCHARTREVIEWIMAGING_PSY_A_CORE FT
EXAM:  CT BRAIN        PROCEDURE DATE:  Apr 20 2019         INTERPRETATION:  .    CLINICAL INFORMATION: Follow-up subdural hematoma.    TECHNIQUE: Multiple axial CT images of the head were obtained without   contrast. Sagittal and coronal reconstructed images were acquired from   the source data.    COMPARISON: Most recent prior CT examination of the head from 2/17/2015.   Prior brain MRI examination from 2/19/2015.    FINDINGS: There is redemonstration of a right-sided frontoparietal and   left pterional craniotomies. There is thickening of the right-sided dural   flap. No subdural hematoma is visualized.    There is unchanged encephalomalacia and gliosis within the bifrontal   lobes and right anterior temporal pole likely related to prior contusion.    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    Moderate ventriculomegaly appears unchanged. There is an incidental cavum   septum pellucidum et vergae.    There is diffuse cerebral volume loss with prominence of the sulci,   fissures, and cisternal spaces which is normal for the patient's age.   There is mild periventricular white matter hypoattenuation statistically   compatible with microvascular changes given calcific atherosclerotic   disease of the intracranial arteries.    There is mild volume loss of the right maxillary sinus. The mastoid air   cells are clear. Bilateral senile scleral plaques are noted.    IMPRESSION: No acute intracranial hemorrhage, mass effect, or shift of   midline structures.    Bifrontal and right temporal lobe and encephalomalacia and gliosis   secondary to likely prior contusion.        LUCA SOTO M.D., ATTENDING RADIOLOGIST  This document has been electronically signed. Apr 20 2019 12:06PM

## 2019-04-23 NOTE — PROGRESS NOTE ADULT - SUBJECTIVE AND OBJECTIVE BOX
Surgery Progress Note      Subjective: Patient seen and examined. No acute events overnight.   campoverde replaced yesterday for urinary retention     T(C): 36.8 (04-22-19 @ 23:54), Max: 36.9 (04-22-19 @ 05:13)  HR: 70 (04-22-19 @ 23:54) (70 - 77)  BP: 150/73 (04-22-19 @ 23:54) (101/63 - 150/73)  RR: 18 (04-22-19 @ 23:54) (18 - 18)  SpO2: 99% (04-22-19 @ 23:54) (96% - 99%)      04-21-19 @ 07:01  -  04-22-19 @ 07:00  --------------------------------------------------------  IN: 550 mL / OUT: 1317.5 mL / NET: -767.5 mL    04-22-19 @ 07:01  -  04-23-19 @ 02:06  --------------------------------------------------------  IN: 0 mL / OUT: 1750 mL / NET: -1750 mL        Physical Exam:     General: NAD  Abdomen: soft, min RUQ tenderness, mildly distended, epigastric PTC with bilious output, RUQ ANAYELI with bilious output,    Labs:                          12.3   7.40  )-----------( 511      ( 22 Apr 2019 06:17 )             39.2     04-22    137  |  103  |  14  ----------------------------<  93  4.1   |  20<L>  |  1.08    Ca    8.9      22 Apr 2019 06:17  Phos  3.2     04-22  Mg     2.2     04-22    TPro  8.0  /  Alb  3.3  /  TBili  1.2  /  DBili  0.5<H>  /  AST  130<H>  /  ALT  289<H>  /  AlkPhos  251<H>  04-22      Medications:     digoxin     Tablet 0.125 milliGRAM(s) Oral daily  diltiazem    milliGRAM(s) Oral daily  docusate sodium 100 milliGRAM(s) Oral two times a day  enoxaparin Injectable 40 milliGRAM(s) SubCutaneous daily  glycerin Suppository - Adult 1 Suppository(s) Rectal once  levETIRAcetam 500 milliGRAM(s) Oral two times a day  lisinopril 20 milliGRAM(s) Oral daily  piperacillin/tazobactam IVPB. 3.375 Gram(s) IV Intermittent every 8 hours  senna 2 Tablet(s) Oral at bedtime  tamsulosin 0.4 milliGRAM(s) Oral at bedtime  traZODone 50 milliGRAM(s) Oral at bedtime      Radiographs: No new imaging

## 2019-04-23 NOTE — BEHAVIORAL HEALTH ASSESSMENT NOTE - CASE SUMMARY
Pt seen with resident, agree with above. 84 y o  male with no past psych history, h/o etoh abuse per chart, though denies current use, pmh of seizure disorder, subdural hematoma s/p craniotomy due to trauma, who presented to OSH ED with cholecystitis and underwent a laparoscopic cholecystectomy. He was then found to have bile duct leak on post op day 1 and transferred to [facility] for further care. Patient had a drain placed by IR on 4/19 and there is a plan to operate on 4/25, psych consult called to assess capacity to consent to treatment. At present time pt lacks capacity to make decision about this surgery as he cannot communicate a consistent choice and is unable to appreciate the relevant medical information. Impression: mild cognitive impairment s/p TBI; r/o dementia process. Plan: as above- c/w trazodone; defer decision for surgery to pt's wife, who has made prior medical decisions on pt's behalf. Please call with any further questions.

## 2019-04-23 NOTE — BEHAVIORAL HEALTH ASSESSMENT NOTE - NSBHCHARTREVIEWLAB_PSY_A_CORE FT
12.0   6.38  )-----------( 489      ( 23 Apr 2019 05:54 )             36.7   04-23    139  |  105  |  16  ----------------------------<  94  3.9   |  21<L>  |  1.18    Ca    8.8      23 Apr 2019 05:54  Phos  2.9     04-23  Mg     2.2     04-23    TPro  8.0  /  Alb  3.3  /  TBili  1.2  /  DBili  0.5<H>  /  AST  130<H>  /  ALT  289<H>  /  AlkPhos  251<H>  04-22

## 2019-04-23 NOTE — PROGRESS NOTE ADULT - ASSESSMENT
84M transferred from OSH due to bile duct injury following lap gus now s/p PTC (4/19)    -pain control as needed  -monitor drain outputs  -continue zosyn  -OOB and ambulating as tolerated  -subq heparin for DVT ppx  -OR planning  -PT-->no needs    D Team   [id number]

## 2019-04-23 NOTE — PROGRESS NOTE ADULT - ASSESSMENT
pt s/p lap choly w/ inc fluid accumulation  ? leak  drains as per sx  History of subdural hematoma/mental status stable   ct head no acute findings  Hypertension c/w meds  Seizure hx  c/w keppra  dvt proph   iv fluids as needed   oob  medically stable for sx

## 2019-04-23 NOTE — PROGRESS NOTE ADULT - SUBJECTIVE AND OBJECTIVE BOX
CHIEF COMPLAINT:Patient is a 84y old  Male who presents with a chief complaint of elevated LFTs post cholecystectomy (23 Apr 2019 02:05)    	        PAST MEDICAL & SURGICAL HISTORY:  Cholelithiasis  History of subdural hematoma  Constipation  Hypertension  Seizure  Acute subdural hematoma: (May 2014 - subdural hematoma,  pt had SX)          REVIEW OF SYSTEMS:  CONSTITUTIONAL:weak  EYES: No eye pain, visual disturbances, or discharge  NECK: No pain or stiffness  RESPIRATORY: No cough, wheezing, chills or hemoptysis; No Shortness of Breath  CARDIOVASCULAR: No chest pain, palpitations, passing out, dizziness, or leg swelling  GASTROINTESTINAL: No abdominal or epigastric pain. No nausea, vomiting, or hematemesis; No diarrhea or constipation. No melena or hematochezia.  GENITOURINARY: No dysuria, frequency, hematuria, or incontinence  NEUROLOGICAL: No headaches,    Medications:  MEDICATIONS  (STANDING):  digoxin     Tablet 0.125 milliGRAM(s) Oral daily  diltiazem    milliGRAM(s) Oral daily  docusate sodium 100 milliGRAM(s) Oral two times a day  enoxaparin Injectable 40 milliGRAM(s) SubCutaneous daily  glycerin Suppository - Adult 1 Suppository(s) Rectal once  levETIRAcetam 500 milliGRAM(s) Oral two times a day  lisinopril 20 milliGRAM(s) Oral daily  piperacillin/tazobactam IVPB. 3.375 Gram(s) IV Intermittent every 8 hours  senna 2 Tablet(s) Oral at bedtime  tamsulosin 0.4 milliGRAM(s) Oral at bedtime  traZODone 50 milliGRAM(s) Oral at bedtime    MEDICATIONS  (PRN):    	    PHYSICAL EXAM:  T(C): 37 (04-23-19 @ 08:31), Max: 37 (04-23-19 @ 08:31)  HR: 72 (04-23-19 @ 08:31) (70 - 77)  BP: 135/62 (04-23-19 @ 08:31) (101/63 - 150/73)  RR: 18 (04-23-19 @ 08:31) (18 - 18)  SpO2: 95% (04-23-19 @ 08:31) (95% - 99%)  Wt(kg): --  I&O's Summary    22 Apr 2019 07:01 - 23 Apr 2019 07:00  --------------------------------------------------------  IN: 0 mL / OUT: 2060 mL / NET: -2060 mL    23 Apr 2019 07:01 - 23 Apr 2019 10:03  --------------------------------------------------------  IN: 240 mL / OUT: 150 mL / NET: 90 mL        Appearance: Normal	  HEENT:   Normal oral mucosa, PERRL, EOMI	  Lymphatic: No lymphadenopathy  Cardiovascular: Normal S1 S2, No JVD, No murmurs,   Respiratory: Lungs clear to auscultation	  Psychiatry: A & O   Gastrointestinal:  Soft, Non-tender, + BS	drain in place  Skin: No rashes, No ecchymoses, No cyanosis	  Neurologic: Non-focal  Extremities: Normal range of motion, No clubbing, cyanosis or edema  Vascular: Peripheral pulses palpable 2+ bilaterally    TELEMETRY: 	    ECG:  	  RADIOLOGY:  OTHER: 	  	  LABS:	 	    CARDIAC MARKERS:                                12.0   6.38  )-----------( 489      ( 23 Apr 2019 05:54 )             36.7     04-23    139  |  105  |  16  ----------------------------<  94  3.9   |  21<L>  |  1.18    Ca    8.8      23 Apr 2019 05:54  Phos  2.9     04-23  Mg     2.2     04-23    TPro  8.0  /  Alb  3.3  /  TBili  1.2  /  DBili  0.5<H>  /  AST  130<H>  /  ALT  289<H>  /  AlkPhos  251<H>  04-22    proBNP:   Lipid Profile:   HgA1c:   TSH:

## 2019-04-23 NOTE — BEHAVIORAL HEALTH ASSESSMENT NOTE - SUMMARY
Patient is a 84 y o  male, , domiciled with wife, with no past psych history, h/o etoh abuse per chart, though denies current use, pmh of seizure disorder, subdural hematoma s/p craniotomy due to trauma, HTN,  cholelithiasis, who presented to OSH ED with cholecystitis and underwent a laparoscopic cholecystectomy. He was then found to have bile duct leak on post op day 1 and transferred to [facility] for further care. Patient had a drain placed by IR on 4/19 and there is a plan to operate on 4/25, psych consult called to assess capacity to consent to treatment.   Patient was fully cooperative with interview. He is oriented to self and situation. At present he is interested in undergoing planned surgery. He is able to state the risks of refusing surgical repair of leaky bile duct as well as the risk/benefits of surgery. He thus has capacity to agree to treatment at this time. Should patient change his mind prior to Friday, and decide he is not interested in surgery, please call psychiatry to evaluate capacity to refuse treatment.    Plan:  Patient has capacity to agree to treatment. Reconsult if patient decides not to have surgery.   Continue Trazodone 50 mg po QHS Patient is a 84 y o  male, , domiciled with wife, with no past psych history, h/o etoh abuse per chart, though denies current use, pmh of seizure disorder, subdural hematoma s/p craniotomy due to trauma, HTN,  cholelithiasis, who presented to [facility] ED with cholecystitis and underwent a laparoscopic cholecystectomy. He was then found to have bile duct leak on post op day 1 and transferred to [facility] for further care. Patient had a drain placed by IR on 4/19 and there is a plan to operate on 4/25, psych consult called to assess capacity to consent to treatment.   Patient was fully cooperative with interview. He is oriented to self and situation. At present he is interested in undergoing planned surgery. He was able to state the risks of refusing surgical repair of leaky bile duct as well as the risk/benefits of surgery- however on later re-assessment pt is unable to make a decision and unable to retain the risks/benefits of the procedure. Given this, the patient presently lacks capacity to make decision about this surgery and decision should be deferred to his wife, who has been making prior decision on his behalf. In addition, pt stated he would prefer if his wife made the decision for him.     Plan:  Pt lacks capacity to decide re: surgery; should defer decision to his wife  Continue Trazodone 50 mg po QHS

## 2019-04-23 NOTE — BEHAVIORAL HEALTH ASSESSMENT NOTE - RISK ASSESSMENT
Risk factors include acute medical illness and h/o TBI. At present, patient denies SI/HI/I/P. He has no h/o of suicide attempt or psychiatric hospitalizations. He identifies reasons for living. Given above, patient is not felt to be at imminent risk of harm to self or others.

## 2019-04-24 LAB
ALBUMIN SERPL ELPH-MCNC: 3.5 G/DL — SIGNIFICANT CHANGE UP (ref 3.3–5)
ALP SERPL-CCNC: 207 U/L — HIGH (ref 40–120)
ALT FLD-CCNC: 305 U/L — HIGH (ref 4–41)
ANION GAP SERPL CALC-SCNC: 12 MMO/L — SIGNIFICANT CHANGE UP (ref 7–14)
AST SERPL-CCNC: 142 U/L — HIGH (ref 4–40)
BILIRUB DIRECT SERPL-MCNC: 0.7 MG/DL — HIGH (ref 0.1–0.2)
BILIRUB SERPL-MCNC: 1.2 MG/DL — SIGNIFICANT CHANGE UP (ref 0.2–1.2)
BUN SERPL-MCNC: 16 MG/DL — SIGNIFICANT CHANGE UP (ref 7–23)
CALCIUM SERPL-MCNC: 9 MG/DL — SIGNIFICANT CHANGE UP (ref 8.4–10.5)
CHLORIDE SERPL-SCNC: 104 MMOL/L — SIGNIFICANT CHANGE UP (ref 98–107)
CO2 SERPL-SCNC: 22 MMOL/L — SIGNIFICANT CHANGE UP (ref 22–31)
CREAT SERPL-MCNC: 1.27 MG/DL — SIGNIFICANT CHANGE UP (ref 0.5–1.3)
GLUCOSE SERPL-MCNC: 87 MG/DL — SIGNIFICANT CHANGE UP (ref 70–99)
HCT VFR BLD CALC: 35 % — LOW (ref 39–50)
HGB BLD-MCNC: 11.1 G/DL — LOW (ref 13–17)
MAGNESIUM SERPL-MCNC: 2.2 MG/DL — SIGNIFICANT CHANGE UP (ref 1.6–2.6)
MCHC RBC-ENTMCNC: 30.3 PG — SIGNIFICANT CHANGE UP (ref 27–34)
MCHC RBC-ENTMCNC: 31.7 % — LOW (ref 32–36)
MCV RBC AUTO: 95.6 FL — SIGNIFICANT CHANGE UP (ref 80–100)
NRBC # FLD: 0 K/UL — SIGNIFICANT CHANGE UP (ref 0–0)
PHOSPHATE SERPL-MCNC: 2.7 MG/DL — SIGNIFICANT CHANGE UP (ref 2.5–4.5)
PLATELET # BLD AUTO: 508 K/UL — HIGH (ref 150–400)
PMV BLD: 9 FL — SIGNIFICANT CHANGE UP (ref 7–13)
POTASSIUM SERPL-MCNC: 4.1 MMOL/L — SIGNIFICANT CHANGE UP (ref 3.5–5.3)
POTASSIUM SERPL-SCNC: 4.1 MMOL/L — SIGNIFICANT CHANGE UP (ref 3.5–5.3)
PROT SERPL-MCNC: 7.5 G/DL — SIGNIFICANT CHANGE UP (ref 6–8.3)
RBC # BLD: 3.66 M/UL — LOW (ref 4.2–5.8)
RBC # FLD: 13.1 % — SIGNIFICANT CHANGE UP (ref 10.3–14.5)
SODIUM SERPL-SCNC: 138 MMOL/L — SIGNIFICANT CHANGE UP (ref 135–145)
WBC # BLD: 5.66 K/UL — SIGNIFICANT CHANGE UP (ref 3.8–10.5)
WBC # FLD AUTO: 5.66 K/UL — SIGNIFICANT CHANGE UP (ref 3.8–10.5)

## 2019-04-24 PROCEDURE — 99232 SBSQ HOSP IP/OBS MODERATE 35: CPT

## 2019-04-24 RX ORDER — POLYETHYLENE GLYCOL 3350 17 G/17G
17 POWDER, FOR SOLUTION ORAL DAILY
Qty: 0 | Refills: 0 | Status: DISCONTINUED | OUTPATIENT
Start: 2019-04-24 | End: 2019-04-25

## 2019-04-24 RX ADMIN — SODIUM CHLORIDE 75 MILLILITER(S): 9 INJECTION INTRAMUSCULAR; INTRAVENOUS; SUBCUTANEOUS at 08:43

## 2019-04-24 RX ADMIN — Medication 100 MILLIGRAM(S): at 17:27

## 2019-04-24 RX ADMIN — POLYETHYLENE GLYCOL 3350 17 GRAM(S): 17 POWDER, FOR SOLUTION ORAL at 18:53

## 2019-04-24 RX ADMIN — PIPERACILLIN AND TAZOBACTAM 25 GRAM(S): 4; .5 INJECTION, POWDER, LYOPHILIZED, FOR SOLUTION INTRAVENOUS at 13:00

## 2019-04-24 RX ADMIN — PIPERACILLIN AND TAZOBACTAM 25 GRAM(S): 4; .5 INJECTION, POWDER, LYOPHILIZED, FOR SOLUTION INTRAVENOUS at 21:02

## 2019-04-24 RX ADMIN — Medication 0.12 MILLIGRAM(S): at 05:34

## 2019-04-24 RX ADMIN — ENOXAPARIN SODIUM 40 MILLIGRAM(S): 100 INJECTION SUBCUTANEOUS at 13:00

## 2019-04-24 RX ADMIN — PIPERACILLIN AND TAZOBACTAM 25 GRAM(S): 4; .5 INJECTION, POWDER, LYOPHILIZED, FOR SOLUTION INTRAVENOUS at 05:35

## 2019-04-24 RX ADMIN — Medication 100 MILLIGRAM(S): at 05:34

## 2019-04-24 RX ADMIN — LEVETIRACETAM 500 MILLIGRAM(S): 250 TABLET, FILM COATED ORAL at 05:34

## 2019-04-24 RX ADMIN — LEVETIRACETAM 500 MILLIGRAM(S): 250 TABLET, FILM COATED ORAL at 17:27

## 2019-04-24 RX ADMIN — Medication 240 MILLIGRAM(S): at 05:33

## 2019-04-24 RX ADMIN — LISINOPRIL 20 MILLIGRAM(S): 2.5 TABLET ORAL at 05:33

## 2019-04-24 NOTE — PROGRESS NOTE ADULT - ASSESSMENT
84M transferred from OSH due to bile duct injury following lap gus now s/p PTC (4/19)    -pain control as needed  -monitor drain outputs  -continue zosyn  -OOB and ambulating as tolerated  -subq heparin for DVT ppx  -OR planning for friday   -bowel prep  -PT-->no needs    D Team   [id number]

## 2019-04-24 NOTE — PROGRESS NOTE ADULT - ASSESSMENT
pt s/p lap choly w/ inc fluid accumulation  ? leak  drains as per sx  History of subdural hematoma/mental status stable   ct head no acute findings  Hypertension c/w meds  Seizure hx  c/w keppra  dvt proph   iv fluids started as urine outpt decreased and creatinine moved up   oob  medically stable for sx

## 2019-04-24 NOTE — PROGRESS NOTE ADULT - ATTENDING COMMENTS
Controlled biliary fistula.  Continue antibiotics.  OR preparation for Friday, 4/26/2019.  Discussed with patient's family.

## 2019-04-24 NOTE — PROGRESS NOTE ADULT - SUBJECTIVE AND OBJECTIVE BOX
Surgery Progress Note      Subjective: Patient seen and examined. No acute events overnight.     T(C): 36.7 (04-24-19 @ 05:32), Max: 37 (04-23-19 @ 08:31)  HR: 68 (04-24-19 @ 05:32) (62 - 72)  BP: 127/64 (04-24-19 @ 05:32) (108/53 - 137/59)  RR: 18 (04-24-19 @ 05:32) (17 - 18)  SpO2: 96% (04-24-19 @ 05:32) (95% - 98%)      04-23-19 @ 07:01  -  04-24-19 @ 07:00  --------------------------------------------------------  IN: 1520 mL / OUT: 1192.5 mL / NET: 327.5 mL        Physical Exam:   General: NAD  Abdomen: soft, min RUQ tenderness, mildly distended, epigastric PTC with bilious output, RUQ ANAYELI with bilious output    Labs:                          11.1   5.66  )-----------( 508      ( 24 Apr 2019 05:44 )             35.0     04-24    138  |  104  |  16  ----------------------------<  87  4.1   |  22  |  1.27    Ca    9.0      24 Apr 2019 05:44  Phos  2.7     04-24  Mg     2.2     04-24        Medications:     digoxin     Tablet 0.125 milliGRAM(s) Oral daily  diltiazem    milliGRAM(s) Oral daily  docusate sodium 100 milliGRAM(s) Oral two times a day  enoxaparin Injectable 40 milliGRAM(s) SubCutaneous daily  glycerin Suppository - Adult 1 Suppository(s) Rectal once  levETIRAcetam 500 milliGRAM(s) Oral two times a day  lisinopril 20 milliGRAM(s) Oral daily  piperacillin/tazobactam IVPB. 3.375 Gram(s) IV Intermittent every 8 hours  senna 2 Tablet(s) Oral at bedtime  sodium chloride 0.9%. 1000 milliLiter(s) IV Continuous <Continuous>  tamsulosin 0.4 milliGRAM(s) Oral at bedtime  traZODone 50 milliGRAM(s) Oral at bedtime      Radiographs: No new imaging

## 2019-04-24 NOTE — PROGRESS NOTE ADULT - SUBJECTIVE AND OBJECTIVE BOX
CHIEF COMPLAINT:Patient is a 84y old  Male who presents with a chief complaint of elevated LFTs post cholecystectomy (24 Apr 2019 07:53)    	        PAST MEDICAL & SURGICAL HISTORY:  Cholelithiasis  History of subdural hematoma  Constipation  Hypertension  Seizure  Acute subdural hematoma: (May 2014 - subdural hematoma,  pt had SX)          REVIEW OF SYSTEMS:  no new complaints  no cp or sob  no abd pain no chills  no hematemesis     Medications:  MEDICATIONS  (STANDING):  digoxin     Tablet 0.125 milliGRAM(s) Oral daily  diltiazem    milliGRAM(s) Oral daily  docusate sodium 100 milliGRAM(s) Oral two times a day  enoxaparin Injectable 40 milliGRAM(s) SubCutaneous daily  glycerin Suppository - Adult 1 Suppository(s) Rectal once  levETIRAcetam 500 milliGRAM(s) Oral two times a day  lisinopril 20 milliGRAM(s) Oral daily  piperacillin/tazobactam IVPB. 3.375 Gram(s) IV Intermittent every 8 hours  senna 2 Tablet(s) Oral at bedtime  sodium chloride 0.9%. 1000 milliLiter(s) (75 mL/Hr) IV Continuous <Continuous>  tamsulosin 0.4 milliGRAM(s) Oral at bedtime  traZODone 50 milliGRAM(s) Oral at bedtime    MEDICATIONS  (PRN):    	    PHYSICAL EXAM:  T(C): 36.4 (04-24-19 @ 11:24), Max: 36.8 (04-23-19 @ 23:39)  HR: 66 (04-24-19 @ 11:24) (62 - 68)  BP: 107/46 (04-24-19 @ 11:24) (107/46 - 137/59)  RR: 18 (04-24-19 @ 11:24) (17 - 18)  SpO2: 92% (04-24-19 @ 11:24) (92% - 98%)  Wt(kg): --  I&O's Summary    23 Apr 2019 07:01  -  24 Apr 2019 07:00  --------------------------------------------------------  IN: 1520 mL / OUT: 1192.5 mL / NET: 327.5 mL    24 Apr 2019 07:01  -  24 Apr 2019 11:29  --------------------------------------------------------  IN: 465 mL / OUT: 325 mL / NET: 140 mL        Appearance: Normal	  HEENT:   Normal oral mucosa, PERRL, EOMI	  Lymphatic: No lymphadenopathy  Cardiovascular: Normal S1 S2, No JVD, No murmurs, No edema  Respiratory: Lungs clear to auscultation	  Gastrointestinal:  Soft, Non-tender, + BS	+ drain   Skin: No rashes, No ecchymoses, No cyanosis	  Neurologic: Non-focal  Extremities: dec range of motion, No clubbing, cyanosis or edema  Vascular: Peripheral pulses palpable 2+ bilaterally    TELEMETRY: 	    ECG:  	  RADIOLOGY:  OTHER: 	  	  LABS:	 	    CARDIAC MARKERS:                                11.1   5.66  )-----------( 508      ( 24 Apr 2019 05:44 )             35.0     04-24    138  |  104  |  16  ----------------------------<  87  4.1   |  22  |  1.27    Ca    9.0      24 Apr 2019 05:44  Phos  2.7     04-24  Mg     2.2     04-24    TPro  7.5  /  Alb  3.5  /  TBili  1.2  /  DBili  0.7<H>  /  AST  142<H>  /  ALT  305<H>  /  AlkPhos  207<H>  04-24    proBNP:   Lipid Profile:   HgA1c:   TSH:

## 2019-04-25 ENCOUNTER — TRANSCRIPTION ENCOUNTER (OUTPATIENT)
Age: 84
End: 2019-04-25

## 2019-04-25 DIAGNOSIS — I10 ESSENTIAL (PRIMARY) HYPERTENSION: ICD-10-CM

## 2019-04-25 LAB
ALBUMIN SERPL ELPH-MCNC: 3.4 G/DL — SIGNIFICANT CHANGE UP (ref 3.3–5)
ALP SERPL-CCNC: 207 U/L — HIGH (ref 40–120)
ALT FLD-CCNC: 301 U/L — HIGH (ref 4–41)
ANION GAP SERPL CALC-SCNC: 11 MMO/L — SIGNIFICANT CHANGE UP (ref 7–14)
APPEARANCE UR: CLEAR — SIGNIFICANT CHANGE UP
AST SERPL-CCNC: 128 U/L — HIGH (ref 4–40)
BACTERIA # UR AUTO: SIGNIFICANT CHANGE UP
BILIRUB DIRECT SERPL-MCNC: 0.6 MG/DL — HIGH (ref 0.1–0.2)
BILIRUB SERPL-MCNC: 1.1 MG/DL — SIGNIFICANT CHANGE UP (ref 0.2–1.2)
BILIRUB UR-MCNC: NEGATIVE — SIGNIFICANT CHANGE UP
BLOOD UR QL VISUAL: HIGH
BUN SERPL-MCNC: 10 MG/DL — SIGNIFICANT CHANGE UP (ref 7–23)
CALCIUM SERPL-MCNC: 8.8 MG/DL — SIGNIFICANT CHANGE UP (ref 8.4–10.5)
CHLORIDE SERPL-SCNC: 105 MMOL/L — SIGNIFICANT CHANGE UP (ref 98–107)
CO2 SERPL-SCNC: 22 MMOL/L — SIGNIFICANT CHANGE UP (ref 22–31)
COLOR SPEC: SIGNIFICANT CHANGE UP
CREAT SERPL-MCNC: 1.05 MG/DL — SIGNIFICANT CHANGE UP (ref 0.5–1.3)
GLUCOSE SERPL-MCNC: 88 MG/DL — SIGNIFICANT CHANGE UP (ref 70–99)
GLUCOSE UR-MCNC: NEGATIVE — SIGNIFICANT CHANGE UP
HCT VFR BLD CALC: 35.4 % — LOW (ref 39–50)
HGB BLD-MCNC: 11.5 G/DL — LOW (ref 13–17)
HYALINE CASTS # UR AUTO: NEGATIVE — SIGNIFICANT CHANGE UP
KETONES UR-MCNC: NEGATIVE — SIGNIFICANT CHANGE UP
LEUKOCYTE ESTERASE UR-ACNC: NEGATIVE — SIGNIFICANT CHANGE UP
MAGNESIUM SERPL-MCNC: 2.2 MG/DL — SIGNIFICANT CHANGE UP (ref 1.6–2.6)
MCHC RBC-ENTMCNC: 30 PG — SIGNIFICANT CHANGE UP (ref 27–34)
MCHC RBC-ENTMCNC: 32.5 % — SIGNIFICANT CHANGE UP (ref 32–36)
MCV RBC AUTO: 92.4 FL — SIGNIFICANT CHANGE UP (ref 80–100)
NITRITE UR-MCNC: NEGATIVE — SIGNIFICANT CHANGE UP
NRBC # FLD: 0 K/UL — SIGNIFICANT CHANGE UP (ref 0–0)
PH UR: 6.5 — SIGNIFICANT CHANGE UP (ref 5–8)
PHOSPHATE SERPL-MCNC: 2.4 MG/DL — LOW (ref 2.5–4.5)
PLATELET # BLD AUTO: 555 K/UL — HIGH (ref 150–400)
PMV BLD: 8.8 FL — SIGNIFICANT CHANGE UP (ref 7–13)
POTASSIUM SERPL-MCNC: 4.2 MMOL/L — SIGNIFICANT CHANGE UP (ref 3.5–5.3)
POTASSIUM SERPL-SCNC: 4.2 MMOL/L — SIGNIFICANT CHANGE UP (ref 3.5–5.3)
PROT SERPL-MCNC: 7.5 G/DL — SIGNIFICANT CHANGE UP (ref 6–8.3)
PROT UR-MCNC: 30 — SIGNIFICANT CHANGE UP
RBC # BLD: 3.83 M/UL — LOW (ref 4.2–5.8)
RBC # FLD: 13 % — SIGNIFICANT CHANGE UP (ref 10.3–14.5)
RBC CASTS # UR COMP ASSIST: HIGH (ref 0–?)
SODIUM SERPL-SCNC: 138 MMOL/L — SIGNIFICANT CHANGE UP (ref 135–145)
SP GR SPEC: 1.01 — SIGNIFICANT CHANGE UP (ref 1–1.04)
SQUAMOUS # UR AUTO: SIGNIFICANT CHANGE UP
UROBILINOGEN FLD QL: NORMAL — SIGNIFICANT CHANGE UP
WBC # BLD: 6.06 K/UL — SIGNIFICANT CHANGE UP (ref 3.8–10.5)
WBC # FLD AUTO: 6.06 K/UL — SIGNIFICANT CHANGE UP (ref 3.8–10.5)
WBC UR QL: HIGH (ref 0–?)

## 2019-04-25 PROCEDURE — 99232 SBSQ HOSP IP/OBS MODERATE 35: CPT

## 2019-04-25 RX ORDER — POLYETHYLENE GLYCOL 3350 17 G/17G
17 POWDER, FOR SOLUTION ORAL ONCE
Qty: 0 | Refills: 0 | Status: COMPLETED | OUTPATIENT
Start: 2019-04-25 | End: 2019-04-25

## 2019-04-25 RX ORDER — POLYETHYLENE GLYCOL 3350 17 G/17G
17 POWDER, FOR SOLUTION ORAL EVERY 12 HOURS
Qty: 0 | Refills: 0 | Status: DISCONTINUED | OUTPATIENT
Start: 2019-04-25 | End: 2019-04-26

## 2019-04-25 RX ORDER — LACTULOSE 10 G/15ML
20 SOLUTION ORAL DAILY
Qty: 0 | Refills: 0 | Status: DISCONTINUED | OUTPATIENT
Start: 2019-04-25 | End: 2019-04-26

## 2019-04-25 RX ADMIN — LISINOPRIL 20 MILLIGRAM(S): 2.5 TABLET ORAL at 05:30

## 2019-04-25 RX ADMIN — SENNA PLUS 2 TABLET(S): 8.6 TABLET ORAL at 21:52

## 2019-04-25 RX ADMIN — LEVETIRACETAM 500 MILLIGRAM(S): 250 TABLET, FILM COATED ORAL at 17:22

## 2019-04-25 RX ADMIN — SODIUM CHLORIDE 75 MILLILITER(S): 9 INJECTION INTRAMUSCULAR; INTRAVENOUS; SUBCUTANEOUS at 11:34

## 2019-04-25 RX ADMIN — Medication 0.12 MILLIGRAM(S): at 05:30

## 2019-04-25 RX ADMIN — PIPERACILLIN AND TAZOBACTAM 25 GRAM(S): 4; .5 INJECTION, POWDER, LYOPHILIZED, FOR SOLUTION INTRAVENOUS at 13:03

## 2019-04-25 RX ADMIN — ENOXAPARIN SODIUM 40 MILLIGRAM(S): 100 INJECTION SUBCUTANEOUS at 11:35

## 2019-04-25 RX ADMIN — Medication 100 MILLIGRAM(S): at 17:22

## 2019-04-25 RX ADMIN — POLYETHYLENE GLYCOL 3350 17 GRAM(S): 17 POWDER, FOR SOLUTION ORAL at 14:50

## 2019-04-25 RX ADMIN — Medication 240 MILLIGRAM(S): at 05:30

## 2019-04-25 RX ADMIN — LEVETIRACETAM 500 MILLIGRAM(S): 250 TABLET, FILM COATED ORAL at 05:30

## 2019-04-25 RX ADMIN — Medication 50 MILLIGRAM(S): at 21:52

## 2019-04-25 RX ADMIN — PIPERACILLIN AND TAZOBACTAM 25 GRAM(S): 4; .5 INJECTION, POWDER, LYOPHILIZED, FOR SOLUTION INTRAVENOUS at 05:31

## 2019-04-25 RX ADMIN — Medication 10 MILLIGRAM(S): at 14:50

## 2019-04-25 RX ADMIN — TAMSULOSIN HYDROCHLORIDE 0.4 MILLIGRAM(S): 0.4 CAPSULE ORAL at 21:52

## 2019-04-25 RX ADMIN — POLYETHYLENE GLYCOL 3350 17 GRAM(S): 17 POWDER, FOR SOLUTION ORAL at 17:22

## 2019-04-25 RX ADMIN — PIPERACILLIN AND TAZOBACTAM 25 GRAM(S): 4; .5 INJECTION, POWDER, LYOPHILIZED, FOR SOLUTION INTRAVENOUS at 21:52

## 2019-04-25 RX ADMIN — LACTULOSE 20 GRAM(S): 10 SOLUTION ORAL at 11:35

## 2019-04-25 RX ADMIN — Medication 100 MILLIGRAM(S): at 05:30

## 2019-04-25 NOTE — PROGRESS NOTE ADULT - REASON FOR ADMISSION
elevated LFTs post cholecystectomy elevated LFTs post cholecystectomy  bile duct injury s/p laparoscopic cholecystectomy

## 2019-04-25 NOTE — PROGRESS NOTE ADULT - ASSESSMENT
84M transferred from OSH due to bile duct injury following lap gus now s/p PTC (4/19)    -pain control as needed  -monitor drain outputs  -continue zosyn  -OOB and ambulating as tolerated  -subq heparin for DVT ppx  -OR planning for friday   -c/w bowel prep  -PT-->no needs    D Team   [id number]

## 2019-04-25 NOTE — PROGRESS NOTE ADULT - SUBJECTIVE AND OBJECTIVE BOX
Surgery Progress Note    S: Patient seen and examined. No acute events overnight. patient backed down to [facility] for prep for OR on Friday. patient denies n/v. - flatus, - BM. patient encouraged to take home medications.     O:  Vital Signs Last 24 Hrs  T(C): 36.4 (24 Apr 2019 23:45), Max: 36.7 (24 Apr 2019 05:32)  T(F): 97.5 (24 Apr 2019 23:45), Max: 98 (24 Apr 2019 05:32)  HR: 64 (24 Apr 2019 23:45) (62 - 69)  BP: 142/69 (24 Apr 2019 23:45) (107/46 - 142/69)  BP(mean): 74 (24 Apr 2019 08:43) (74 - 74)  RR: 18 (24 Apr 2019 23:45) (18 - 18)  SpO2: 98% (24 Apr 2019 23:45) (92% - 99%)    I&O's Detail    23 Apr 2019 07:01  -  24 Apr 2019 07:00  --------------------------------------------------------  IN:    IV PiggyBack: 100 mL    Oral Fluid: 720 mL    sodium chloride 0.9%.: 700 mL  Total IN: 1520 mL    OUT:    Bulb: 550 mL    Bulb: 32.5 mL    Indwelling Catheter - Urethral: 610 mL  Total OUT: 1192.5 mL    Total NET: 327.5 mL      24 Apr 2019 07:01  -  25 Apr 2019 00:18  --------------------------------------------------------  IN:    IV PiggyBack: 100 mL    Oral Fluid: 480 mL    sodium chloride 0.9%.: 825 mL  Total IN: 1405 mL    OUT:    Bulb: 159 mL    Bulb: 53 mL    Indwelling Catheter - Urethral: 1025 mL  Total OUT: 1237 mL    Total NET: 168 mL          MEDICATIONS  (STANDING):  digoxin     Tablet 0.125 milliGRAM(s) Oral daily  diltiazem    milliGRAM(s) Oral daily  docusate sodium 100 milliGRAM(s) Oral two times a day  enoxaparin Injectable 40 milliGRAM(s) SubCutaneous daily  glycerin Suppository - Adult 1 Suppository(s) Rectal once  levETIRAcetam 500 milliGRAM(s) Oral two times a day  lisinopril 20 milliGRAM(s) Oral daily  piperacillin/tazobactam IVPB. 3.375 Gram(s) IV Intermittent every 8 hours  polyethylene glycol 3350 17 Gram(s) Oral daily  senna 2 Tablet(s) Oral at bedtime  sodium chloride 0.9%. 1000 milliLiter(s) (75 mL/Hr) IV Continuous <Continuous>  tamsulosin 0.4 milliGRAM(s) Oral at bedtime  traZODone 50 milliGRAM(s) Oral at bedtime    MEDICATIONS  (PRN):                            11.1   5.66  )-----------( 508      ( 24 Apr 2019 05:44 )             35.0       04-24    138  |  104  |  16  ----------------------------<  87  4.1   |  22  |  1.27    Ca    9.0      24 Apr 2019 05:44  Phos  2.7     04-24  Mg     2.2     04-24    TPro  7.5  /  Alb  3.5  /  TBili  1.2  /  DBili  0.7<H>  /  AST  142<H>  /  ALT  305<H>  /  AlkPhos  207<H>  04-24      Physical Exam:  Gen: Laying in bed, NAD  Resp: Unlabored breathing  Abd: soft, NT, moderately distended, no rebound or guarding, epigastric PTC with bilious output, RUP ANAYELI with bilious output  Ext: WWP

## 2019-04-25 NOTE — PROGRESS NOTE ADULT - SUBJECTIVE AND OBJECTIVE BOX
CHIEF COMPLAINT:Patient is a 84y old  Male who presents with a chief complaint of elevated LFTs post cholecystectomy  bile duct injury s/p laparoscopic cholecystectomy (25 Apr 2019 00:17)    	        PAST MEDICAL & SURGICAL HISTORY:  Cholelithiasis  History of subdural hematoma  Constipation  Hypertension  Seizure  Acute subdural hematoma: (May 2014 - subdural hematoma,  pt had SX)          REVIEW OF SYSTEMS:  CONSTITUTIONAL: No fever, weight loss, or fatigue  EYES: No eye pain, visual disturbances, or discharge  NECK: No pain or stiffness  RESPIRATORY: No cough, wheezing, chills or hemoptysis; No Shortness of Breath  CARDIOVASCULAR: No chest pain, palpitations, passing out, dizziness, or leg swelling  GASTROINTESTINAL: No abdominal or epigastric pain. No nausea, vomiting, or hematemesis; No diarrhea or constipation. No melena or hematochezia.  GENITOURINARY: No dysuria, frequency, hematuria, or incontinence  NEUROLOGICAL: No headaches, memory loss, loss of strength, numbness, or tremors  SKIN: No itching, burning, rashes, or lesions   LYMPH Nodes: No enlarged glands  ENDOCRINE: No heat or cold intolerance; No hair loss  MUSCULOSKELETAL: No joint pain or swelling; No muscle, back, or extremity pain    Medications:  MEDICATIONS  (STANDING):  digoxin     Tablet 0.125 milliGRAM(s) Oral daily  diltiazem    milliGRAM(s) Oral daily  docusate sodium 100 milliGRAM(s) Oral two times a day  enoxaparin Injectable 40 milliGRAM(s) SubCutaneous daily  glycerin Suppository - Adult 1 Suppository(s) Rectal once  levETIRAcetam 500 milliGRAM(s) Oral two times a day  lisinopril 20 milliGRAM(s) Oral daily  piperacillin/tazobactam IVPB. 3.375 Gram(s) IV Intermittent every 8 hours  polyethylene glycol 3350 17 Gram(s) Oral daily  senna 2 Tablet(s) Oral at bedtime  sodium chloride 0.9%. 1000 milliLiter(s) (75 mL/Hr) IV Continuous <Continuous>  tamsulosin 0.4 milliGRAM(s) Oral at bedtime  traZODone 50 milliGRAM(s) Oral at bedtime    MEDICATIONS  (PRN):    	    PHYSICAL EXAM:  T(C): 36.7 (04-25-19 @ 05:28), Max: 36.7 (04-25-19 @ 05:28)  HR: 63 (04-25-19 @ 05:28) (63 - 69)  BP: 140/67 (04-25-19 @ 05:28) (107/46 - 142/69)  RR: 18 (04-25-19 @ 05:28) (18 - 18)  SpO2: 98% (04-25-19 @ 05:28) (92% - 99%)  Wt(kg): --  I&O's Summary    24 Apr 2019 07:01  -  25 Apr 2019 07:00  --------------------------------------------------------  IN: 1405 mL / OUT: 2382 mL / NET: -977 mL        Appearance: Normal	  HEENT:   Normal oral mucosa, PERRL, EOMI	  Lymphatic: No lymphadenopathy  Cardiovascular: Normal S1 S2, No JVD, No murmurs, No edema  Respiratory: Lungs clear to auscultation	  Psychiatry: A & O x 3, Mood & affect appropriate  Gastrointestinal:  Soft, Non-tender, + BS	  Skin: No rashes, No ecchymoses, No cyanosis	  Neurologic: Non-focal  Extremities: Normal range of motion, No clubbing, cyanosis or edema  Vascular: Peripheral pulses palpable 2+ bilaterally    TELEMETRY: 	    ECG:  	  RADIOLOGY:  OTHER: 	  	  LABS:	 	    CARDIAC MARKERS:                                11.5   6.06  )-----------( 555      ( 25 Apr 2019 06:25 )             35.4     04-25    138  |  105  |  10  ----------------------------<  88  4.2   |  22  |  1.05    Ca    8.8      25 Apr 2019 06:25  Phos  2.4     04-25  Mg     2.2     04-25    TPro  7.5  /  Alb  3.4  /  TBili  1.1  /  DBili  0.6<H>  /  AST  128<H>  /  ALT  301<H>  /  AlkPhos  207<H>  04-25    proBNP:   Lipid Profile:   HgA1c:   TSH: CHIEF COMPLAINT:Patient is a 84y old  Male who presents with a chief complaint of elevated LFTs post cholecystectomy  bile duct injury s/p laparoscopic cholecystectomy (25 Apr 2019 00:17)    	        PAST MEDICAL & SURGICAL HISTORY:  Cholelithiasis  History of subdural hematoma  Constipation  Hypertension  Seizure  Acute subdural hematoma: (May 2014 - subdural hematoma,  pt had SX)          REVIEW OF SYSTEMS:  CONSTITUTIONAL: weak  EYES: No eye pain, visual disturbances, or discharge  NECK: No pain or stiffness  RESPIRATORY: No cough, wheezing, chills or hemoptysis; No Shortness of Breath  CARDIOVASCULAR: No chest pain, palpitations, passing out, dizziness,   GASTROINTESTINAL:abd pain at times  GENITOURINARY: No dysuria, frequency, hematuria, or incontinence  NEUROLOGICAL: No headaches,     Medications:  MEDICATIONS  (STANDING):  digoxin     Tablet 0.125 milliGRAM(s) Oral daily  diltiazem    milliGRAM(s) Oral daily  docusate sodium 100 milliGRAM(s) Oral two times a day  enoxaparin Injectable 40 milliGRAM(s) SubCutaneous daily  glycerin Suppository - Adult 1 Suppository(s) Rectal once  levETIRAcetam 500 milliGRAM(s) Oral two times a day  lisinopril 20 milliGRAM(s) Oral daily  piperacillin/tazobactam IVPB. 3.375 Gram(s) IV Intermittent every 8 hours  polyethylene glycol 3350 17 Gram(s) Oral daily  senna 2 Tablet(s) Oral at bedtime  sodium chloride 0.9%. 1000 milliLiter(s) (75 mL/Hr) IV Continuous <Continuous>  tamsulosin 0.4 milliGRAM(s) Oral at bedtime  traZODone 50 milliGRAM(s) Oral at bedtime    MEDICATIONS  (PRN):    	    PHYSICAL EXAM:  T(C): 36.7 (04-25-19 @ 05:28), Max: 36.7 (04-25-19 @ 05:28)  HR: 63 (04-25-19 @ 05:28) (63 - 69)  BP: 140/67 (04-25-19 @ 05:28) (107/46 - 142/69)  RR: 18 (04-25-19 @ 05:28) (18 - 18)  SpO2: 98% (04-25-19 @ 05:28) (92% - 99%)  Wt(kg): --  I&O's Summary    24 Apr 2019 07:01  -  25 Apr 2019 07:00  --------------------------------------------------------  IN: 1405 mL / OUT: 2382 mL / NET: -977 mL        Appearance: Normal	  HEENT:   Normal oral mucosa, PERRL, EOMI	  Lymphatic: No lymphadenopathy  Cardiovascular: Normal S1 S2, No JVD, No murmurs, No edema  Respiratory: Lungs clear to auscultation	  Psychiatry: A & O x 3, Mood & affect appropriate  Gastrointestinal:  Soft, Non-tender, + BS	tube in place   Skin: No rashes, No ecchymoses, No cyanosis	  Neurologic: Non-focal  Extremities: Normal range of motion, No clubbing, cyanosis or edema  Vascular: Peripheral pulses palpable 2+ bilaterally    TELEMETRY: 	    ECG:  	  RADIOLOGY:  OTHER: 	  	  LABS:	 	    CARDIAC MARKERS:                                11.5   6.06  )-----------( 555      ( 25 Apr 2019 06:25 )             35.4     04-25    138  |  105  |  10  ----------------------------<  88  4.2   |  22  |  1.05    Ca    8.8      25 Apr 2019 06:25  Phos  2.4     04-25  Mg     2.2     04-25    TPro  7.5  /  Alb  3.4  /  TBili  1.1  /  DBili  0.6<H>  /  AST  128<H>  /  ALT  301<H>  /  AlkPhos  207<H>  04-25    proBNP:   Lipid Profile:   HgA1c:   TSH:

## 2019-04-25 NOTE — CHART NOTE - NSCHARTNOTEFT_GEN_A_CORE
Preop Dx: Bile duct injury  Surgeon: Dr. Harp  Procedure: Robotic possible open bile duct repair    Vital Signs Last 24 Hrs  T(C): 36.7 (25 Apr 2019 05:28), Max: 36.7 (25 Apr 2019 05:28)  T(F): 98.1 (25 Apr 2019 05:28), Max: 98.1 (25 Apr 2019 05:28)  HR: 63 (25 Apr 2019 05:28) (63 - 69)  BP: 140/67 (25 Apr 2019 05:28) (107/46 - 142/69)  BP(mean): --  RR: 18 (25 Apr 2019 05:28) (18 - 18)  SpO2: 98% (25 Apr 2019 05:28) (92% - 99%)                        11.5   6.06  )-----------( 555      ( 25 Apr 2019 06:25 )             35.4     04-25    138  |  105  |  10  ----------------------------<  88  4.2   |  22  |  1.05    Ca    8.8      25 Apr 2019 06:25  Phos  2.4     04-25  Mg     2.2     04-25    TPro  7.5  /  Alb  3.4  /  TBili  1.1  /  DBili  0.6<H>  /  AST  128<H>  /  ALT  301<H>  /  AlkPhos  207<H>  04-25      Daily     Daily     EKG:  CXR:   Type and Screen:         A/P: 84y Male       - NPO past midnight, except medications  - IVF while NPO  - Consent signed and in chart  - Medical clearance for OR

## 2019-04-25 NOTE — PROGRESS NOTE ADULT - ASSESSMENT
pt s/p lap choly w/ inc fluid accumulation  ? leak/abd pain at times  drains as per sx  History of subdural hematoma/mental status stable   ct head no acute findings  Hypertension c/w meds  Seizure hx  c/w keppra  dvt proph   iv fluids as needed  oob  medically stable for sx

## 2019-04-26 ENCOUNTER — APPOINTMENT (OUTPATIENT)
Dept: SURGICAL ONCOLOGY | Facility: HOSPITAL | Age: 84
End: 2019-04-26

## 2019-04-26 ENCOUNTER — RESULT REVIEW (OUTPATIENT)
Age: 84
End: 2019-04-26

## 2019-04-26 LAB
ALBUMIN SERPL ELPH-MCNC: 3.5 G/DL — SIGNIFICANT CHANGE UP (ref 3.3–5)
ALP SERPL-CCNC: 203 U/L — HIGH (ref 40–120)
ALT FLD-CCNC: 299 U/L — HIGH (ref 4–41)
ANION GAP SERPL CALC-SCNC: 13 MMO/L — SIGNIFICANT CHANGE UP (ref 7–14)
APTT BLD: 31 SEC — SIGNIFICANT CHANGE UP (ref 27.5–36.3)
AST SERPL-CCNC: 125 U/L — HIGH (ref 4–40)
BASE EXCESS BLDA CALC-SCNC: -1.7 MMOL/L — SIGNIFICANT CHANGE UP
BASE EXCESS BLDA CALC-SCNC: -2.9 MMOL/L — SIGNIFICANT CHANGE UP
BILIRUB SERPL-MCNC: 1.1 MG/DL — SIGNIFICANT CHANGE UP (ref 0.2–1.2)
BLD GP AB SCN SERPL QL: NEGATIVE — SIGNIFICANT CHANGE UP
BUN SERPL-MCNC: 7 MG/DL — SIGNIFICANT CHANGE UP (ref 7–23)
CA-I BLDA-SCNC: 1.18 MMOL/L — SIGNIFICANT CHANGE UP (ref 1.15–1.29)
CA-I BLDA-SCNC: 1.23 MMOL/L — SIGNIFICANT CHANGE UP (ref 1.15–1.29)
CALCIUM SERPL-MCNC: 9.1 MG/DL — SIGNIFICANT CHANGE UP (ref 8.4–10.5)
CHLORIDE SERPL-SCNC: 103 MMOL/L — SIGNIFICANT CHANGE UP (ref 98–107)
CO2 SERPL-SCNC: 21 MMOL/L — LOW (ref 22–31)
CREAT SERPL-MCNC: 1.07 MG/DL — SIGNIFICANT CHANGE UP (ref 0.5–1.3)
GLUCOSE BLDA-MCNC: 158 MG/DL — HIGH (ref 70–99)
GLUCOSE BLDA-MCNC: 163 MG/DL — HIGH (ref 70–99)
GLUCOSE SERPL-MCNC: 96 MG/DL — SIGNIFICANT CHANGE UP (ref 70–99)
HCO3 BLDA-SCNC: 22 MMOL/L — SIGNIFICANT CHANGE UP (ref 22–26)
HCO3 BLDA-SCNC: 23 MMOL/L — SIGNIFICANT CHANGE UP (ref 22–26)
HCT VFR BLD CALC: 37.2 % — LOW (ref 39–50)
HCT VFR BLDA CALC: 29.2 % — LOW (ref 39–51)
HCT VFR BLDA CALC: 30.3 % — LOW (ref 39–51)
HGB BLD-MCNC: 11.7 G/DL — LOW (ref 13–17)
HGB BLDA-MCNC: 9.4 G/DL — LOW (ref 13–17)
HGB BLDA-MCNC: 9.8 G/DL — LOW (ref 13–17)
INR BLD: 1.12 — SIGNIFICANT CHANGE UP (ref 0.88–1.17)
MAGNESIUM SERPL-MCNC: 2.1 MG/DL — SIGNIFICANT CHANGE UP (ref 1.6–2.6)
MCHC RBC-ENTMCNC: 30.4 PG — SIGNIFICANT CHANGE UP (ref 27–34)
MCHC RBC-ENTMCNC: 31.5 % — LOW (ref 32–36)
MCV RBC AUTO: 96.6 FL — SIGNIFICANT CHANGE UP (ref 80–100)
NRBC # FLD: 0 K/UL — SIGNIFICANT CHANGE UP (ref 0–0)
PCO2 BLDA: 46 MMHG — SIGNIFICANT CHANGE UP (ref 35–48)
PCO2 BLDA: 48 MMHG — SIGNIFICANT CHANGE UP (ref 35–48)
PH BLDA: 7.31 PH — LOW (ref 7.35–7.45)
PH BLDA: 7.31 PH — LOW (ref 7.35–7.45)
PHOSPHATE SERPL-MCNC: 2.3 MG/DL — LOW (ref 2.5–4.5)
PLATELET # BLD AUTO: 544 K/UL — HIGH (ref 150–400)
PMV BLD: 8.9 FL — SIGNIFICANT CHANGE UP (ref 7–13)
PO2 BLDA: 336 MMHG — HIGH (ref 83–108)
PO2 BLDA: 374 MMHG — HIGH (ref 83–108)
POTASSIUM BLDA-SCNC: 3.9 MMOL/L — SIGNIFICANT CHANGE UP (ref 3.4–4.5)
POTASSIUM BLDA-SCNC: 4.2 MMOL/L — SIGNIFICANT CHANGE UP (ref 3.4–4.5)
POTASSIUM SERPL-MCNC: 4 MMOL/L — SIGNIFICANT CHANGE UP (ref 3.5–5.3)
POTASSIUM SERPL-SCNC: 4 MMOL/L — SIGNIFICANT CHANGE UP (ref 3.5–5.3)
PROT SERPL-MCNC: 7.9 G/DL — SIGNIFICANT CHANGE UP (ref 6–8.3)
PROTHROM AB SERPL-ACNC: 12.5 SEC — SIGNIFICANT CHANGE UP (ref 9.8–13.1)
RBC # BLD: 3.85 M/UL — LOW (ref 4.2–5.8)
RBC # FLD: 12.9 % — SIGNIFICANT CHANGE UP (ref 10.3–14.5)
RH IG SCN BLD-IMP: POSITIVE — SIGNIFICANT CHANGE UP
SAO2 % BLDA: 99.7 % — HIGH (ref 95–99)
SAO2 % BLDA: 99.7 % — HIGH (ref 95–99)
SODIUM BLDA-SCNC: 136 MMOL/L — SIGNIFICANT CHANGE UP (ref 136–146)
SODIUM BLDA-SCNC: 138 MMOL/L — SIGNIFICANT CHANGE UP (ref 136–146)
SODIUM SERPL-SCNC: 137 MMOL/L — SIGNIFICANT CHANGE UP (ref 135–145)
WBC # BLD: 6.16 K/UL — SIGNIFICANT CHANGE UP (ref 3.8–10.5)
WBC # FLD AUTO: 6.16 K/UL — SIGNIFICANT CHANGE UP (ref 3.8–10.5)

## 2019-04-26 PROCEDURE — 44050 REDUCE BOWEL OBSTRUCTION: CPT | Mod: 82

## 2019-04-26 PROCEDURE — 47420 INCISION OF BILE DUCT: CPT | Mod: 82

## 2019-04-26 PROCEDURE — 47711 EXCISION OF BILE DUCT TUMOR: CPT | Mod: 82

## 2019-04-26 PROCEDURE — 44050 REDUCE BOWEL OBSTRUCTION: CPT

## 2019-04-26 PROCEDURE — 47420 INCISION OF BILE DUCT: CPT

## 2019-04-26 PROCEDURE — 88307 TISSUE EXAM BY PATHOLOGIST: CPT | Mod: 26

## 2019-04-26 PROCEDURE — 47711 EXCISION OF BILE DUCT TUMOR: CPT

## 2019-04-26 PROCEDURE — S2900 ROBOTIC SURGICAL SYSTEM: CPT | Mod: NC

## 2019-04-26 PROCEDURE — 71045 X-RAY EXAM CHEST 1 VIEW: CPT | Mod: 26

## 2019-04-26 PROCEDURE — 47780 FUSE BILE DUCTS AND BOWEL: CPT | Mod: 82

## 2019-04-26 PROCEDURE — 47780 FUSE BILE DUCTS AND BOWEL: CPT

## 2019-04-26 PROCEDURE — 88305 TISSUE EXAM BY PATHOLOGIST: CPT | Mod: 26

## 2019-04-26 RX ORDER — ONDANSETRON 8 MG/1
4 TABLET, FILM COATED ORAL ONCE
Qty: 0 | Refills: 0 | Status: DISCONTINUED | OUTPATIENT
Start: 2019-04-26 | End: 2019-04-26

## 2019-04-26 RX ORDER — ACETAMINOPHEN 500 MG
1000 TABLET ORAL ONCE
Qty: 0 | Refills: 0 | Status: DISCONTINUED | OUTPATIENT
Start: 2019-04-26 | End: 2019-04-26

## 2019-04-26 RX ORDER — DIGOXIN 250 MCG
0.12 TABLET ORAL DAILY
Qty: 0 | Refills: 0 | Status: DISCONTINUED | OUTPATIENT
Start: 2019-04-26 | End: 2019-04-27

## 2019-04-26 RX ORDER — DILTIAZEM HCL 120 MG
5 CAPSULE, EXT RELEASE 24 HR ORAL
Qty: 125 | Refills: 0 | Status: DISCONTINUED | OUTPATIENT
Start: 2019-04-26 | End: 2019-04-27

## 2019-04-26 RX ORDER — HYDROMORPHONE HYDROCHLORIDE 2 MG/ML
1 INJECTION INTRAMUSCULAR; INTRAVENOUS; SUBCUTANEOUS
Qty: 0 | Refills: 0 | Status: DISCONTINUED | OUTPATIENT
Start: 2019-04-26 | End: 2019-04-26

## 2019-04-26 RX ORDER — LEVETIRACETAM 250 MG/1
500 TABLET, FILM COATED ORAL EVERY 12 HOURS
Qty: 0 | Refills: 0 | Status: DISCONTINUED | OUTPATIENT
Start: 2019-04-26 | End: 2019-04-27

## 2019-04-26 RX ORDER — HYDROMORPHONE HYDROCHLORIDE 2 MG/ML
0.5 INJECTION INTRAMUSCULAR; INTRAVENOUS; SUBCUTANEOUS
Qty: 0 | Refills: 0 | Status: DISCONTINUED | OUTPATIENT
Start: 2019-04-26 | End: 2019-04-26

## 2019-04-26 RX ORDER — HEPARIN SODIUM 5000 [USP'U]/ML
5000 INJECTION INTRAVENOUS; SUBCUTANEOUS EVERY 8 HOURS
Qty: 0 | Refills: 0 | Status: DISCONTINUED | OUTPATIENT
Start: 2019-04-26 | End: 2019-04-27

## 2019-04-26 RX ORDER — SODIUM CHLORIDE 9 MG/ML
1000 INJECTION, SOLUTION INTRAVENOUS
Qty: 0 | Refills: 0 | Status: DISCONTINUED | OUTPATIENT
Start: 2019-04-26 | End: 2019-04-27

## 2019-04-26 RX ADMIN — Medication 1.25 MILLIGRAM(S): at 22:37

## 2019-04-26 RX ADMIN — Medication 240 MILLIGRAM(S): at 05:31

## 2019-04-26 RX ADMIN — SODIUM CHLORIDE 125 MILLILITER(S): 9 INJECTION, SOLUTION INTRAVENOUS at 15:51

## 2019-04-26 RX ADMIN — Medication 0.12 MILLIGRAM(S): at 05:31

## 2019-04-26 RX ADMIN — PIPERACILLIN AND TAZOBACTAM 25 GRAM(S): 4; .5 INJECTION, POWDER, LYOPHILIZED, FOR SOLUTION INTRAVENOUS at 05:32

## 2019-04-26 RX ADMIN — Medication 100 MILLIGRAM(S): at 05:31

## 2019-04-26 RX ADMIN — SODIUM CHLORIDE 125 MILLILITER(S): 9 INJECTION, SOLUTION INTRAVENOUS at 20:55

## 2019-04-26 RX ADMIN — Medication 0.12 MILLIGRAM(S): at 22:37

## 2019-04-26 RX ADMIN — POLYETHYLENE GLYCOL 3350 17 GRAM(S): 17 POWDER, FOR SOLUTION ORAL at 05:32

## 2019-04-26 RX ADMIN — PIPERACILLIN AND TAZOBACTAM 25 GRAM(S): 4; .5 INJECTION, POWDER, LYOPHILIZED, FOR SOLUTION INTRAVENOUS at 15:19

## 2019-04-26 RX ADMIN — LISINOPRIL 20 MILLIGRAM(S): 2.5 TABLET ORAL at 05:31

## 2019-04-26 RX ADMIN — LEVETIRACETAM 500 MILLIGRAM(S): 250 TABLET, FILM COATED ORAL at 05:31

## 2019-04-26 NOTE — BRIEF OPERATIVE NOTE - SPECIMENS
1)  jossy-portal tissue;  2) portion of hepatic duct;  3)   segment of small bowel and hepatico-jejunostomy

## 2019-04-26 NOTE — PROVIDER CONTACT NOTE (OTHER) - SITUATION
Pt is refusing second IV access and I am unable to administer Cardizem, Keppra, Zosyn, IV fluids through this IV. Pt is bending arm and will not allow me to touch him to fix it.

## 2019-04-26 NOTE — BRIEF OPERATIVE NOTE - NSICDXBRIEFPROCEDURE_GEN_ALL_CORE_FT
PROCEDURES:  Small bowel resection with anastomosis 26-Apr-2019 15:03:08  Pooja Nj  Laparoscopic hepaticojejunostomy 26-Apr-2019 15:02:33  Pooja Nj

## 2019-04-26 NOTE — PROGRESS NOTE ADULT - SUBJECTIVE AND OBJECTIVE BOX
Surgery Progress Note      Subjective: Patient seen and examined. No acute events overnight.     T(C): 36.9 (04-26-19 @ 07:10), Max: 36.9 (04-26-19 @ 07:10)  HR: 66 (04-26-19 @ 07:10) (59 - 79)  BP: 161/80 (04-26-19 @ 07:10) (132/59 - 161/80)  RR: 16 (04-26-19 @ 07:10) (16 - 18)  SpO2: 100% (04-26-19 @ 07:10) (98% - 100%)      04-25-19 @ 07:01  -  04-26-19 @ 07:00  --------------------------------------------------------  IN: 1645 mL / OUT: 3464.5 mL / NET: -1819.5 mL        Physical Exam:     General: NAD  Abdomen: soft, not tender, moderately distended, epigastric PTC with bilious output, RUP ANAYELI with bilious output    Labs:                          11.7   6.16  )-----------( 544      ( 26 Apr 2019 05:57 )             37.2     04-26    137  |  103  |  7   ----------------------------<  96  4.0   |  21<L>  |  1.07    Ca    9.1      26 Apr 2019 05:57  Phos  2.3     04-26  Mg     2.1     04-26    TPro  7.9  /  Alb  3.5  /  TBili  1.1  /  DBili  x   /  AST  125<H>  /  ALT  299<H>  /  AlkPhos  203<H>  04-26      Medications:     digoxin     Tablet 0.125 milliGRAM(s) Oral daily  diltiazem    milliGRAM(s) Oral daily  docusate sodium 100 milliGRAM(s) Oral two times a day  glycerin Suppository - Adult 1 Suppository(s) Rectal once  lactulose Syrup 20 Gram(s) Oral daily  levETIRAcetam 500 milliGRAM(s) Oral two times a day  lisinopril 20 milliGRAM(s) Oral daily  piperacillin/tazobactam IVPB. 3.375 Gram(s) IV Intermittent every 8 hours  polyethylene glycol 3350 17 Gram(s) Oral every 12 hours  senna 2 Tablet(s) Oral at bedtime  sodium chloride 0.9%. 1000 milliLiter(s) IV Continuous <Continuous>  tamsulosin 0.4 milliGRAM(s) Oral at bedtime  traZODone 50 milliGRAM(s) Oral at bedtime      Radiographs: No new imaging

## 2019-04-26 NOTE — BRIEF OPERATIVE NOTE - BRIEF OP NOTE DRAINS
1)  19 [place] harika drains x 2;  2) previously placed IR bile drain; 3)  campoverde catheter; 4) 8fr pediatric feeding tube (5cm) placed as stent in hepaticojejunostomy

## 2019-04-26 NOTE — CHART NOTE - NSCHARTNOTEFT_GEN_A_CORE
Post Operative Note      Procedure: Robotic assisted Laparoscopic hepaticojejunostomy, small bowel resection and anastomosis    Subjective: Patient seen and examined. He states that his pain is well controlled    Objective:    T(C): 36.7 (04-26-19 @ 20:48), Max: 36.9 (04-26-19 @ 07:10)  HR: 92 (04-26-19 @ 20:48) (66 - 95)  BP: 170/90 (04-26-19 @ 20:48) (111/57 - 170/90)  RR: 16 (04-26-19 @ 20:48) (12 - 22)  SpO2: 96% (04-26-19 @ 20:48) (95% - 100%)      04-25-19 @ 07:01  -  04-26-19 @ 07:00  --------------------------------------------------------  IN: 1645 mL / OUT: 3464.5 mL / NET: -1819.5 mL    04-26-19 @ 07:01  -  04-26-19 @ 21:47  --------------------------------------------------------  IN: 3525 mL / OUT: 1965 mL / NET: 1560 mL        Physical Exam:  General: NAD  Abdomen: soft, distended, appropriately tender, ANAYELI 1 (medial) serosanguinous output, ANAYELI 2 (lateral) with serosanguinous with bile tinge output, previous PTC in place, dressings c/d/i  : campoverde in situ    Medications:     MEDICATIONS  (STANDING):  digoxin     Tablet 0.125 milliGRAM(s) Oral daily  diltiazem    milliGRAM(s) Oral daily  docusate sodium 100 milliGRAM(s) Oral two times a day  glycerin Suppository - Adult 1 Suppository(s) Rectal once  heparin  Injectable 5000 Unit(s) SubCutaneous every 8 hours  lactated ringers. 1000 milliLiter(s) (125 mL/Hr) IV Continuous <Continuous>  lactulose Syrup 20 Gram(s) Oral daily  levETIRAcetam 500 milliGRAM(s) Oral two times a day  lisinopril 20 milliGRAM(s) Oral daily  piperacillin/tazobactam IVPB. 3.375 Gram(s) IV Intermittent every 8 hours  polyethylene glycol 3350 17 Gram(s) Oral every 12 hours  senna 2 Tablet(s) Oral at bedtime  sodium chloride 0.9%. 1000 milliLiter(s) (75 mL/Hr) IV Continuous <Continuous>  tamsulosin 0.4 milliGRAM(s) Oral at bedtime  traZODone 50 milliGRAM(s) Oral at bedtime    MEDICATIONS  (PRN):    MEDICATIONS  (STANDING):  digoxin     Tablet 0.125 milliGRAM(s) Oral daily  diltiazem    milliGRAM(s) Oral daily  docusate sodium 100 milliGRAM(s) Oral two times a day  glycerin Suppository - Adult 1 Suppository(s) Rectal once  heparin  Injectable 5000 Unit(s) SubCutaneous every 8 hours  lactated ringers. 1000 milliLiter(s) (125 mL/Hr) IV Continuous <Continuous>  lactulose Syrup 20 Gram(s) Oral daily  levETIRAcetam 500 milliGRAM(s) Oral two times a day  lisinopril 20 milliGRAM(s) Oral daily  piperacillin/tazobactam IVPB. 3.375 Gram(s) IV Intermittent every 8 hours  polyethylene glycol 3350 17 Gram(s) Oral every 12 hours  senna 2 Tablet(s) Oral at bedtime  sodium chloride 0.9%. 1000 milliLiter(s) (75 mL/Hr) IV Continuous <Continuous>  tamsulosin 0.4 milliGRAM(s) Oral at bedtime  traZODone 50 milliGRAM(s) Oral at bedtime    MEDICATIONS  (PRN):      Labs/Studies:                            11.7   6.16  )-----------( 544      ( 26 Apr 2019 05:57 )             37.2     04-26    137  |  103  |  7   ----------------------------<  96  4.0   |  21<L>  |  1.07    Ca    9.1      26 Apr 2019 05:57  Phos  2.3     04-26  Mg     2.1     04-26    TPro  7.9  /  Alb  3.5  /  TBili  1.1  /  DBili  x   /  AST  125<H>  /  ALT  299<H>  /  AlkPhos  203<H>  04-26      Assessment/Plan:    84M transferred from OSH due to bile duct injury following lap gus now s/p PTC (4/19) now s/p robotic assisted Laparoscopic hepaticojejunostomy, small bowel resection and anastomosis (4/26)    -pain control   -monitor drain outputs  -continue zosyn  -NPO  -OOB and ambulating as tolerated  -DVT ppx    D Team   [id number]

## 2019-04-26 NOTE — PRE-OP CHECKLIST - 1.
campoverde light anselmo urine,     right lower ANAYELI drain with serousanguinous drainage,  biliary bag with bilious fluid

## 2019-04-26 NOTE — PROGRESS NOTE ADULT - ASSESSMENT
84M transferred from OSH due to bile duct injury following lap gus now s/p PTC (4/19)    -pain control   -monitor drain outputs  -continue zosyn  -OOB and ambulating as tolerated  -subq heparin for DVT ppx  - OR today for bile duct repair    D Team   [id number]

## 2019-04-27 LAB
ALBUMIN SERPL ELPH-MCNC: 3.8 G/DL — SIGNIFICANT CHANGE UP (ref 3.3–5)
ALP SERPL-CCNC: 160 U/L — HIGH (ref 40–120)
ALT FLD-CCNC: 287 U/L — HIGH (ref 4–41)
ANION GAP SERPL CALC-SCNC: 15 MMO/L — HIGH (ref 7–14)
AST SERPL-CCNC: 147 U/L — HIGH (ref 4–40)
BILIRUB DIRECT SERPL-MCNC: 0.5 MG/DL — HIGH (ref 0.1–0.2)
BILIRUB SERPL-MCNC: 1.1 MG/DL — SIGNIFICANT CHANGE UP (ref 0.2–1.2)
BUN SERPL-MCNC: 7 MG/DL — SIGNIFICANT CHANGE UP (ref 7–23)
CALCIUM SERPL-MCNC: 9.2 MG/DL — SIGNIFICANT CHANGE UP (ref 8.4–10.5)
CHLORIDE SERPL-SCNC: 100 MMOL/L — SIGNIFICANT CHANGE UP (ref 98–107)
CO2 SERPL-SCNC: 21 MMOL/L — LOW (ref 22–31)
CREAT SERPL-MCNC: 1.02 MG/DL — SIGNIFICANT CHANGE UP (ref 0.5–1.3)
GLUCOSE SERPL-MCNC: 133 MG/DL — HIGH (ref 70–99)
HCT VFR BLD CALC: 31.6 % — LOW (ref 39–50)
HGB BLD-MCNC: 10.2 G/DL — LOW (ref 13–17)
MAGNESIUM SERPL-MCNC: 2 MG/DL — SIGNIFICANT CHANGE UP (ref 1.6–2.6)
MCHC RBC-ENTMCNC: 30.4 PG — SIGNIFICANT CHANGE UP (ref 27–34)
MCHC RBC-ENTMCNC: 32.3 % — SIGNIFICANT CHANGE UP (ref 32–36)
MCV RBC AUTO: 94 FL — SIGNIFICANT CHANGE UP (ref 80–100)
NRBC # FLD: 0.03 K/UL — SIGNIFICANT CHANGE UP (ref 0–0)
PHOSPHATE SERPL-MCNC: 2.4 MG/DL — LOW (ref 2.5–4.5)
PLATELET # BLD AUTO: 518 K/UL — HIGH (ref 150–400)
PMV BLD: 9.4 FL — SIGNIFICANT CHANGE UP (ref 7–13)
POTASSIUM SERPL-MCNC: 4.4 MMOL/L — SIGNIFICANT CHANGE UP (ref 3.5–5.3)
POTASSIUM SERPL-SCNC: 4.4 MMOL/L — SIGNIFICANT CHANGE UP (ref 3.5–5.3)
PROT SERPL-MCNC: 7.5 G/DL — SIGNIFICANT CHANGE UP (ref 6–8.3)
RBC # BLD: 3.36 M/UL — LOW (ref 4.2–5.8)
RBC # FLD: 13 % — SIGNIFICANT CHANGE UP (ref 10.3–14.5)
SODIUM SERPL-SCNC: 136 MMOL/L — SIGNIFICANT CHANGE UP (ref 135–145)
WBC # BLD: 9.11 K/UL — SIGNIFICANT CHANGE UP (ref 3.8–10.5)
WBC # FLD AUTO: 9.11 K/UL — SIGNIFICANT CHANGE UP (ref 3.8–10.5)

## 2019-04-27 RX ORDER — DILTIAZEM HCL 120 MG
240 CAPSULE, EXT RELEASE 24 HR ORAL DAILY
Qty: 0 | Refills: 0 | Status: DISCONTINUED | OUTPATIENT
Start: 2019-04-27 | End: 2019-04-27

## 2019-04-27 RX ORDER — LANOLIN ALCOHOL/MO/W.PET/CERES
3 CREAM (GRAM) TOPICAL AT BEDTIME
Qty: 0 | Refills: 0 | Status: DISCONTINUED | OUTPATIENT
Start: 2019-04-27 | End: 2019-05-10

## 2019-04-27 RX ORDER — DILTIAZEM HCL 120 MG
240 CAPSULE, EXT RELEASE 24 HR ORAL DAILY
Qty: 0 | Refills: 0 | Status: DISCONTINUED | OUTPATIENT
Start: 2019-04-27 | End: 2019-05-10

## 2019-04-27 RX ORDER — DEXTROSE MONOHYDRATE, SODIUM CHLORIDE, AND POTASSIUM CHLORIDE 50; .745; 4.5 G/1000ML; G/1000ML; G/1000ML
1000 INJECTION, SOLUTION INTRAVENOUS
Qty: 0 | Refills: 0 | Status: DISCONTINUED | OUTPATIENT
Start: 2019-04-27 | End: 2019-04-29

## 2019-04-27 RX ORDER — GLYCERIN ADULT
1 SUPPOSITORY, RECTAL RECTAL ONCE
Qty: 0 | Refills: 0 | Status: COMPLETED | OUTPATIENT
Start: 2019-04-27 | End: 2019-05-01

## 2019-04-27 RX ORDER — ACETAMINOPHEN 500 MG
650 TABLET ORAL EVERY 6 HOURS
Qty: 0 | Refills: 0 | Status: DISCONTINUED | OUTPATIENT
Start: 2019-04-27 | End: 2019-05-10

## 2019-04-27 RX ORDER — OXYCODONE HYDROCHLORIDE 5 MG/1
5 TABLET ORAL EVERY 4 HOURS
Qty: 0 | Refills: 0 | Status: DISCONTINUED | OUTPATIENT
Start: 2019-04-27 | End: 2019-04-30

## 2019-04-27 RX ORDER — LEVETIRACETAM 250 MG/1
500 TABLET, FILM COATED ORAL
Qty: 0 | Refills: 0 | Status: DISCONTINUED | OUTPATIENT
Start: 2019-04-27 | End: 2019-05-10

## 2019-04-27 RX ORDER — LISINOPRIL 2.5 MG/1
20 TABLET ORAL DAILY
Qty: 0 | Refills: 0 | Status: DISCONTINUED | OUTPATIENT
Start: 2019-04-27 | End: 2019-05-07

## 2019-04-27 RX ORDER — DIGOXIN 250 MCG
0.12 TABLET ORAL DAILY
Qty: 0 | Refills: 0 | Status: DISCONTINUED | OUTPATIENT
Start: 2019-04-27 | End: 2019-05-10

## 2019-04-27 RX ORDER — ENOXAPARIN SODIUM 100 MG/ML
40 INJECTION SUBCUTANEOUS DAILY
Qty: 0 | Refills: 0 | Status: DISCONTINUED | OUTPATIENT
Start: 2019-04-27 | End: 2019-05-10

## 2019-04-27 RX ORDER — LISINOPRIL 2.5 MG/1
20 TABLET ORAL DAILY
Qty: 0 | Refills: 0 | Status: DISCONTINUED | OUTPATIENT
Start: 2019-04-27 | End: 2019-04-27

## 2019-04-27 RX ADMIN — Medication 63.75 MILLIMOLE(S): at 16:16

## 2019-04-27 RX ADMIN — Medication 1.25 MILLIGRAM(S): at 04:08

## 2019-04-27 RX ADMIN — PIPERACILLIN AND TAZOBACTAM 25 GRAM(S): 4; .5 INJECTION, POWDER, LYOPHILIZED, FOR SOLUTION INTRAVENOUS at 02:30

## 2019-04-27 RX ADMIN — PIPERACILLIN AND TAZOBACTAM 25 GRAM(S): 4; .5 INJECTION, POWDER, LYOPHILIZED, FOR SOLUTION INTRAVENOUS at 10:27

## 2019-04-27 RX ADMIN — PIPERACILLIN AND TAZOBACTAM 25 GRAM(S): 4; .5 INJECTION, POWDER, LYOPHILIZED, FOR SOLUTION INTRAVENOUS at 18:36

## 2019-04-27 RX ADMIN — Medication 3 MILLIGRAM(S): at 23:26

## 2019-04-27 RX ADMIN — DEXTROSE MONOHYDRATE, SODIUM CHLORIDE, AND POTASSIUM CHLORIDE 125 MILLILITER(S): 50; .745; 4.5 INJECTION, SOLUTION INTRAVENOUS at 04:08

## 2019-04-27 RX ADMIN — Medication 5 MG/HR: at 02:49

## 2019-04-27 RX ADMIN — HEPARIN SODIUM 5000 UNIT(S): 5000 INJECTION INTRAVENOUS; SUBCUTANEOUS at 05:24

## 2019-04-27 RX ADMIN — Medication 240 MILLIGRAM(S): at 18:36

## 2019-04-27 RX ADMIN — LEVETIRACETAM 400 MILLIGRAM(S): 250 TABLET, FILM COATED ORAL at 02:16

## 2019-04-27 RX ADMIN — LEVETIRACETAM 500 MILLIGRAM(S): 250 TABLET, FILM COATED ORAL at 18:36

## 2019-04-27 RX ADMIN — Medication 0.12 MILLIGRAM(S): at 12:28

## 2019-04-27 RX ADMIN — DEXTROSE MONOHYDRATE, SODIUM CHLORIDE, AND POTASSIUM CHLORIDE 75 MILLILITER(S): 50; .745; 4.5 INJECTION, SOLUTION INTRAVENOUS at 18:36

## 2019-04-27 RX ADMIN — DEXTROSE MONOHYDRATE, SODIUM CHLORIDE, AND POTASSIUM CHLORIDE 75 MILLILITER(S): 50; .745; 4.5 INJECTION, SOLUTION INTRAVENOUS at 20:44

## 2019-04-27 NOTE — PROGRESS NOTE ADULT - SUBJECTIVE AND OBJECTIVE BOX
ANESTHESIA POSTOP CHECK    84y Male POSTOP DAY 1    [ X] NO APPARENT ANESTHESIA COMPLICATIONS      Comments:

## 2019-04-27 NOTE — PROVIDER CONTACT NOTE (OTHER) - SITUATION
Asked pt if I could obtain second IV access and he is refusing. Will not let me redress and current IV access he has. Unable to reorient patient.

## 2019-04-27 NOTE — PROGRESS NOTE ADULT - ASSESSMENT
84M transferred from OSH due to bile duct injury following lap gus now s/p PTC (4/19) now s/p robotic assisted Laparoscopic hepaticojejunostomy, small bowel resection and anastomosis (4/26)    -pain control   -monitor drain outputs  -continue zosyn  -NPO  -OOB and ambulating as tolerated  -DVT ppx    D Team   [id number]

## 2019-04-27 NOTE — PROGRESS NOTE ADULT - ASSESSMENT
pt s/p lap choly w/ inc fluid accumulation  ? leak/  s/p hepativ jejonostomy/sb resection  History of subdural hematoma/mental status stable   ct head no acute findings  Hypertension c/w meds  Seizure hx  c/w keppra  dvt proph   iv fluids as needed  oob  pt

## 2019-04-27 NOTE — PROVIDER CONTACT NOTE (OTHER) - ASSESSMENT
pt was trying to climb out of bed when I walked in the room. HR came back down to 90bpm NSR when I settled him back in bed.    temp 99.2 oral  HR 90  /79  RR 18  Spo2 87% room air    patient back in bed with bed alarm activated

## 2019-04-27 NOTE — PROVIDER CONTACT NOTE (OTHER) - BACKGROUND
pt has been refusing IV access since he arrived on 8T from PACU. Current IV is in AC and has needed to be redressed but pt will not allow me to touch it.

## 2019-04-27 NOTE — PROGRESS NOTE ADULT - SUBJECTIVE AND OBJECTIVE BOX
CHIEF COMPLAINT:Patient is a 84y old  Male who presents with a chief complaint of elevated LFTs post cholecystectomy (27 Apr 2019 00:37)    	        PAST MEDICAL & SURGICAL HISTORY:  Cholelithiasis  History of subdural hematoma  Constipation  Hypertension  Seizure  Acute subdural hematoma: (May 2014 - subdural hematoma,  pt had SX)          REVIEW OF SYSTEMS:  CONSTITUTIONAL: No fever, weight loss, or fatigue  EYES: No eye pain, visual disturbances, or discharge  NECK: No pain or stiffness  RESPIRATORY: No cough, wheezing, chills or hemoptysis; No Shortness of Breath  CARDIOVASCULAR: No chest pain, palpitations, passing out, dizziness, or leg swelling  GASTROINTESTINAL:abd pain controlled  GENITOURINARY: No dysuria, frequency, hematuria, or incontinence  NEUROLOGICAL: No headaches,  MUSCULOSKELETAL: No joint pain or swelling; No muscle, back, or extremity pain    Medications:  MEDICATIONS  (STANDING):  dextrose 5% + sodium chloride 0.45% with potassium chloride 20 mEq/L 1000 milliLiter(s) (75 mL/Hr) IV Continuous <Continuous>  digoxin     Tablet 0.125 milliGRAM(s) Oral daily  diltiazem    milliGRAM(s) Oral daily  heparin  Injectable 5000 Unit(s) SubCutaneous every 8 hours  levETIRAcetam 500 milliGRAM(s) Oral two times a day  lisinopril 20 milliGRAM(s) Oral daily  piperacillin/tazobactam IVPB. 3.375 Gram(s) IV Intermittent every 8 hours  sodium phosphate IVPB 15 milliMole(s) IV Intermittent once    MEDICATIONS  (PRN):    	    PHYSICAL EXAM:  T(C): 36.3 (04-27-19 @ 08:33), Max: 36.7 (04-26-19 @ 20:48)  HR: 67 (04-27-19 @ 08:33) (66 - 95)  BP: 145/65 (04-27-19 @ 08:33) (111/57 - 170/90)  RR: 16 (04-27-19 @ 08:33) (12 - 22)  SpO2: 95% (04-27-19 @ 08:33) (92% - 99%)  Wt(kg): --  I&O's Summary    26 Apr 2019 07:01  -  27 Apr 2019 07:00  --------------------------------------------------------  IN: 4050 mL / OUT: 2975 mL / NET: 1075 mL    27 Apr 2019 07:01  -  27 Apr 2019 09:12  --------------------------------------------------------  IN: 130 mL / OUT: 350 mL / NET: -220 mL        Appearance: Normal	  HEENT:   Normal oral mucosa, PERRL, EOMI	  Lymphatic: No lymphadenopathy  Cardiovascular: Normal S1 S2, No JVD, No murmurs, No edema  Respiratory: Lungs clear to auscultation	  Psychiatry: A & O   Gastrointestinal:  Soft, tender no r/g  Skin: No rashes, No ecchymoses, No cyanosis	  Neurologic: Non-focal  Extremities: Normal range of motion, No clubbing, cyanosis or edema  Vascular: Peripheral pulses palpable 2+ bilaterally    TELEMETRY: 	    ECG:  	  RADIOLOGY:  OTHER: 	  	  LABS:	 	    CARDIAC MARKERS:                                10.2   9.11  )-----------( 518      ( 27 Apr 2019 05:49 )             31.6     04-27    136  |  100  |  7   ----------------------------<  133<H>  4.4   |  21<L>  |  1.02    Ca    9.2      27 Apr 2019 05:49  Phos  2.4     04-27  Mg     2.0     04-27    TPro  7.5  /  Alb  3.8  /  TBili  1.1  /  DBili  0.5<H>  /  AST  147<H>  /  ALT  287<H>  /  AlkPhos  160<H>  04-27    proBNP:   Lipid Profile:   HgA1c:   TSH:

## 2019-04-27 NOTE — PROGRESS NOTE ADULT - SUBJECTIVE AND OBJECTIVE BOX
Surgery Progress Note      Subjective: Patient seen and examined. Patietnt refusing all medications, IV, etc.     T(C): 36.7 (04-26-19 @ 20:48), Max: 36.9 (04-26-19 @ 07:10)  HR: 89 (04-26-19 @ 22:36) (66 - 95)  BP: 155/71 (04-26-19 @ 22:36) (111/57 - 170/90)  RR: 16 (04-26-19 @ 20:48) (12 - 22)  SpO2: 96% (04-26-19 @ 20:48) (95% - 100%)      04-25-19 @ 07:01  -  04-26-19 @ 07:00  --------------------------------------------------------  IN: 1645 mL / OUT: 3464.5 mL / NET: -1819.5 mL    04-26-19 @ 07:01  -  04-27-19 @ 00:37  --------------------------------------------------------  IN: 3525 mL / OUT: 1965 mL / NET: 1560 mL        Physical Exam:     General: NAD  Abdomen: soft, distended, appropriately tender, ANAYELI 1 (medial) serosanguinous output, ANAYELI 2 (lateral) with serosanguinous with bile tinge output, previous PTC in place, dressings c/d/i  : gilles in situ      Labs:                          11.7   6.16  )-----------( 544      ( 26 Apr 2019 05:57 )             37.2     04-26    137  |  103  |  7   ----------------------------<  96  4.0   |  21<L>  |  1.07    Ca    9.1      26 Apr 2019 05:57  Phos  2.3     04-26  Mg     2.1     04-26    TPro  7.9  /  Alb  3.5  /  TBili  1.1  /  DBili  x   /  AST  125<H>  /  ALT  299<H>  /  AlkPhos  203<H>  04-26      Medications:     digoxin  Injectable 0.125 milliGRAM(s) IV Push daily  diltiazem Infusion 5 mG/Hr IV Continuous <Continuous>  enalaprilat Injectable 1.25 milliGRAM(s) IV Push every 6 hours  heparin  Injectable 5000 Unit(s) SubCutaneous every 8 hours  lactated ringers. 1000 milliLiter(s) IV Continuous <Continuous>  levETIRAcetam  IVPB 500 milliGRAM(s) IV Intermittent every 12 hours  piperacillin/tazobactam IVPB. 3.375 Gram(s) IV Intermittent every 8 hours      Radiographs: No new imaging

## 2019-04-28 LAB
ALBUMIN SERPL ELPH-MCNC: 3.6 G/DL — SIGNIFICANT CHANGE UP (ref 3.3–5)
ALP SERPL-CCNC: 152 U/L — HIGH (ref 40–120)
ALT FLD-CCNC: 342 U/L — HIGH (ref 4–41)
ANION GAP SERPL CALC-SCNC: 14 MMO/L — SIGNIFICANT CHANGE UP (ref 7–14)
AST SERPL-CCNC: 152 U/L — HIGH (ref 4–40)
BILIRUB DIRECT SERPL-MCNC: 0.5 MG/DL — HIGH (ref 0.1–0.2)
BILIRUB SERPL-MCNC: 1.1 MG/DL — SIGNIFICANT CHANGE UP (ref 0.2–1.2)
BUN SERPL-MCNC: 6 MG/DL — LOW (ref 7–23)
CALCIUM SERPL-MCNC: 8.9 MG/DL — SIGNIFICANT CHANGE UP (ref 8.4–10.5)
CHLORIDE SERPL-SCNC: 105 MMOL/L — SIGNIFICANT CHANGE UP (ref 98–107)
CO2 SERPL-SCNC: 24 MMOL/L — SIGNIFICANT CHANGE UP (ref 22–31)
CREAT SERPL-MCNC: 0.92 MG/DL — SIGNIFICANT CHANGE UP (ref 0.5–1.3)
GLUCOSE SERPL-MCNC: 105 MG/DL — HIGH (ref 70–99)
HCT VFR BLD CALC: 35.2 % — LOW (ref 39–50)
HGB BLD-MCNC: 11.3 G/DL — LOW (ref 13–17)
MAGNESIUM SERPL-MCNC: 1.8 MG/DL — SIGNIFICANT CHANGE UP (ref 1.6–2.6)
MCHC RBC-ENTMCNC: 30.7 PG — SIGNIFICANT CHANGE UP (ref 27–34)
MCHC RBC-ENTMCNC: 32.1 % — SIGNIFICANT CHANGE UP (ref 32–36)
MCV RBC AUTO: 95.7 FL — SIGNIFICANT CHANGE UP (ref 80–100)
NRBC # FLD: 0 K/UL — SIGNIFICANT CHANGE UP (ref 0–0)
PHOSPHATE SERPL-MCNC: 2.2 MG/DL — LOW (ref 2.5–4.5)
PLATELET # BLD AUTO: 489 K/UL — HIGH (ref 150–400)
PMV BLD: 9.3 FL — SIGNIFICANT CHANGE UP (ref 7–13)
POTASSIUM SERPL-MCNC: 4.3 MMOL/L — SIGNIFICANT CHANGE UP (ref 3.5–5.3)
POTASSIUM SERPL-SCNC: 4.3 MMOL/L — SIGNIFICANT CHANGE UP (ref 3.5–5.3)
PROT SERPL-MCNC: 7.2 G/DL — SIGNIFICANT CHANGE UP (ref 6–8.3)
RBC # BLD: 3.68 M/UL — LOW (ref 4.2–5.8)
RBC # FLD: 13.1 % — SIGNIFICANT CHANGE UP (ref 10.3–14.5)
SODIUM SERPL-SCNC: 143 MMOL/L — SIGNIFICANT CHANGE UP (ref 135–145)
WBC # BLD: 8.3 K/UL — SIGNIFICANT CHANGE UP (ref 3.8–10.5)
WBC # FLD AUTO: 8.3 K/UL — SIGNIFICANT CHANGE UP (ref 3.8–10.5)

## 2019-04-28 RX ADMIN — LISINOPRIL 20 MILLIGRAM(S): 2.5 TABLET ORAL at 05:07

## 2019-04-28 RX ADMIN — Medication 0.12 MILLIGRAM(S): at 05:07

## 2019-04-28 RX ADMIN — DEXTROSE MONOHYDRATE, SODIUM CHLORIDE, AND POTASSIUM CHLORIDE 75 MILLILITER(S): 50; .745; 4.5 INJECTION, SOLUTION INTRAVENOUS at 09:30

## 2019-04-28 RX ADMIN — PIPERACILLIN AND TAZOBACTAM 25 GRAM(S): 4; .5 INJECTION, POWDER, LYOPHILIZED, FOR SOLUTION INTRAVENOUS at 09:30

## 2019-04-28 RX ADMIN — Medication 3 MILLIGRAM(S): at 21:21

## 2019-04-28 RX ADMIN — Medication 63.75 MILLIMOLE(S): at 11:00

## 2019-04-28 RX ADMIN — ENOXAPARIN SODIUM 40 MILLIGRAM(S): 100 INJECTION SUBCUTANEOUS at 11:00

## 2019-04-28 RX ADMIN — Medication 240 MILLIGRAM(S): at 05:56

## 2019-04-28 RX ADMIN — PIPERACILLIN AND TAZOBACTAM 25 GRAM(S): 4; .5 INJECTION, POWDER, LYOPHILIZED, FOR SOLUTION INTRAVENOUS at 01:22

## 2019-04-28 RX ADMIN — LEVETIRACETAM 500 MILLIGRAM(S): 250 TABLET, FILM COATED ORAL at 17:06

## 2019-04-28 RX ADMIN — LEVETIRACETAM 500 MILLIGRAM(S): 250 TABLET, FILM COATED ORAL at 05:07

## 2019-04-28 RX ADMIN — PIPERACILLIN AND TAZOBACTAM 25 GRAM(S): 4; .5 INJECTION, POWDER, LYOPHILIZED, FOR SOLUTION INTRAVENOUS at 17:06

## 2019-04-28 NOTE — PROGRESS NOTE ADULT - ASSESSMENT
84M transferred from OSH due to bile duct injury following lap gus now s/p PTC (4/19) now s/p robotic assisted Laparoscopic hepaticojejunostomy, small bowel resection and anastomosis (4/26)    -pain control   -monitor drain outputs  -continue zosyn  -NPO  -OOB and ambulating as tolerated  -DVT ppx    D Team   [id number] 84M transferred from OSH due to bile duct injury following lap gus now s/p PTC (4/19) now s/p robotic assisted Laparoscopic hepaticojejunostomy, small bowel resection and anastomosis (4/26)    -pain control   -monitor drain outputs  -continue zosyn  -CLD  -OOB and ambulating as tolerated  -lovenox for DVT ppx    D Team   [id number]

## 2019-04-28 NOTE — PROGRESS NOTE ADULT - SUBJECTIVE AND OBJECTIVE BOX
CHIEF COMPLAINT:Patient is a 84y old  Male who presents with a chief complaint of elevated LFTs post cholecystectomy (28 Apr 2019 00:21)    	        PAST MEDICAL & SURGICAL HISTORY:  Cholelithiasis  History of subdural hematoma  Constipation  Hypertension  Seizure  Acute subdural hematoma: (May 2014 - subdural hematoma,  pt had SX)          REVIEW OF SYSTEMS:  CONSTITUTIONAL: weak  NECK: No pain or stiffness  RESPIRATORY: No cough, wheezing, chills or hemoptysis; No Shortness of Breath  CARDIOVASCULAR: No chest pain, palpitations, passing out, dizziness,   GASTROINTESTINALno abd pain no bm no flatus   GENITOURINARY: No dysuria, frequency, hematuria, or incontinence  NEUROLOGICAL: No headaches,       Medications:  MEDICATIONS  (STANDING):  dextrose 5% + sodium chloride 0.45% with potassium chloride 20 mEq/L 1000 milliLiter(s) (75 mL/Hr) IV Continuous <Continuous>  digoxin     Tablet 0.125 milliGRAM(s) Oral daily  diltiazem    milliGRAM(s) Oral daily  enoxaparin Injectable 40 milliGRAM(s) SubCutaneous daily  glycerin Suppository - Adult 1 Suppository(s) Rectal once  levETIRAcetam 500 milliGRAM(s) Oral two times a day  lisinopril 20 milliGRAM(s) Oral daily  melatonin 3 milliGRAM(s) Oral at bedtime  piperacillin/tazobactam IVPB. 3.375 Gram(s) IV Intermittent every 8 hours    MEDICATIONS  (PRN):  acetaminophen   Tablet .. 650 milliGRAM(s) Oral every 6 hours PRN Mild Pain (1 - 3)  oxyCODONE    IR 5 milliGRAM(s) Oral every 4 hours PRN Moderate Pain (4 - 6)    	    PHYSICAL EXAM:  T(C): 36.7 (04-28-19 @ 11:28), Max: 37.3 (04-27-19 @ 20:23)  HR: 79 (04-28-19 @ 11:28) (71 - 91)  BP: 117/57 (04-28-19 @ 11:28) (117/57 - 159/71)  RR: 18 (04-28-19 @ 11:28) (18 - 18)  SpO2: 98% (04-28-19 @ 11:28) (92% - 98%)  Wt(kg): --  I&O's Summary    27 Apr 2019 07:01 - 28 Apr 2019 07:00  --------------------------------------------------------  IN: 1030 mL / OUT: 1845 mL / NET: -815 mL    28 Apr 2019 07:01 - 28 Apr 2019 15:28  --------------------------------------------------------  IN: 1910 mL / OUT: 615 mL / NET: 1295 mL        Appearance: Normal	  HEENT:   Normal oral mucosa, PERRL, EOMI	  Lymphatic: No lymphadenopathy  Cardiovascular: Normal S1 S2, No JVD, No murmurs,   Respiratory: Lungs clear to auscultation	    Gastrointestinal:  Soft, dist no r/g  Skin: No rashes, No ecchymoses, No cyanosis	  Neurologic: Non-focal  Extremities: dec range of motion, No clubbing, cyanosis or edema  Vascular: Peripheral pulses palpable 2+ bilaterally    TELEMETRY: 	    ECG:  	  RADIOLOGY:  OTHER: 	  	  LABS:	 	    CARDIAC MARKERS:                                11.3   8.30  )-----------( 489      ( 28 Apr 2019 05:41 )             35.2     04-28    143  |  105  |  6<L>  ----------------------------<  105<H>  4.3   |  24  |  0.92    Ca    8.9      28 Apr 2019 05:41  Phos  2.2     04-28  Mg     1.8     04-28    TPro  7.2  /  Alb  3.6  /  TBili  1.1  /  DBili  0.5<H>  /  AST  152<H>  /  ALT  342<H>  /  AlkPhos  152<H>  04-28    proBNP:   Lipid Profile:   HgA1c:   TSH:

## 2019-04-28 NOTE — PROGRESS NOTE ADULT - SUBJECTIVE AND OBJECTIVE BOX
Surgery Progress Note      Subjective: Patient seen and examined. Some agitation overnight, started on melatonin, few beats of SVT while agitated asymptomatic, resolved abruptly      T(C): 36.7 (04-26-19 @ 20:48), Max: 36.9 (04-26-19 @ 07:10)  HR: 89 (04-26-19 @ 22:36) (66 - 95)  BP: 155/71 (04-26-19 @ 22:36) (111/57 - 170/90)  RR: 16 (04-26-19 @ 20:48) (12 - 22)  SpO2: 96% (04-26-19 @ 20:48) (95% - 100%)      04-25-19 @ 07:01  -  04-26-19 @ 07:00  --------------------------------------------------------  IN: 1645 mL / OUT: 3464.5 mL / NET: -1819.5 mL    04-26-19 @ 07:01  -  04-27-19 @ 00:37  --------------------------------------------------------  IN: 3525 mL / OUT: 1965 mL / NET: 1560 mL        Physical Exam:     General: NAD  Abdomen: soft, distended, appropriately tender, ANAYELI 1 (medial) serosanguinous output, ANAYELI 2 (lateral) with serosanguinous with bile tinge output, previous PTC in place, dressings c/d/i  : gilles in situ      Labs:                          11.7   6.16  )-----------( 544      ( 26 Apr 2019 05:57 )             37.2     04-26    137  |  103  |  7   ----------------------------<  96  4.0   |  21<L>  |  1.07    Ca    9.1      26 Apr 2019 05:57  Phos  2.3     04-26  Mg     2.1     04-26    TPro  7.9  /  Alb  3.5  /  TBili  1.1  /  DBili  x   /  AST  125<H>  /  ALT  299<H>  /  AlkPhos  203<H>  04-26      Medications:     digoxin  Injectable 0.125 milliGRAM(s) IV Push daily  diltiazem Infusion 5 mG/Hr IV Continuous <Continuous>  enalaprilat Injectable 1.25 milliGRAM(s) IV Push every 6 hours  heparin  Injectable 5000 Unit(s) SubCutaneous every 8 hours  lactated ringers. 1000 milliLiter(s) IV Continuous <Continuous>  levETIRAcetam  IVPB 500 milliGRAM(s) IV Intermittent every 12 hours  piperacillin/tazobactam IVPB. 3.375 Gram(s) IV Intermittent every 8 hours      Radiographs: No new imaging Surgery Progress Note      Subjective: Patient seen and examined. Some agitation overnight, started on melatonin, few beats of SVT while agitated asymptomatic, resolved abruptly. hasn't been drinking much.     T(C): 36.7 (04-26-19 @ 20:48), Max: 36.9 (04-26-19 @ 07:10)  HR: 89 (04-26-19 @ 22:36) (66 - 95)  BP: 155/71 (04-26-19 @ 22:36) (111/57 - 170/90)  RR: 16 (04-26-19 @ 20:48) (12 - 22)  SpO2: 96% (04-26-19 @ 20:48) (95% - 100%)      04-25-19 @ 07:01  -  04-26-19 @ 07:00  --------------------------------------------------------  IN: 1645 mL / OUT: 3464.5 mL / NET: -1819.5 mL    04-26-19 @ 07:01  -  04-27-19 @ 00:37  --------------------------------------------------------  IN: 3525 mL / OUT: 1965 mL / NET: 1560 mL        Physical Exam:     General: NAD  Abdomen: soft, distended, appropriately tender, 2 RLQ JPs with bilious output, previous PTC in place, dressings c/d/i  : gilles in situ      Labs:                          11.7   6.16  )-----------( 544      ( 26 Apr 2019 05:57 )             37.2     04-26    137  |  103  |  7   ----------------------------<  96  4.0   |  21<L>  |  1.07    Ca    9.1      26 Apr 2019 05:57  Phos  2.3     04-26  Mg     2.1     04-26    TPro  7.9  /  Alb  3.5  /  TBili  1.1  /  DBili  x   /  AST  125<H>  /  ALT  299<H>  /  AlkPhos  203<H>  04-26      Medications:     digoxin  Injectable 0.125 milliGRAM(s) IV Push daily  diltiazem Infusion 5 mG/Hr IV Continuous <Continuous>  enalaprilat Injectable 1.25 milliGRAM(s) IV Push every 6 hours  heparin  Injectable 5000 Unit(s) SubCutaneous every 8 hours  lactated ringers. 1000 milliLiter(s) IV Continuous <Continuous>  levETIRAcetam  IVPB 500 milliGRAM(s) IV Intermittent every 12 hours  piperacillin/tazobactam IVPB. 3.375 Gram(s) IV Intermittent every 8 hours      Radiographs: No new imaging

## 2019-04-28 NOTE — PROGRESS NOTE ADULT - ASSESSMENT
pt s/p lap choly w/ inc fluid accumulation  ? leak/  s/p hepatojejonostomy/sb resection  abd distention  await bowel fxn  History of subdural hematoma/mental status stable   ct head no acute findings  Hypertension c/w meds  Seizure hx  c/w keppra  dvt proph   iv fluids as needed  oob  pt

## 2019-04-29 LAB
ALBUMIN SERPL ELPH-MCNC: 3.6 G/DL — SIGNIFICANT CHANGE UP (ref 3.3–5)
ALP SERPL-CCNC: 166 U/L — HIGH (ref 40–120)
ALT FLD-CCNC: 318 U/L — HIGH (ref 4–41)
ANION GAP SERPL CALC-SCNC: 13 MMO/L — SIGNIFICANT CHANGE UP (ref 7–14)
AST SERPL-CCNC: 120 U/L — HIGH (ref 4–40)
BILIRUB DIRECT SERPL-MCNC: 0.5 MG/DL — HIGH (ref 0.1–0.2)
BILIRUB SERPL-MCNC: 0.9 MG/DL — SIGNIFICANT CHANGE UP (ref 0.2–1.2)
BUN SERPL-MCNC: 7 MG/DL — SIGNIFICANT CHANGE UP (ref 7–23)
CALCIUM SERPL-MCNC: 8.8 MG/DL — SIGNIFICANT CHANGE UP (ref 8.4–10.5)
CHLORIDE SERPL-SCNC: 101 MMOL/L — SIGNIFICANT CHANGE UP (ref 98–107)
CO2 SERPL-SCNC: 23 MMOL/L — SIGNIFICANT CHANGE UP (ref 22–31)
CREAT SERPL-MCNC: 0.95 MG/DL — SIGNIFICANT CHANGE UP (ref 0.5–1.3)
GLUCOSE SERPL-MCNC: 117 MG/DL — HIGH (ref 70–99)
HCT VFR BLD CALC: 33 % — LOW (ref 39–50)
HGB BLD-MCNC: 10.7 G/DL — LOW (ref 13–17)
MAGNESIUM SERPL-MCNC: 1.9 MG/DL — SIGNIFICANT CHANGE UP (ref 1.6–2.6)
MCHC RBC-ENTMCNC: 30.7 PG — SIGNIFICANT CHANGE UP (ref 27–34)
MCHC RBC-ENTMCNC: 32.4 % — SIGNIFICANT CHANGE UP (ref 32–36)
MCV RBC AUTO: 94.8 FL — SIGNIFICANT CHANGE UP (ref 80–100)
NRBC # FLD: 0 K/UL — SIGNIFICANT CHANGE UP (ref 0–0)
PHOSPHATE SERPL-MCNC: 2.5 MG/DL — SIGNIFICANT CHANGE UP (ref 2.5–4.5)
PLATELET # BLD AUTO: 521 K/UL — HIGH (ref 150–400)
PMV BLD: 9.2 FL — SIGNIFICANT CHANGE UP (ref 7–13)
POTASSIUM SERPL-MCNC: 4.1 MMOL/L — SIGNIFICANT CHANGE UP (ref 3.5–5.3)
POTASSIUM SERPL-SCNC: 4.1 MMOL/L — SIGNIFICANT CHANGE UP (ref 3.5–5.3)
PROT SERPL-MCNC: 7.4 G/DL — SIGNIFICANT CHANGE UP (ref 6–8.3)
RBC # BLD: 3.48 M/UL — LOW (ref 4.2–5.8)
RBC # FLD: 13.2 % — SIGNIFICANT CHANGE UP (ref 10.3–14.5)
SODIUM SERPL-SCNC: 137 MMOL/L — SIGNIFICANT CHANGE UP (ref 135–145)
WBC # BLD: 6.77 K/UL — SIGNIFICANT CHANGE UP (ref 3.8–10.5)
WBC # FLD AUTO: 6.77 K/UL — SIGNIFICANT CHANGE UP (ref 3.8–10.5)

## 2019-04-29 RX ORDER — MAGNESIUM SULFATE 500 MG/ML
2 VIAL (ML) INJECTION ONCE
Qty: 0 | Refills: 0 | Status: COMPLETED | OUTPATIENT
Start: 2019-04-29 | End: 2019-04-29

## 2019-04-29 RX ADMIN — ENOXAPARIN SODIUM 40 MILLIGRAM(S): 100 INJECTION SUBCUTANEOUS at 11:24

## 2019-04-29 RX ADMIN — LEVETIRACETAM 500 MILLIGRAM(S): 250 TABLET, FILM COATED ORAL at 19:03

## 2019-04-29 RX ADMIN — Medication 50 GRAM(S): at 08:09

## 2019-04-29 RX ADMIN — Medication 3 MILLIGRAM(S): at 21:57

## 2019-04-29 RX ADMIN — DEXTROSE MONOHYDRATE, SODIUM CHLORIDE, AND POTASSIUM CHLORIDE 75 MILLILITER(S): 50; .745; 4.5 INJECTION, SOLUTION INTRAVENOUS at 05:39

## 2019-04-29 RX ADMIN — LISINOPRIL 20 MILLIGRAM(S): 2.5 TABLET ORAL at 05:40

## 2019-04-29 RX ADMIN — PIPERACILLIN AND TAZOBACTAM 25 GRAM(S): 4; .5 INJECTION, POWDER, LYOPHILIZED, FOR SOLUTION INTRAVENOUS at 19:03

## 2019-04-29 RX ADMIN — PIPERACILLIN AND TAZOBACTAM 25 GRAM(S): 4; .5 INJECTION, POWDER, LYOPHILIZED, FOR SOLUTION INTRAVENOUS at 01:38

## 2019-04-29 RX ADMIN — LEVETIRACETAM 500 MILLIGRAM(S): 250 TABLET, FILM COATED ORAL at 05:40

## 2019-04-29 RX ADMIN — Medication 240 MILLIGRAM(S): at 05:40

## 2019-04-29 RX ADMIN — PIPERACILLIN AND TAZOBACTAM 25 GRAM(S): 4; .5 INJECTION, POWDER, LYOPHILIZED, FOR SOLUTION INTRAVENOUS at 11:24

## 2019-04-29 RX ADMIN — Medication 0.12 MILLIGRAM(S): at 05:40

## 2019-04-29 NOTE — PROGRESS NOTE ADULT - ASSESSMENT
84M transferred from OSH due to bile duct injury following lap gus now s/p PTC (4/19) now s/p robotic assisted Laparoscopic hepaticojejunostomy, small bowel resection and anastomosis (4/26)    -pain control   -monitor drain outputs  -continue zosyn  -CLD  -OOB and ambulating as tolerated  -lovenox for DVT ppx    D Team   [id number]

## 2019-04-29 NOTE — DIETITIAN INITIAL EVALUATION ADULT. - ENERGY NEEDS
Ht: 67 in   Wt trend: 4/27 175.2 pounds, 4/21 155.8 pounds, 4/18 160.4 pounds, 4/17 175.8 pounds    BMI: (using 160 pounds UBW) : 25.1   IBW: 148 pounds   Skin: No edema, no pressure injuries

## 2019-04-29 NOTE — PROGRESS NOTE ADULT - SUBJECTIVE AND OBJECTIVE BOX
Surgery Progress Note    S: Patient seen and examined. No acute events overnight. patient tolerated more CLD yesterday with encouragement by staff and wife. - flatus, - BM. OOB and ambulating at times.     O:  Vital Signs Last 24 Hrs  T(C): 36.7 (28 Apr 2019 20:47), Max: 36.8 (28 Apr 2019 08:45)  T(F): 98 (28 Apr 2019 20:47), Max: 98.2 (28 Apr 2019 08:45)  HR: 73 (28 Apr 2019 20:47) (71 - 79)  BP: 130/66 (28 Apr 2019 20:47) (117/57 - 159/71)  BP(mean): --  RR: 18 (28 Apr 2019 20:47) (18 - 18)  SpO2: 100% (28 Apr 2019 20:47) (96% - 100%)    I&O's Detail    27 Apr 2019 07:01  -  28 Apr 2019 07:00  --------------------------------------------------------  IN:    dextrose 5% + sodium chloride 0.45% with potassium chloride 20 mEq/L: 1025 mL    diltiazem Infusion: 5 mL  Total IN: 1030 mL    OUT:    Bulb: 40 mL    Bulb: 260 mL    Bulb: 220 mL    Incontinent per Condom Catheter: 675 mL    Indwelling Catheter - Urethral: 300 mL    Voided: 350 mL  Total OUT: 1845 mL    Total NET: -815 mL      28 Apr 2019 07:01  -  29 Apr 2019 00:08  --------------------------------------------------------  IN:    dextrose 5% + sodium chloride 0.45% with potassium chloride 20 mEq/L: 825 mL    IV PiggyBack: 450 mL    Oral Fluid: 960 mL  Total IN: 2235 mL    OUT:    Bulb: 32 mL    Bulb: 82 mL    Bulb: 100 mL    Incontinent per Condom Catheter: 650 mL  Total OUT: 864 mL    Total NET: 1371 mL          MEDICATIONS  (STANDING):  dextrose 5% + sodium chloride 0.45% with potassium chloride 20 mEq/L 1000 milliLiter(s) (75 mL/Hr) IV Continuous <Continuous>  digoxin     Tablet 0.125 milliGRAM(s) Oral daily  diltiazem    milliGRAM(s) Oral daily  enoxaparin Injectable 40 milliGRAM(s) SubCutaneous daily  glycerin Suppository - Adult 1 Suppository(s) Rectal once  levETIRAcetam 500 milliGRAM(s) Oral two times a day  lisinopril 20 milliGRAM(s) Oral daily  melatonin 3 milliGRAM(s) Oral at bedtime  piperacillin/tazobactam IVPB. 3.375 Gram(s) IV Intermittent every 8 hours    MEDICATIONS  (PRN):  acetaminophen   Tablet .. 650 milliGRAM(s) Oral every 6 hours PRN Mild Pain (1 - 3)  oxyCODONE    IR 5 milliGRAM(s) Oral every 4 hours PRN Moderate Pain (4 - 6)                            11.3   8.30  )-----------( 489      ( 28 Apr 2019 05:41 )             35.2       04-28    143  |  105  |  6<L>  ----------------------------<  105<H>  4.3   |  24  |  0.92    Ca    8.9      28 Apr 2019 05:41  Phos  2.2     04-28  Mg     1.8     04-28    TPro  7.2  /  Alb  3.6  /  TBili  1.1  /  DBili  0.5<H>  /  AST  152<H>  /  ALT  342<H>  /  AlkPhos  152<H>  04-28      Physical Exam:  Gen: Laying in bed, NAD  Resp: Unlabored breathing  Abd: soft, moderately distended, appropriately tender, 2 RLQ JPs with bilious output, previous PTC in place, dressings c/d/iExt: [facility] Surgery Progress Note    S: Patient seen and examined. No acute events overnight. patient tolerated more CLD yesterday with encouragement by staff and wife. - flatus, - BM. OOB and ambulating at times.     O:  Vital Signs Last 24 Hrs  T(C): 36.7 (28 Apr 2019 20:47), Max: 36.8 (28 Apr 2019 08:45)  T(F): 98 (28 Apr 2019 20:47), Max: 98.2 (28 Apr 2019 08:45)  HR: 73 (28 Apr 2019 20:47) (71 - 79)  BP: 130/66 (28 Apr 2019 20:47) (117/57 - 159/71)  BP(mean): --  RR: 18 (28 Apr 2019 20:47) (18 - 18)  SpO2: 100% (28 Apr 2019 20:47) (96% - 100%)    I&O's Detail    27 Apr 2019 07:01  -  28 Apr 2019 07:00  --------------------------------------------------------  IN:    dextrose 5% + sodium chloride 0.45% with potassium chloride 20 mEq/L: 1025 mL    diltiazem Infusion: 5 mL  Total IN: 1030 mL    OUT:    Bulb: 40 mL    Bulb: 260 mL    Bulb: 220 mL    Incontinent per Condom Catheter: 675 mL    Indwelling Catheter - Urethral: 300 mL    Voided: 350 mL  Total OUT: 1845 mL    Total NET: -815 mL      28 Apr 2019 07:01  -  29 Apr 2019 00:08  --------------------------------------------------------  IN:    dextrose 5% + sodium chloride 0.45% with potassium chloride 20 mEq/L: 825 mL    IV PiggyBack: 450 mL    Oral Fluid: 960 mL  Total IN: 2235 mL    OUT:    Bulb: 32 mL    Bulb: 82 mL    Bulb: 100 mL    Incontinent per Condom Catheter: 650 mL  Total OUT: 864 mL    Total NET: 1371 mL          MEDICATIONS  (STANDING):  dextrose 5% + sodium chloride 0.45% with potassium chloride 20 mEq/L 1000 milliLiter(s) (75 mL/Hr) IV Continuous <Continuous>  digoxin     Tablet 0.125 milliGRAM(s) Oral daily  diltiazem    milliGRAM(s) Oral daily  enoxaparin Injectable 40 milliGRAM(s) SubCutaneous daily  glycerin Suppository - Adult 1 Suppository(s) Rectal once  levETIRAcetam 500 milliGRAM(s) Oral two times a day  lisinopril 20 milliGRAM(s) Oral daily  melatonin 3 milliGRAM(s) Oral at bedtime  piperacillin/tazobactam IVPB. 3.375 Gram(s) IV Intermittent every 8 hours    MEDICATIONS  (PRN):  acetaminophen   Tablet .. 650 milliGRAM(s) Oral every 6 hours PRN Mild Pain (1 - 3)  oxyCODONE    IR 5 milliGRAM(s) Oral every 4 hours PRN Moderate Pain (4 - 6)                            11.3   8.30  )-----------( 489      ( 28 Apr 2019 05:41 )             35.2       04-28    143  |  105  |  6<L>  ----------------------------<  105<H>  4.3   |  24  |  0.92    Ca    8.9      28 Apr 2019 05:41  Phos  2.2     04-28  Mg     1.8     04-28    TPro  7.2  /  Alb  3.6  /  TBili  1.1  /  DBili  0.5<H>  /  AST  152<H>  /  ALT  342<H>  /  AlkPhos  152<H>  04-28      Physical Exam:  Gen: Laying in bed, NAD  Resp: Unlabored breathing  Abd: soft, moderately distended, appropriately tender, 2 RLQ JPs with output SS>bilious, previous PTC in place, dressings c/d/iExt: LYNNP

## 2019-04-29 NOTE — DIETITIAN INITIAL EVALUATION ADULT. - PERTINENT LABORATORY DATA
04-29 Na 137 mmol/L Glu 117 mg/dL<H> K+ 4.1 mmol/L Cr 0.95 mg/dL BUN 7 mg/dL Phos 2.5 mg/dL Alb 3.6 g/dL  Hgb 10.7 g/dL<L> Hct 33.0 %<L>

## 2019-04-29 NOTE — PROGRESS NOTE ADULT - ASSESSMENT
pt s/p lap choly w/ inc fluid accumulation  biliary leak/  s/p hepatojejonostomy/sb resection  abd distention improved  History of subdural hematoma/mental status stable   ct head no acute findings  Hypertension c/w meds  Seizure hx  c/w keppra  dvt proph   iv fluids as needed  oob  pt

## 2019-04-29 NOTE — DIETITIAN INITIAL EVALUATION ADULT. - OTHER INFO
Length Of Stay nutrition assessment. RD assessment 4/17 at [facility] noted. 83 y/o M transferred from OSH s/p lap gus 4/16 s/p hepaticojejunosotmy and SBR with anastomosis 4/26. Pt has been NPO/clears 4/26 to 4/29 with advancement to regular for lunch 4/29. <25% breakfast consumed 4/29. No chewing or swallowing difficulties at this time.  Denies nausea/vomiting. Denies any BMs after surgery. Denies any recent wt loss.  Weights variable in chart with no clear trends.

## 2019-04-29 NOTE — DIETITIAN INITIAL EVALUATION ADULT. - PERTINENT MEDS FT
MEDICATIONS  (STANDING):  digoxin     Tablet 0.125 milliGRAM(s) Oral daily  diltiazem    milliGRAM(s) Oral daily  enoxaparin Injectable 40 milliGRAM(s) SubCutaneous daily  glycerin Suppository - Adult 1 Suppository(s) Rectal once  levETIRAcetam 500 milliGRAM(s) Oral two times a day  lisinopril 20 milliGRAM(s) Oral daily  melatonin 3 milliGRAM(s) Oral at bedtime  piperacillin/tazobactam IVPB. 3.375 Gram(s) IV Intermittent every 8 hours

## 2019-04-29 NOTE — PROGRESS NOTE ADULT - SUBJECTIVE AND OBJECTIVE BOX
CHIEF COMPLAINT:Patient is a 84y old  Male who presents with a chief complaint of elevated LFTs post cholecystectomy (29 Apr 2019 00:08)    	        PAST MEDICAL & SURGICAL HISTORY:  Cholelithiasis  History of subdural hematoma  Constipation  Hypertension  Seizure  Acute subdural hematoma: (May 2014 - subdural hematoma,  pt had SX)          REVIEW OF SYSTEMS:  CONSTITUTIONAL: No fever, weight loss, or fatigue  EYES: No eye pain, visual disturbances, or discharge  NECK: No pain or stiffness  RESPIRATORY: No cough, wheezing, chills or hemoptysis; No Shortness of Breath  CARDIOVASCULAR: No chest pain, palpitations, passing out, dizziness, or leg swelling  GASTROINTESTINAL: No abdominal or epigastric pain. No nausea, vomiting, or hematemesis; No diarrhea or constipation. No melena or hematochezia.  GENITOURINARY: No dysuria, frequency, hematuria, or incontinence  NEUROLOGICAL: No headaches, memory loss, loss of strength, numbness, or tremors  SKIN: No itching, burning, rashes, or lesions   LYMPH Nodes: No enlarged glands  ENDOCRINE: No heat or cold intolerance; No hair loss  MUSCULOSKELETAL: No joint pain or swelling; No muscle, back, or extremity pain    Medications:  MEDICATIONS  (STANDING):  dextrose 5% + sodium chloride 0.45% with potassium chloride 20 mEq/L 1000 milliLiter(s) (75 mL/Hr) IV Continuous <Continuous>  digoxin     Tablet 0.125 milliGRAM(s) Oral daily  diltiazem    milliGRAM(s) Oral daily  enoxaparin Injectable 40 milliGRAM(s) SubCutaneous daily  glycerin Suppository - Adult 1 Suppository(s) Rectal once  levETIRAcetam 500 milliGRAM(s) Oral two times a day  lisinopril 20 milliGRAM(s) Oral daily  melatonin 3 milliGRAM(s) Oral at bedtime  piperacillin/tazobactam IVPB. 3.375 Gram(s) IV Intermittent every 8 hours    MEDICATIONS  (PRN):  acetaminophen   Tablet .. 650 milliGRAM(s) Oral every 6 hours PRN Mild Pain (1 - 3)  oxyCODONE    IR 5 milliGRAM(s) Oral every 4 hours PRN Moderate Pain (4 - 6)    	    PHYSICAL EXAM:  T(C): 36.8 (04-29-19 @ 08:09), Max: 36.9 (04-29-19 @ 00:30)  HR: 69 (04-29-19 @ 08:09) (63 - 79)  BP: 138/59 (04-29-19 @ 08:09) (117/57 - 138/59)  RR: 18 (04-29-19 @ 08:09) (18 - 18)  SpO2: 99% (04-29-19 @ 08:09) (98% - 100%)  Wt(kg): --  I&O's Summary    28 Apr 2019 07:01  -  29 Apr 2019 07:00  --------------------------------------------------------  IN: 2235 mL / OUT: 2351.5 mL / NET: -116.5 mL        Appearance: Normal	  HEENT:   Normal oral mucosa, PERRL, EOMI	  Lymphatic: No lymphadenopathy  Cardiovascular: Normal S1 S2, No JVD, No murmurs, No edema  Respiratory: Lungs clear to auscultation	  Psychiatry: A & O x 3, Mood & affect appropriate  Gastrointestinal:  Soft, Non-tender, + BS	  Skin: No rashes, No ecchymoses, No cyanosis	  Neurologic: Non-focal  Extremities: Normal range of motion, No clubbing, cyanosis or edema  Vascular: Peripheral pulses palpable 2+ bilaterally    TELEMETRY: 	    ECG:  	  RADIOLOGY:  OTHER: 	  	  LABS:	 	    CARDIAC MARKERS:                                10.7   6.77  )-----------( 521      ( 29 Apr 2019 05:47 )             33.0     04-29    137  |  101  |  7   ----------------------------<  117<H>  4.1   |  23  |  0.95    Ca    8.8      29 Apr 2019 05:47  Phos  2.5     04-29  Mg     1.9     04-29    TPro  7.4  /  Alb  3.6  /  TBili  0.9  /  DBili  0.5<H>  /  AST  120<H>  /  ALT  318<H>  /  AlkPhos  166<H>  04-29    proBNP:   Lipid Profile:   HgA1c:   TSH: CHIEF COMPLAINT:Patient is a 84y old  Male who presents with a chief complaint of elevated LFTs post cholecystectomy (29 Apr 2019 00:08)    	        PAST MEDICAL & SURGICAL HISTORY:  Cholelithiasis  History of subdural hematoma  Constipation  Hypertension  Seizure  Acute subdural hematoma: (May 2014 - subdural hematoma,  pt had SX)          REVIEW OF SYSTEMS:  CONSTITUTIONAL: weak  EYES: No eye pain, visual disturbances, or discharge  NECK: No pain   RESPIRATORY: No cough, wheezing, chills or hemoptysis; No Shortness of Breath  CARDIOVASCULAR: No chest pain, palpitations, passing out, dizziness, or leg swelling  GASTROINTESTINAL:less abd pain   NEUROLOGICAL: No headaches,   MUSCULOSKELETAL: No joint pain or swelling; No muscle, back, or extremity pain    Medications:  MEDICATIONS  (STANDING):  dextrose 5% + sodium chloride 0.45% with potassium chloride 20 mEq/L 1000 milliLiter(s) (75 mL/Hr) IV Continuous <Continuous>  digoxin     Tablet 0.125 milliGRAM(s) Oral daily  diltiazem    milliGRAM(s) Oral daily  enoxaparin Injectable 40 milliGRAM(s) SubCutaneous daily  glycerin Suppository - Adult 1 Suppository(s) Rectal once  levETIRAcetam 500 milliGRAM(s) Oral two times a day  lisinopril 20 milliGRAM(s) Oral daily  melatonin 3 milliGRAM(s) Oral at bedtime  piperacillin/tazobactam IVPB. 3.375 Gram(s) IV Intermittent every 8 hours    MEDICATIONS  (PRN):  acetaminophen   Tablet .. 650 milliGRAM(s) Oral every 6 hours PRN Mild Pain (1 - 3)  oxyCODONE    IR 5 milliGRAM(s) Oral every 4 hours PRN Moderate Pain (4 - 6)    	    PHYSICAL EXAM:  T(C): 36.8 (04-29-19 @ 08:09), Max: 36.9 (04-29-19 @ 00:30)  HR: 69 (04-29-19 @ 08:09) (63 - 79)  BP: 138/59 (04-29-19 @ 08:09) (117/57 - 138/59)  RR: 18 (04-29-19 @ 08:09) (18 - 18)  SpO2: 99% (04-29-19 @ 08:09) (98% - 100%)  Wt(kg): --  I&O's Summary    28 Apr 2019 07:01  -  29 Apr 2019 07:00  --------------------------------------------------------  IN: 2235 mL / OUT: 2351.5 mL / NET: -116.5 mL        Appearance: Normal	  HEENT:   Normal oral mucosa, PERRL, EOMI	  Lymphatic: No lymphadenopathy  Cardiovascular: Normal S1 S2, No JVD, No murmurs, No edema  Respiratory: Lungs clear to auscultation	  Psychiatry: A & O x 3, Mood & affect appropriate  Gastrointestinal:  Soft, Non-tender, + BS	drains in place  Skin: No rashes, No ecchymoses, No cyanosis	  Neurologic: Non-focal  Extremitiesde range of motion, No clubbing, cyanosis   Vascular: Peripheral pulses palpable 2+ bilaterally    TELEMETRY: 	    ECG:  	  RADIOLOGY:  OTHER: 	  	  LABS:	 	    CARDIAC MARKERS:                                10.7   6.77  )-----------( 521      ( 29 Apr 2019 05:47 )             33.0     04-29    137  |  101  |  7   ----------------------------<  117<H>  4.1   |  23  |  0.95    Ca    8.8      29 Apr 2019 05:47  Phos  2.5     04-29  Mg     1.9     04-29    TPro  7.4  /  Alb  3.6  /  TBili  0.9  /  DBili  0.5<H>  /  AST  120<H>  /  ALT  318<H>  /  AlkPhos  166<H>  04-29    proBNP:   Lipid Profile:   HgA1c:   TSH:

## 2019-04-30 RX ORDER — SODIUM,POTASSIUM PHOSPHATES 278-250MG
2 POWDER IN PACKET (EA) ORAL ONCE
Qty: 0 | Refills: 0 | Status: COMPLETED | OUTPATIENT
Start: 2019-04-30 | End: 2019-04-30

## 2019-04-30 RX ADMIN — Medication 0.12 MILLIGRAM(S): at 05:14

## 2019-04-30 RX ADMIN — ENOXAPARIN SODIUM 40 MILLIGRAM(S): 100 INJECTION SUBCUTANEOUS at 11:26

## 2019-04-30 RX ADMIN — PIPERACILLIN AND TAZOBACTAM 25 GRAM(S): 4; .5 INJECTION, POWDER, LYOPHILIZED, FOR SOLUTION INTRAVENOUS at 03:00

## 2019-04-30 RX ADMIN — Medication 2 PACKET(S): at 08:59

## 2019-04-30 RX ADMIN — Medication 240 MILLIGRAM(S): at 05:14

## 2019-04-30 RX ADMIN — LISINOPRIL 20 MILLIGRAM(S): 2.5 TABLET ORAL at 05:14

## 2019-04-30 RX ADMIN — LEVETIRACETAM 500 MILLIGRAM(S): 250 TABLET, FILM COATED ORAL at 05:14

## 2019-04-30 RX ADMIN — Medication 3 MILLIGRAM(S): at 21:34

## 2019-04-30 RX ADMIN — OXYCODONE HYDROCHLORIDE 5 MILLIGRAM(S): 5 TABLET ORAL at 19:32

## 2019-04-30 RX ADMIN — OXYCODONE HYDROCHLORIDE 5 MILLIGRAM(S): 5 TABLET ORAL at 20:02

## 2019-04-30 RX ADMIN — LEVETIRACETAM 500 MILLIGRAM(S): 250 TABLET, FILM COATED ORAL at 18:06

## 2019-04-30 NOTE — PROGRESS NOTE ADULT - ASSESSMENT
pt s/p lap choly w/ inc fluid accumulation  biliary leak/  s/p hepatojejonostomy/sb resection  abd distention   sx f/u  History of subdural hematoma/mental status stable   ct head no acute findings  Hypertension c/w meds  Seizure hx  c/w keppra  dvt proph   iv fluids as needed  oob  pt

## 2019-04-30 NOTE — SWALLOW BEDSIDE ASSESSMENT ADULT - SWALLOW EVAL: RECOMMENDED DIET
1) Dysphagia 2 (Mechanical Soft) with Nectar-Thick Liquids; 2) Cinesophagram to objectively assess swallow function and r/o aspiration

## 2019-04-30 NOTE — PROGRESS NOTE ADULT - SUBJECTIVE AND OBJECTIVE BOX
Surgery Progress Note    S: Patient seen and examined. No acute events overnight. advanced to regular diet yesterday but not taking in much PO. - flatus, - BM.     O:  Vital Signs Last 24 Hrs  T(C): 36.6 (30 Apr 2019 05:11), Max: 36.8 (29 Apr 2019 08:09)  T(F): 97.9 (30 Apr 2019 05:11), Max: 98.2 (29 Apr 2019 08:09)  HR: 64 (30 Apr 2019 05:11) (64 - 74)  BP: 140/59 (30 Apr 2019 05:11) (131/66 - 142/64)  BP(mean): --  RR: 18 (30 Apr 2019 05:11) (18 - 18)  SpO2: 93% (30 Apr 2019 05:11) (93% - 100%)    I&O's Detail    29 Apr 2019 07:01  -  30 Apr 2019 07:00  --------------------------------------------------------  IN:  Total IN: 0 mL    OUT:    Bulb: 225 mL    Bulb: 155 mL    Bulb: 10 mL    Incontinent per Condom Catheter: 1000 mL  Total OUT: 1390 mL    Total NET: -1390 mL          MEDICATIONS  (STANDING):  digoxin     Tablet 0.125 milliGRAM(s) Oral daily  diltiazem    milliGRAM(s) Oral daily  enoxaparin Injectable 40 milliGRAM(s) SubCutaneous daily  glycerin Suppository - Adult 1 Suppository(s) Rectal once  levETIRAcetam 500 milliGRAM(s) Oral two times a day  lisinopril 20 milliGRAM(s) Oral daily  melatonin 3 milliGRAM(s) Oral at bedtime  potassium phosphate / sodium phosphate powder 2 Packet(s) Oral once    MEDICATIONS  (PRN):  acetaminophen   Tablet .. 650 milliGRAM(s) Oral every 6 hours PRN Mild Pain (1 - 3)  oxyCODONE    IR 5 milliGRAM(s) Oral every 4 hours PRN Moderate Pain (4 - 6)                            10.7   6.77  )-----------( 521      ( 29 Apr 2019 05:47 )             33.0       04-29    137  |  101  |  7   ----------------------------<  117<H>  4.1   |  23  |  0.95    Ca    8.8      29 Apr 2019 05:47  Phos  2.5     04-29  Mg     1.9     04-29    TPro  7.4  /  Alb  3.6  /  TBili  0.9  /  DBili  0.5<H>  /  AST  120<H>  /  ALT  318<H>  /  AlkPhos  166<H>  04-29      Physical Exam:  Gen: Laying in bed, NAD  Resp: Unlabored breathing  Abd: soft, moderately distended, appropriately tender, 2 RLQ JPs with output SS>bilious, previous PTC in place, dressings c/d/i  Ext: WWP

## 2019-04-30 NOTE — SWALLOW BEDSIDE ASSESSMENT ADULT - ASR SWALLOW ASPIRATION MONITOR
change of breathing pattern/pneumonia/cough/gurgly voice/oral hygiene/position upright (90Y)/throat clearing/fever/upper respiratory infection

## 2019-04-30 NOTE — PROGRESS NOTE ADULT - SUBJECTIVE AND OBJECTIVE BOX
CHIEF COMPLAINT:Patient is a 84y old  Male who presents with a chief complaint of elevated LFTs post cholecystectomy (30 Apr 2019 07:35)    	        PAST MEDICAL & SURGICAL HISTORY:  Cholelithiasis  History of subdural hematoma  Constipation  Hypertension  Seizure  Acute subdural hematoma: (May 2014 - subdural hematoma,  pt had SX)          REVIEW OF SYSTEMS:  CONSTITUTIONAL: No fever, weight loss, or fatigue  EYES: No eye pain, visual disturbances, or discharge  NECK: No pain or stiffness  RESPIRATORY: No cough, wheezing, chills or hemoptysis; No Shortness of Breath  CARDIOVASCULAR: No chest pain, palpitations, passing out, dizziness,  GASTROINTESTINAL: No abdominal or epigastric pain.   GENITOURINARY: No dysuria, frequency, hematuria, or incontinence  NEUROLOGICAL: No headaches,  Medications:  MEDICATIONS  (STANDING):  digoxin     Tablet 0.125 milliGRAM(s) Oral daily  diltiazem    milliGRAM(s) Oral daily  enoxaparin Injectable 40 milliGRAM(s) SubCutaneous daily  glycerin Suppository - Adult 1 Suppository(s) Rectal once  levETIRAcetam 500 milliGRAM(s) Oral two times a day  lisinopril 20 milliGRAM(s) Oral daily  melatonin 3 milliGRAM(s) Oral at bedtime    MEDICATIONS  (PRN):  acetaminophen   Tablet .. 650 milliGRAM(s) Oral every 6 hours PRN Mild Pain (1 - 3)  oxyCODONE    IR 5 milliGRAM(s) Oral every 4 hours PRN Moderate Pain (4 - 6)    	    PHYSICAL EXAM:  T(C): 36.6 (04-30-19 @ 05:11), Max: 36.6 (04-30-19 @ 05:11)  HR: 64 (04-30-19 @ 05:11) (64 - 74)  BP: 140/59 (04-30-19 @ 05:11) (131/66 - 142/64)  RR: 18 (04-30-19 @ 05:11) (18 - 18)  SpO2: 93% (04-30-19 @ 05:11) (93% - 100%)  Wt(kg): --  I&O's Summary    29 Apr 2019 07:01  -  30 Apr 2019 07:00  --------------------------------------------------------  IN: 0 mL / OUT: 1390 mL / NET: -1390 mL        Appearance: Normal	  HEENT:   Normal oral mucosa, PERRL, EOMI	  Lymphatic: No lymphadenopathy  Cardiovascular: Normal S1 S2, No JVD, No murmurs, No edema  Respiratory: Lungs clear to auscultation	  Psychiatry: A & O   Gastrointestinal:  Soft,  distended no /r/g draind in place  Skin: No rashes, No ecchymoses, No cyanosis	  Neurologic: Non-focal  Extremities: Normal range of motion, No clubbing, cyanosis or edema  Vascular: Peripheral pulses palpable    TELEMETRY: 	    ECG:  	  RADIOLOGY:  OTHER: 	  	  LABS:	 	    CARDIAC MARKERS:                                10.7   6.77  )-----------( 521      ( 29 Apr 2019 05:47 )             33.0     04-29    137  |  101  |  7   ----------------------------<  117<H>  4.1   |  23  |  0.95    Ca    8.8      29 Apr 2019 05:47  Phos  2.5     04-29  Mg     1.9     04-29    TPro  7.4  /  Alb  3.6  /  TBili  0.9  /  DBili  0.5<H>  /  AST  120<H>  /  ALT  318<H>  /  AlkPhos  166<H>  04-29    proBNP:   Lipid Profile:   HgA1c:   TSH:

## 2019-04-30 NOTE — SWALLOW BEDSIDE ASSESSMENT ADULT - SWALLOW EVAL: DIAGNOSIS
Patient demonstrated oropharyngeal dysphagia characterized by slow mastication of regular solids exacerbated by decreased dentition status, delayed bolus collection, delayed anterior-posterior transfer vs questionable delay in pharyngeal trigger and palpable hyolaryngeal elevation. There were no immediate overt, clinical s/s of laryngeal penetration/aspiration however patient noted with delayed throat clearing after thin liquid trials suggestive of airway protection deficits.

## 2019-04-30 NOTE — SWALLOW BEDSIDE ASSESSMENT ADULT - SWALLOW EVAL: RECOMMENDED FEEDING/EATING TECHNIQUES
crush medication (when feasible)/position upright (90 degrees)/no straws/small sips/bites/allow for swallow between intakes/maintain upright posture during/after eating for 30 mins

## 2019-04-30 NOTE — PROGRESS NOTE ADULT - ASSESSMENT
84M transferred from OSH due to bile duct injury following lap gus now s/p PTC (4/19) now s/p robotic assisted Laparoscopic hepaticojejunostomy, small bowel resection and anastomosis (4/26)    -pain control as needed  -monitor drain outputs  -d/c zosyn  -c/w regular diet  -OOB and ambulating as tolerated  -lovenox for DVT ppx    D Team   [id number]

## 2019-04-30 NOTE — SWALLOW BEDSIDE ASSESSMENT ADULT - SPECIFY REASON(S)
to assess swallow function due to report of "patient not eating solids" To assess swallow function due to report of "patient not eating solids". As per discussion with primary RN, patient has also been coughing during meals.

## 2019-05-01 LAB
ALBUMIN SERPL ELPH-MCNC: 3.8 G/DL — SIGNIFICANT CHANGE UP (ref 3.3–5)
ALP SERPL-CCNC: 203 U/L — HIGH (ref 40–120)
ALT FLD-CCNC: 305 U/L — HIGH (ref 4–41)
ANION GAP SERPL CALC-SCNC: 14 MMO/L — SIGNIFICANT CHANGE UP (ref 7–14)
AST SERPL-CCNC: 109 U/L — HIGH (ref 4–40)
BILIRUB DIRECT SERPL-MCNC: 0.4 MG/DL — HIGH (ref 0.1–0.2)
BILIRUB SERPL-MCNC: 0.9 MG/DL — SIGNIFICANT CHANGE UP (ref 0.2–1.2)
BUN SERPL-MCNC: 13 MG/DL — SIGNIFICANT CHANGE UP (ref 7–23)
CALCIUM SERPL-MCNC: 9.2 MG/DL — SIGNIFICANT CHANGE UP (ref 8.4–10.5)
CHLORIDE SERPL-SCNC: 102 MMOL/L — SIGNIFICANT CHANGE UP (ref 98–107)
CO2 SERPL-SCNC: 22 MMOL/L — SIGNIFICANT CHANGE UP (ref 22–31)
CREAT SERPL-MCNC: 0.98 MG/DL — SIGNIFICANT CHANGE UP (ref 0.5–1.3)
GLUCOSE SERPL-MCNC: 92 MG/DL — SIGNIFICANT CHANGE UP (ref 70–99)
HCT VFR BLD CALC: 35.6 % — LOW (ref 39–50)
HGB BLD-MCNC: 11.4 G/DL — LOW (ref 13–17)
MAGNESIUM SERPL-MCNC: 2.2 MG/DL — SIGNIFICANT CHANGE UP (ref 1.6–2.6)
MCHC RBC-ENTMCNC: 29.8 PG — SIGNIFICANT CHANGE UP (ref 27–34)
MCHC RBC-ENTMCNC: 32 % — SIGNIFICANT CHANGE UP (ref 32–36)
MCV RBC AUTO: 93 FL — SIGNIFICANT CHANGE UP (ref 80–100)
NRBC # FLD: 0 K/UL — SIGNIFICANT CHANGE UP (ref 0–0)
PHOSPHATE SERPL-MCNC: 3.2 MG/DL — SIGNIFICANT CHANGE UP (ref 2.5–4.5)
PLATELET # BLD AUTO: 562 K/UL — HIGH (ref 150–400)
PMV BLD: 9 FL — SIGNIFICANT CHANGE UP (ref 7–13)
POTASSIUM SERPL-MCNC: 4.5 MMOL/L — SIGNIFICANT CHANGE UP (ref 3.5–5.3)
POTASSIUM SERPL-SCNC: 4.5 MMOL/L — SIGNIFICANT CHANGE UP (ref 3.5–5.3)
PROT SERPL-MCNC: 7.7 G/DL — SIGNIFICANT CHANGE UP (ref 6–8.3)
RBC # BLD: 3.83 M/UL — LOW (ref 4.2–5.8)
RBC # FLD: 13.3 % — SIGNIFICANT CHANGE UP (ref 10.3–14.5)
SODIUM SERPL-SCNC: 138 MMOL/L — SIGNIFICANT CHANGE UP (ref 135–145)
SURGICAL PATHOLOGY STUDY: SIGNIFICANT CHANGE UP
WBC # BLD: 6.81 K/UL — SIGNIFICANT CHANGE UP (ref 3.8–10.5)
WBC # FLD AUTO: 6.81 K/UL — SIGNIFICANT CHANGE UP (ref 3.8–10.5)

## 2019-05-01 PROCEDURE — 74177 CT ABD & PELVIS W/CONTRAST: CPT | Mod: 26

## 2019-05-01 PROCEDURE — 74230 X-RAY XM SWLNG FUNCJ C+: CPT | Mod: 26

## 2019-05-01 RX ORDER — KETOROLAC TROMETHAMINE 30 MG/ML
15 SYRINGE (ML) INJECTION ONCE
Qty: 0 | Refills: 0 | Status: DISCONTINUED | OUTPATIENT
Start: 2019-05-01 | End: 2019-05-01

## 2019-05-01 RX ORDER — OXYCODONE HYDROCHLORIDE 5 MG/1
10 TABLET ORAL EVERY 6 HOURS
Qty: 0 | Refills: 0 | Status: DISCONTINUED | OUTPATIENT
Start: 2019-05-01 | End: 2019-05-06

## 2019-05-01 RX ADMIN — Medication 0.12 MILLIGRAM(S): at 05:42

## 2019-05-01 RX ADMIN — ENOXAPARIN SODIUM 40 MILLIGRAM(S): 100 INJECTION SUBCUTANEOUS at 12:36

## 2019-05-01 RX ADMIN — LEVETIRACETAM 500 MILLIGRAM(S): 250 TABLET, FILM COATED ORAL at 17:20

## 2019-05-01 RX ADMIN — LEVETIRACETAM 500 MILLIGRAM(S): 250 TABLET, FILM COATED ORAL at 05:42

## 2019-05-01 RX ADMIN — Medication 3 MILLIGRAM(S): at 21:20

## 2019-05-01 RX ADMIN — Medication 240 MILLIGRAM(S): at 05:42

## 2019-05-01 RX ADMIN — LISINOPRIL 20 MILLIGRAM(S): 2.5 TABLET ORAL at 05:42

## 2019-05-01 RX ADMIN — Medication 15 MILLIGRAM(S): at 09:25

## 2019-05-01 RX ADMIN — Medication 1 SUPPOSITORY(S): at 09:13

## 2019-05-01 RX ADMIN — Medication 15 MILLIGRAM(S): at 09:13

## 2019-05-01 RX ADMIN — OXYCODONE HYDROCHLORIDE 10 MILLIGRAM(S): 5 TABLET ORAL at 17:25

## 2019-05-01 NOTE — PROGRESS NOTE ADULT - SUBJECTIVE AND OBJECTIVE BOX
Surgery Progress Note      Subjective: Patient seen and examined. No acute events overnight.  believed to have passed flatus per nursing yesterday  seen by speech and swallow -- recommended nectar thickened     T(C): 37.1 (04-30-19 @ 23:46), Max: 37.1 (04-30-19 @ 23:46)  HR: 80 (04-30-19 @ 23:46) (64 - 80)  BP: 126/63 (04-30-19 @ 23:46) (120/64 - 140/59)  RR: 18 (04-30-19 @ 23:46) (16 - 18)  SpO2: 100% (04-30-19 @ 23:46) (93% - 100%)      04-29-19 @ 07:01  -  04-30-19 @ 07:00  --------------------------------------------------------  IN: 0 mL / OUT: 1390 mL / NET: -1390 mL    04-30-19 @ 07:01  -  05-01-19 @ 02:18  --------------------------------------------------------  IN: 240 mL / OUT: 660 mL / NET: -420 mL        Physical Exam:     General: NAD  Abdomen: soft, moderately distended, appropriately tender, 2 RLQ JPs with serosanguinous output, previous PTC in place, dressings c/d/i    Labs:                          10.7   6.77  )-----------( 521      ( 29 Apr 2019 05:47 )             33.0     04-29    137  |  101  |  7   ----------------------------<  117<H>  4.1   |  23  |  0.95    Ca    8.8      29 Apr 2019 05:47  Phos  2.5     04-29  Mg     1.9     04-29    TPro  7.4  /  Alb  3.6  /  TBili  0.9  /  DBili  0.5<H>  /  AST  120<H>  /  ALT  318<H>  /  AlkPhos  166<H>  04-29        Medications:     acetaminophen   Tablet .. 650 milliGRAM(s) Oral every 6 hours PRN  digoxin     Tablet 0.125 milliGRAM(s) Oral daily  diltiazem    milliGRAM(s) Oral daily  enoxaparin Injectable 40 milliGRAM(s) SubCutaneous daily  glycerin Suppository - Adult 1 Suppository(s) Rectal once  levETIRAcetam 500 milliGRAM(s) Oral two times a day  lisinopril 20 milliGRAM(s) Oral daily  melatonin 3 milliGRAM(s) Oral at bedtime  oxyCODONE    IR 5 milliGRAM(s) Oral every 4 hours PRN      Radiographs: No new imaging

## 2019-05-01 NOTE — SWALLOW VFSS/MBS ASSESSMENT ADULT - PHARYNGEAL PHASE COMMENTS
Delayed swallow trigger with bolus head at level of valleculae; Decreased base of tongue retraction; Trace residue in the valleculae; Decreased hyolaryngeal elevation/excursion; Reduced pharyngeal constriction; Trace residue along the posterior pharyngeal wall penetration Delayed swallow trigger with bolus head at level of pyriform sinus; Decreased base of tongue retraction; Ttrace residue in the valleculae; Decreased hyolaryngeal elevation/excursion; Reduced pharyngeal constriction; Trace residue along the posterior pharyngeal wall Delayed swallow trigger with bolus head at level of pyriform sinus; Decreased base of tongue retraction; Trace residue in the valleculae; Decreased hyolaryngeal elevation/excursion; Reduced pharyngeal constriction; Trace residue along the posterior pharyngeal wall

## 2019-05-01 NOTE — SWALLOW VFSS/MBS ASSESSMENT ADULT - MODE OF PRESENTATION
spoon/self fed cup/self fed self fed/via large cup sips (pt did not take smaller cup sips despite clinician cues)

## 2019-05-01 NOTE — PROGRESS NOTE ADULT - ASSESSMENT
84M transferred from OSH due to bile duct injury following lap gus now s/p PTC (4/19) now s/p robotic assisted Laparoscopic hepaticojejunostomy, small bowel resection and anastomosis (4/26)    -pain control   -monitor drain outputs  -diet: nectar thicks  -cinesophagram today   -OOB and ambulating as tolerated  -lovenox for DVT ppx    D Team   [id number] 84M transferred from OSH due to bile duct injury following lap gus now s/p PTC (4/19) now s/p robotic assisted Laparoscopic hepaticojejunostomy, small bowel resection and anastomosis (4/26)    -pain control   -monitor drain outputs  -diet: nectar thicks  -cinesophagram today   -CT today  -OOB and ambulating as tolerated  -lovenox for DVT ppx    D Team   [id number]

## 2019-05-01 NOTE — PROGRESS NOTE ADULT - SUBJECTIVE AND OBJECTIVE BOX
CHIEF COMPLAINT:Patient is a 84y old  Male who presents with a chief complaint of elevated LFTs post cholecystectomy (01 May 2019 02:17)    	        PAST MEDICAL & SURGICAL HISTORY:  Cholelithiasis  History of subdural hematoma  Constipation  Hypertension  Seizure  Acute subdural hematoma: (May 2014 - subdural hematoma,  pt had SX)          REVIEW OF SYSTEMS:  CONSTITUTIONAL: No fever, weight loss, or fatigue  EYES: No eye pain, visual disturbances, or discharge  NECK: No pain or stiffness  RESPIRATORY: No cough, wheezing, chills or hemoptysis; No Shortness of Breath  CARDIOVASCULAR: No chest pain, palpitations, passing out, dizziness, or leg swelling  GASTROINTESTINAL: No abdominal or epigastric pain. No nausea, vomiting, or hematemesis; No diarrhea or constipation. No melena or hematochezia.  GENITOURINARY: No dysuria, frequency, hematuria, or incontinence  NEUROLOGICAL: No headaches, memory loss, loss of strength, numbness, or tremors  SKIN: No itching, burning, rashes, or lesions   LYMPH Nodes: No enlarged glands  ENDOCRINE: No heat or cold intolerance; No hair loss  MUSCULOSKELETAL: No joint pain or swelling; No muscle, back, or extremity pain    Medications:  MEDICATIONS  (STANDING):  digoxin     Tablet 0.125 milliGRAM(s) Oral daily  diltiazem    milliGRAM(s) Oral daily  enoxaparin Injectable 40 milliGRAM(s) SubCutaneous daily  levETIRAcetam 500 milliGRAM(s) Oral two times a day  lisinopril 20 milliGRAM(s) Oral daily  melatonin 3 milliGRAM(s) Oral at bedtime    MEDICATIONS  (PRN):  acetaminophen   Tablet .. 650 milliGRAM(s) Oral every 6 hours PRN Mild Pain (1 - 3)  oxyCODONE    IR 10 milliGRAM(s) Oral every 6 hours PRN Severe Pain (7 - 10)  oxyCODONE    IR 5 milliGRAM(s) Oral every 4 hours PRN Moderate Pain (4 - 6)    	    PHYSICAL EXAM:  T(C): 36.7 (05-01-19 @ 11:08), Max: 37.1 (04-30-19 @ 23:46)  HR: 68 (05-01-19 @ 11:08) (68 - 80)  BP: 130/61 (05-01-19 @ 11:08) (121/63 - 135/64)  RR: 17 (05-01-19 @ 11:08) (16 - 18)  SpO2: 98% (05-01-19 @ 11:08) (97% - 100%)  Wt(kg): --  I&O's Summary    30 Apr 2019 07:01  -  01 May 2019 07:00  --------------------------------------------------------  IN: 240 mL / OUT: 760 mL / NET: -520 mL    01 May 2019 07:01  -  01 May 2019 12:42  --------------------------------------------------------  IN: 120 mL / OUT: 145 mL / NET: -25 mL        Appearance: Normal	  HEENT:   Normal oral mucosa, PERRL, EOMI	  Lymphatic: No lymphadenopathy  Cardiovascular: Normal S1 S2, No JVD, No murmurs, No edema  Respiratory: Lungs clear to auscultation	  Psychiatry: A & O x 3, Mood & affect appropriate  Gastrointestinal:  Soft, Non-tender, + BS	  Skin: No rashes, No ecchymoses, No cyanosis	  Neurologic: Non-focal  Extremities: Normal range of motion, No clubbing, cyanosis or edema  Vascular: Peripheral pulses palpable 2+ bilaterally    TELEMETRY: 	    ECG:  	  RADIOLOGY:  OTHER: 	  	  LABS:	 	    CARDIAC MARKERS:                                11.4   6.81  )-----------( 562      ( 01 May 2019 06:31 )             35.6     05-01    138  |  102  |  13  ----------------------------<  92  4.5   |  22  |  0.98    Ca    9.2      01 May 2019 06:31  Phos  3.2     05-01  Mg     2.2     05-01    TPro  7.7  /  Alb  3.8  /  TBili  0.9  /  DBili  0.4<H>  /  AST  109<H>  /  ALT  305<H>  /  AlkPhos  203<H>  05-01    proBNP:   Lipid Profile:   HgA1c:   TSH: CHIEF COMPLAINT:Patient is a 84y old  Male who presents with a chief complaint of elevated LFTs post cholecystectomy (01 May 2019 02:17)    	        PAST MEDICAL & SURGICAL HISTORY:  Cholelithiasis  History of subdural hematoma  Constipation  Hypertension  Seizure  Acute subdural hematoma: (May 2014 - subdural hematoma,  pt had SX)          REVIEW OF SYSTEMS:  CONSTITUTIONAL: weak  EYES: No eye pain, visual disturbances, or discharge  NECK: No pain or stiffness  RESPIRATORY: No cough, wheezing, chills or hemoptysis; No Shortness of Breath  CARDIOVASCULAR: No chest pain, palpitations, passing out, dizziness, or leg swelling  GASTROINTESTINAL: No abdominal or epigastric pain. no vomiting  NEUROLOGICAL: No headaches    Medications:  MEDICATIONS  (STANDING):  digoxin     Tablet 0.125 milliGRAM(s) Oral daily  diltiazem    milliGRAM(s) Oral daily  enoxaparin Injectable 40 milliGRAM(s) SubCutaneous daily  levETIRAcetam 500 milliGRAM(s) Oral two times a day  lisinopril 20 milliGRAM(s) Oral daily  melatonin 3 milliGRAM(s) Oral at bedtime    MEDICATIONS  (PRN):  acetaminophen   Tablet .. 650 milliGRAM(s) Oral every 6 hours PRN Mild Pain (1 - 3)  oxyCODONE    IR 10 milliGRAM(s) Oral every 6 hours PRN Severe Pain (7 - 10)  oxyCODONE    IR 5 milliGRAM(s) Oral every 4 hours PRN Moderate Pain (4 - 6)    	    PHYSICAL EXAM:  T(C): 36.7 (05-01-19 @ 11:08), Max: 37.1 (04-30-19 @ 23:46)  HR: 68 (05-01-19 @ 11:08) (68 - 80)  BP: 130/61 (05-01-19 @ 11:08) (121/63 - 135/64)  RR: 17 (05-01-19 @ 11:08) (16 - 18)  SpO2: 98% (05-01-19 @ 11:08) (97% - 100%)  Wt(kg): --  I&O's Summary    30 Apr 2019 07:01  -  01 May 2019 07:00  --------------------------------------------------------  IN: 240 mL / OUT: 760 mL / NET: -520 mL    01 May 2019 07:01  -  01 May 2019 12:42  --------------------------------------------------------  IN: 120 mL / OUT: 145 mL / NET: -25 mL        Appearance: Normal	  HEENT:   Normal oral mucosa, PERRL, EOMI	  Lymphatic: No lymphadenopathy  Cardiovascular: Normal S1 S2, No JVD, No murmurs,   Respiratory: Lungs clear to auscultation	  Psychiatry: A & O   Gastrointestinal:  Soft, distended  no r/g  drains in place  Skin: No rashes, No ecchymoses, No cyanosis	  Neurologic: Non-focal  Extremities: dec range of motion, No clubbing, cyanosis   Vascular: Peripheral pulses palpable     TELEMETRY: 	    ECG:  	  RADIOLOGY:  OTHER: 	  	  LABS:	 	    CARDIAC MARKERS:                                11.4   6.81  )-----------( 562      ( 01 May 2019 06:31 )             35.6     05-01    138  |  102  |  13  ----------------------------<  92  4.5   |  22  |  0.98    Ca    9.2      01 May 2019 06:31  Phos  3.2     05-01  Mg     2.2     05-01    TPro  7.7  /  Alb  3.8  /  TBili  0.9  /  DBili  0.4<H>  /  AST  109<H>  /  ALT  305<H>  /  AlkPhos  203<H>  05-01    proBNP:   Lipid Profile:   HgA1c:   TSH:

## 2019-05-01 NOTE — SWALLOW VFSS/MBS ASSESSMENT ADULT - COMMENTS
As per charting, "84M with PMH of seizure disorder, subdural hematoma s/p craniotomy due to trauma, HTN, ETOH abuse, cholelithiasis, who presented to OSH ED with abdominal pain, nausea/vomiting and was found to have cholecystitis and cholelithiasis on CT. He underwent a laparoscopic cholecystectomy. On POD1, he was noted to have increase in LFTs and bilious output from ANAYELI drain on POD2.  MRCP and HIDA scan were performed at outside hospital. Patient was transferred to [facility] for further care."    Patient is known to this service from initial bedside swallow evaluation 4/30/19 (please see chart for full report). Patient was received awake and confused yet cooperative in the radiology suite this AM.

## 2019-05-01 NOTE — SWALLOW VFSS/MBS ASSESSMENT ADULT - RECOMMENDED CONSISTENCY
1. Mechanical Soft with Thin Liquids  2. Dysphagia Precautions: Upright position (90 degree angle) during/after eating for 30 min; Small bites; Small single cup sips; No straws  3. Patient will no longer be followed by this service at this time; MD to reconsult if concern for change in status 1. Mechanical Soft with Thin Liquids  2. Dysphagia Precautions: Upright position (90 degree angle) during/after eating for 30 min; Small bites; Small single cup sips; No straws  3. Patient will no longer be followed by this service at this time; MD may reconsult if concern for change in status

## 2019-05-01 NOTE — SWALLOW VFSS/MBS ASSESSMENT ADULT - NS SWALLOW VFSS REC ASPIR MON
change of breathing pattern/oral hygiene/pneumonia/throat clearing/upper respiratory infection/fever/position upright (90Y)/cough/gurgly voice

## 2019-05-01 NOTE — SWALLOW VFSS/MBS ASSESSMENT ADULT - ORAL PHASE
Delayed anterior-posterior transfer Premature spillover to hypopharynx Premature spillover into hypopharynx

## 2019-05-01 NOTE — SWALLOW VFSS/MBS ASSESSMENT ADULT - DIAGNOSTIC IMPRESSIONS
Patient demonstrated a Mild Oropharyngeal Dysphagia characterized by adequate oral acceptance and containment, delayed bolus collection and delayed anterior-posterior transfer across all textures. Slow/effortful mastication of regular solid noted. Reduced tongue to palate contact with premature spillover to the hypopharynx noted for thin liquid. Pharyngeal stage was marked by delayed swallow trigger with bolus head at level of valleculae for puree and regular solids, and at the level of pyriform sinus for thin and nectar-thick liquid. There was decreased base of tongue retraction with trace residue in the valleculae post swallow, decreased hyolaryngeal elevation/excursion, and reduced pharyngeal constriction with trace residue along the posterior pharyngeal wall post swallow across all consistencies. There was intermittent trace laryngeal penetration with complete retrieval for large cup sips of thin liquid. There was no evidence of laryngeal penetration/aspiration for puree, solids and nectar-thick liquids. Patient demonstrated a Mild Oropharyngeal Dysphagia characterized by adequate oral acceptance and containment, delayed bolus collection and delayed anterior-posterior transfer across all textures. Slow/effortful mastication of regular solid judged due to decreased dentition status. Reduced tongue to palate contact with premature spillover to the hypopharynx noted for thin and nectar-thick liquid. Pharyngeal stage was marked by delayed swallow trigger with bolus head at level of valleculae for puree and regular solids, and at the level of pyriform sinus for thin and nectar-thick liquid. There was decreased base of tongue retraction with trace residue in the valleculae post swallow, decreased hyolaryngeal elevation/excursion, and reduced pharyngeal constriction with trace residue along the posterior pharyngeal wall post swallow across all consistencies. There was intermittent trace laryngeal penetration during the swallow with complete retrieval for large cup sips of thin liquid. Patient did not take smaller cup sips despite clinician cueing. There was no evidence of laryngeal penetration/aspiration for puree, solids and nectar-thick liquids.

## 2019-05-01 NOTE — PROGRESS NOTE ADULT - ASSESSMENT
pt s/p lap choly w/ inc fluid accumulation  biliary leak/  s/p hepatojejonostomy/sb resection  abd distention stable  trend lfts   sx f/u  History of subdural hematoma/mental status stable   ct head no acute findings  Hypertension c/w meds  Seizure hx  c/w keppra  dvt proph   iv fluids as needed  swallow eval noted  modify diet   oob  pt

## 2019-05-02 LAB
HCT VFR BLD CALC: 39.7 % — SIGNIFICANT CHANGE UP (ref 39–50)
HGB BLD-MCNC: 11.9 G/DL — LOW (ref 13–17)
MCHC RBC-ENTMCNC: 30 % — LOW (ref 32–36)
MCHC RBC-ENTMCNC: 30.9 PG — SIGNIFICANT CHANGE UP (ref 27–34)
MCV RBC AUTO: 103.1 FL — HIGH (ref 80–100)
NRBC # FLD: 0 K/UL — SIGNIFICANT CHANGE UP (ref 0–0)
PLATELET # BLD AUTO: 468 K/UL — HIGH (ref 150–400)
PMV BLD: 9.7 FL — SIGNIFICANT CHANGE UP (ref 7–13)
RBC # BLD: 3.85 M/UL — LOW (ref 4.2–5.8)
RBC # FLD: 13.5 % — SIGNIFICANT CHANGE UP (ref 10.3–14.5)
WBC # BLD: 5.73 K/UL — SIGNIFICANT CHANGE UP (ref 3.8–10.5)
WBC # FLD AUTO: 5.73 K/UL — SIGNIFICANT CHANGE UP (ref 3.8–10.5)

## 2019-05-02 RX ORDER — TAMSULOSIN HYDROCHLORIDE 0.4 MG/1
0.4 CAPSULE ORAL AT BEDTIME
Qty: 0 | Refills: 0 | Status: DISCONTINUED | OUTPATIENT
Start: 2019-05-02 | End: 2019-05-10

## 2019-05-02 RX ORDER — SODIUM CHLORIDE 9 MG/ML
1000 INJECTION, SOLUTION INTRAVENOUS ONCE
Qty: 0 | Refills: 0 | Status: COMPLETED | OUTPATIENT
Start: 2019-05-02 | End: 2019-05-02

## 2019-05-02 RX ADMIN — ENOXAPARIN SODIUM 40 MILLIGRAM(S): 100 INJECTION SUBCUTANEOUS at 11:27

## 2019-05-02 RX ADMIN — Medication 0.12 MILLIGRAM(S): at 05:31

## 2019-05-02 RX ADMIN — OXYCODONE HYDROCHLORIDE 10 MILLIGRAM(S): 5 TABLET ORAL at 00:51

## 2019-05-02 RX ADMIN — TAMSULOSIN HYDROCHLORIDE 0.4 MILLIGRAM(S): 0.4 CAPSULE ORAL at 21:26

## 2019-05-02 RX ADMIN — LEVETIRACETAM 500 MILLIGRAM(S): 250 TABLET, FILM COATED ORAL at 05:31

## 2019-05-02 RX ADMIN — Medication 3 MILLIGRAM(S): at 21:26

## 2019-05-02 RX ADMIN — OXYCODONE HYDROCHLORIDE 10 MILLIGRAM(S): 5 TABLET ORAL at 19:57

## 2019-05-02 RX ADMIN — OXYCODONE HYDROCHLORIDE 10 MILLIGRAM(S): 5 TABLET ORAL at 19:27

## 2019-05-02 RX ADMIN — OXYCODONE HYDROCHLORIDE 10 MILLIGRAM(S): 5 TABLET ORAL at 00:21

## 2019-05-02 RX ADMIN — SODIUM CHLORIDE 1000 MILLILITER(S): 9 INJECTION, SOLUTION INTRAVENOUS at 18:55

## 2019-05-02 RX ADMIN — Medication 240 MILLIGRAM(S): at 05:31

## 2019-05-02 RX ADMIN — LISINOPRIL 20 MILLIGRAM(S): 2.5 TABLET ORAL at 05:31

## 2019-05-02 RX ADMIN — Medication 1 ENEMA: at 15:47

## 2019-05-02 RX ADMIN — LEVETIRACETAM 500 MILLIGRAM(S): 250 TABLET, FILM COATED ORAL at 18:55

## 2019-05-02 NOTE — PROGRESS NOTE ADULT - SUBJECTIVE AND OBJECTIVE BOX
CHIEF COMPLAINT:Patient is a 84y old  Male who presents with a chief complaint of elevated LFTs post cholecystectomy (02 May 2019 09:23)    	        PAST MEDICAL & SURGICAL HISTORY:  Cholelithiasis  History of subdural hematoma  Constipation  Hypertension  Seizure  Acute subdural hematoma: (May 2014 - subdural hematoma,  pt had SX)          REVIEW OF SYSTEMS:  CONSTITUTIONAL: No fever, weight loss, or fatigue  EYES: No eye pain, visual disturbances, or discharge  NECK: No pain or stiffness  RESPIRATORY: No cough, wheezing, chills or hemoptysis; No Shortness of Breath  CARDIOVASCULAR: No chest pain, palpitations, passing out, dizziness, or leg swelling  GASTROINTESTINAL: No abdominal or epigastric pain. No nausea, vomiting, or hematemesis; No diarrhea or constipation. No melena or hematochezia.  GENITOURINARY: No dysuria, frequency, hematuria, or incontinence  NEUROLOGICAL: No headaches, memory loss, loss of strength, numbness, or tremors  SKIN: No itching, burning, rashes, or lesions   LYMPH Nodes: No enlarged glands  ENDOCRINE: No heat or cold intolerance; No hair loss  MUSCULOSKELETAL: No joint pain or swelling; No muscle, back, or extremity pain    Medications:  MEDICATIONS  (STANDING):  digoxin     Tablet 0.125 milliGRAM(s) Oral daily  diltiazem    milliGRAM(s) Oral daily  enoxaparin Injectable 40 milliGRAM(s) SubCutaneous daily  levETIRAcetam 500 milliGRAM(s) Oral two times a day  lisinopril 20 milliGRAM(s) Oral daily  melatonin 3 milliGRAM(s) Oral at bedtime    MEDICATIONS  (PRN):  acetaminophen   Tablet .. 650 milliGRAM(s) Oral every 6 hours PRN Mild Pain (1 - 3)  oxyCODONE    IR 10 milliGRAM(s) Oral every 6 hours PRN Severe Pain (7 - 10)  oxyCODONE    IR 5 milliGRAM(s) Oral every 4 hours PRN Moderate Pain (4 - 6)    	    PHYSICAL EXAM:  T(C): 36.9 (05-02-19 @ 08:44), Max: 36.9 (05-02-19 @ 08:44)  HR: 76 (05-02-19 @ 08:44) (68 - 76)  BP: 150/52 (05-02-19 @ 08:44) (127/53 - 150/52)  RR: 16 (05-02-19 @ 08:44) (16 - 18)  SpO2: 94% (05-02-19 @ 08:44) (93% - 97%)  Wt(kg): --  I&O's Summary    01 May 2019 07:01  -  02 May 2019 07:00  --------------------------------------------------------  IN: 700 mL / OUT: 682.5 mL / NET: 17.5 mL    02 May 2019 07:01  -  02 May 2019 11:50  --------------------------------------------------------  IN: 210 mL / OUT: 42 mL / NET: 168 mL        Appearance: Normal	  HEENT:   Normal oral mucosa, PERRL, EOMI	  Lymphatic: No lymphadenopathy  Cardiovascular: Normal S1 S2, No JVD, No murmurs, No edema  Respiratory: Lungs clear to auscultation	  Psychiatry: A & O x 3, Mood & affect appropriate  Gastrointestinal:  Soft, Non-tender, + BS	  Skin: No rashes, No ecchymoses, No cyanosis	  Neurologic: Non-focal  Extremities: Normal range of motion, No clubbing, cyanosis or edema  Vascular: Peripheral pulses palpable 2+ bilaterally    TELEMETRY: 	    ECG:  	  RADIOLOGY:  OTHER: 	  	  LABS:	 	    CARDIAC MARKERS:                                11.9   5.73  )-----------( 468      ( 02 May 2019 06:18 )             39.7     05-01    138  |  102  |  13  ----------------------------<  92  4.5   |  22  |  0.98    Ca    9.2      01 May 2019 06:31  Phos  3.2     05-01  Mg     2.2     05-01    TPro  7.7  /  Alb  3.8  /  TBili  0.9  /  DBili  0.4<H>  /  AST  109<H>  /  ALT  305<H>  /  AlkPhos  203<H>  05-01    proBNP:   Lipid Profile:   HgA1c:   TSH: CHIEF COMPLAINT:Patient is a 84y old  Male who presents with a chief complaint of elevated LFTs post cholecystectomy (02 May 2019 09:23)    	        PAST MEDICAL & SURGICAL HISTORY:  Cholelithiasis  History of subdural hematoma  Constipation  Hypertension  Seizure  Acute subdural hematoma: (May 2014 - subdural hematoma,  pt had SX)          REVIEW OF SYSTEMS:  CONSTITUTIONAL: weak  NECK: No pain or stiffness  RESPIRATORY: No cough, wheezing, chills or hemoptysis; No Shortness of Breath  CARDIOVASCULAR: No chest pain, palpitations, passing out, dizziness, o  GASTROINTESTINAL: mild abd pain  NEUROLOGICAL: No headaches,     Medications:  MEDICATIONS  (STANDING):  digoxin     Tablet 0.125 milliGRAM(s) Oral daily  diltiazem    milliGRAM(s) Oral daily  enoxaparin Injectable 40 milliGRAM(s) SubCutaneous daily  levETIRAcetam 500 milliGRAM(s) Oral two times a day  lisinopril 20 milliGRAM(s) Oral daily  melatonin 3 milliGRAM(s) Oral at bedtime    MEDICATIONS  (PRN):  acetaminophen   Tablet .. 650 milliGRAM(s) Oral every 6 hours PRN Mild Pain (1 - 3)  oxyCODONE    IR 10 milliGRAM(s) Oral every 6 hours PRN Severe Pain (7 - 10)  oxyCODONE    IR 5 milliGRAM(s) Oral every 4 hours PRN Moderate Pain (4 - 6)    	    PHYSICAL EXAM:  T(C): 36.9 (05-02-19 @ 08:44), Max: 36.9 (05-02-19 @ 08:44)  HR: 76 (05-02-19 @ 08:44) (68 - 76)  BP: 150/52 (05-02-19 @ 08:44) (127/53 - 150/52)  RR: 16 (05-02-19 @ 08:44) (16 - 18)  SpO2: 94% (05-02-19 @ 08:44) (93% - 97%)  Wt(kg): --  I&O's Summary    01 May 2019 07:01  -  02 May 2019 07:00  --------------------------------------------------------  IN: 700 mL / OUT: 682.5 mL / NET: 17.5 mL    02 May 2019 07:01  -  02 May 2019 11:50  --------------------------------------------------------  IN: 210 mL / OUT: 42 mL / NET: 168 mL        Appearance: Normal	  HEENT:   Normal oral mucosa, PERRL, EOMI	  Lymphatic: No lymphadenopathy  Cardiovascular: Normal S1 S2, No JVD, No murmurs, No edema  Respiratory: Lungs clear to auscultation	  Gastrointestinal:  distended no r/g  drains in place  Skin: No rashes, No ecchymoses, No cyanosis	  Neurologic: Non-focal  Extremities: dec  range of motion, No clubbing, cyanosis or edema  Vascular: Peripheral pulses palpable 2+ bilaterally    TELEMETRY: 	    ECG:  	  RADIOLOGY:  OTHER: 	  	  LABS:	 	    CARDIAC MARKERS:                                11.9   5.73  )-----------( 468      ( 02 May 2019 06:18 )             39.7     05-01    138  |  102  |  13  ----------------------------<  92  4.5   |  22  |  0.98    Ca    9.2      01 May 2019 06:31  Phos  3.2     05-01  Mg     2.2     05-01    TPro  7.7  /  Alb  3.8  /  TBili  0.9  /  DBili  0.4<H>  /  AST  109<H>  /  ALT  305<H>  /  AlkPhos  203<H>  05-01    proBNP:   Lipid Profile:   HgA1c:   TSH:

## 2019-05-02 NOTE — PROGRESS NOTE ADULT - SUBJECTIVE AND OBJECTIVE BOX
Surgery Progress Note    S: Patient seen and examined. No acute events overnight. patient had CT yesterday which preliminarily doesn't show any collections. - flatus, - BM. patient remains with limited appetite.     O:  Vital Signs Last 24 Hrs  T(C): 36.9 (02 May 2019 08:44), Max: 36.9 (02 May 2019 08:44)  T(F): 98.4 (02 May 2019 08:44), Max: 98.4 (02 May 2019 08:44)  HR: 76 (02 May 2019 08:44) (68 - 76)  BP: 150/52 (02 May 2019 08:44) (127/53 - 150/52)  BP(mean): --  RR: 16 (02 May 2019 08:44) (16 - 18)  SpO2: 94% (02 May 2019 08:44) (93% - 98%)    I&O's Detail    01 May 2019 07:01  -  02 May 2019 07:00  --------------------------------------------------------  IN:    Oral Fluid: 700 mL  Total IN: 700 mL    OUT:    Bulb: 22.5 mL    Bulb: 60 mL    Bulb: 125 mL    Incontinent per Condom Catheter: 475 mL  Total OUT: 682.5 mL    Total NET: 17.5 mL          MEDICATIONS  (STANDING):  digoxin     Tablet 0.125 milliGRAM(s) Oral daily  diltiazem    milliGRAM(s) Oral daily  enoxaparin Injectable 40 milliGRAM(s) SubCutaneous daily  levETIRAcetam 500 milliGRAM(s) Oral two times a day  lisinopril 20 milliGRAM(s) Oral daily  melatonin 3 milliGRAM(s) Oral at bedtime    MEDICATIONS  (PRN):  acetaminophen   Tablet .. 650 milliGRAM(s) Oral every 6 hours PRN Mild Pain (1 - 3)  oxyCODONE    IR 10 milliGRAM(s) Oral every 6 hours PRN Severe Pain (7 - 10)  oxyCODONE    IR 5 milliGRAM(s) Oral every 4 hours PRN Moderate Pain (4 - 6)                            11.9   5.73  )-----------( 468      ( 02 May 2019 06:18 )             39.7       05-01    138  |  102  |  13  ----------------------------<  92  4.5   |  22  |  0.98    Ca    9.2      01 May 2019 06:31  Phos  3.2     05-01  Mg     2.2     05-01    TPro  7.7  /  Alb  3.8  /  TBili  0.9  /  DBili  0.4<H>  /  AST  109<H>  /  ALT  305<H>  /  AlkPhos  203<H>  05-01      Physical Exam:  Gen: Laying in bed, NAD  Resp: Unlabored breathing  Abd: soft, moderately distended, appropriately tender, 2 RLQ JPs with serosanguinous output, previous PTC in place, dressings c/d/i  Ext: WWP

## 2019-05-02 NOTE — PROGRESS NOTE ADULT - ASSESSMENT
84M transferred from OSH due to bile duct injury following lap gus now s/p PTC (4/19) now s/p robotic assisted Laparoscopic hepaticojejunostomy, small bowel resection and anastomosis (4/26)    -pain control as needed  -monitor drain outputs  -diet: mechanical soft with thin liquids, as per speech and swallow  -PTC tube check today  -OOB and ambulating as tolerated  -lovenox for DVT ppx    D Team   [id number]

## 2019-05-02 NOTE — PROGRESS NOTE ADULT - ASSESSMENT
pt s/p lap choly w/ inc fluid accumulation  biliary leak/  s/p hepatojejonostomy/sb resection  abd distention stable  trend lfts   sx f/u noted  History of subdural hematoma/mental status stable   ct head no acute findings  Hypertension c/w meds  Seizure hx  c/w keppra  dvt proph   iv fluids as needed  swallow eval noted  modified diet   oob  pt

## 2019-05-03 LAB
ALBUMIN SERPL ELPH-MCNC: 3.3 G/DL — SIGNIFICANT CHANGE UP (ref 3.3–5)
ALP SERPL-CCNC: 196 U/L — HIGH (ref 40–120)
ALT FLD-CCNC: 303 U/L — HIGH (ref 4–41)
ANION GAP SERPL CALC-SCNC: 14 MMO/L — SIGNIFICANT CHANGE UP (ref 7–14)
AST SERPL-CCNC: 131 U/L — HIGH (ref 4–40)
BILIRUB SERPL-MCNC: 0.7 MG/DL — SIGNIFICANT CHANGE UP (ref 0.2–1.2)
BUN SERPL-MCNC: 23 MG/DL — SIGNIFICANT CHANGE UP (ref 7–23)
CALCIUM SERPL-MCNC: 8.6 MG/DL — SIGNIFICANT CHANGE UP (ref 8.4–10.5)
CHLORIDE SERPL-SCNC: 99 MMOL/L — SIGNIFICANT CHANGE UP (ref 98–107)
CO2 SERPL-SCNC: 21 MMOL/L — LOW (ref 22–31)
CREAT SERPL-MCNC: 1.15 MG/DL — SIGNIFICANT CHANGE UP (ref 0.5–1.3)
GLUCOSE SERPL-MCNC: 98 MG/DL — SIGNIFICANT CHANGE UP (ref 70–99)
HCT VFR BLD CALC: 33.6 % — LOW (ref 39–50)
HGB BLD-MCNC: 10.6 G/DL — LOW (ref 13–17)
MAGNESIUM SERPL-MCNC: 2 MG/DL — SIGNIFICANT CHANGE UP (ref 1.6–2.6)
MCHC RBC-ENTMCNC: 30.5 PG — SIGNIFICANT CHANGE UP (ref 27–34)
MCHC RBC-ENTMCNC: 31.5 % — LOW (ref 32–36)
MCV RBC AUTO: 96.8 FL — SIGNIFICANT CHANGE UP (ref 80–100)
NRBC # FLD: 0 K/UL — SIGNIFICANT CHANGE UP (ref 0–0)
PHOSPHATE SERPL-MCNC: 3.2 MG/DL — SIGNIFICANT CHANGE UP (ref 2.5–4.5)
PLATELET # BLD AUTO: 428 K/UL — HIGH (ref 150–400)
PMV BLD: 9.2 FL — SIGNIFICANT CHANGE UP (ref 7–13)
POTASSIUM SERPL-MCNC: 4.7 MMOL/L — SIGNIFICANT CHANGE UP (ref 3.5–5.3)
POTASSIUM SERPL-SCNC: 4.7 MMOL/L — SIGNIFICANT CHANGE UP (ref 3.5–5.3)
PROT SERPL-MCNC: 7.4 G/DL — SIGNIFICANT CHANGE UP (ref 6–8.3)
RBC # BLD: 3.47 M/UL — LOW (ref 4.2–5.8)
RBC # FLD: 13.4 % — SIGNIFICANT CHANGE UP (ref 10.3–14.5)
SODIUM SERPL-SCNC: 134 MMOL/L — LOW (ref 135–145)
WBC # BLD: 7.57 K/UL — SIGNIFICANT CHANGE UP (ref 3.8–10.5)
WBC # FLD AUTO: 7.57 K/UL — SIGNIFICANT CHANGE UP (ref 3.8–10.5)

## 2019-05-03 PROCEDURE — 47531 INJECTION FOR CHOLANGIOGRAM: CPT

## 2019-05-03 RX ORDER — POLYETHYLENE GLYCOL 3350 17 G/17G
17 POWDER, FOR SOLUTION ORAL DAILY
Qty: 0 | Refills: 0 | Status: DISCONTINUED | OUTPATIENT
Start: 2019-05-03 | End: 2019-05-10

## 2019-05-03 RX ORDER — LACTULOSE 10 G/15ML
10 SOLUTION ORAL ONCE
Qty: 0 | Refills: 0 | Status: COMPLETED | OUTPATIENT
Start: 2019-05-03 | End: 2019-05-04

## 2019-05-03 RX ORDER — DOCUSATE SODIUM 100 MG
100 CAPSULE ORAL THREE TIMES A DAY
Qty: 0 | Refills: 0 | Status: DISCONTINUED | OUTPATIENT
Start: 2019-05-03 | End: 2019-05-10

## 2019-05-03 RX ORDER — SENNA PLUS 8.6 MG/1
2 TABLET ORAL AT BEDTIME
Qty: 0 | Refills: 0 | Status: DISCONTINUED | OUTPATIENT
Start: 2019-05-03 | End: 2019-05-10

## 2019-05-03 RX ADMIN — Medication 3 MILLIGRAM(S): at 21:31

## 2019-05-03 RX ADMIN — Medication 100 MILLIGRAM(S): at 21:30

## 2019-05-03 RX ADMIN — SENNA PLUS 2 TABLET(S): 8.6 TABLET ORAL at 21:31

## 2019-05-03 RX ADMIN — Medication 100 MILLIGRAM(S): at 13:50

## 2019-05-03 RX ADMIN — POLYETHYLENE GLYCOL 3350 17 GRAM(S): 17 POWDER, FOR SOLUTION ORAL at 13:50

## 2019-05-03 RX ADMIN — LEVETIRACETAM 500 MILLIGRAM(S): 250 TABLET, FILM COATED ORAL at 05:36

## 2019-05-03 RX ADMIN — Medication 240 MILLIGRAM(S): at 05:36

## 2019-05-03 RX ADMIN — LISINOPRIL 20 MILLIGRAM(S): 2.5 TABLET ORAL at 05:36

## 2019-05-03 RX ADMIN — TAMSULOSIN HYDROCHLORIDE 0.4 MILLIGRAM(S): 0.4 CAPSULE ORAL at 21:30

## 2019-05-03 RX ADMIN — LEVETIRACETAM 500 MILLIGRAM(S): 250 TABLET, FILM COATED ORAL at 18:08

## 2019-05-03 RX ADMIN — Medication 0.12 MILLIGRAM(S): at 05:36

## 2019-05-03 RX ADMIN — ENOXAPARIN SODIUM 40 MILLIGRAM(S): 100 INJECTION SUBCUTANEOUS at 13:50

## 2019-05-03 NOTE — PROGRESS NOTE ADULT - ASSESSMENT
84M transferred from OSH due to bile duct injury following lap gus now s/p PTC (4/19) now s/p robotic assisted Laparoscopic hepaticojejunostomy, small bowel resection and anastomosis (4/26)    -pain control as needed  -monitor drain outputs  -diet: mechanical soft with thin liquids, as per speech and swallow  -PTC tube check today  -OOB and ambulating as tolerated  -lovenox for DVT ppx    D Team   [id number]

## 2019-05-03 NOTE — PROGRESS NOTE ADULT - SUBJECTIVE AND OBJECTIVE BOX
CHIEF COMPLAINT:Patient is a 84y old  Male who presents with a chief complaint of elevated LFTs post cholecystectomy (03 May 2019 00:04)    	        PAST MEDICAL & SURGICAL HISTORY:  Cholelithiasis  History of subdural hematoma  Constipation  Hypertension  Seizure  Acute subdural hematoma: (May 2014 - subdural hematoma,  pt had SX)          REVIEW OF SYSTEMS:  CONSTITUTIONAL: No fever, weight loss, or fatigue  EYES: No eye pain, visual disturbances, or discharge  NECK: No pain or stiffness  RESPIRATORY: No cough, wheezing, chills or hemoptysis; No Shortness of Breath  CARDIOVASCULAR: No chest pain, palpitations, passing out, dizziness,   GASTROINTESTINAL: dec abd pain/ distended   GENITOURINARY: No dysuria, frequency, hematuria, or incontinence  NEUROLOGICAL: No headaches, memory loss, loss of strength, numbness, or tremors      Medications:  MEDICATIONS  (STANDING):  digoxin     Tablet 0.125 milliGRAM(s) Oral daily  diltiazem    milliGRAM(s) Oral daily  docusate sodium 100 milliGRAM(s) Oral three times a day  enoxaparin Injectable 40 milliGRAM(s) SubCutaneous daily  levETIRAcetam 500 milliGRAM(s) Oral two times a day  lisinopril 20 milliGRAM(s) Oral daily  melatonin 3 milliGRAM(s) Oral at bedtime  polyethylene glycol 3350 17 Gram(s) Oral daily  senna 2 Tablet(s) Oral at bedtime  tamsulosin 0.4 milliGRAM(s) Oral at bedtime    MEDICATIONS  (PRN):  acetaminophen   Tablet .. 650 milliGRAM(s) Oral every 6 hours PRN Mild Pain (1 - 3)  oxyCODONE    IR 10 milliGRAM(s) Oral every 6 hours PRN Severe Pain (7 - 10)  oxyCODONE    IR 5 milliGRAM(s) Oral every 4 hours PRN Moderate Pain (4 - 6)    	    PHYSICAL EXAM:  T(C): 36.8 (05-03-19 @ 05:34), Max: 37 (05-02-19 @ 16:53)  HR: 85 (05-03-19 @ 05:34) (64 - 85)  BP: 153/63 (05-03-19 @ 05:34) (117/69 - 153/63)  RR: 16 (05-03-19 @ 05:34) (16 - 18)  SpO2: 99% (05-03-19 @ 05:34) (94% - 99%)  Wt(kg): --  I&O's Summary    02 May 2019 07:01  -  03 May 2019 07:00  --------------------------------------------------------  IN: 900 mL / OUT: 472 mL / NET: 428 mL        Appearance: Normal	  HEENT:   Normal oral mucosa, PERRL, EOMI	  Lymphatic: No lymphadenopathy  Cardiovascular: Normal S1 S2, No JVD, No murmurs,  Respiratory: Lungs clear to auscultation	  Psychiatry: A & O x 3,   Gastrointestinal:  Soft,  distended /no r/g  Skin: No rashes, No ecchymoses, No cyanosis	  Neurologic: Non-focal  Extremities: Normal range of motion, No clubbing, cyanosis   Vascular: Peripheral pulses palpable 2+ bilaterally    TELEMETRY: 	    ECG:  	  RADIOLOGY:  OTHER: 	  	  LABS:	 	    CARDIAC MARKERS:                                10.6   7.57  )-----------( 428      ( 03 May 2019 06:28 )             33.6     05-03    134<L>  |  99  |  23  ----------------------------<  98  4.7   |  21<L>  |  1.15    Ca    8.6      03 May 2019 06:28  Phos  3.2     05-03  Mg     2.0     05-03    TPro  7.4  /  Alb  3.3  /  TBili  0.7  /  DBili  x   /  AST  131<H>  /  ALT  303<H>  /  AlkPhos  196<H>  05-03    proBNP:   Lipid Profile:   HgA1c:   TSH:

## 2019-05-03 NOTE — PROGRESS NOTE ADULT - SUBJECTIVE AND OBJECTIVE BOX
Surgery Progress Note    S: Patient seen and examined. No acute events overnight. patient received fleet enema yesterday without passage of stool. - flatus, - BM. pain is well controlled.     O:  Vital Signs Last 24 Hrs  T(C): 36.7 (02 May 2019 23:49), Max: 37 (02 May 2019 16:53)  T(F): 98 (02 May 2019 23:49), Max: 98.6 (02 May 2019 16:53)  HR: 64 (02 May 2019 23:49) (64 - 78)  BP: 121/67 (02 May 2019 23:49) (117/69 - 150/52)  BP(mean): --  RR: 17 (02 May 2019 23:49) (16 - 18)  SpO2: 96% (02 May 2019 23:49) (93% - 98%)    I&O's Detail    01 May 2019 07:01  -  02 May 2019 07:00  --------------------------------------------------------  IN:    Oral Fluid: 700 mL  Total IN: 700 mL    OUT:    Bulb: 22.5 mL    Bulb: 60 mL    Bulb: 125 mL    Incontinent per Condom Catheter: 475 mL  Total OUT: 682.5 mL    Total NET: 17.5 mL      02 May 2019 07:01  -  03 May 2019 00:04  --------------------------------------------------------  IN:    Oral Fluid: 660 mL  Total IN: 660 mL    OUT:    Bulb: 7 mL    Bulb: 30 mL    Bulb: 30 mL    Voided: 175 mL  Total OUT: 242 mL    Total NET: 418 mL          MEDICATIONS  (STANDING):  digoxin     Tablet 0.125 milliGRAM(s) Oral daily  diltiazem    milliGRAM(s) Oral daily  enoxaparin Injectable 40 milliGRAM(s) SubCutaneous daily  levETIRAcetam 500 milliGRAM(s) Oral two times a day  lisinopril 20 milliGRAM(s) Oral daily  melatonin 3 milliGRAM(s) Oral at bedtime  tamsulosin 0.4 milliGRAM(s) Oral at bedtime    MEDICATIONS  (PRN):  acetaminophen   Tablet .. 650 milliGRAM(s) Oral every 6 hours PRN Mild Pain (1 - 3)  oxyCODONE    IR 10 milliGRAM(s) Oral every 6 hours PRN Severe Pain (7 - 10)  oxyCODONE    IR 5 milliGRAM(s) Oral every 4 hours PRN Moderate Pain (4 - 6)                            11.9   5.73  )-----------( 468      ( 02 May 2019 06:18 )             39.7       05-01    138  |  102  |  13  ----------------------------<  92  4.5   |  22  |  0.98    Ca    9.2      01 May 2019 06:31  Phos  3.2     05-01  Mg     2.2     05-01    TPro  7.7  /  Alb  3.8  /  TBili  0.9  /  DBili  0.4<H>  /  AST  109<H>  /  ALT  305<H>  /  AlkPhos  203<H>  05-01      Physical Exam:  Gen: Laying in bed, NAD  Resp: Unlabored breathing  Abd: soft, moderately distended, appropriately tender, 2 RLQ JPs with serosanguinous output, previous PTC in place, dressings c/d/i  Ext: WWP

## 2019-05-04 RX ADMIN — LISINOPRIL 20 MILLIGRAM(S): 2.5 TABLET ORAL at 05:18

## 2019-05-04 RX ADMIN — Medication 240 MILLIGRAM(S): at 05:18

## 2019-05-04 RX ADMIN — Medication 100 MILLIGRAM(S): at 15:02

## 2019-05-04 RX ADMIN — LACTULOSE 10 GRAM(S): 10 SOLUTION ORAL at 12:21

## 2019-05-04 RX ADMIN — Medication 3 MILLIGRAM(S): at 21:44

## 2019-05-04 RX ADMIN — POLYETHYLENE GLYCOL 3350 17 GRAM(S): 17 POWDER, FOR SOLUTION ORAL at 11:53

## 2019-05-04 RX ADMIN — TAMSULOSIN HYDROCHLORIDE 0.4 MILLIGRAM(S): 0.4 CAPSULE ORAL at 21:44

## 2019-05-04 RX ADMIN — Medication 0.12 MILLIGRAM(S): at 05:18

## 2019-05-04 RX ADMIN — LEVETIRACETAM 500 MILLIGRAM(S): 250 TABLET, FILM COATED ORAL at 18:01

## 2019-05-04 RX ADMIN — Medication 100 MILLIGRAM(S): at 05:18

## 2019-05-04 RX ADMIN — LEVETIRACETAM 500 MILLIGRAM(S): 250 TABLET, FILM COATED ORAL at 05:18

## 2019-05-04 RX ADMIN — ENOXAPARIN SODIUM 40 MILLIGRAM(S): 100 INJECTION SUBCUTANEOUS at 11:52

## 2019-05-04 NOTE — PROGRESS NOTE ADULT - ASSESSMENT
84M transferred from OSH due to bile duct injury following lap gus now s/p PTC (4/19) now s/p robotic assisted Laparoscopic hepaticojejunostomy, small bowel resection and anastomosis (4/26)    -pain control as needed  -monitor drain outputs  -diet: mechanical soft with thin liquids, as per speech and swallow  -fleet enema  -consider d/c ANAYELI  -OOB and ambulating as tolerated  -lovenox for DVT ppx    D Team   [id number]

## 2019-05-04 NOTE — PROGRESS NOTE ADULT - SUBJECTIVE AND OBJECTIVE BOX
CHIEF COMPLAINT:Patient is a 84y old  Male who presents with a chief complaint of elevated LFTs post cholecystectomy (03 May 2019 12:41)    	        PAST MEDICAL & SURGICAL HISTORY:  Cholelithiasis  History of subdural hematoma  Constipation  Hypertension  Seizure  Acute subdural hematoma: (May 2014 - subdural hematoma,  pt had SX)          REVIEW OF SYSTEMS:  sitting in chair  EYES: No eye pain, visual disturbances, or discharge  NECK: No pain or stiffness  RESPIRATORY: No cough, wheezing, chills or hemoptysis; No Shortness of Breath  CARDIOVASCULAR: No chest pain, palpitations, passing out, dizziness,   GASTROINTESTINAL: less abd pain   GENITOURINARY: No dysuria, frequency, hematuria, or incontinence  NEUROLOGICAL: No headaches,    Medications:  MEDICATIONS  (STANDING):  digoxin     Tablet 0.125 milliGRAM(s) Oral daily  diltiazem    milliGRAM(s) Oral daily  docusate sodium 100 milliGRAM(s) Oral three times a day  enoxaparin Injectable 40 milliGRAM(s) SubCutaneous daily  lactulose Syrup 10 Gram(s) Oral once  levETIRAcetam 500 milliGRAM(s) Oral two times a day  lisinopril 20 milliGRAM(s) Oral daily  melatonin 3 milliGRAM(s) Oral at bedtime  polyethylene glycol 3350 17 Gram(s) Oral daily  saline laxative (FLEET) Rectal Enema 1 Enema Rectal once  senna 2 Tablet(s) Oral at bedtime  tamsulosin 0.4 milliGRAM(s) Oral at bedtime    MEDICATIONS  (PRN):  acetaminophen   Tablet .. 650 milliGRAM(s) Oral every 6 hours PRN Mild Pain (1 - 3)  oxyCODONE    IR 10 milliGRAM(s) Oral every 6 hours PRN Severe Pain (7 - 10)  oxyCODONE    IR 5 milliGRAM(s) Oral every 4 hours PRN Moderate Pain (4 - 6)    	    PHYSICAL EXAM:  T(C): 37.1 (05-04-19 @ 08:14), Max: 37.1 (05-04-19 @ 08:14)  HR: 891 (05-04-19 @ 08:14) (72 - 891)  BP: 144/58 (05-04-19 @ 08:14) (106/56 - 157/61)  RR: 17 (05-04-19 @ 08:14) (16 - 18)  SpO2: 98% (05-04-19 @ 08:14) (96% - 100%)  Wt(kg): --  I&O's Summary    03 May 2019 07:01  -  04 May 2019 07:00  --------------------------------------------------------  IN: 0 mL / OUT: 525 mL / NET: -525 mL    04 May 2019 07:01  -  04 May 2019 09:46  --------------------------------------------------------  IN: 0 mL / OUT: 105 mL / NET: -105 mL        Appearance: Normal	  HEENT:   Normal oral mucosa, PERRL, EOMI	  Lymphatic: No lymphadenopathy  Cardiovascular: Normal S1 S2, No JVD, No murmurs  Respiratory: Lungs clear to auscultation	  Psychiatry: A & O  Gastrointestinal:  dist no r/g   Skin: No rashes, No ecchymoses, No cyanosis	  Neurologic: Non-focal  Extremities: Normal range of motion, No clubbing, cyanosis or edema  Vascular: Peripheral pulses palpable     TELEMETRY: 	    ECG:  	  RADIOLOGY:  OTHER: 	  	  LABS:	 	    CARDIAC MARKERS:                                10.6   7.57  )-----------( 428      ( 03 May 2019 06:28 )             33.6     05-03    134<L>  |  99  |  23  ----------------------------<  98  4.7   |  21<L>  |  1.15    Ca    8.6      03 May 2019 06:28  Phos  3.2     05-03  Mg     2.0     05-03    TPro  7.4  /  Alb  3.3  /  TBili  0.7  /  DBili  x   /  AST  131<H>  /  ALT  303<H>  /  AlkPhos  196<H>  05-03    proBNP:   Lipid Profile:   HgA1c:   TSH:

## 2019-05-04 NOTE — PROGRESS NOTE ADULT - SUBJECTIVE AND OBJECTIVE BOX
Surgery Progress Note      Subjective: Patient seen and examined. No acute events overnight.   still no bowel function     T(C): 37.1 (05-04-19 @ 08:14), Max: 37.1 (05-04-19 @ 08:14)  HR: 891 (05-04-19 @ 08:14) (72 - 891)  BP: 144/58 (05-04-19 @ 08:14) (106/56 - 157/61)  RR: 17 (05-04-19 @ 08:14) (16 - 18)  SpO2: 98% (05-04-19 @ 08:14) (96% - 100%)      05-03-19 @ 07:01  -  05-04-19 @ 07:00  --------------------------------------------------------  IN: 0 mL / OUT: 525 mL / NET: -525 mL    05-04-19 @ 07:01  -  05-04-19 @ 10:51  --------------------------------------------------------  IN: 0 mL / OUT: 105 mL / NET: -105 mL        Physical Exam:   General: NAD  Abdomen: soft, moderately distended, appropriately tender, 2 RLQ JPs with serosanguinous output, previous PTC in place, dressings c/d/i    Labs:      Medications:     acetaminophen   Tablet .. 650 milliGRAM(s) Oral every 6 hours PRN  digoxin     Tablet 0.125 milliGRAM(s) Oral daily  diltiazem    milliGRAM(s) Oral daily  docusate sodium 100 milliGRAM(s) Oral three times a day  enoxaparin Injectable 40 milliGRAM(s) SubCutaneous daily  lactulose Syrup 10 Gram(s) Oral once  levETIRAcetam 500 milliGRAM(s) Oral two times a day  lisinopril 20 milliGRAM(s) Oral daily  melatonin 3 milliGRAM(s) Oral at bedtime  oxyCODONE    IR 10 milliGRAM(s) Oral every 6 hours PRN  oxyCODONE    IR 5 milliGRAM(s) Oral every 4 hours PRN  polyethylene glycol 3350 17 Gram(s) Oral daily  saline laxative (FLEET) Rectal Enema 1 Enema Rectal once  senna 2 Tablet(s) Oral at bedtime  tamsulosin 0.4 milliGRAM(s) Oral at bedtime      Radiographs: No new imaging

## 2019-05-04 NOTE — PROVIDER CONTACT NOTE (OTHER) - ACTION/TREATMENT ORDERED:
Jayashree Girard MD will come assess patient and try to reorient him.
Try talking to patient again in 30 minutes and see if I can get another IV access.
no intervention at this time. continue to monitor patient and keep bed alarm activated.
MD aware. Continue to monitor. No new orders at this time.
MD made aware
MD made aware
provider made aware via text pager
try again later, continue to monitor

## 2019-05-04 NOTE — PROGRESS NOTE ADULT - ASSESSMENT
pt s/p lap choly w/ inc fluid accumulation  biliary leak/  tube check as per sx  s/p hepatojejonostomy/sb resection  abd distention stable  trend lfts   sx f/u noted  History of subdural hematoma/mental status stable   ct head no acute findings  Hypertension c/w meds  Seizure hx  c/w keppra  dvt proph   iv fluids as needed  swallow eval noted  modified diet   oob  pt

## 2019-05-04 NOTE — PROVIDER CONTACT NOTE (OTHER) - ASSESSMENT
s/p robotic assisted hepatic- jejunostomy with small bowel resection. Patient A&Ox2-3, tried to contact wife, no response. Patient willing to take Miralax and lactulose instead.

## 2019-05-05 RX ADMIN — Medication 240 MILLIGRAM(S): at 05:32

## 2019-05-05 RX ADMIN — TAMSULOSIN HYDROCHLORIDE 0.4 MILLIGRAM(S): 0.4 CAPSULE ORAL at 21:23

## 2019-05-05 RX ADMIN — Medication 650 MILLIGRAM(S): at 09:17

## 2019-05-05 RX ADMIN — Medication 3 MILLIGRAM(S): at 21:23

## 2019-05-05 RX ADMIN — SENNA PLUS 2 TABLET(S): 8.6 TABLET ORAL at 21:23

## 2019-05-05 RX ADMIN — Medication 100 MILLIGRAM(S): at 21:23

## 2019-05-05 RX ADMIN — ENOXAPARIN SODIUM 40 MILLIGRAM(S): 100 INJECTION SUBCUTANEOUS at 12:44

## 2019-05-05 RX ADMIN — Medication 650 MILLIGRAM(S): at 08:47

## 2019-05-05 RX ADMIN — LISINOPRIL 20 MILLIGRAM(S): 2.5 TABLET ORAL at 05:32

## 2019-05-05 RX ADMIN — LEVETIRACETAM 500 MILLIGRAM(S): 250 TABLET, FILM COATED ORAL at 05:32

## 2019-05-05 RX ADMIN — LEVETIRACETAM 500 MILLIGRAM(S): 250 TABLET, FILM COATED ORAL at 18:22

## 2019-05-05 RX ADMIN — Medication 0.12 MILLIGRAM(S): at 05:32

## 2019-05-05 NOTE — PROGRESS NOTE ADULT - ASSESSMENT
84M transferred from OSH due to bile duct injury following lap gus now s/p PTC (4/19) now s/p robotic assisted Laparoscopic hepaticojejunostomy, small bowel resection and anastomosis (4/26)    -pain control as needed  -monitor drain outputs  -diet: mechanical soft with thin liquids, as per speech and swallow  -fleet enema  -consider d/c ANAYELI  -OOB and ambulating as tolerated  -lovenox for DVT ppx    D Team   [id number] 84M transferred from OSH due to bile duct injury following lap gus now s/p PTC (4/19) now s/p robotic assisted Laparoscopic hepaticojejunostomy, small bowel resection and anastomosis (4/26)    -pain control as needed  -monitor drain outputs  -diet: mechanical soft with thin liquids, as per speech and swallow  -continue bowel regimen  -consider d/c ANAYELI  -OOB and ambulating as tolerated  -lovenox for DVT ppx  -discharge planning    D Team   [id number]

## 2019-05-05 NOTE — PROGRESS NOTE ADULT - SUBJECTIVE AND OBJECTIVE BOX
Surgery Progress Note      Subjective: Patient seen and examined. per nursing, BMx2 yesterday    T(C): 36.7 (05-05-19 @ 08:39), Max: 36.9 (05-04-19 @ 16:50)  HR: 51 (05-05-19 @ 08:39) (51 - 82)  BP: 145/52 (05-05-19 @ 08:39) (132/66 - 145/52)  RR: 17 (05-05-19 @ 08:39) (15 - 18)  SpO2: 99% (05-05-19 @ 08:39) (90% - 99%)      05-04-19 @ 07:01  -  05-05-19 @ 07:00  --------------------------------------------------------  IN: 0 mL / OUT: 800 mL / NET: -800 mL    05-05-19 @ 07:01  -  05-05-19 @ 09:42  --------------------------------------------------------  IN: 0 mL / OUT: 180 mL / NET: -180 mL        Physical Exam:   General: NAD  Abdomen: soft, moderately distended, appropriately tender, 2 RLQ JPs with serosanguinous output, previous PTC in place, dressings c/d/i      Medications:     acetaminophen   Tablet .. 650 milliGRAM(s) Oral every 6 hours PRN  digoxin     Tablet 0.125 milliGRAM(s) Oral daily  diltiazem    milliGRAM(s) Oral daily  docusate sodium 100 milliGRAM(s) Oral three times a day  enoxaparin Injectable 40 milliGRAM(s) SubCutaneous daily  levETIRAcetam 500 milliGRAM(s) Oral two times a day  lisinopril 20 milliGRAM(s) Oral daily  melatonin 3 milliGRAM(s) Oral at bedtime  oxyCODONE    IR 10 milliGRAM(s) Oral every 6 hours PRN  polyethylene glycol 3350 17 Gram(s) Oral daily  senna 2 Tablet(s) Oral at bedtime  tamsulosin 0.4 milliGRAM(s) Oral at bedtime      Radiographs: No new imaging

## 2019-05-05 NOTE — PROGRESS NOTE ADULT - SUBJECTIVE AND OBJECTIVE BOX
CHIEF COMPLAINT:Patient is a 84y old  Male who presents with a chief complaint of elevated LFTs post cholecystectomy (04 May 2019 10:51)    	        PAST MEDICAL & SURGICAL HISTORY:  Cholelithiasis  History of subdural hematoma  Constipation  Hypertension  Seizure  Acute subdural hematoma: (May 2014 - subdural hematoma,  pt had SX)          REVIEW OF SYSTEMS:  no cp or sob  no abd pain  no vomiting      Medications:  MEDICATIONS  (STANDING):  digoxin     Tablet 0.125 milliGRAM(s) Oral daily  diltiazem    milliGRAM(s) Oral daily  docusate sodium 100 milliGRAM(s) Oral three times a day  enoxaparin Injectable 40 milliGRAM(s) SubCutaneous daily  levETIRAcetam 500 milliGRAM(s) Oral two times a day  lisinopril 20 milliGRAM(s) Oral daily  melatonin 3 milliGRAM(s) Oral at bedtime  polyethylene glycol 3350 17 Gram(s) Oral daily  senna 2 Tablet(s) Oral at bedtime  tamsulosin 0.4 milliGRAM(s) Oral at bedtime    MEDICATIONS  (PRN):  acetaminophen   Tablet .. 650 milliGRAM(s) Oral every 6 hours PRN Mild Pain (1 - 3)  oxyCODONE    IR 10 milliGRAM(s) Oral every 6 hours PRN Severe Pain (7 - 10)    	    PHYSICAL EXAM:  T(C): 36.4 (05-05-19 @ 05:31), Max: 36.9 (05-04-19 @ 16:50)  HR: 82 (05-05-19 @ 05:31) (66 - 82)  BP: 144/71 (05-05-19 @ 05:31) (132/66 - 144/71)  RR: 15 (05-05-19 @ 05:31) (15 - 18)  SpO2: 98% (05-05-19 @ 05:31) (90% - 99%)  Wt(kg): --  I&O's Summary    04 May 2019 07:01  -  05 May 2019 07:00  --------------------------------------------------------  IN: 0 mL / OUT: 800 mL / NET: -800 mL        Appearance: Normal	  HEENT:   Normal oral mucosa, PERRL, EOMI	  Lymphatic: No lymphadenopathy  Cardiovascular: Normal S1 S2, No JVD,   Respiratory: Lungs clear to auscultation	  Psychiatry: A & O  Gastrointestinal: firm / distended no r/g   Skin: No rashes, No ecchymoses, No cyanosis	  Neurologic: Non-focal  Extremities: Normal range of motion, No clubbing, cyanosis   Vascular: Peripheral pulses palpable 2+ bilaterally    TELEMETRY: 	    ECG:  	  RADIOLOGY:  OTHER: 	  	  LABS:	 	    CARDIAC MARKERS:                  proBNP:   Lipid Profile:   HgA1c:   TSH:

## 2019-05-06 ENCOUNTER — TRANSCRIPTION ENCOUNTER (OUTPATIENT)
Age: 84
End: 2019-05-06

## 2019-05-06 RX ORDER — OXYCODONE HYDROCHLORIDE 5 MG/1
5 TABLET ORAL EVERY 4 HOURS
Qty: 0 | Refills: 0 | Status: DISCONTINUED | OUTPATIENT
Start: 2019-05-06 | End: 2019-05-10

## 2019-05-06 RX ADMIN — OXYCODONE HYDROCHLORIDE 5 MILLIGRAM(S): 5 TABLET ORAL at 21:11

## 2019-05-06 RX ADMIN — LEVETIRACETAM 500 MILLIGRAM(S): 250 TABLET, FILM COATED ORAL at 05:11

## 2019-05-06 RX ADMIN — SENNA PLUS 2 TABLET(S): 8.6 TABLET ORAL at 21:12

## 2019-05-06 RX ADMIN — Medication 0.12 MILLIGRAM(S): at 05:11

## 2019-05-06 RX ADMIN — Medication 240 MILLIGRAM(S): at 05:11

## 2019-05-06 RX ADMIN — LEVETIRACETAM 500 MILLIGRAM(S): 250 TABLET, FILM COATED ORAL at 18:16

## 2019-05-06 RX ADMIN — Medication 100 MILLIGRAM(S): at 05:11

## 2019-05-06 RX ADMIN — LISINOPRIL 20 MILLIGRAM(S): 2.5 TABLET ORAL at 05:11

## 2019-05-06 RX ADMIN — Medication 3 MILLIGRAM(S): at 21:12

## 2019-05-06 RX ADMIN — ENOXAPARIN SODIUM 40 MILLIGRAM(S): 100 INJECTION SUBCUTANEOUS at 12:12

## 2019-05-06 RX ADMIN — Medication 100 MILLIGRAM(S): at 12:11

## 2019-05-06 RX ADMIN — Medication 100 MILLIGRAM(S): at 21:12

## 2019-05-06 RX ADMIN — POLYETHYLENE GLYCOL 3350 17 GRAM(S): 17 POWDER, FOR SOLUTION ORAL at 12:13

## 2019-05-06 RX ADMIN — OXYCODONE HYDROCHLORIDE 5 MILLIGRAM(S): 5 TABLET ORAL at 21:58

## 2019-05-06 RX ADMIN — TAMSULOSIN HYDROCHLORIDE 0.4 MILLIGRAM(S): 0.4 CAPSULE ORAL at 21:12

## 2019-05-06 NOTE — PROGRESS NOTE ADULT - ASSESSMENT
84M transferred from OSH due to bile duct injury following lap gus now s/p PTC (4/19) now s/p robotic assisted Laparoscopic hepaticojejunostomy, small bowel resection and anastomosis (4/26)    -pain control as needed  -monitor drain outputs  -diet: mechanical soft with thin liquids, as per speech and swallow  -continue bowel regimen  -d/c ANAYELI  -OOB and ambulating as tolerated  -lovenox for DVT ppx  -discharge planning    D Team   [id number] 84M transferred from OSH due to bile duct injury following lap gus now s/p PTC (4/19) now s/p robotic assisted Laparoscopic hepaticojejunostomy, small bowel resection and anastomosis (4/26)    -pain control as needed  -monitor drain outputs  -diet: mechanical soft with thin liquids, as per speech and swallow  -continue bowel regimen  -OOB and ambulating as tolerated  -lovenox for DVT ppx  -discharge planning - home with home PT and rolling walker    D Team   [id number]

## 2019-05-06 NOTE — DISCHARGE NOTE PROVIDER - HOSPITAL COURSE
84M with PMH of seizure disorder, subdural hematoma s/p craniotomy due to trauma, HTN, ETOH abuse, cholelithiasis, who presented to OSH ED with abdominal pain, nausea/vomiting and was found to have cholecystitis and cholelithiasis on CT. He underwent a laparoscopic cholecystectomy. On POD1, he was noted to have increase in LFTs and bilious output from ANAYELI drain on POD2.      MRCP and HIDA scan were performed at outside hospital. Patient was transferred to [facility] for further care.         Patient states that he has some abdominal pain and feels bloated. Cannot remember when he last had a bowel movement Denies nausea/vomiting.         On 4/19 pt underwent percutaneous external biliary drain placement with IR. Pt tolerated procedure well. 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 There is narrowing of the proximal celiac artery possibly due to a prominent median arcuate ligament. Prominent calcified plaque is seen to involve the origin of the superior mesenteric artery The SABI, and renal arteries are within normal limits. Moderate     atheromatous plaque of the abdominal aorta, common iliac arteries and common femoral arteries is demonstrated.                 5/1pt had CT abdomen pelvis which showed Interval hepaticojejunostomy. No discrete right upper quadrant collection. 84M with PMH of seizure disorder, subdural hematoma s/p craniotomy due to trauma, HTN, ETOH abuse, cholelithiasis, who presented to OSH ED with abdominal pain, nausea/vomiting and was found to have cholecystitis and cholelithiasis on CT. He underwent a laparoscopic cholecystectomy. On POD1, he was noted to have increase in LFTs and bilious output from ANAYELI drain on POD2.      MRCP and HIDA scan were performed at outside hospital. Patient was transferred to [facility] for further care.         Patient states that he has some abdominal pain and feels bloated. Cannot remember when he last had a bowel movement Denies nausea/vomiting.         On 4/19 pt underwent percutaneous external biliary drain placement with IR. Pt tolerated procedure well. 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 There is narrowing of the proximal celiac artery possibly due to a prominent median arcuate ligament. Prominent calcified plaque is seen to involve the origin of the superior mesenteric artery The SABI, and renal arteries are within normal limits. Moderate atheromatous plaque of the abdominal aorta, common iliac arteries and common femoral arteries is demonstrated.     Pt was monitored over the next few days. On 4/26 pt went to OR for Laparoscopic hepaticojejunostomy and Small bowel resection with anastomosis. Pts campoverde was removed post operatively and he pass a TOV. During hospital course patients diet was slowly advanced as tolerated.    On 4/30 pt had speech and swallow evaluation and cinesophogram was performed.      5/1pt had CT abdomen pelvis which showed Interval hepaticojejunostomy. No discrete right upper quadrant collection. 84M with PMH of seizure disorder, subdural hematoma s/p craniotomy due to trauma, HTN, ETOH abuse, cholelithiasis, who presented to OSH ED with abdominal pain, nausea/vomiting and was found to have cholecystitis and cholelithiasis on CT. He underwent a laparoscopic cholecystectomy. On POD1, he was noted to have increase in LFTs and bilious output from ANAYELI drain on POD2.      MRCP and HIDA scan were performed at outside hospital. Patient was transferred to [facility] for further care.         Patient states that he has some abdominal pain and feels bloated. Cannot remember when he last had a bowel movement Denies nausea/vomiting.         On 4/19 pt underwent percutaneous external biliary drain placement with IR. Pt tolerated procedure well. 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 There is narrowing of the proximal celiac artery possibly due to a prominent median arcuate ligament. Prominent calcified plaque is seen to involve the origin of the superior mesenteric artery The SABI, and renal arteries are within normal limits. Moderate atheromatous plaque of the abdominal aorta, common iliac arteries and common femoral arteries is demonstrated.     Pt was monitored over the next few days. On 4/26 pt went to OR for Laparoscopic hepaticojejunostomy and Small bowel resection with anastomosis. Pts campoverde was removed post operatively and he pass a TOV. During hospital course patients diet was slowly advanced as tolerated.    On 4/30 pt had speech and swallow evaluation and cinesophogram was performed.      5/1pt had CT abdomen pelvis which showed Interval hepaticojejunostomy. No discrete right upper quadrant collection.         On 5/3 pt underwent a Biliary tube check revealing no intrahepatic biliary dilatation and free flow of contrast from the liver to the bowel. No leakage of contrast noted.         At this time patient has been deemed stable for discharge by attending. 84M with PMH of seizure disorder, subdural hematoma s/p craniotomy due to trauma, HTN, ETOH abuse, cholelithiasis, who presented to OSH ED with abdominal pain, nausea/vomiting and was found to have cholecystitis and cholelithiasis on CT. He underwent a laparoscopic cholecystectomy. On POD1, he was noted to have increase in LFTs and bilious output from ANAYELI drain on POD2.      MRCP and HIDA scan were performed at outside hospital. Patient was transferred to [facility] for further care.         Patient states that he has some abdominal pain and feels bloated. Cannot remember when he last had a bowel movement Denies nausea/vomiting.         On 4/19 pt underwent percutaneous external biliary drain placement with IR. Pt tolerated procedure well. 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 There is narrowing of the proximal celiac artery possibly due to a prominent median arcuate ligament. Prominent calcified plaque is seen to involve the origin of the superior mesenteric artery The SABI, and renal arteries are within normal limits. Moderate atheromatous plaque of the abdominal aorta, common iliac arteries and common femoral arteries is demonstrated.     Pt was monitored over the next few days. On 4/26 pt went to OR for Laparoscopic hepaticojejunostomy and Small bowel resection with anastomosis. Pts campoverde was removed post operatively and he pass a TOV. During hospital course patients diet was slowly advanced as tolerated.    On 4/30 pt had speech and swallow evaluation and cinesophogram was performed.      5/1pt had CT abdomen pelvis which showed Interval hepaticojejunostomy. No discrete right upper quadrant collection.         On 5/3 pt underwent a Biliary tube check revealing no intrahepatic biliary dilatation and free flow of contrast from the liver to the bowel. No leakage of contrast noted.         5/4 Patient had bowel function return and had a bowel movement.         5/5 ANAYELI drain # 2 was removed         5/7 Patient was noted to have a rise in Creatinine and nephrology was consulted. Renal US was negative for hydronephrosis, campoverde was placed with return of yellow/clear urine and he was stared on IVF.         5/8: Patient Cr downtrending         5/9 Patients Cr continued to downtrend. Campoverde kept at discharge and given instructions for following up with urology as outpatient.     At this time patient has been deemed stable for discharge home with physical therapy by attending. 84M with PMH of seizure disorder, subdural hematoma s/p craniotomy due to trauma, HTN, ETOH abuse, cholelithiasis, who presented to OSH ED with abdominal pain, nausea/vomiting and was found to have cholecystitis and cholelithiasis on CT. He underwent a laparoscopic cholecystectomy. On POD1, he was noted to have increase in LFTs and bilious output from ANAYELI drain on POD2.      MRCP and HIDA scan were performed at outside hospital. Patient was transferred to [facility] for further care.         Patient states that he has some abdominal pain and feels bloated. Cannot remember when he last had a bowel movement Denies nausea/vomiting.         On 4/19 pt underwent percutaneous external biliary drain placement with IR. Pt tolerated procedure well. 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 There is narrowing of the proximal celiac artery possibly due to a prominent median arcuate ligament. Prominent calcified plaque is seen to involve the origin of the superior mesenteric artery The SABI, and renal arteries are within normal limits. Moderate atheromatous plaque of the abdominal aorta, common iliac arteries and common femoral arteries is demonstrated.     Pt was monitored over the next few days. On 4/26 pt went to OR for Laparoscopic hepaticojejunostomy and Small bowel resection with anastomosis. Pts campoverde was removed post operatively and he pass a TOV. During hospital course patients diet was slowly advanced as tolerated.    On 4/30 pt had speech and swallow evaluation and cinesophogram was performed.      5/1pt had CT abdomen pelvis which showed Interval hepaticojejunostomy. No discrete right upper quadrant collection.         On 5/3 pt underwent a Biliary tube check revealing no intrahepatic biliary dilatation and free flow of contrast from the liver to the bowel. No leakage of contrast noted.         5/4 Patient had bowel function return and had a bowel movement.         5/5 ANAYELI drain # 2 was removed         5/7 Patient was noted to have a rise in Creatinine and nephrology was consulted. Renal US was negative for hydronephrosis, campoverde was placed with return of yellow/clear urine and he was stared on IVF.         5/8: Patient Cr downtrending         5/9 Patients Cr continued to downtrend. Campoverde kept at discharge and given instructions for following up with urology as outpatient.     At this time patient has been deemed stable for discharge home with physical therapy by attending.

## 2019-05-06 NOTE — DISCHARGE NOTE PROVIDER - CARE PROVIDER_API CALL
James Harp)  Surgery  [address] [place] [address]  Phone: [phone]  Fax: [phone]  Follow Up Time: James Harp)  Surgery  [address] [place] [address]  Phone: [phone]  Fax: [phone]  Follow Up Time:     Guzman Sanchez)  Internal Medicine; Nephrology  [address] [address]  Phone: [phone]  Fax: [phone]  Follow Up Time:

## 2019-05-06 NOTE — DISCHARGE NOTE PROVIDER - PROVIDER TOKENS
PROVIDER:[TOKEN:[[id number]]] PROVIDER:[TOKEN:[[id number]]],PROVIDER:[TOKEN:[[id number]]]

## 2019-05-06 NOTE — DISCHARGE NOTE PROVIDER - CARE PROVIDERS DIRECT ADDRESSES
,[email or web address]_Unknown

## 2019-05-06 NOTE — PROGRESS NOTE ADULT - SUBJECTIVE AND OBJECTIVE BOX
Surgery Progress Note      Subjective: Patient seen and examined. No acute events overnight.   1 ANAYELI removed yesterday     T(C): 36.8 (05-06-19 @ 00:21), Max: 36.9 (05-05-19 @ 11:52)  HR: 79 (05-06-19 @ 00:21) (51 - 82)  BP: 118/59 (05-06-19 @ 00:21) (110/49 - 150/72)  RR: 18 (05-06-19 @ 00:21) (15 - 18)  SpO2: 98% (05-06-19 @ 00:21) (97% - 99%)      05-04-19 @ 07:01  -  05-05-19 @ 07:00  --------------------------------------------------------  IN: 0 mL / OUT: 800 mL / NET: -800 mL    05-05-19 @ 07:01  -  05-06-19 @ 00:24  --------------------------------------------------------  IN: 0 mL / OUT: 360 mL / NET: -360 mL        Physical Exam:     General: NAD  Abdomen: soft, moderately distended, appropriately tender, 1 RLQ JPs with serosanguinous output, previous PTC in place, dressings c/d/i        Medications:     acetaminophen   Tablet .. 650 milliGRAM(s) Oral every 6 hours PRN  digoxin     Tablet 0.125 milliGRAM(s) Oral daily  diltiazem    milliGRAM(s) Oral daily  docusate sodium 100 milliGRAM(s) Oral three times a day  enoxaparin Injectable 40 milliGRAM(s) SubCutaneous daily  levETIRAcetam 500 milliGRAM(s) Oral two times a day  lisinopril 20 milliGRAM(s) Oral daily  melatonin 3 milliGRAM(s) Oral at bedtime  oxyCODONE    IR 10 milliGRAM(s) Oral every 6 hours PRN  polyethylene glycol 3350 17 Gram(s) Oral daily  senna 2 Tablet(s) Oral at bedtime  tamsulosin 0.4 milliGRAM(s) Oral at bedtime      Radiographs: No new imaging

## 2019-05-06 NOTE — DISCHARGE NOTE PROVIDER - NSDCFUADDAPPT_GEN_ALL_CORE_FT
Please follow up with Interventional radiology to have your drain evaluated one week from discharge.  Please call today, to schedule an appointment (for one week from discharge date) [phone].   Location is in [facility] on the second floor radiology Room 263. You can park at [facility] parking garage. Continue to empty and record the drain output daily as you have been taught. Please follow up with Interventional radiology to have your drain evaluated one week from discharge.  Please call today, to schedule an appointment (for one week from discharge date) [phone].   Location is in [facility] on the second floor radiology Room 263. You can park at [facility] parking garage. Continue to empty and record the drain output daily as you have been taught.    Please follow up with Urology at the [facility] for your urinary retention and placement of the campoverde while in the hospital. You may call [phone] to schedule an appointment. Please follow up with Interventional radiology to have your drain evaluated one week from discharge.  Please call today, to schedule an appointment (for one week from discharge date) [phone].   Location is in [facility] on the second floor radiology Room 263. You can park at [facility] parking garage. Continue to empty and record the drain output daily as you have been taught.    Please follow up with Urology at the [facility] for your urinary retention and placement of the campoverde while in the hospital. You may call [phone] to schedule an appointment.    Please follow up with speech and swallow at [facility] for further evaluation [phone] Please follow up with Interventional radiology to have your drain evaluated one week from discharge.  Please call today, to schedule an appointment (for one week from discharge date) [phone].   Location is in [facility] on the second floor radiology Room 263. You can park at [facility] parking garage. Continue to empty and record the drain output daily as you have been taught.    Please follow up with Urology at the [facility] for your urinary retention and placement of the campoverde while in the hospital. You may call [phone] to schedule an appointment.    Please follow up with primary medical doctor. Lisinopril was discontinued during hospital course due to increase in creatinine. Please follow up with primary medical doctor as to when to re-start medication    Please follow up with speech and swallow at [facility] for further evaluation [phone] Please follow up with Interventional radiology to have your drain evaluated one week from discharge.  Please call today, to schedule an appointment (for one week from discharge date) [phone].   Location is in [facility] on the second floor radiology Room 263. You can park at [facility] parking garage. Continue to empty and record the drain output daily as you have been taught.    Please follow up with Urology at the [facility] for your urinary retention and placement of the campoverde while in the hospital. You may call [phone] to schedule an appointment. Continue taking flomax for BPH.     Please follow up with primary medical doctor. Lisinopril was discontinued during hospital course due to increase in creatinine. Please follow up with primary medical doctor as to when to re-start medication    Please follow up with speech and swallow at [facility] for further evaluation [phone]

## 2019-05-07 ENCOUNTER — TRANSCRIPTION ENCOUNTER (OUTPATIENT)
Age: 84
End: 2019-05-07

## 2019-05-07 LAB
ALBUMIN SERPL ELPH-MCNC: 4.4 G/DL — SIGNIFICANT CHANGE UP (ref 3.3–5)
ALP SERPL-CCNC: 236 U/L — HIGH (ref 40–120)
ALT FLD-CCNC: 341 U/L — HIGH (ref 4–41)
ANION GAP SERPL CALC-SCNC: 16 MMO/L — HIGH (ref 7–14)
APPEARANCE UR: SIGNIFICANT CHANGE UP
AST SERPL-CCNC: 142 U/L — HIGH (ref 4–40)
BACTERIA # UR AUTO: NEGATIVE — SIGNIFICANT CHANGE UP
BILIRUB DIRECT SERPL-MCNC: 0.4 MG/DL — HIGH (ref 0.1–0.2)
BILIRUB SERPL-MCNC: 1 MG/DL — SIGNIFICANT CHANGE UP (ref 0.2–1.2)
BILIRUB UR-MCNC: SIGNIFICANT CHANGE UP
BLOOD UR QL VISUAL: NEGATIVE — SIGNIFICANT CHANGE UP
BUN SERPL-MCNC: 42 MG/DL — HIGH (ref 7–23)
CALCIUM SERPL-MCNC: 10 MG/DL — SIGNIFICANT CHANGE UP (ref 8.4–10.5)
CHLORIDE SERPL-SCNC: 99 MMOL/L — SIGNIFICANT CHANGE UP (ref 98–107)
CO2 SERPL-SCNC: 23 MMOL/L — SIGNIFICANT CHANGE UP (ref 22–31)
COD CRY URNS QL: SIGNIFICANT CHANGE UP (ref 0–0)
COLOR SPEC: SIGNIFICANT CHANGE UP
CREAT ?TM UR-MCNC: 347.8 MG/DL — SIGNIFICANT CHANGE UP
CREAT SERPL-MCNC: 2.27 MG/DL — HIGH (ref 0.5–1.3)
GLUCOSE SERPL-MCNC: 126 MG/DL — HIGH (ref 70–99)
GLUCOSE UR-MCNC: NEGATIVE — SIGNIFICANT CHANGE UP
HCT VFR BLD CALC: 35.9 % — LOW (ref 39–50)
HGB BLD-MCNC: 11.5 G/DL — LOW (ref 13–17)
HYALINE CASTS # UR AUTO: SIGNIFICANT CHANGE UP
KETONES UR-MCNC: NEGATIVE — SIGNIFICANT CHANGE UP
LEUKOCYTE ESTERASE UR-ACNC: NEGATIVE — SIGNIFICANT CHANGE UP
MAGNESIUM SERPL-MCNC: 2.6 MG/DL — SIGNIFICANT CHANGE UP (ref 1.6–2.6)
MCHC RBC-ENTMCNC: 30.4 PG — SIGNIFICANT CHANGE UP (ref 27–34)
MCHC RBC-ENTMCNC: 32 % — SIGNIFICANT CHANGE UP (ref 32–36)
MCV RBC AUTO: 95 FL — SIGNIFICANT CHANGE UP (ref 80–100)
NITRITE UR-MCNC: NEGATIVE — SIGNIFICANT CHANGE UP
NRBC # FLD: 0 K/UL — SIGNIFICANT CHANGE UP (ref 0–0)
OSMOLALITY UR: 571 MOSMO/KG — SIGNIFICANT CHANGE UP (ref 50–1200)
PH UR: 5.5 — SIGNIFICANT CHANGE UP (ref 5–8)
PHOSPHATE SERPL-MCNC: 4.3 MG/DL — SIGNIFICANT CHANGE UP (ref 2.5–4.5)
PLATELET # BLD AUTO: 454 K/UL — HIGH (ref 150–400)
PMV BLD: 9.3 FL — SIGNIFICANT CHANGE UP (ref 7–13)
POTASSIUM SERPL-MCNC: 5.5 MMOL/L — HIGH (ref 3.5–5.3)
POTASSIUM SERPL-SCNC: 5.5 MMOL/L — HIGH (ref 3.5–5.3)
PROT SERPL-MCNC: 9.3 G/DL — HIGH (ref 6–8.3)
PROT UR-MCNC: 70 — SIGNIFICANT CHANGE UP
RBC # BLD: 3.78 M/UL — LOW (ref 4.2–5.8)
RBC # FLD: 13.3 % — SIGNIFICANT CHANGE UP (ref 10.3–14.5)
RBC CASTS # UR COMP ASSIST: SIGNIFICANT CHANGE UP (ref 0–?)
SODIUM SERPL-SCNC: 138 MMOL/L — SIGNIFICANT CHANGE UP (ref 135–145)
SODIUM UR-SCNC: 48 MMOL/L — SIGNIFICANT CHANGE UP
SP GR SPEC: 1.03 — SIGNIFICANT CHANGE UP (ref 1–1.04)
SQUAMOUS # UR AUTO: SIGNIFICANT CHANGE UP
UROBILINOGEN FLD QL: SIGNIFICANT CHANGE UP
WBC # BLD: 7.34 K/UL — SIGNIFICANT CHANGE UP (ref 3.8–10.5)
WBC # FLD AUTO: 7.34 K/UL — SIGNIFICANT CHANGE UP (ref 3.8–10.5)
WBC UR QL: SIGNIFICANT CHANGE UP (ref 0–?)

## 2019-05-07 RX ORDER — CALCIUM GLUCONATE 100 MG/ML
2 VIAL (ML) INTRAVENOUS ONCE
Qty: 0 | Refills: 0 | Status: COMPLETED | OUTPATIENT
Start: 2019-05-07 | End: 2019-05-07

## 2019-05-07 RX ORDER — SODIUM CHLORIDE 9 MG/ML
1000 INJECTION, SOLUTION INTRAVENOUS
Qty: 0 | Refills: 0 | Status: DISCONTINUED | OUTPATIENT
Start: 2019-05-07 | End: 2019-05-09

## 2019-05-07 RX ORDER — SODIUM CHLORIDE 9 MG/ML
1000 INJECTION, SOLUTION INTRAVENOUS
Qty: 0 | Refills: 0 | Status: DISCONTINUED | OUTPATIENT
Start: 2019-05-07 | End: 2019-05-07

## 2019-05-07 RX ADMIN — SENNA PLUS 2 TABLET(S): 8.6 TABLET ORAL at 21:12

## 2019-05-07 RX ADMIN — Medication 200 GRAM(S): at 17:25

## 2019-05-07 RX ADMIN — POLYETHYLENE GLYCOL 3350 17 GRAM(S): 17 POWDER, FOR SOLUTION ORAL at 12:41

## 2019-05-07 RX ADMIN — Medication 100 MILLIGRAM(S): at 21:12

## 2019-05-07 RX ADMIN — SODIUM CHLORIDE 75 MILLILITER(S): 9 INJECTION, SOLUTION INTRAVENOUS at 17:26

## 2019-05-07 RX ADMIN — SODIUM CHLORIDE 50 MILLILITER(S): 9 INJECTION, SOLUTION INTRAVENOUS at 16:31

## 2019-05-07 RX ADMIN — LEVETIRACETAM 500 MILLIGRAM(S): 250 TABLET, FILM COATED ORAL at 17:26

## 2019-05-07 RX ADMIN — TAMSULOSIN HYDROCHLORIDE 0.4 MILLIGRAM(S): 0.4 CAPSULE ORAL at 21:12

## 2019-05-07 RX ADMIN — LEVETIRACETAM 500 MILLIGRAM(S): 250 TABLET, FILM COATED ORAL at 06:00

## 2019-05-07 RX ADMIN — Medication 100 MILLIGRAM(S): at 06:00

## 2019-05-07 RX ADMIN — Medication 0.12 MILLIGRAM(S): at 06:00

## 2019-05-07 RX ADMIN — ENOXAPARIN SODIUM 40 MILLIGRAM(S): 100 INJECTION SUBCUTANEOUS at 12:41

## 2019-05-07 RX ADMIN — Medication 100 MILLIGRAM(S): at 12:41

## 2019-05-07 NOTE — DISCHARGE NOTE NURSING/CASE MANAGEMENT/SOCIAL WORK - NSDCFUADDAPPT_GEN_ALL_CORE_FT
Please follow up with Interventional radiology to have your drain evaluated one week from discharge.  Please call today, to schedule an appointment (for one week from discharge date) [phone].   Location is in [facility] on the second floor radiology Room 263. You can park at [facility] parking garage. Continue to empty and record the drain output daily as you have been taught.

## 2019-05-07 NOTE — DISCHARGE NOTE NURSING/CASE MANAGEMENT/SOCIAL WORK - NSDCPEWEB_GEN_ALL_CORE
[place] website --- [email or web address] Tobacco [email or web address] website --- [email or web address]

## 2019-05-07 NOTE — PROGRESS NOTE ADULT - SUBJECTIVE AND OBJECTIVE BOX
SUBJECTIVE:    Patient seen and examined, feeling well, no complaints, awaiting DC home.     OBJECTIVE:     ** VITAL SIGNS / I&O's **    T(C): 36.7 (05-07-19 @ 08:01), Max: 36.7 (05-07-19 @ 05:53)  T(F): 98 (05-07-19 @ 08:01), Max: 98.1 (05-07-19 @ 05:53)  HR: 94 (05-07-19 @ 08:01) (77 - 94)  BP: 101/550 (05-07-19 @ 08:01) (101/550 - 143/64)  RR: 20 (05-07-19 @ 08:01) (18 - 20)  SpO2: 95% (05-07-19 @ 08:01) (95% - 99%)      06 May 2019 07:01  -  07 May 2019 07:00  --------------------------------------------------------  IN:  Total IN: 0 mL    OUT:    Bulb: 40 mL    Bulb: 5.5 mL    Voided: 200 mL  Total OUT: 245.5 mL    Total NET: -245.5 mL        General: NAD  Abdomen: soft, moderately distended, appropriately tender, 1 RLQ JPs with serosanguinous output, previous PTC in place, dressings c/d/i

## 2019-05-07 NOTE — CONSULT NOTE ADULT - SUBJECTIVE AND OBJECTIVE BOX
HPI:  84M with PMH of seizure disorder, subdural hematoma s/p craniotomy due to trauma, HTN, ETOH abuse, cholelithiasis, who presented to OSH ED with abdominal pain, nausea/vomiting and was found to have cholecystitis and cholelithiasis on CT. He underwent a laparoscopic cholecystectomy. On POD1, he was noted to have increase in LFTs and bilious output from ANAYELI drain. She is now s/p PTC on 4/19, s/p robotic laparoscopic hepatojejenostomy, SBR, and anastomosis on 4/26. Today was getting redy for discharge, noted to have hyperkalemia and FRANCA so nephrology was consulted. he patient is c/o distended abdomen, poor appetite, last BM was 3 days ago. Bladder scan showed ~ 350 cc      PAST MEDICAL & SURGICAL HISTORY:  Cholelithiasis  History of subdural hematoma  Constipation  Hypertension  Seizure  Acute subdural hematoma: (May 2014 - subdural hematoma,  pt had SX)      MEDICATIONS  (STANDING):  digoxin     Tablet 0.125 milliGRAM(s) Oral daily  diltiazem    milliGRAM(s) Oral daily  docusate sodium 100 milliGRAM(s) Oral three times a day  enoxaparin Injectable 40 milliGRAM(s) SubCutaneous daily  lactated ringers. 1000 milliLiter(s) (75 mL/Hr) IV Continuous <Continuous>  levETIRAcetam 500 milliGRAM(s) Oral two times a day  melatonin 3 milliGRAM(s) Oral at bedtime  polyethylene glycol 3350 17 Gram(s) Oral daily  senna 2 Tablet(s) Oral at bedtime  tamsulosin 0.4 milliGRAM(s) Oral at bedtime      Allergies    No Known Allergies    Intolerances        SOCIAL HISTORY:  Denies ETOh,Smoking,     FAMILY HISTORY:      REVIEW OF SYSTEMS:      All other review of systems is negative unless indicated above.    VITAL:  T(C): , Max: 36.7 (05-07-19 @ 05:53)  T(F): , Max: 98.1 (05-07-19 @ 05:53)  HR: 89 (05-07-19 @ 17:01)  BP: 114/47 (05-07-19 @ 17:01)  BP(mean): --  RR: 18 (05-07-19 @ 17:01)  SpO2: 100% (05-07-19 @ 17:01)  Wt(kg): --    I and O's:    05-06 @ 07:01  -  05-07 @ 07:00  --------------------------------------------------------  IN: 0 mL / OUT: 245.5 mL / NET: -245.5 mL    05-07 @ 07:01  -  05-07 @ 19:49  --------------------------------------------------------  IN: 275 mL / OUT: 100 mL / NET: 175 mL          PHYSICAL EXAM:    Constitutional: NAD  HEENT: PERRLA, EOMI,  MMM  Neck: No LAD, No JVD  Respiratory: CTAB  Cardiovascular: S1 and S2  Gastrointestinal: BS+, distended  Extremities: No peripheral edema  Neurological: A/O x 3, no focal deficits  Psychiatric: Normal mood, normal affect  : + Campoverde    LABS:                        11.5   7.34  )-----------( 454      ( 07 May 2019 13:00 )             35.9     05-07    138  |  99  |  42<H>  ----------------------------<  126<H>  5.5<H>   |  23  |  2.27<H>    Ca    10.0      07 May 2019 13:00  Phos  4.3     05-07  Mg     2.6     05-07    TPro  9.3<H>  /  Alb  4.4  /  TBili  1.0  /  DBili  0.4<H>  /  AST  142<H>  /  ALT  341<H>  /  AlkPhos  236<H>  05-07      Urine Studies:          RADIOLOGY & ADDITIONAL STUDIES:        ASSESSMENT:  84M with PMH of seizure disorder, subdural hematoma s/p craniotomy due to trauma, HTN, ETOH abuse, cholelithiasis, who presented from an OSH post a complicated  laparoscopic cholecystectomy now s/p PTC on 4/19 and s/p robotic laparoscopic hepatojejunostomy SBR, and anastomosis on 4/26.      - FRANCA DD: pre-renal azotemia 2ry to decreased po intake vs abdominal compartment syndrome vs obstructive uropathy  - Hyperkalemia 2ry to lisinopril and FRANCA  - Euvolemic      PLAN:   - Urinalysis, Urine lytes  - Insert campoverde catheter  - Check bladder pressure   - Renal US  - Low K diet  - LR to 75 cc/hr  - Hold lisinopril   - Renal dosing of meds to creatinine clearance of 20-25 ml/min  - Avoid nephrotoxins as able    d/w Surgery resident    Thank you for the courtesy of the referral    Aurea Baker MD  [facility]  [phone]

## 2019-05-07 NOTE — CHART NOTE - NSCHARTNOTEFT_GEN_A_CORE
Pt is s/p robotic laparoscopic hepatojejunostomy and SBR on 4/26.    Spoke to the nephrology attending, Dr. Berrios over the phone regarding pt's urinary retention w/ rising potassium and creatinine w/ urine lytes showing possible prerenal azotemia. Bladder scan done and showed 380cc of post void residual.    Dr. Berrios recommended insertion of campoverde catheter and watch overnight. Spoke to Team D resident, campoverde inserted.     Jose Alejandro Jin PGY-1  [id number]

## 2019-05-07 NOTE — DISCHARGE NOTE NURSING/CASE MANAGEMENT/SOCIAL WORK - NSDCPEEMAIL_GEN_ALL_CORE
[facility] for Tobacco Control email [email or web address]

## 2019-05-07 NOTE — DISCHARGE NOTE NURSING/CASE MANAGEMENT/SOCIAL WORK - NSDCDPATPORTLINK_GEN_ALL_CORE
You can access the [facility] Patient Portal, offered by [facility], by registering with the following website: [email or web address]

## 2019-05-07 NOTE — PROGRESS NOTE ADULT - ASSESSMENT
84M transferred from OSH due to bile duct injury following lap gus now s/p PTC (4/19) now s/p robotic assisted Laparoscopic hepaticojejunostomy, small bowel resection and anastomosis (4/26)    -pain control as needed  -monitor drain outputs  -diet: mechanical soft with thin liquids, as per speech and swallow  -continue bowel regimen  -OOB and ambulating as tolerated  -lovenox for DVT ppx  -DC home today with home PT and rolling walker    D Team   [id number]

## 2019-05-07 NOTE — DISCHARGE NOTE NURSING/CASE MANAGEMENT/SOCIAL WORK - NSSCTYPOFSERV_GEN_ALL_CORE
RN for evaluation and home physical therapy. RN for evaluation and home physical therapy/ Nurse will visit day after discharge.

## 2019-05-08 LAB
ANION GAP SERPL CALC-SCNC: 12 MMO/L — SIGNIFICANT CHANGE UP (ref 7–14)
ANION GAP SERPL CALC-SCNC: 12 MMO/L — SIGNIFICANT CHANGE UP (ref 7–14)
BUN SERPL-MCNC: 41 MG/DL — HIGH (ref 7–23)
BUN SERPL-MCNC: 41 MG/DL — HIGH (ref 7–23)
CALCIUM SERPL-MCNC: 9.5 MG/DL — SIGNIFICANT CHANGE UP (ref 8.4–10.5)
CALCIUM SERPL-MCNC: 9.5 MG/DL — SIGNIFICANT CHANGE UP (ref 8.4–10.5)
CHLORIDE SERPL-SCNC: 101 MMOL/L — SIGNIFICANT CHANGE UP (ref 98–107)
CHLORIDE SERPL-SCNC: 101 MMOL/L — SIGNIFICANT CHANGE UP (ref 98–107)
CO2 SERPL-SCNC: 24 MMOL/L — SIGNIFICANT CHANGE UP (ref 22–31)
CO2 SERPL-SCNC: 24 MMOL/L — SIGNIFICANT CHANGE UP (ref 22–31)
CREAT SERPL-MCNC: 1.51 MG/DL — HIGH (ref 0.5–1.3)
CREAT SERPL-MCNC: 1.51 MG/DL — HIGH (ref 0.5–1.3)
GLUCOSE SERPL-MCNC: 103 MG/DL — HIGH (ref 70–99)
GLUCOSE SERPL-MCNC: 103 MG/DL — HIGH (ref 70–99)
HCT VFR BLD CALC: 31.3 % — LOW (ref 39–50)
HGB BLD-MCNC: 10.2 G/DL — LOW (ref 13–17)
MAGNESIUM SERPL-MCNC: 2.4 MG/DL — SIGNIFICANT CHANGE UP (ref 1.6–2.6)
MCHC RBC-ENTMCNC: 30.7 PG — SIGNIFICANT CHANGE UP (ref 27–34)
MCHC RBC-ENTMCNC: 32.6 % — SIGNIFICANT CHANGE UP (ref 32–36)
MCV RBC AUTO: 94.3 FL — SIGNIFICANT CHANGE UP (ref 80–100)
NRBC # FLD: 0 K/UL — SIGNIFICANT CHANGE UP (ref 0–0)
PHOSPHATE SERPL-MCNC: 4 MG/DL — SIGNIFICANT CHANGE UP (ref 2.5–4.5)
PLATELET # BLD AUTO: 400 K/UL — SIGNIFICANT CHANGE UP (ref 150–400)
PMV BLD: 9.5 FL — SIGNIFICANT CHANGE UP (ref 7–13)
POTASSIUM SERPL-MCNC: 4.6 MMOL/L — SIGNIFICANT CHANGE UP (ref 3.5–5.3)
POTASSIUM SERPL-MCNC: 4.6 MMOL/L — SIGNIFICANT CHANGE UP (ref 3.5–5.3)
POTASSIUM SERPL-SCNC: 4.6 MMOL/L — SIGNIFICANT CHANGE UP (ref 3.5–5.3)
POTASSIUM SERPL-SCNC: 4.6 MMOL/L — SIGNIFICANT CHANGE UP (ref 3.5–5.3)
RBC # BLD: 3.32 M/UL — LOW (ref 4.2–5.8)
RBC # FLD: 13.3 % — SIGNIFICANT CHANGE UP (ref 10.3–14.5)
SODIUM SERPL-SCNC: 137 MMOL/L — SIGNIFICANT CHANGE UP (ref 135–145)
SODIUM SERPL-SCNC: 137 MMOL/L — SIGNIFICANT CHANGE UP (ref 135–145)
WBC # BLD: 7.15 K/UL — SIGNIFICANT CHANGE UP (ref 3.8–10.5)
WBC # FLD AUTO: 7.15 K/UL — SIGNIFICANT CHANGE UP (ref 3.8–10.5)

## 2019-05-08 PROCEDURE — 76770 US EXAM ABDO BACK WALL COMP: CPT | Mod: 26

## 2019-05-08 RX ADMIN — Medication 240 MILLIGRAM(S): at 05:42

## 2019-05-08 RX ADMIN — SODIUM CHLORIDE 75 MILLILITER(S): 9 INJECTION, SOLUTION INTRAVENOUS at 17:05

## 2019-05-08 RX ADMIN — POLYETHYLENE GLYCOL 3350 17 GRAM(S): 17 POWDER, FOR SOLUTION ORAL at 12:50

## 2019-05-08 RX ADMIN — LEVETIRACETAM 500 MILLIGRAM(S): 250 TABLET, FILM COATED ORAL at 05:42

## 2019-05-08 RX ADMIN — ENOXAPARIN SODIUM 40 MILLIGRAM(S): 100 INJECTION SUBCUTANEOUS at 12:50

## 2019-05-08 RX ADMIN — LEVETIRACETAM 500 MILLIGRAM(S): 250 TABLET, FILM COATED ORAL at 17:02

## 2019-05-08 RX ADMIN — Medication 100 MILLIGRAM(S): at 13:40

## 2019-05-08 RX ADMIN — Medication 0.12 MILLIGRAM(S): at 05:42

## 2019-05-08 RX ADMIN — Medication 100 MILLIGRAM(S): at 21:16

## 2019-05-08 RX ADMIN — TAMSULOSIN HYDROCHLORIDE 0.4 MILLIGRAM(S): 0.4 CAPSULE ORAL at 21:16

## 2019-05-08 NOTE — PROGRESS NOTE ADULT - SUBJECTIVE AND OBJECTIVE BOX
CHIEF COMPLAINT:Patient is a 84y old  Male who presents with a chief complaint of elevated LFTs post cholecystectomy (08 May 2019 14:49)    	        PAST MEDICAL & SURGICAL HISTORY:  Cholelithiasis  History of subdural hematoma  Constipation  Hypertension  Seizure  Acute subdural hematoma: (May 2014 - subdural hematoma,  pt had SX)          REVIEW OF SYSTEMS    EYES: No eye pain, visual disturbances, or discharge  NECK: No pain or stiffness  RESPIRATORY: No cough, wheezing, chills or hemoptysis; No Shortness of Breath  CARDIOVASCULAR: No chest pain, palpitations, passing out, dizziness,   GASTROINTESTINAL: No abdominal or epigastric pain.   GENITOURINARY: No dysuria, frequency, hematuria, or incontinence  NEUROLOGICAL: No headaches,    Medications:  MEDICATIONS  (STANDING):  digoxin     Tablet 0.125 milliGRAM(s) Oral daily  diltiazem    milliGRAM(s) Oral daily  docusate sodium 100 milliGRAM(s) Oral three times a day  enoxaparin Injectable 40 milliGRAM(s) SubCutaneous daily  lactated ringers. 1000 milliLiter(s) (75 mL/Hr) IV Continuous <Continuous>  levETIRAcetam 500 milliGRAM(s) Oral two times a day  melatonin 3 milliGRAM(s) Oral at bedtime  polyethylene glycol 3350 17 Gram(s) Oral daily  senna 2 Tablet(s) Oral at bedtime  tamsulosin 0.4 milliGRAM(s) Oral at bedtime    MEDICATIONS  (PRN):  acetaminophen   Tablet .. 650 milliGRAM(s) Oral every 6 hours PRN Mild Pain (1 - 3)  oxyCODONE    IR 5 milliGRAM(s) Oral every 4 hours PRN Moderate Pain (4 - 6)    	    PHYSICAL EXAM:  T(C): 36.7 (05-08-19 @ 16:11), Max: 36.9 (05-07-19 @ 20:34)  HR: 100 (05-08-19 @ 16:11) (84 - 100)  BP: 122/57 (05-08-19 @ 16:11) (114/47 - 146/58)  RR: 16 (05-08-19 @ 16:11) (16 - 18)  SpO2: 99% (05-08-19 @ 16:11) (98% - 100%)  Wt(kg): --  I&O's Summary    07 May 2019 07:01  -  08 May 2019 07:00  --------------------------------------------------------  IN: 275 mL / OUT: 1140 mL / NET: -865 mL    08 May 2019 07:01  -  08 May 2019 16:16  --------------------------------------------------------  IN: 525 mL / OUT: 462 mL / NET: 63 mL        Appearance: Normal	  HEENT:   Normal oral mucosa, PERRL, EOMI	  Lymphatic: No lymphadenopathy  Cardiovascular: Normal S1 S2, No JVD, No murmurs,  Respiratory: Lungs clear to auscultation	  Psychiatry: A & O   Gastrointestinal:  Soft,   dist no r/g   Skin: No rashes, No ecchymoses, No cyanosis	  Neurologic: Non-focal  Extremities: Normal range of motion, No clubbing, cyanosis or edema  Vascular: Peripheral pulses palpable    TELEMETRY: 	    ECG:  	  RADIOLOGY:  OTHER: 	  	  LABS:	 	    CARDIAC MARKERS:                                10.2   7.15  )-----------( 400      ( 08 May 2019 03:06 )             31.3     05-08    137  |  101  |  41<H>  ----------------------------<  103<H>  4.6   |  24  |  1.51<H>    Ca    9.5      08 May 2019 03:06  Phos  4.0     05-08  Mg     2.4     05-08    TPro  9.3<H>  /  Alb  4.4  /  TBili  1.0  /  DBili  0.4<H>  /  AST  142<H>  /  ALT  341<H>  /  AlkPhos  236<H>  05-07    proBNP:   Lipid Profile:   HgA1c:   TSH:

## 2019-05-08 NOTE — PROGRESS NOTE ADULT - ASSESSMENT
pt s/p lap choly w/ inc fluid accumulation  biliary leak/  s/p hepatojejonostomy/sb resection  abd distention stable  trend lfts   sx f/u noted  History of subdural hematoma/mental status stable   ct head no acute findings  Hypertension c/w meds  Seizure hx  c/w keppra  dvt proph   mckenzie  leah f/u noted  hold ace  cont campoverde   swallow eval noted  modified diet   oob  pt

## 2019-05-08 NOTE — PROGRESS NOTE ADULT - SUBJECTIVE AND OBJECTIVE BOX
Patient seen and examined sitting up in chair. No pain, no SOB.      MEDICATIONS  (STANDING):  digoxin     Tablet 0.125 milliGRAM(s) Oral daily  diltiazem    milliGRAM(s) Oral daily  docusate sodium 100 milliGRAM(s) Oral three times a day  enoxaparin Injectable 40 milliGRAM(s) SubCutaneous daily  lactated ringers. 1000 milliLiter(s) (75 mL/Hr) IV Continuous <Continuous>  levETIRAcetam 500 milliGRAM(s) Oral two times a day  melatonin 3 milliGRAM(s) Oral at bedtime  polyethylene glycol 3350 17 Gram(s) Oral daily  senna 2 Tablet(s) Oral at bedtime  tamsulosin 0.4 milliGRAM(s) Oral at bedtime      VITAL:  T(C): , Max: 36.9 (19 @ 20:34)  T(F): , Max: 98.4 (19 @ 20:34)  HR: 89 (19 @ 11:58)  BP: 127/60 (19 @ 11:58)  RR: 16 (19 @ 11:58)  SpO2: 100% (19 @ 11:58)    I and O's:     @ 07:01  -   @ 07:00  --------------------------------------------------------  IN: 275 mL / OUT: 1140 mL / NET: -865 mL     @ 07:01  -   @ 14:27  --------------------------------------------------------  IN: 525 mL / OUT: 362 mL / NET: 163 mL          PHYSICAL EXAM:    Constitutional: NAD  Neck:  No JVD  Respiratory: CTAB/L  Cardiovascular: S1 and S2  Gastrointestinal: BS+, soft, NT/ND  Extremities: No peripheral edema  : + Campoverde  Skin: No rashes    LABS:                        10.2   7.15  )-----------( 400      ( 08 May 2019 03:06 )             31.3         137  |  101  |  41<H>  ----------------------------<  103<H>  4.6   |  24  |  1.51<H>    Ca    9.5      08 May 2019 03:06  Phos  4.0       Mg     2.4         TPro  9.3<H>  /  Alb  4.4  /  TBili  1.0  /  DBili  0.4<H>  /  AST  142<H>  /  ALT  341<H>  /  AlkPhos  236<H>        Urine Studies:  Urinalysis Basic - ( 07 May 2019 19:56 )    Color: DARK YELLOW / Appearance: Lt TURBID / S.027 / pH: 5.5  Gluc: NEGATIVE / Ketone: NEGATIVE  / Bili: TRACE / Urobili: TRACE   Blood: NEGATIVE / Protein: 70 / Nitrite: NEGATIVE   Leuk Esterase: NEGATIVE / RBC: 3-5 / WBC 3-5   Sq Epi: OCC / Non Sq Epi: x / Bacteria: NEGATIVE      Osmolality, Random Urine: 571 mosmo/kg ( @ 19:56)  Sodium, Random Urine: 48 mmol/L ( @ 19:56)  Creatinine, Random Urine: 347.80 mg/dL ( @ 19:56)    RADIOLOGY:    < from: US Kidney and Bladder (19 @ 09:06) >  IMPRESSION:     No hydronephrosis.    < end of copied text >      ASSESSMENT:  84M with PMH of seizure disorder, subdural hematoma s/p craniotomy due to trauma, HTN, ETOH abuse, cholelithiasis, who presented from an OSH post a complicated laparoscopic cholecystectomy now s/p PTC on  and s/p robotic laparoscopic hepatojejunostomy SBR, and anastomosis on .  - FRANCA: pre-renal azotemia 2ry to decreased po intake vs obstructive uropathy - renal function improving s/p campoverde placement  - Hyperkalemia: 2ry to lisinopril and FRANCA - improved this AM    PLAN:   - maintain campoverde catheter for retention  - maintain low K diet  - LR @ 75 cc/hr  - Hold lisinopril   - Renal dosing of meds to creatinine clearance of 40-50 ml/min      DAVID Betancur  [facility]  [phone] Patient seen and examined sitting up in chair. No pain, no SOB.      MEDICATIONS  (STANDING):  digoxin     Tablet 0.125 milliGRAM(s) Oral daily  diltiazem    milliGRAM(s) Oral daily  docusate sodium 100 milliGRAM(s) Oral three times a day  enoxaparin Injectable 40 milliGRAM(s) SubCutaneous daily  lactated ringers. 1000 milliLiter(s) (75 mL/Hr) IV Continuous <Continuous>  levETIRAcetam 500 milliGRAM(s) Oral two times a day  melatonin 3 milliGRAM(s) Oral at bedtime  polyethylene glycol 3350 17 Gram(s) Oral daily  senna 2 Tablet(s) Oral at bedtime  tamsulosin 0.4 milliGRAM(s) Oral at bedtime      VITAL:  T(C): , Max: 36.9 (19 @ 20:34)  T(F): , Max: 98.4 (19 @ 20:34)  HR: 89 (19 @ 11:58)  BP: 127/60 (19 @ 11:58)  RR: 16 (19 @ 11:58)  SpO2: 100% (19 @ 11:58)    I and O's:     @ 07:01  -   @ 07:00  --------------------------------------------------------  IN: 275 mL / OUT: 1140 mL / NET: -865 mL     @ 07:01  -   @ 14:27  --------------------------------------------------------  IN: 525 mL / OUT: 362 mL / NET: 163 mL          PHYSICAL EXAM:    Constitutional: NAD  Neck:  No JVD  Respiratory: CTAB/L  Cardiovascular: S1 and S2  Gastrointestinal: BS+, soft, NT/ND  Extremities: No peripheral edema  : + Campoverde  Skin: No rashes    LABS:                        10.2   7.15  )-----------( 400      ( 08 May 2019 03:06 )             31.3         137  |  101  |  41<H>  ----------------------------<  103<H>  4.6   |  24  |  1.51<H>    Ca    9.5      08 May 2019 03:06  Phos  4.0       Mg     2.4         TPro  9.3<H>  /  Alb  4.4  /  TBili  1.0  /  DBili  0.4<H>  /  AST  142<H>  /  ALT  341<H>  /  AlkPhos  236<H>        Urine Studies:  Urinalysis Basic - ( 07 May 2019 19:56 )    Color: DARK YELLOW / Appearance: Lt TURBID / S.027 / pH: 5.5  Gluc: NEGATIVE / Ketone: NEGATIVE  / Bili: TRACE / Urobili: TRACE   Blood: NEGATIVE / Protein: 70 / Nitrite: NEGATIVE   Leuk Esterase: NEGATIVE / RBC: 3-5 / WBC 3-5   Sq Epi: OCC / Non Sq Epi: x / Bacteria: NEGATIVE      Osmolality, Random Urine: 571 mosmo/kg ( @ 19:56)  Sodium, Random Urine: 48 mmol/L ( @ 19:56)  Creatinine, Random Urine: 347.80 mg/dL ( @ 19:56)    RADIOLOGY:    < from: US Kidney and Bladder (19 @ 09:06) >  IMPRESSION:     No hydronephrosis.    < end of copied text >      ASSESSMENT:  84M with PMH of seizure disorder, subdural hematoma s/p craniotomy due to trauma, HTN, ETOH abuse, cholelithiasis, who presented from an OSH post a complicated laparoscopic cholecystectomy now s/p PTC on  and s/p robotic laparoscopic hepatojejunostomy SBR, and anastomosis on .  - FRANCA: pre-renal azotemia 2ry to decreased po intake vs obstructive uropathy - renal function improving s/p campoverde placement  - Hyperkalemia: 2ry to lisinopril and FRANCA - improved this AM    PLAN:   - maintain campoverde catheter for retention  - maintain low K diet  - LR @ 75 cc/hr  - Hold lisinopril   - Renal dosing of meds to creatinine clearance of 40-50 ml/min      Melisa Cifuentes NP-MALINA  [facility]  [phone]      *******************RENAL ATTENDING**********************  Seen/examined with NP. I agree with NP assessment as above.    VS as above; NAD; CTA-B/L; RRR; no edema b/l    ASSESSMENT: 84M w/ HTN, EtOH abuse, SDH-craniotomy, and seizures, 19 from OSH s/p complicated lap gus followed by PTC 19 and robotic hepatojejunostomy+SBR 19    (1)Renal - FRNACA from prerenal azotemia (FENa <0.3%); resolving, on LR and off ACEI  (2)Lytes - highly acceptable for now    PLAN:   (1)LR as ordered  (2)No ACEI/ARB  (3)Dose new meds for GFR 40-50ml/min  (4)BMP daily  (5)No objection to trial of void within next couple of days          Guzman Sanchez MD  [facility] [employer]  [phone]

## 2019-05-08 NOTE — PROGRESS NOTE ADULT - SUBJECTIVE AND OBJECTIVE BOX
SUBJECTIVE:    Patient seen and examined, no acute events overnight.   Valladares placed yesterday.     OBJECTIVE:     ** VITAL SIGNS / I&O's **    T(C): 36.4 (05-08-19 @ 11:58), Max: 36.9 (05-07-19 @ 20:34)  T(F): 97.6 (05-08-19 @ 11:58), Max: 98.4 (05-07-19 @ 20:34)  HR: 89 (05-08-19 @ 11:58) (84 - 93)  BP: 127/60 (05-08-19 @ 11:58) (114/47 - 146/58)  RR: 16 (05-08-19 @ 11:58) (16 - 18)  SpO2: 100% (05-08-19 @ 11:58) (98% - 100%)      07 May 2019 07:01  -  08 May 2019 07:00  --------------------------------------------------------  IN:    lactated ringers.: 50 mL    lactated ringers.: 225 mL  Total IN: 275 mL    OUT:    Bulb: 240 mL    Indwelling Catheter - Urethral: 900 mL  Total OUT: 1140 mL    Total NET: -865 mL      08 May 2019 07:01  -  08 May 2019 14:49  --------------------------------------------------------  IN:    lactated ringers.: 525 mL  Total IN: 525 mL    OUT:    Bulb: 105 mL    Bulb: 7 mL    Indwelling Catheter - Urethral: 250 mL  Total OUT: 362 mL    Total NET: 163 mL          ** PHYSICAL EXAM **    General: NAD  Abdomen: soft, moderately distended, appropriately tender, 1 RLQ JPs with serosanguinous output, previous PTC in place, dressings c/d/i   : Valladares in place       ** LABS **                 10.2   7.15   )----------(  400       ( 08 May 2019 03:06 )               31.3      137    |  101    |  41     ----------------------------<  103        ( 08 May 2019 03:06 )  4.6     |  24     |  1.51     Ca    9.5        ( 08 May 2019 03:06 )  Phos  4.0       ( 08 May 2019 03:06 )  Mg     2.4       ( 08 May 2019 03:06 )          CAPILLARY BLOOD GLUCOSE

## 2019-05-08 NOTE — PROGRESS NOTE ADULT - ASSESSMENT
84M transferred from OSH due to bile duct injury following lap gus now s/p PTC (4/19) now s/p robotic assisted Laparoscopic hepaticojejunostomy, small bowel resection and anastomosis (4/26), found to have rise in Cr prior to DC home, nephrology consulted     -pain control as needed  -monitor drain outputs  -diet: mechanical soft with thin liquids, as per speech and swallow  -continue bowel regimen  -OOB and ambulating as tolerated  -lovenox for DVT ppx  - Nephrology consulted, campoverde placed  - FU renal ultrasound   - Trend Cr  -DC planning     D Team   [id number]

## 2019-05-09 LAB
ANION GAP SERPL CALC-SCNC: 12 MMO/L — SIGNIFICANT CHANGE UP (ref 7–14)
BUN SERPL-MCNC: 29 MG/DL — HIGH (ref 7–23)
CALCIUM SERPL-MCNC: 9 MG/DL — SIGNIFICANT CHANGE UP (ref 8.4–10.5)
CHLORIDE SERPL-SCNC: 106 MMOL/L — SIGNIFICANT CHANGE UP (ref 98–107)
CO2 SERPL-SCNC: 22 MMOL/L — SIGNIFICANT CHANGE UP (ref 22–31)
CREAT SERPL-MCNC: 0.91 MG/DL — SIGNIFICANT CHANGE UP (ref 0.5–1.3)
GLUCOSE SERPL-MCNC: 103 MG/DL — HIGH (ref 70–99)
HCT VFR BLD CALC: 31.3 % — LOW (ref 39–50)
HGB BLD-MCNC: 9.7 G/DL — LOW (ref 13–17)
MAGNESIUM SERPL-MCNC: 2.1 MG/DL — SIGNIFICANT CHANGE UP (ref 1.6–2.6)
MCHC RBC-ENTMCNC: 30.4 PG — SIGNIFICANT CHANGE UP (ref 27–34)
MCHC RBC-ENTMCNC: 31 % — LOW (ref 32–36)
MCV RBC AUTO: 98.1 FL — SIGNIFICANT CHANGE UP (ref 80–100)
NRBC # FLD: 0 K/UL — SIGNIFICANT CHANGE UP (ref 0–0)
PHOSPHATE SERPL-MCNC: 2.8 MG/DL — SIGNIFICANT CHANGE UP (ref 2.5–4.5)
PLATELET # BLD AUTO: 353 K/UL — SIGNIFICANT CHANGE UP (ref 150–400)
PMV BLD: 9.7 FL — SIGNIFICANT CHANGE UP (ref 7–13)
POTASSIUM SERPL-MCNC: 4.9 MMOL/L — SIGNIFICANT CHANGE UP (ref 3.5–5.3)
POTASSIUM SERPL-SCNC: 4.9 MMOL/L — SIGNIFICANT CHANGE UP (ref 3.5–5.3)
RBC # BLD: 3.19 M/UL — LOW (ref 4.2–5.8)
RBC # FLD: 13.1 % — SIGNIFICANT CHANGE UP (ref 10.3–14.5)
SODIUM SERPL-SCNC: 140 MMOL/L — SIGNIFICANT CHANGE UP (ref 135–145)
WBC # BLD: 5.02 K/UL — SIGNIFICANT CHANGE UP (ref 3.8–10.5)
WBC # FLD AUTO: 5.02 K/UL — SIGNIFICANT CHANGE UP (ref 3.8–10.5)

## 2019-05-09 RX ORDER — LANOLIN ALCOHOL/MO/W.PET/CERES
1 CREAM (GRAM) TOPICAL
Qty: 0 | Refills: 0 | DISCHARGE
Start: 2019-05-09

## 2019-05-09 RX ORDER — POLYETHYLENE GLYCOL 3350 17 G/17G
17 POWDER, FOR SOLUTION ORAL
Qty: 0 | Refills: 0 | DISCHARGE
Start: 2019-05-09

## 2019-05-09 RX ORDER — LISINOPRIL 2.5 MG/1
1 TABLET ORAL
Qty: 0 | Refills: 0 | DISCHARGE

## 2019-05-09 RX ORDER — DOCUSATE SODIUM 100 MG
1 CAPSULE ORAL
Qty: 0 | Refills: 0 | DISCHARGE
Start: 2019-05-09

## 2019-05-09 RX ORDER — SENNA PLUS 8.6 MG/1
2 TABLET ORAL
Qty: 0 | Refills: 0 | DISCHARGE
Start: 2019-05-09

## 2019-05-09 RX ORDER — TAMSULOSIN HYDROCHLORIDE 0.4 MG/1
1 CAPSULE ORAL
Qty: 0 | Refills: 0 | DISCHARGE
Start: 2019-05-09

## 2019-05-09 RX ADMIN — LEVETIRACETAM 500 MILLIGRAM(S): 250 TABLET, FILM COATED ORAL at 05:10

## 2019-05-09 RX ADMIN — LEVETIRACETAM 500 MILLIGRAM(S): 250 TABLET, FILM COATED ORAL at 17:53

## 2019-05-09 RX ADMIN — ENOXAPARIN SODIUM 40 MILLIGRAM(S): 100 INJECTION SUBCUTANEOUS at 12:23

## 2019-05-09 RX ADMIN — Medication 240 MILLIGRAM(S): at 05:10

## 2019-05-09 RX ADMIN — Medication 0.12 MILLIGRAM(S): at 05:10

## 2019-05-09 RX ADMIN — TAMSULOSIN HYDROCHLORIDE 0.4 MILLIGRAM(S): 0.4 CAPSULE ORAL at 21:34

## 2019-05-09 NOTE — PROGRESS NOTE ADULT - ASSESSMENT
pt s/p lap choly w/ inc fluid accumulation  biliary leak/  s/p hepatojejonostomy/sb resection  abd distention stable  trend lfts   sx f/u noted  History of subdural hematoma/mental status stable   ct head no acute findings  Hypertension c/w meds  Seizure hx  c/w keppra  dvt proph   mckenzie resolved  tov   renalf/u noted  no ace/arb  swallow eval noted  modified diet   oob  pt

## 2019-05-09 NOTE — PROGRESS NOTE ADULT - SUBJECTIVE AND OBJECTIVE BOX
Patient seen and examined in bed. No pain, no SOB.      MEDICATIONS  (STANDING):  digoxin     Tablet 0.125 milliGRAM(s) Oral daily  diltiazem    milliGRAM(s) Oral daily  docusate sodium 100 milliGRAM(s) Oral three times a day  enoxaparin Injectable 40 milliGRAM(s) SubCutaneous daily  lactated ringers. 1000 milliLiter(s) (75 mL/Hr) IV Continuous <Continuous>  levETIRAcetam 500 milliGRAM(s) Oral two times a day  melatonin 3 milliGRAM(s) Oral at bedtime  polyethylene glycol 3350 17 Gram(s) Oral daily  senna 2 Tablet(s) Oral at bedtime  tamsulosin 0.4 milliGRAM(s) Oral at bedtime      VITAL:  T(C): , Max: 37 (19 @ 05:08)  T(F): , Max: 98.6 (19 @ 05:08)  HR: 72 (19 @ 05:08)  BP: 118/60 (19 @ 05:08)  RR: 16 (19 @ 05:08)  SpO2: 98% (19 @ 05:08)    I and O's:     @ 07:01  -   @ 07:00  --------------------------------------------------------  IN: 525 mL / OUT: 1502 mL / NET: -977 mL          PHYSICAL EXAM:    Constitutional: NAD  Neck:  No JVD  Respiratory: CTAB/L  Cardiovascular: S1 and S2  Gastrointestinal: BS+, soft, NT/ND  Extremities: No peripheral edema  : + Valladares  Skin: No rashes  LABS:                        9.7    5.02  )-----------( 353      ( 09 May 2019 06:40 )             31.3         140  |  106  |  29<H>  ----------------------------<  103<H>  4.9   |  22  |  0.91    Ca    9.0      09 May 2019 06:40  Phos  2.8       Mg     2.1         TPro  9.3<H>  /  Alb  4.4  /  TBili  1.0  /  DBili  0.4<H>  /  AST  142<H>  /  ALT  341<H>  /  AlkPhos  236<H>        Urine Studies:  Urinalysis Basic - ( 07 May 2019 19:56 )    Color: DARK YELLOW / Appearance: Lt TURBID / S.027 / pH: 5.5  Gluc: NEGATIVE / Ketone: NEGATIVE  / Bili: TRACE / Urobili: TRACE   Blood: NEGATIVE / Protein: 70 / Nitrite: NEGATIVE   Leuk Esterase: NEGATIVE / RBC: 3-5 / WBC 3-5   Sq Epi: OCC / Non Sq Epi: x / Bacteria: NEGATIVE      Osmolality, Random Urine: 571 mosmo/kg ( @ 19:56)  Sodium, Random Urine: 48 mmol/L ( @ 19:56)  Creatinine, Random Urine: 347.80 mg/dL ( @ 19:56)      ASSESSMENT: 84M w/ HTN, EtOH abuse, SDH-craniotomy, and seizures, 19 from OSH s/p complicated lap gus followed by PTC 19 and robotic hepatojejunostomy+SBR 19    (1)Renal - FRANCA from prerenal azotemia (FENa <0.3%); resolving/resolved, on LR and off ACEI  (2)Lytes - highly acceptable for now    PLAN:   (1)D/C IVF  (2)No ACEI/ARB  (3)Dose new meds for GFR >60ml/min  (4)BMP daily  (5)No objection to trial of void within 1-2 days      DAVID Betancur  [facility]  [phone] Patient seen and examined in bed. No pain, no SOB.      MEDICATIONS  (STANDING):  digoxin     Tablet 0.125 milliGRAM(s) Oral daily  diltiazem    milliGRAM(s) Oral daily  docusate sodium 100 milliGRAM(s) Oral three times a day  enoxaparin Injectable 40 milliGRAM(s) SubCutaneous daily  lactated ringers. 1000 milliLiter(s) (75 mL/Hr) IV Continuous <Continuous>  levETIRAcetam 500 milliGRAM(s) Oral two times a day  melatonin 3 milliGRAM(s) Oral at bedtime  polyethylene glycol 3350 17 Gram(s) Oral daily  senna 2 Tablet(s) Oral at bedtime  tamsulosin 0.4 milliGRAM(s) Oral at bedtime      VITAL:  T(C): , Max: 37 (19 @ 05:08)  T(F): , Max: 98.6 (19 @ 05:08)  HR: 72 (19 @ 05:08)  BP: 118/60 (19 @ 05:08)  RR: 16 (19 @ 05:08)  SpO2: 98% (19 @ 05:08)    I and O's:     @ 07:01  -   @ 07:00  --------------------------------------------------------  IN: 525 mL / OUT: 1502 mL / NET: -977 mL          PHYSICAL EXAM:    Constitutional: NAD  Neck:  No JVD  Respiratory: CTAB/L  Cardiovascular: S1 and S2  Gastrointestinal: BS+, soft, NT/ND  Extremities: No peripheral edema  : + Valladares  Skin: No rashes  LABS:                        9.7    5.02  )-----------( 353      ( 09 May 2019 06:40 )             31.3         140  |  106  |  29<H>  ----------------------------<  103<H>  4.9   |  22  |  0.91    Ca    9.0      09 May 2019 06:40  Phos  2.8       Mg     2.1         TPro  9.3<H>  /  Alb  4.4  /  TBili  1.0  /  DBili  0.4<H>  /  AST  142<H>  /  ALT  341<H>  /  AlkPhos  236<H>        Urine Studies:  Urinalysis Basic - ( 07 May 2019 19:56 )    Color: DARK YELLOW / Appearance: Lt TURBID / S.027 / pH: 5.5  Gluc: NEGATIVE / Ketone: NEGATIVE  / Bili: TRACE / Urobili: TRACE   Blood: NEGATIVE / Protein: 70 / Nitrite: NEGATIVE   Leuk Esterase: NEGATIVE / RBC: 3-5 / WBC 3-5   Sq Epi: OCC / Non Sq Epi: x / Bacteria: NEGATIVE      Osmolality, Random Urine: 571 mosmo/kg ( @ 19:56)  Sodium, Random Urine: 48 mmol/L ( @ 19:56)  Creatinine, Random Urine: 347.80 mg/dL ( @ 19:56)      ASSESSMENT: 84M w/ HTN, EtOH abuse, SDH-craniotomy, and seizures, 19 from OSH s/p complicated lap gus followed by PTC 19 and robotic hepatojejunostomy+SBR 19    (1)Renal - FRANCA from prerenal azotemia (FENa <0.3%); resolving/resolved, on LR and off ACEI  (2)Lytes - highly acceptable for now  (3) - plan for TOV today    PLAN:   (1)D/C IVF  (2)No ACEI/ARB  (3)Dose new meds for GFR >60ml/min  (4)BMP daily  (5)No objection to trial of void today      Melisa Cifuentes NP-C  [facility]  [phone] Patient seen and examined in bed. No pain, no SOB.      MEDICATIONS  (STANDING):  digoxin     Tablet 0.125 milliGRAM(s) Oral daily  diltiazem    milliGRAM(s) Oral daily  docusate sodium 100 milliGRAM(s) Oral three times a day  enoxaparin Injectable 40 milliGRAM(s) SubCutaneous daily  lactated ringers. 1000 milliLiter(s) (75 mL/Hr) IV Continuous <Continuous>  levETIRAcetam 500 milliGRAM(s) Oral two times a day  melatonin 3 milliGRAM(s) Oral at bedtime  polyethylene glycol 3350 17 Gram(s) Oral daily  senna 2 Tablet(s) Oral at bedtime  tamsulosin 0.4 milliGRAM(s) Oral at bedtime      VITAL:  T(C): , Max: 37 (19 @ 05:08)  T(F): , Max: 98.6 (19 @ 05:08)  HR: 72 (19 @ 05:08)  BP: 118/60 (19 @ 05:08)  RR: 16 (19 @ 05:08)  SpO2: 98% (19 @ 05:08)    I and O's:     @ 07:01  -   @ 07:00  --------------------------------------------------------  IN: 525 mL / OUT: 1502 mL / NET: -977 mL          PHYSICAL EXAM:    Constitutional: NAD  Neck:  No JVD  Respiratory: CTAB/L  Cardiovascular: S1 and S2  Gastrointestinal: BS+, soft, NT/ND  Extremities: No peripheral edema  : + Valladares  Skin: No rashes  LABS:                        9.7    5.02  )-----------( 353      ( 09 May 2019 06:40 )             31.3         140  |  106  |  29<H>  ----------------------------<  103<H>  4.9   |  22  |  0.91    Ca    9.0      09 May 2019 06:40  Phos  2.8       Mg     2.1         TPro  9.3<H>  /  Alb  4.4  /  TBili  1.0  /  DBili  0.4<H>  /  AST  142<H>  /  ALT  341<H>  /  AlkPhos  236<H>        Urine Studies:  Urinalysis Basic - ( 07 May 2019 19:56 )    Color: DARK YELLOW / Appearance: Lt TURBID / S.027 / pH: 5.5  Gluc: NEGATIVE / Ketone: NEGATIVE  / Bili: TRACE / Urobili: TRACE   Blood: NEGATIVE / Protein: 70 / Nitrite: NEGATIVE   Leuk Esterase: NEGATIVE / RBC: 3-5 / WBC 3-5   Sq Epi: OCC / Non Sq Epi: x / Bacteria: NEGATIVE      Osmolality, Random Urine: 571 mosmo/kg ( @ 19:56)  Sodium, Random Urine: 48 mmol/L ( @ 19:56)  Creatinine, Random Urine: 347.80 mg/dL ( @ 19:56)      ASSESSMENT: 84M w/ HTN, EtOH abuse, SDH-craniotomy, and seizures, 19 from OSH s/p complicated lap gus followed by PTC 19 and robotic hepatojejunostomy+SBR 19    (1)Renal - FRANCA from prerenal azotemia (FENa <0.3%); resolving/resolved, on LR and off ACEI  (2)Lytes - highly acceptable for now  (3) - plan for TOV today    PLAN:   (1)D/C IVF  (2)No ACEI/ARB  (3)Dose new meds for GFR >60ml/min  (4)BMP daily  (5)No objection to trial of void today      Melisa Cifuentes NP-C  [facility]  [phone]      *******************RENAL ATTENDING**********************  Seen/examined with NP. I agree with NP assessment as above.    VS as above; NAD; CTA-B/L; RRR; no edema b/l;       ASSESSMENT: 84M w/ HTN, EtOH abuse, SDH-craniotomy, and seizures, 19 from OSH s/p complicated lap gus followed by PTC 19 and robotic hepatojejunostomy+SBR 19    (1)Renal - FRANCA from prerenal azotemia (FENa <0.3%); resolving/resolved, on LR and off ACEI  (2)Lytes - highly acceptable for now  (3) - TOV today    PLAN:   (1)D/C IVF  (2)No ACEI/ARB  (3)TOV as planned  (4)No objection to discharge in a.m. if no further complications ensue          Guzman Sanchez MD  [employer]  [phone]

## 2019-05-09 NOTE — PROGRESS NOTE ADULT - ASSESSMENT
84M transferred from OSH due to bile duct injury following lap gus now s/p PTC (4/19) now s/p robotic assisted Laparoscopic hepaticojejunostomy, small bowel resection and anastomosis (4/26), found to have rise in Cr prior to DC home, nephrology consulted. Cr downtrending     - Remove campoverde - TOV today   - Trend Cr   -pain control as needed  -monitor drain outputs  -diet: mechanical soft with thin liquids, as per speech and swallow  -continue bowel regimen  -OOB and ambulating as tolerated  -lovenox for DVT ppx  -DC home with home rehab today vs tomorrow    D Team   [id number]

## 2019-05-09 NOTE — PROGRESS NOTE ADULT - SUBJECTIVE AND OBJECTIVE BOX
CHIEF COMPLAINT:Patient is a 84y old  Male who presents with a chief complaint of elevated LFTs post cholecystectomy (09 May 2019 09:33)    	        PAST MEDICAL & SURGICAL HISTORY:  Cholelithiasis  History of subdural hematoma  Constipation  Hypertension  Seizure  Acute subdural hematoma: (May 2014 - subdural hematoma,  pt had SX)          REVIEW OF SYSTEMS:  CONSTITUTIONAL: weak  EYES: No eye pain, visual disturbances, or discharge  NECK: No pain or stiffness  RESPIRATORY: No cough, wheezing, chills or hemoptysis; No Shortness of Breath  CARDIOVASCULAR: No chest pain, palpitations, passing out, dizziness,   GASTROINTESTINAL: No abdominal or epigastric pain. No nausea, vomiting, or hematemesis;   GENITOURINARY: campoverde  NEUROLOGICAL: No headaches,   Medications:  MEDICATIONS  (STANDING):  digoxin     Tablet 0.125 milliGRAM(s) Oral daily  diltiazem    milliGRAM(s) Oral daily  docusate sodium 100 milliGRAM(s) Oral three times a day  enoxaparin Injectable 40 milliGRAM(s) SubCutaneous daily  lactated ringers. 1000 milliLiter(s) (75 mL/Hr) IV Continuous <Continuous>  levETIRAcetam 500 milliGRAM(s) Oral two times a day  melatonin 3 milliGRAM(s) Oral at bedtime  polyethylene glycol 3350 17 Gram(s) Oral daily  senna 2 Tablet(s) Oral at bedtime  tamsulosin 0.4 milliGRAM(s) Oral at bedtime    MEDICATIONS  (PRN):  acetaminophen   Tablet .. 650 milliGRAM(s) Oral every 6 hours PRN Mild Pain (1 - 3)  oxyCODONE    IR 5 milliGRAM(s) Oral every 4 hours PRN Moderate Pain (4 - 6)    	    PHYSICAL EXAM:  T(C): 36.6 (05-09-19 @ 09:53), Max: 37 (05-09-19 @ 05:08)  HR: 75 (05-09-19 @ 09:53) (70 - 100)  BP: 142/69 (05-09-19 @ 09:53) (111/55 - 142/69)  RR: 17 (05-09-19 @ 09:53) (16 - 17)  SpO2: 100% (05-09-19 @ 09:53) (95% - 100%)  Wt(kg): --  I&O's Summary    08 May 2019 07:01  -  09 May 2019 07:00  --------------------------------------------------------  IN: 525 mL / OUT: 1502 mL / NET: -977 mL        Appearance: Normal	  HEENT:   Normal oral mucosa, PERRL, EOMI	  Lymphatic: No lymphadenopathy  Cardiovascular: Normal S1 S2, No JVD, No murmurs,   Respiratory: dec bs   Psychiatry: A & O   Gastrointestinal:  Soft, dist no r.g  Skin: No rashes, No ecchymoses, No cyanosis	  Neurologic: Non-focal  Extremities: dec  range of motion, No clubbing, cyanosis or edema  Vascular: Peripheral pulses palpable     TELEMETRY: 	    ECG:  	  RADIOLOGY:  OTHER: 	  	  LABS:	 	    CARDIAC MARKERS:                                9.7    5.02  )-----------( 353      ( 09 May 2019 06:40 )             31.3     05-09    140  |  106  |  29<H>  ----------------------------<  103<H>  4.9   |  22  |  0.91    Ca    9.0      09 May 2019 06:40  Phos  2.8     05-09  Mg     2.1     05-09    TPro  9.3<H>  /  Alb  4.4  /  TBili  1.0  /  DBili  0.4<H>  /  AST  142<H>  /  ALT  341<H>  /  AlkPhos  236<H>  05-07    proBNP:   Lipid Profile:   HgA1c:   TSH:

## 2019-05-09 NOTE — PROGRESS NOTE ADULT - SUBJECTIVE AND OBJECTIVE BOX
SUBJECTIVE:    patient seen and examined overnight, no acute events.   Valladares in place.     OBJECTIVE:     ** VITAL SIGNS / I&O's **    T(C): 37 (05-09-19 @ 05:08), Max: 37 (05-09-19 @ 05:08)  T(F): 98.6 (05-09-19 @ 05:08), Max: 98.6 (05-09-19 @ 05:08)  HR: 72 (05-09-19 @ 05:08) (70 - 100)  BP: 118/60 (05-09-19 @ 05:08) (111/55 - 146/58)  RR: 16 (05-09-19 @ 05:08) (16 - 17)  SpO2: 98% (05-09-19 @ 05:08) (95% - 100%)      08 May 2019 07:01  -  09 May 2019 07:00  --------------------------------------------------------  IN:    lactated ringers.: 525 mL  Total IN: 525 mL    OUT:    Bulb: 295 mL    Bulb: 7 mL    Indwelling Catheter - Urethral: 1200 mL  Total OUT: 1502 mL    Total NET: -977 mL          ** PHYSICAL EXAM **    General: NAD  Abdomen: soft, moderately distended, appropriately tender, 1 RLQ JPs with serosanguinous output, previous PTC in place, dressings c/d/i   : Valladares in place       ** LABS **                 9.7    5.02   )----------(  353       ( 09 May 2019 06:40 )               31.3      140    |  106    |  29     ----------------------------<  103        ( 09 May 2019 06:40 )  4.9     |  22     |  0.91     Ca    9.0        ( 09 May 2019 06:40 )  Phos  2.8       ( 09 May 2019 06:40 )  Mg     2.1       ( 09 May 2019 06:40 )          CAPILLARY BLOOD GLUCOSE

## 2019-05-10 VITALS
OXYGEN SATURATION: 98 % | HEART RATE: 72 BPM | DIASTOLIC BLOOD PRESSURE: 68 MMHG | TEMPERATURE: 98 F | RESPIRATION RATE: 18 BRPM | SYSTOLIC BLOOD PRESSURE: 136 MMHG

## 2019-05-10 RX ORDER — TAMSULOSIN HYDROCHLORIDE 0.4 MG/1
1 CAPSULE ORAL
Qty: 14 | Refills: 0
Start: 2019-05-10 | End: 2019-05-23

## 2019-05-10 RX ADMIN — Medication 240 MILLIGRAM(S): at 05:49

## 2019-05-10 RX ADMIN — LEVETIRACETAM 500 MILLIGRAM(S): 250 TABLET, FILM COATED ORAL at 05:49

## 2019-05-10 RX ADMIN — Medication 0.12 MILLIGRAM(S): at 05:49

## 2019-05-10 NOTE — PROGRESS NOTE ADULT - SUBJECTIVE AND OBJECTIVE BOX
CHIEF COMPLAINT:Patient is a 84y old  Male who presents with a chief complaint of elevated LFTs post cholecystectomy (10 May 2019 05:06)    	        PAST MEDICAL & SURGICAL HISTORY:  Cholelithiasis  History of subdural hematoma  Constipation  Hypertension  Seizure  Acute subdural hematoma: (May 2014 - subdural hematoma,  pt had SX)          REVIEW OF SYSTEMS:  no cp or sob  no abd pain   no chills  no vomiting       Medications:  MEDICATIONS  (STANDING):  digoxin     Tablet 0.125 milliGRAM(s) Oral daily  diltiazem    milliGRAM(s) Oral daily  docusate sodium 100 milliGRAM(s) Oral three times a day  enoxaparin Injectable 40 milliGRAM(s) SubCutaneous daily  levETIRAcetam 500 milliGRAM(s) Oral two times a day  melatonin 3 milliGRAM(s) Oral at bedtime  polyethylene glycol 3350 17 Gram(s) Oral daily  senna 2 Tablet(s) Oral at bedtime  tamsulosin 0.4 milliGRAM(s) Oral at bedtime    MEDICATIONS  (PRN):  acetaminophen   Tablet .. 650 milliGRAM(s) Oral every 6 hours PRN Mild Pain (1 - 3)  oxyCODONE    IR 5 milliGRAM(s) Oral every 4 hours PRN Moderate Pain (4 - 6)    	    PHYSICAL EXAM:  T(C): 36.8 (05-10-19 @ 05:33), Max: 36.8 (05-10-19 @ 05:33)  HR: 70 (05-10-19 @ 05:33) (59 - 75)  BP: 143/64 (05-10-19 @ 05:33) (121/55 - 143/64)  RR: 17 (05-10-19 @ 05:33) (16 - 17)  SpO2: 97% (05-10-19 @ 05:33) (97% - 100%)  Wt(kg): --  I&O's Summary    09 May 2019 07:01  -  10 May 2019 07:00  --------------------------------------------------------  IN: 0 mL / OUT: 2220 mL / NET: -2220 mL        Appearance: Normal	  HEENT:   Normal oral mucosa, PERRL, EOMI	  Lymphatic: No lymphadenopathy  Cardiovascular: Normal S1 S2, No JVD,   Respiratory: Lungs clear to auscultation	  Psychiatry: A & O   Gastrointestinal:  Soft,  dist  + drain in place  Skin: No rashes, No ecchymoses, No cyanosis	  Neurologic: Non-focal  Extremities: Normal range of motion,   Vascular: Peripheral pulses palpable     TELEMETRY: 	    ECG:  	  RADIOLOGY:  OTHER: 	  	  LABS:	 	    CARDIAC MARKERS:                                9.7    5.02  )-----------( 353      ( 09 May 2019 06:40 )             31.3     05-09    140  |  106  |  29<H>  ----------------------------<  103<H>  4.9   |  22  |  0.91    Ca    9.0      09 May 2019 06:40  Phos  2.8     05-09  Mg     2.1     05-09      proBNP:   Lipid Profile:   HgA1c:   TSH:

## 2019-05-10 NOTE — PROGRESS NOTE ADULT - ASSESSMENT
pt s/p lap choly w/ inc fluid accumulation  biliary leak/  s/p hepatojejonostomy/sb resection  abd distention stable  trend lfts   sx f/u noted  History of subdural hematoma/mental status stable   ct head no acute findings  Hypertension c/w meds  Seizure hx  c/w keppra  dvt proph   mckenzie resolved  tov ?  renal f/u  no ace/arb  swallow eval noted  modified diet   oob  pt

## 2019-05-10 NOTE — PROGRESS NOTE ADULT - ASSESSMENT
84M transferred from OSH due to bile duct injury following lap gus now s/p PTC (4/19) now s/p robotic assisted Laparoscopic hepaticojejunostomy, small bowel resection and anastomosis (4/26), found to have rise in Cr prior to DC home, nephrology consulted. Cr downtrending     Plan  - Pain control  - Dysphagia 2 diet  - Trend Cr   - Monitor drain outputs  - C/w bowel regimen  - OOB and ambulating as tolerated  - DVT ppx: Lovenox  - Dispo: DC home 5/10

## 2019-05-10 NOTE — PROGRESS NOTE ADULT - SUBJECTIVE AND OBJECTIVE BOX
D-Team Surgery Progress Note     Subjective/24hour Events: No acute events overnight. Pain controlled. Tolerating regular soft diet. No N/V. No CP or SOB.    Vital Signs:  Vital Signs Last 24 Hrs  T(C): 36.7 (09 May 2019 20:52), Max: 37 (09 May 2019 05:08)  T(F): 98.1 (09 May 2019 20:52), Max: 98.6 (09 May 2019 05:08)  HR: 74 (09 May 2019 20:52) (59 - 75)  BP: 125/60 (09 May 2019 20:52) (118/60 - 143/57)  BP(mean): --  RR: 17 (09 May 2019 20:52) (16 - 17)  SpO2: 98% (09 May 2019 20:52) (97% - 100%)    CAPILLARY BLOOD GLUCOSE          I&O's Detail    08 May 2019 07:01  -  09 May 2019 07:00  --------------------------------------------------------  IN:    lactated ringers.: 525 mL  Total IN: 525 mL    OUT:    Bulb: 295 mL    Bulb: 7 mL    Indwelling Catheter - Urethral: 1200 mL  Total OUT: 1502 mL    Total NET: -977 mL      09 May 2019 07:01  -  10 May 2019 05:07  --------------------------------------------------------  IN:  Total IN: 0 mL    OUT:    Bulb: 280 mL    Indwelling Catheter - Urethral: 1300 mL  Total OUT: 1580 mL    Total NET: -1580 mL            MEDICATIONS  (STANDING):  digoxin     Tablet 0.125 milliGRAM(s) Oral daily  diltiazem    milliGRAM(s) Oral daily  docusate sodium 100 milliGRAM(s) Oral three times a day  enoxaparin Injectable 40 milliGRAM(s) SubCutaneous daily  levETIRAcetam 500 milliGRAM(s) Oral two times a day  melatonin 3 milliGRAM(s) Oral at bedtime  polyethylene glycol 3350 17 Gram(s) Oral daily  senna 2 Tablet(s) Oral at bedtime  tamsulosin 0.4 milliGRAM(s) Oral at bedtime    MEDICATIONS  (PRN):  acetaminophen   Tablet .. 650 milliGRAM(s) Oral every 6 hours PRN Mild Pain (1 - 3)  oxyCODONE    IR 5 milliGRAM(s) Oral every 4 hours PRN Moderate Pain (4 - 6)        Physical Exam:  Gen: NAD  LS: nml respiratory effort  Card: pulse regularly present  GI: abd soft, nontender  Ext: warm      Labs:    05-09    140  |  106  |  29<H>  ----------------------------<  103<H>  4.9   |  22  |  0.91    Ca    9.0      09 May 2019 06:40  Phos  2.8     05-09  Mg     2.1     05-09                              9.7    5.02  )-----------( 353      ( 09 May 2019 06:40 )             31.3               Imaging: D-Team Surgery Progress Note     Subjective/24hour Events: No acute events overnight. Pain controlled. Tolerating soft diet. No N/V. No CP or SOB.    Vital Signs:  Vital Signs Last 24 Hrs  T(C): 36.7 (09 May 2019 20:52), Max: 37 (09 May 2019 05:08)  T(F): 98.1 (09 May 2019 20:52), Max: 98.6 (09 May 2019 05:08)  HR: 74 (09 May 2019 20:52) (59 - 75)  BP: 125/60 (09 May 2019 20:52) (118/60 - 143/57)  BP(mean): --  RR: 17 (09 May 2019 20:52) (16 - 17)  SpO2: 98% (09 May 2019 20:52) (97% - 100%)    CAPILLARY BLOOD GLUCOSE          I&O's Detail    08 May 2019 07:01  -  09 May 2019 07:00  --------------------------------------------------------  IN:    lactated ringers.: 525 mL  Total IN: 525 mL    OUT:    Bulb: 295 mL    Bulb: 7 mL    Indwelling Catheter - Urethral: 1200 mL  Total OUT: 1502 mL    Total NET: -977 mL      09 May 2019 07:01  -  10 May 2019 05:07  --------------------------------------------------------  IN:  Total IN: 0 mL    OUT:    Bulb: 280 mL    Indwelling Catheter - Urethral: 1300 mL  Total OUT: 1580 mL    Total NET: -1580 mL            MEDICATIONS  (STANDING):  digoxin     Tablet 0.125 milliGRAM(s) Oral daily  diltiazem    milliGRAM(s) Oral daily  docusate sodium 100 milliGRAM(s) Oral three times a day  enoxaparin Injectable 40 milliGRAM(s) SubCutaneous daily  levETIRAcetam 500 milliGRAM(s) Oral two times a day  melatonin 3 milliGRAM(s) Oral at bedtime  polyethylene glycol 3350 17 Gram(s) Oral daily  senna 2 Tablet(s) Oral at bedtime  tamsulosin 0.4 milliGRAM(s) Oral at bedtime    MEDICATIONS  (PRN):  acetaminophen   Tablet .. 650 milliGRAM(s) Oral every 6 hours PRN Mild Pain (1 - 3)  oxyCODONE    IR 5 milliGRAM(s) Oral every 4 hours PRN Moderate Pain (4 - 6)        Physical Exam:  Gen: NAD  LS: nml respiratory effort  Card: pulse regularly present  GI: abd soft, nontender, port sites c/d/i  Ext: warm      Labs:    05-09    140  |  106  |  29<H>  ----------------------------<  103<H>  4.9   |  22  |  0.91    Ca    9.0      09 May 2019 06:40  Phos  2.8     05-09  Mg     2.1     05-09                              9.7    5.02  )-----------( 353      ( 09 May 2019 06:40 )             31.3               Imaging:

## 2019-05-10 NOTE — PROVIDER CONTACT NOTE (OTHER) - RECOMMENDATIONS
MD made aware
MD aware. Continue to monitor. No new orders at this time.
MD made aware
MD made aware
extend IV for another 24 hours
provider notified

## 2019-05-10 NOTE — PROGRESS NOTE ADULT - PROVIDER SPECIALTY LIST ADULT
Anesthesia
Internal Medicine
Nephrology
Nephrology
Oncology/Hematology Visit Note  Mar 19, 2019    Reason for Visit: follow up of ER positive, HER2 positive left metastatic breast cancer    History of Present Illness: Hoda Brush is a 65 year old female with ER positive, HER2 positive left metastatic breast cancer with bone mets.     TREATMENT HISTORY:  A. Initial diagnosis with metastatic breast cancer in [facility].  Neoadjuvant CAF x 6.   B. Left mastectomy. Left axillary node dissection.  C.  Radiation in 1 dose to R iliac region. g Gy.  C. Herceptin for 2 years taxane for a prescribed course then stopped, monthly zoledronic acid.  She had 2 years of Herceptin with tamoxifen added after chemotherapy.  D.  Tamoxifen alone and zoledronic acid every 3 months.  E.  [place]  We restarted Herceptin every 3 weeks and continued tamoxifen. Bone targeted agent changed to denosumab every 6 weeks.      She is from [facility], formerly from [facility], and came [place] the [place]  She lives with her daughter and is here for continuation of every 3 week Herceptin, which she receives along with tamoxifen.  Her tumor is ER positive, DE positive, HER-2 positive.  She had metastatic disease at the time of presentation with bone-only metastases by report.  She presented with metastatic disease in 02/2014.  Staging was initially bone-only metastases by report.  She did her staging in 02/2014 that showed that she had stage IV disease, T4N2M1 invasive ductal carcinoma of the left breast.  She had a right hip metastasis.  She underwent neoadjuvant CAF, left mastectomy, left axillary lymph node dissection and radiation.  She had radiation of the right iliac region where she had metastatic disease.  Pathology report from [facility] shows the tumor to be positive for ER in 75% of the cells, positive for DE in 40% of the cells, and the HER-2 was 3+ positive by immunohistochemistry.  The Ki-67 was 20%.  She had no evidence of nonbone metastatic disease on her PET/CT scan from 
Surgery
"08/2017.    Restaging on 1/14/19 was stable.     Interval History:  Hoda is here for follow up today.  present. She is feeling overall well. She continues to see her dentist for ongoing issues. She denies any pain. She has not had any recurrence of episodes of chest pain. Denies any dizziness, dyspnea, pleuritic pain. No orthopnea, leg swelling. Appetite good. Tolerating tamoxifen. No vaginal bleeding.    Review of Systems:  Patient denies any of the following except if noted above: fevers, chills, abdominal pain, nausea, vomiting, diarrhea, constipation, urinary concerns, headaches, cough.    Current Outpatient Medications   Medication Sig Dispense Refill     melatonin 3 MG tablet Take 1 tablet (3 mg) by mouth nightly as needed for sleep 30 tablet 11     order for DME Equipment being ordered: 2 Mastectomy bras and 1 prosthetheses. 2 Piece 0     order for DME L UE class 2 compression sleeve and gauntlet  Night garment  Bandaging supplies 1 each 1     tamoxifen (NOLVADEX) 20 MG tablet Take 1 tablet (20 mg) by mouth daily 90 tablet 3     VITAMIN D, CHOLECALCIFEROL, PO Take 1,000 Units by mouth daily         Physical Examination:  General: The patient is a pleasant female in no acute distress.  /63 (BP Location: Right arm, Patient Position: Sitting, Cuff Size: Adult Regular)   Pulse 74   Temp 97.8  F (36.6  C) (Oral)   Resp 16   Ht 1.6 m (5' 2.99\")   Wt 79.2 kg (174 lb 8 oz)   SpO2 100%   BMI 30.92 kg/m    Wt Readings from Last 10 Encounters:   03/19/19 79.2 kg (174 lb 8 oz)   02/26/19 80 kg (176 lb 6.4 oz)   02/05/19 80.2 kg (176 lb 14.4 oz)   01/15/19 79.1 kg (174 lb 4.8 oz)   01/08/19 79.9 kg (176 lb 3.2 oz)   12/24/18 80.2 kg (176 lb 12.8 oz)   12/05/18 81.8 kg (180 lb 4.8 oz)   11/13/18 82.5 kg (181 lb 12.8 oz)   10/23/18 83 kg (182 lb 14.4 oz)   10/02/18 83.6 kg (184 lb 3.2 oz)     HEENT: EOMI, PERRL. Sclerae are anicteric. Oral mucosa is pink and moist with no lesions or thrush. "
  Lymph: Neck is supple with no lymphadenopathy in the cervical or supraclavicular areas.   Heart: Regular rate and rhythm.   Lungs: Clear to auscultation bilaterally.   Abdomen: Bowel sounds present, soft, nontender with no palpable hepatosplenomegaly or masses.   Extremities: No lower extremity edema noted bilaterally.   Neuro: Cranial nerves II through XII are grossly intact.  Skin: No rashes, petechiae, or bruising noted on exposed skin.    Laboratory Data:  Results for NATASHA LOUIS (MRN [id number]) as of 3/19/2019 15:50   3/19/2019 14:48   Sodium 140   Potassium 4.0   Chloride 106   Carbon Dioxide 29   Urea Nitrogen 16   Creatinine 0.71   GFR Estimate >90   GFR Estimate If Black >90   Calcium 8.9   Anion Gap 4   Albumin 3.2 (L)   Protein Total 7.3   Bilirubin Total 0.2   Alkaline Phosphatase 37 (L)   ALT 36   AST 38   Glucose 77   WBC 8.2   Hemoglobin 11.2 (L)   Hematocrit 35.5   Platelet Count 242   RBC Count 3.75 (L)   MCV 95   MCH 29.9   MCHC 31.5   RDW 13.4   Diff Method Automated Method   % Neutrophils 52.9   % Lymphocytes 37.4   % Monocytes 6.8   % Eosinophils 2.6   % Basophils 0.2   % Immature Granulocytes 0.1   Nucleated RBCs 0   Absolute Neutrophil 4.3   Absolute Lymphocytes 3.1   Absolute Monocytes 0.6   Absolute Eosinophils 0.2   Absolute Basophils 0.0   Abs Immature Granulocytes 0.0   Absolute Nucleated RBC 0.0     Results for NATASHA LOUIS (MRN [id number]) as of 3/20/2019 05:38   Ref. Range 2/5/2019 12:32 2/26/2019 12:52 3/19/2019 14:48   CA 27-29 Latest Ref Range: 0 - 39 U/mL 9 10 11   Results for NATASHA LOUIS (MRN [id number]) as of 3/20/2019 05:38   Ref. Range 2/5/2019 12:32 2/26/2019 12:52 3/19/2019 14:48   CEA Latest Ref Range: 0 - 2.5 ug/L 0.6 0.6 1.0       Imaging:    Echo 3/19/19:  Interpretation Summary  Global and regional left ventricular function is normal with an EF of 60-65%.  Left ventricular diastolic function is normal.  Global peak LV longitudinal 
Internal Medicine
Internal Medicine
Surgery
Internal Medicine
strain is averaged at -19.8%. This is within  reported normal limits (normal <-18%).  This study was compared with the study from 12/5/18 and there is no  significant change     Assessment and Plan:    1. Metastatic breast cancer, ER, [place], HER2+ positive: Was last treated in [facility] with Herceptin. We have continued Herceptin every 3 weeks as well as daily tamoxifen. CT CAP on 10/118 with stable disease. Echocardiogram on 3/19 with EF 60-65% and normal LV function. Tolerating well. Tumor markers stable  --Will proceed with Herceptin. Continue Tamoxifen.   --Next restaging on 4/8 prior to seeing Dr. Aguiar on 4/9    2. Bone metastasis: On xgeva every 6 weeks. On calcium + vitamin D. Calcium corrects to WNL. Last dose on 12/24. Cleared by dentist to proceed. Last dose 2/26. Will be due 4/9    3. Atypical chest pain: this has not recurred. She should see cardiology and have stress test if this recurs.       4. LUE lymphedema: She completed lymphedema treatment and has a sleeve to use now.      5. Insomnia: She feels melatonin is working. No longer taking remeron as was too sedating.      6. Health maintenance: Flu shot given this season.    Jossie Sparrow PA-C  [facility]  [address] [place] [address] [phone]

## 2019-05-11 ENCOUNTER — EMERGENCY (EMERGENCY)
Facility: HOSPITAL | Age: 84
LOS: 1 days | Discharge: HOME | End: 2019-05-11
Attending: EMERGENCY MEDICINE | Admitting: INTERNAL MEDICINE
Payer: MEDICARE

## 2019-05-11 VITALS
DIASTOLIC BLOOD PRESSURE: 63 MMHG | SYSTOLIC BLOOD PRESSURE: 132 MMHG | OXYGEN SATURATION: 98 % | TEMPERATURE: 99 F | HEART RATE: 95 BPM | RESPIRATION RATE: 18 BRPM

## 2019-05-11 DIAGNOSIS — I62.01 NONTRAUMATIC ACUTE SUBDURAL HEMORRHAGE: Chronic | ICD-10-CM

## 2019-05-11 PROCEDURE — 99282 EMERGENCY DEPT VISIT SF MDM: CPT

## 2019-05-11 NOTE — ED PROVIDER NOTE - CLINICAL SUMMARY MEDICAL DECISION MAKING FREE TEXT BOX
83 y/o M with recent extensive abdominal surgery.  Campoverde placed on 5/7 for FRANCA.  Campoverde came out today and to ED for replacement.  Patient feels well otherwise and refusing labs.  Will replace campoverde.  Then stable for discharge and discussed need to f/u for re-assessment of kidney function.

## 2019-05-11 NOTE — ED PROVIDER NOTE - OBJECTIVE STATEMENT
85 y/o M with wife at bedside.  Noted extensive PMH including seizures, SDH, s/p lap hepaticojejunostomy and SB resection on 4/26 - previous [facility] records reviewed.  Patient has indwelling abd drain.  Developed azotemia and hyperkalemia on 5/7 and campoverde placed with improvement.  Discharged yesterday to home with campoverde in place and leg bag.  Visiting nurse assessed patient today and noted that campoverde had come out, and referred patient to ED.  Patient states has not passed urine since campoverde out.  Denies any pain or complaint.  No abd pain.  States will allow campoverde to be replaced.  Refusing blood testing as frustrated with having to return to hospital.

## 2019-05-11 NOTE — ED PROVIDER NOTE - CADM POA URETHRAL CATHETER
airway patent/breath sounds equal/good air movement/no rales/respirations non-labored/no wheezes/no chest wall tenderness/no rhonchi/no intercostal retractions/clear to auscultation bilaterally
No

## 2019-05-11 NOTE — ED ADULT TRIAGE NOTE - CHIEF COMPLAINT QUOTE
Pt was discharged from the hospital yesterday with indwelling campoverde, VNS today noticed the campoverde came out. Pt reports pain to penis, no bleeding. Pt sent to ED for re-insertion.

## 2019-05-14 ENCOUNTER — APPOINTMENT (OUTPATIENT)
Dept: SURGICAL ONCOLOGY | Facility: CLINIC | Age: 84
End: 2019-05-14
Payer: MEDICARE

## 2019-05-14 VITALS
DIASTOLIC BLOOD PRESSURE: 74 MMHG | HEART RATE: 94 BPM | HEIGHT: 67 IN | WEIGHT: 170 LBS | SYSTOLIC BLOOD PRESSURE: 135 MMHG | OXYGEN SATURATION: 97 % | BODY MASS INDEX: 26.68 KG/M2

## 2019-05-14 DIAGNOSIS — Z87.898 PERSONAL HISTORY OF OTHER SPECIFIED CONDITIONS: ICD-10-CM

## 2019-05-14 DIAGNOSIS — Z87.828 PERSONAL HISTORY OF OTHER (HEALED) PHYSICAL INJURY AND TRAUMA: ICD-10-CM

## 2019-05-14 DIAGNOSIS — Z87.891 PERSONAL HISTORY OF NICOTINE DEPENDENCE: ICD-10-CM

## 2019-05-14 DIAGNOSIS — Z78.9 OTHER SPECIFIED HEALTH STATUS: ICD-10-CM

## 2019-05-14 DIAGNOSIS — Z87.19 PERSONAL HISTORY OF OTHER DISEASES OF THE DIGESTIVE SYSTEM: ICD-10-CM

## 2019-05-14 DIAGNOSIS — Z86.79 PERSONAL HISTORY OF OTHER DISEASES OF THE CIRCULATORY SYSTEM: ICD-10-CM

## 2019-05-14 PROCEDURE — 99024 POSTOP FOLLOW-UP VISIT: CPT

## 2019-05-16 ENCOUNTER — APPOINTMENT (OUTPATIENT)
Dept: UROLOGY | Facility: CLINIC | Age: 84
End: 2019-05-16

## 2019-05-17 ENCOUNTER — APPOINTMENT (OUTPATIENT)
Dept: UROLOGY | Facility: CLINIC | Age: 84
End: 2019-05-17
Payer: MEDICARE

## 2019-05-17 VITALS
WEIGHT: 170 LBS | DIASTOLIC BLOOD PRESSURE: 67 MMHG | SYSTOLIC BLOOD PRESSURE: 154 MMHG | HEIGHT: 67 IN | BODY MASS INDEX: 26.68 KG/M2 | HEART RATE: 64 BPM | TEMPERATURE: 98.1 F

## 2019-05-17 PROCEDURE — 99203 OFFICE O/P NEW LOW 30 MIN: CPT

## 2019-05-17 NOTE — ASSESSMENT
[FreeTextEntry1] : Recently underwent a cholecystectomy and was discharged from [facility] last week 05/10/19.\par Prior to his surgery, pt was able to void adequately per his wife's report.\par We will teach him how to remove his catheter. \par He will remove it himself Monday early morning and RTO for TOV. \par

## 2019-05-17 NOTE — ADDENDUM
[FreeTextEntry1] :  I, Lizeth Domínguez, acted solely as a scribe for Dr. Chuy Sandoval. The documentation recorded by the scribe accurately reflects the service I personally performed and the decision by me.\par

## 2019-05-17 NOTE — HISTORY OF PRESENT ILLNESS
[FreeTextEntry1] : 84 year old male presents w/ acute urinary retention.\par Recently underwent a cholecystectomy and was discharged from [facility] last week 05/10/19.\par Prior to his surgery, pt was able to void adequately per his wife's report.\par We will teach him how to remove his catheter. \par He will remove it himself Monday early morning and RTO for TOV. \par

## 2019-05-20 ENCOUNTER — APPOINTMENT (OUTPATIENT)
Dept: UROLOGY | Facility: CLINIC | Age: 84
End: 2019-05-20
Payer: MEDICARE

## 2019-05-20 DIAGNOSIS — R33.8 OTHER RETENTION OF URINE: ICD-10-CM

## 2019-05-20 PROBLEM — Z86.79 HISTORY OF HYPERTENSION: Status: RESOLVED | Noted: 2019-05-14 | Resolved: 2019-05-20

## 2019-05-20 PROBLEM — Z78.9 NON-SMOKER: Status: ACTIVE | Noted: 2019-05-14

## 2019-05-20 PROBLEM — Z87.898 HISTORY OF SEIZURES: Status: RESOLVED | Noted: 2019-05-14 | Resolved: 2019-05-20

## 2019-05-20 PROBLEM — Z87.828 HISTORY OF HEAD INJURY: Status: RESOLVED | Noted: 2019-05-17 | Resolved: 2019-05-20

## 2019-05-20 PROBLEM — Z78.9 SOCIAL ALCOHOL USE: Status: ACTIVE | Noted: 2019-05-20

## 2019-05-20 PROBLEM — Z87.19 HISTORY OF GALLBLADDER DISEASE: Status: RESOLVED | Noted: 2019-05-14 | Resolved: 2019-05-20

## 2019-05-20 PROBLEM — Z87.898 HISTORY OF WEIGHT LOSS: Status: RESOLVED | Noted: 2019-05-14 | Resolved: 2019-05-20

## 2019-05-20 PROBLEM — Z87.891 FORMER SMOKER: Status: ACTIVE | Noted: 2019-05-20

## 2019-05-20 PROCEDURE — 99213 OFFICE O/P EST LOW 20 MIN: CPT | Mod: 25

## 2019-05-20 PROCEDURE — 51798 US URINE CAPACITY MEASURE: CPT

## 2019-05-20 RX ORDER — LISINOPRIL 30 MG/1
TABLET ORAL
Refills: 0 | Status: ACTIVE | COMMUNITY

## 2019-05-20 RX ORDER — DILTIAZEM HYDROCHLORIDE 90 MG/1
TABLET ORAL
Refills: 0 | Status: ACTIVE | COMMUNITY

## 2019-05-20 RX ORDER — FOLIC ACID 20 MG
CAPSULE ORAL
Refills: 0 | Status: ACTIVE | COMMUNITY

## 2019-05-20 RX ORDER — LEVETIRACETAM 500 MG/1
500 TABLET, FILM COATED ORAL
Refills: 0 | Status: ACTIVE | COMMUNITY

## 2019-05-20 RX ORDER — ATORVASTATIN CALCIUM 80 MG/1
TABLET, FILM COATED ORAL
Refills: 0 | Status: ACTIVE | COMMUNITY

## 2019-05-20 RX ORDER — DIGOXIN 0.06 MG/1
TABLET ORAL
Refills: 0 | Status: ACTIVE | COMMUNITY

## 2019-05-20 RX ORDER — FERROUS SULFATE 300 MG/5ML
SOLUTION ORAL
Refills: 0 | Status: ACTIVE | COMMUNITY

## 2019-05-20 NOTE — HISTORY OF PRESENT ILLNESS
[de-identified] : Patient is an 83 y/o male who sustained a common bile duct transection during laparoscopic cholecystectomy 04/2019.\par He underwent PTC placement for decompression followed by robotic hepaticojejunostomy 04/26/2019.\par Recovered well.\par Currently has PTC in place, tube study prior to hospital discharge did not demonstrate stricture or leak.

## 2019-05-20 NOTE — ASSESSMENT
[FreeTextEntry1] : Doing well.\par \par Plan:  Follow up with IR for tube study and possible removal.  Diet and activity as tolerated.  Follow up in 1-2 weeks.

## 2019-05-20 NOTE — HISTORY OF PRESENT ILLNESS
[FreeTextEntry1] : 84 year old male presents for acute urinary retention.\par Pt is voiding adequately.\par PVR today was 0 mL.\par RTO as needed.\par

## 2019-05-21 ENCOUNTER — EMERGENCY (EMERGENCY)
Facility: HOSPITAL | Age: 84
LOS: 1 days | Discharge: HOME | End: 2019-05-21
Attending: STUDENT IN AN ORGANIZED HEALTH CARE EDUCATION/TRAINING PROGRAM | Admitting: STUDENT IN AN ORGANIZED HEALTH CARE EDUCATION/TRAINING PROGRAM
Payer: MEDICARE

## 2019-05-21 VITALS
RESPIRATION RATE: 16 BRPM | DIASTOLIC BLOOD PRESSURE: 66 MMHG | OXYGEN SATURATION: 99 % | HEART RATE: 55 BPM | SYSTOLIC BLOOD PRESSURE: 146 MMHG | TEMPERATURE: 98 F

## 2019-05-21 VITALS
HEART RATE: 75 BPM | OXYGEN SATURATION: 97 % | RESPIRATION RATE: 16 BRPM | DIASTOLIC BLOOD PRESSURE: 67 MMHG | TEMPERATURE: 98 F | SYSTOLIC BLOOD PRESSURE: 134 MMHG

## 2019-05-21 DIAGNOSIS — I62.01 NONTRAUMATIC ACUTE SUBDURAL HEMORRHAGE: Chronic | ICD-10-CM

## 2019-05-21 LAB
ALBUMIN SERPL ELPH-MCNC: 3.4 G/DL — SIGNIFICANT CHANGE UP (ref 3.3–5)
ALP SERPL-CCNC: 148 U/L — HIGH (ref 40–120)
ALT FLD-CCNC: 41 U/L — SIGNIFICANT CHANGE UP (ref 4–41)
ANION GAP SERPL CALC-SCNC: 10 MMO/L — SIGNIFICANT CHANGE UP (ref 7–14)
AST SERPL-CCNC: 23 U/L — SIGNIFICANT CHANGE UP (ref 4–40)
BASOPHILS # BLD AUTO: 0.03 K/UL — SIGNIFICANT CHANGE UP (ref 0–0.2)
BASOPHILS NFR BLD AUTO: 0.5 % — SIGNIFICANT CHANGE UP (ref 0–2)
BILIRUB DIRECT SERPL-MCNC: < 0.2 MG/DL — SIGNIFICANT CHANGE UP (ref 0.1–0.2)
BILIRUB SERPL-MCNC: 0.4 MG/DL — SIGNIFICANT CHANGE UP (ref 0.2–1.2)
BUN SERPL-MCNC: 13 MG/DL — SIGNIFICANT CHANGE UP (ref 7–23)
CALCIUM SERPL-MCNC: 8.6 MG/DL — SIGNIFICANT CHANGE UP (ref 8.4–10.5)
CHLORIDE SERPL-SCNC: 107 MMOL/L — SIGNIFICANT CHANGE UP (ref 98–107)
CO2 SERPL-SCNC: 24 MMOL/L — SIGNIFICANT CHANGE UP (ref 22–31)
CREAT SERPL-MCNC: 0.92 MG/DL — SIGNIFICANT CHANGE UP (ref 0.5–1.3)
EOSINOPHIL # BLD AUTO: 0.24 K/UL — SIGNIFICANT CHANGE UP (ref 0–0.5)
EOSINOPHIL NFR BLD AUTO: 4.2 % — SIGNIFICANT CHANGE UP (ref 0–6)
GLUCOSE SERPL-MCNC: 104 MG/DL — HIGH (ref 70–99)
HCT VFR BLD CALC: 26.9 % — LOW (ref 39–50)
HGB BLD-MCNC: 8.7 G/DL — LOW (ref 13–17)
IMM GRANULOCYTES NFR BLD AUTO: 0.4 % — SIGNIFICANT CHANGE UP (ref 0–1.5)
LIDOCAIN IGE QN: 29.4 U/L — SIGNIFICANT CHANGE UP (ref 7–60)
LYMPHOCYTES # BLD AUTO: 1.52 K/UL — SIGNIFICANT CHANGE UP (ref 1–3.3)
LYMPHOCYTES # BLD AUTO: 26.9 % — SIGNIFICANT CHANGE UP (ref 13–44)
MCHC RBC-ENTMCNC: 30.4 PG — SIGNIFICANT CHANGE UP (ref 27–34)
MCHC RBC-ENTMCNC: 32.3 % — SIGNIFICANT CHANGE UP (ref 32–36)
MCV RBC AUTO: 94.1 FL — SIGNIFICANT CHANGE UP (ref 80–100)
MONOCYTES # BLD AUTO: 0.49 K/UL — SIGNIFICANT CHANGE UP (ref 0–0.9)
MONOCYTES NFR BLD AUTO: 8.7 % — SIGNIFICANT CHANGE UP (ref 2–14)
NEUTROPHILS # BLD AUTO: 3.36 K/UL — SIGNIFICANT CHANGE UP (ref 1.8–7.4)
NEUTROPHILS NFR BLD AUTO: 59.3 % — SIGNIFICANT CHANGE UP (ref 43–77)
NRBC # FLD: 0 K/UL — SIGNIFICANT CHANGE UP (ref 0–0)
PLATELET # BLD AUTO: 352 K/UL — SIGNIFICANT CHANGE UP (ref 150–400)
PMV BLD: 9.1 FL — SIGNIFICANT CHANGE UP (ref 7–13)
POTASSIUM SERPL-MCNC: 4 MMOL/L — SIGNIFICANT CHANGE UP (ref 3.5–5.3)
POTASSIUM SERPL-SCNC: 4 MMOL/L — SIGNIFICANT CHANGE UP (ref 3.5–5.3)
PROT SERPL-MCNC: 7.1 G/DL — SIGNIFICANT CHANGE UP (ref 6–8.3)
RBC # BLD: 2.86 M/UL — LOW (ref 4.2–5.8)
RBC # FLD: 13.6 % — SIGNIFICANT CHANGE UP (ref 10.3–14.5)
SODIUM SERPL-SCNC: 141 MMOL/L — SIGNIFICANT CHANGE UP (ref 135–145)
WBC # BLD: 5.66 K/UL — SIGNIFICANT CHANGE UP (ref 3.8–10.5)
WBC # FLD AUTO: 5.66 K/UL — SIGNIFICANT CHANGE UP (ref 3.8–10.5)

## 2019-05-21 PROCEDURE — 74177 CT ABD & PELVIS W/CONTRAST: CPT | Mod: 26

## 2019-05-21 PROCEDURE — 99284 EMERGENCY DEPT VISIT MOD MDM: CPT

## 2019-05-21 RX ORDER — SODIUM CHLORIDE 9 MG/ML
1000 INJECTION INTRAMUSCULAR; INTRAVENOUS; SUBCUTANEOUS ONCE
Refills: 0 | Status: COMPLETED | OUTPATIENT
Start: 2019-05-21 | End: 2019-05-21

## 2019-05-21 RX ADMIN — SODIUM CHLORIDE 1000 MILLILITER(S): 9 INJECTION INTRAMUSCULAR; INTRAVENOUS; SUBCUTANEOUS at 12:04

## 2019-05-21 NOTE — ED PROVIDER NOTE - ATTENDING CONTRIBUTION TO CARE
83 yo male with PMH seizures, SDH, s/p hepaticojejunostomy and SB resection 4/26, s/p cholecystectomy now with abd drain presents to ED for evaluation of drainage. Wife reports that patient's drainage bag has not put out anything since Saturday and reports that patient has been feeling itchy. Reports mild abd pain. Denies fevers or chills. Denies change in appetite,change in mental status, change in skin color. Denies nausea, vomiting, diarrhea, constipation, urinary changes.

## 2019-05-21 NOTE — CONSULT NOTE ADULT - ASSESSMENT
Julian Johnson is an 84 y.o. man with history of seizure disorder, HTN, and cholelithiasis s/p lap gus c/b bile leak requiring robotic-assisted hepaticojejunostomy who presented to the ED on 5/21 because of decreased drain output and itching.     Plan:  - Will discuss with attending    Robles Zavaleta, PGY2  [id number] 84M PMH seizure disorder, HTN, cholelithiasis s/p lap gus at OSH c/b bile leak, requiring transfer to [facility] (4/19) s/p IR PTC followed by Laparoscopic Robotic assisted hepaticojejunostomy (4/26/19, discharged 5/10), presenting with decreased PTC drainage. Imaging showing PTC retracted and terminating in peritoneum.     - IR PTC drain does appears to be malpositioned, however patient does not have elevated LFTs (with exception of Alk phos, which was elevated at time of discharge and has since improved)  - Will discuss with IR whether drain should be removed at this time or whether they would prefer to perform tube check first  - No indication for urgent surgical intervention    MALINA Grullon PGY2  D team surgery  [id number] 84M PMH seizure disorder, HTN, cholelithiasis s/p lap gus at OSH c/b bile leak, requiring transfer to [facility] (4/19) s/p IR PTC followed by Laparoscopic Robotic assisted hepaticojejunostomy (4/26/19, discharged 5/10), presenting with decreased PTC drainage. Imaging showing PTC retracted and terminating in peritoneum.     - IR PTC drain does appears to be malpositioned, however patient does not have elevated LFTs (with exception of Alk phos, which was elevated at time of discharge and has since improved)  - Discussed with IR - IR to remove PTC in ED tonight  - No contraindication for discharge home after PTC is removed  - Please have patient follow up with Dr. Harp as an outpatient - [phone] to schedule an appointment.     MALINA Grullon PGY2  D team surgery  [id number]

## 2019-05-21 NOTE — ED PROVIDER NOTE - PROGRESS NOTE DETAILS
Lida: Pt seen and evaluated by MICU attending. pt desat on NC required bipap and pressors. will a admit to MICU. Lida: Pt signed out to me pending surgery recommendations. pt reassessed by surgery now pending  IR evaluation for possible cracked catheter. pt comfortable. Latonya: patient seen by IR s/p removal of drain. Per IR/surgery, no longer requires drain. States can be discharged

## 2019-05-21 NOTE — ED PROVIDER NOTE - NS ED ROS FT
Constitutional: no fevers or chills  Cardiac: no palpitations, chest pain  Lungs: no shortness of breath, wheezes  Abd: denies diarrhea  Genitourinary: no dysuria, increased urinary frequency, hematuria  Neurology: no sensorimotor deficits, no dizziness, no headache, no visual changes  Skin: no rashes

## 2019-05-21 NOTE — PROVIDER CONTACT NOTE (OTHER) - SITUATION
Pt being d/c-needs ambulette transport home.  SW contacted [facility] at [phone] to arrange transport.

## 2019-05-21 NOTE — CONSULT NOTE ADULT - SUBJECTIVE AND OBJECTIVE BOX
General Surgery Consult      Consulting surgical team: D  Consulting attending: Dr. Harp      HPI: Julian Johnson is an 84 y.o. man with history of seizure disorder, HTN, and cholelithiasis s/p lap gus c/b bile leak requiring robotic-assisted hepaticojejunostomy who presented to the ED on 5/21 because of decreased drain output and itching.       PAST MEDICAL HISTORY:  Cholelithiasis  History of subdural hematoma  Constipation  Hypertension  Seizure      PAST SURGICAL HISTORY:  Acute subdural hematoma      MEDICATIONS:  acetaminophen 325 mg oral tablet: 2 tab(s) orally every 6 hours, As needed, Temp greater or equal to 38C (100.4F), Mild Pain (1 - 3) (18 Apr 2019 10:26)  atorvastatin 20 mg oral tablet: 1 tab(s) orally once a day (12 Apr 2019 13:58)  digoxin 125 mcg (0.125 mg) oral tablet: 1 tab(s) orally once a day (12 Apr 2019 13:58)  diltiazem 24 hour extended release 240 mg/24 hours oral capsule, extended release: 1 cap(s) orally once a day (12 Apr 2019 13:58)  docusate sodium 100 mg oral capsule: 1 cap(s) orally 3 times a day (09 May 2019 12:07)  folic acid 1 mg oral tablet: 1 tab(s) orally once a day (12 Apr 2019 13:58)  levETIRAcetam 500 mg oral tablet: 1 tab(s) orally 2 times a day (12 Apr 2019 13:58)  melatonin 3 mg oral tablet: 1 tab(s) orally once a day (at bedtime) (09 May 2019 12:07)  Multiple Vitamins oral capsule: 1 cap(s) orally once a day (12 Apr 2019 13:58)  polyethylene glycol 3350 oral powder for reconstitution: 17 gram(s) orally once a day (09 May 2019 12:07)  senna oral tablet: 2 tab(s) orally once a day (at bedtime) (09 May 2019 12:07)  traZODone 50 mg oral tablet: 1 tab(s) orally once a day (at bedtime) (12 Apr 2019 13:58)  Vitamin C 500 mg oral tablet: 1 tab(s) orally once a day (12 Apr 2019 13:58)      ALLERGIES:  No Known Allergies      VITALS & I/Os:  Vital Signs Last 24 Hrs  T(C): 36.6 (21 May 2019 09:16), Max: 36.6 (21 May 2019 09:16)  T(F): 97.9 (21 May 2019 09:16), Max: 97.9 (21 May 2019 09:16)  HR: 54 (21 May 2019 12:19) (54 - 75)  BP: 115/51 (21 May 2019 12:19) (115/51 - 134/67)  BP(mean): --  RR: 16 (21 May 2019 12:19) (16 - 16)  SpO2: 99% (21 May 2019 12:19) (97% - 99%)      PHYSICAL EXAM:  General: No acute distress  Respiratory: Nonlabored  Cardiovascular: normotensive, regular rate  Abdominal: Soft, nondistended, nontender. No rebound or guarding. No organomegaly, no palpable mass.  Extremities: Warm      LABS:                        8.7    5.66  )-----------( 352      ( 21 May 2019 12:00 )             26.9     05-21    141  |  107  |  13  ----------------------------<  104<H>  4.0   |  24  |  0.92    Ca    8.6      21 May 2019 12:00    TPro  7.1  /  Alb  3.4  /  TBili  0.4  /  DBili  < 0.2  /  AST  23  /  ALT  41  /  AlkPhos  148<H>  05-21      IMAGING: Surgical Oncology Consult Note      Consulting surgical team: D team surgery  Consulting attending: Dr. Harp      84M PMH seizure disorder, HTN, cholelithiasis s/p lap gus at OSH c/b bile leak, requiring transfer to [facility] (4/19) s/p IR PTC followed by Laparoscopic Robotic assisted hepaticojejunostomy (4/26/19, discharged 5/10), presenting with decreased drainage from PTC as well as "itching". Patient's wife states that over the weekend pt's PTC drain bag "fell off", which she reattached herself at home. Since then there has been minimal drainage into the bag. Pt's wife also feels that pt has been complaining of increased itchiness, prompting presentation to [facility] for further evaluation.     Upon arrival to [facility] ED, AVSS. Afebrile. Normotensive. WBC 5.66. AST/ALT wnl. T bili 0.4. Alk phos 148 (from 236 at time of discharge). CT abd/pelvis obtained which showed PTC drain retracted and terminating in the peritoneum. Of note, last IR drain check was 5/3/19 which showed PTC in appropriate position.       PAST MEDICAL HISTORY:  Cholelithiasis  History of subdural hematoma  Constipation  Hypertension  Seizure      PAST SURGICAL HISTORY:  Acute subdural hematoma      MEDICATIONS:  acetaminophen 325 mg oral tablet: 2 tab(s) orally every 6 hours, As needed, Temp greater or equal to 38C (100.4F), Mild Pain (1 - 3) (18 Apr 2019 10:26)  atorvastatin 20 mg oral tablet: 1 tab(s) orally once a day (12 Apr 2019 13:58)  digoxin 125 mcg (0.125 mg) oral tablet: 1 tab(s) orally once a day (12 Apr 2019 13:58)  diltiazem 24 hour extended release 240 mg/24 hours oral capsule, extended release: 1 cap(s) orally once a day (12 Apr 2019 13:58)  docusate sodium 100 mg oral capsule: 1 cap(s) orally 3 times a day (09 May 2019 12:07)  folic acid 1 mg oral tablet: 1 tab(s) orally once a day (12 Apr 2019 13:58)  levETIRAcetam 500 mg oral tablet: 1 tab(s) orally 2 times a day (12 Apr 2019 13:58)  melatonin 3 mg oral tablet: 1 tab(s) orally once a day (at bedtime) (09 May 2019 12:07)  Multiple Vitamins oral capsule: 1 cap(s) orally once a day (12 Apr 2019 13:58)  polyethylene glycol 3350 oral powder for reconstitution: 17 gram(s) orally once a day (09 May 2019 12:07)  senna oral tablet: 2 tab(s) orally once a day (at bedtime) (09 May 2019 12:07)  traZODone 50 mg oral tablet: 1 tab(s) orally once a day (at bedtime) (12 Apr 2019 13:58)  Vitamin C 500 mg oral tablet: 1 tab(s) orally once a day (12 Apr 2019 13:58)      ALLERGIES:  No Known Allergies      VITALS & I/Os:  Vital Signs Last 24 Hrs  T(C): 36.6 (21 May 2019 09:16), Max: 36.6 (21 May 2019 09:16)  T(F): 97.9 (21 May 2019 09:16), Max: 97.9 (21 May 2019 09:16)  HR: 54 (21 May 2019 12:19) (54 - 75)  BP: 115/51 (21 May 2019 12:19) (115/51 - 134/67)  BP(mean): --  RR: 16 (21 May 2019 12:19) (16 - 16)  SpO2: 99% (21 May 2019 12:19) (97% - 99%)      PHYSICAL EXAM:  General: Laying in bed, in no acute distress  Respiratory: Nonlabored  Abdominal: Soft, nondistended, minimally TTP in RUQ. No rebound or guarding. PTC drain exiting skin over RUQ. No drainage noted in bag.   Extremities: Warm      LABS:                        8.7    5.66  )-----------( 352      ( 21 May 2019 12:00 )             26.9     05-21    141  |  107  |  13  ----------------------------<  104<H>  4.0   |  24  |  0.92    Ca    8.6      21 May 2019 12:00    TPro  7.1  /  Alb  3.4  /  TBili  0.4  /  DBili  < 0.2  /  AST  23  /  ALT  41  /  AlkPhos  148<H>  05-21      IMAGING:    CT Abdomen and Pelvis w/ IV Cont (05.21.19 @ 14:10)  FINDINGS:    LOWER CHEST: Emphysema. Coronary atherosclerosis. Cardiomegaly.    LIVER:Hepaticojejunostomy. Small pneumobilia is slightly increased.   Moderate periportal edema, slightly increased since prior.  BILE DUCTS: Percutaneous transhepatic biliary drain has been retracted   and terminates in the peritoneum.  GALLBLADDER: Cholecystectomy.  SPLEEN: Within normal limits.  PANCREAS: Within normal limits.  ADRENALS: Within normal limits.  KIDNEYS/URETERS: Within normal limits.    BLADDER: New posterior bladder wall thickening.  REPRODUCTIVE ORGANS: Prostate within normal limits.    BOWEL: Left lower quadrant small bowel sutures. No bowel obstruction.   Appendix is normal.  PERITONEUM: Interval removal of 2 right lower quadrant surgical drains.   No ascites.  VESSELS:  Atherosclerosis.  RETROPERITONEUM: No lymphadenopathy.    ABDOMINAL WALL: Within normal limits.  BONES: Degenerative changes.    IMPRESSION:     Percutaneous transhepatic biliary drain has been retracted and terminates   in the peritoneum.

## 2019-05-21 NOTE — CHART NOTE - NSCHARTNOTEFT_GEN_A_CORE
Contacted by ED about dislodged biliary drain. CT confirmed retraction of the drainage catheter with tip outside of the liver.     The catheter was removed at bedside and a dry sterile dressing was placed.     The patient was instructed to follow up with his surgeon. He was also instructed to call his doctor and/or return to the ED if he experienced increasing abdominal pain, N/V, fever, or intractable itching. Contacted by ED about dislodged biliary drain. CT confirmed retraction of the drainage catheter with tip outside of the liver.     The catheter was removed at bedside and a dry sterile dressing was placed.      The patient was instructed to follow up with his surgeon. He was also instructed to call his doctor and/or return to the ED if he experienced increasing abdominal pain, N/V, fever, or intractable itching.

## 2019-05-21 NOTE — ED PROVIDER NOTE - NSFOLLOWUPINSTRUCTIONS_ED_ALL_ED_FT
Follow up with your primary care doctor  Return to ED for any new or worsening symptoms including severe abdominal pain, nausea, vomiting, yellowing of skin, confusion

## 2019-05-21 NOTE — ED ADULT TRIAGE NOTE - CHIEF COMPLAINT QUOTE
Family states pt. had surgery on his gallbladder approx. 4 wks ago. Pt. then had another surgery 2 wks ago to "fix something" and drain was placed. c/o no drainage to bag since Saturday and upper abdominal pain starting last night. Denies n/v or fevers. Given Tylenol around 7:30am with some relief.

## 2019-05-21 NOTE — ED ADULT NURSE NOTE - OBJECTIVE STATEMENT
Pt A/Ox3 with wife at bedside, Wife states pt has a drain in place from a gallbladder surgery and has noticed decreased output since last night. Pt denies abdominal pain n/v/d at this time, Wife states pt skin was itchy and she was worried that stopped the drainage? Skin appears intact around the site. Pt appears well, comfortable in stretcher, breathing well and non labored, skin warm and dry. PIV inserted with aseptic technique, blood drawn, labeled at bedside with 2 pt identifiers and sent to lab. Pt and family aware of POC, NAD. Will continue to monitor. Blood drawn and sent to lab.

## 2019-05-21 NOTE — ED ADULT NURSE REASSESSMENT NOTE - NS ED NURSE REASSESS COMMENT FT1
pt received alert and oriented x3. respirations equal and unlabored. pt decline any pain at the moment. IR at bedside pulling tube out. pt nad. Call bell in reach, warm blanket provided, bed in lowest position, side rails up x2,safety maintained. will continue to monitor.

## 2019-05-23 ENCOUNTER — APPOINTMENT (OUTPATIENT)
Dept: SURGICAL ONCOLOGY | Facility: CLINIC | Age: 84
End: 2019-05-23
Payer: MEDICARE

## 2019-05-23 ENCOUNTER — APPOINTMENT (OUTPATIENT)
Dept: SURGICAL ONCOLOGY | Facility: CLINIC | Age: 84
End: 2019-05-23

## 2019-05-23 VITALS
HEART RATE: 64 BPM | HEIGHT: 67 IN | BODY MASS INDEX: 26.68 KG/M2 | OXYGEN SATURATION: 99 % | SYSTOLIC BLOOD PRESSURE: 154 MMHG | WEIGHT: 170 LBS | DIASTOLIC BLOOD PRESSURE: 71 MMHG

## 2019-05-23 PROCEDURE — 99024 POSTOP FOLLOW-UP VISIT: CPT

## 2019-05-23 NOTE — ASSESSMENT
[FreeTextEntry1] : Doing well.\par \par Plan: Diet and activity as tolerated.  Will obtain follow up MRCP in three months with re-exam at that time.

## 2019-05-23 NOTE — HISTORY OF PRESENT ILLNESS
[de-identified] : Patient is an 85 y/o male who sustained a common bile duct transection during laparoscopic cholecystectomy 04/2019.\par He underwent PTC placement for decompression followed by robotic hepaticojejunostomy 04/26/2019.\par Recovered well.\par Currently has PTC in place, tube study prior to hospital discharge did not demonstrate stricture or leak.\par \par 05/23/2019: Patient presented to [facility] ED yesterday afternoon with dislodged PTC catheter.  CT abdomen/pelvis showed retraction of catheter into peritoneal cavity and was subsequently removed.  LFT and bilirubin levels were within normal limits.  He feels improved compared to last visit and is tolerating diet well.

## 2019-06-01 PROCEDURE — G9001: CPT

## 2019-06-01 PROCEDURE — G9005: CPT

## 2019-07-01 ENCOUNTER — OUTPATIENT (OUTPATIENT)
Dept: OUTPATIENT SERVICES | Facility: HOSPITAL | Age: 84
LOS: 1 days | End: 2019-07-01
Payer: MEDICARE

## 2019-07-01 DIAGNOSIS — I62.01 NONTRAUMATIC ACUTE SUBDURAL HEMORRHAGE: Chronic | ICD-10-CM

## 2019-07-01 PROCEDURE — G9005: CPT

## 2019-07-19 DIAGNOSIS — Z71.89 OTHER SPECIFIED COUNSELING: ICD-10-CM

## 2019-07-30 DIAGNOSIS — Z76.89 PERSONS ENCOUNTERING HEALTH SERVICES IN OTHER SPECIFIED CIRCUMSTANCES: ICD-10-CM

## 2019-08-27 ENCOUNTER — APPOINTMENT (OUTPATIENT)
Dept: MRI IMAGING | Facility: IMAGING CENTER | Age: 84
End: 2019-08-27

## 2019-09-17 ENCOUNTER — APPOINTMENT (OUTPATIENT)
Dept: SURGICAL ONCOLOGY | Facility: CLINIC | Age: 84
End: 2019-09-17

## 2019-09-28 ENCOUNTER — FORM ENCOUNTER (OUTPATIENT)
Age: 84
End: 2019-09-28

## 2019-09-29 ENCOUNTER — OUTPATIENT (OUTPATIENT)
Dept: OUTPATIENT SERVICES | Facility: HOSPITAL | Age: 84
LOS: 1 days | End: 2019-09-29
Payer: MEDICARE

## 2019-09-29 ENCOUNTER — APPOINTMENT (OUTPATIENT)
Dept: MRI IMAGING | Facility: IMAGING CENTER | Age: 84
End: 2019-09-29
Payer: MEDICARE

## 2019-09-29 DIAGNOSIS — S36.13XA INJURY OF BILE DUCT, INITIAL ENCOUNTER: ICD-10-CM

## 2019-09-29 DIAGNOSIS — I62.01 NONTRAUMATIC ACUTE SUBDURAL HEMORRHAGE: Chronic | ICD-10-CM

## 2019-09-29 PROCEDURE — 74183 MRI ABD W/O CNTR FLWD CNTR: CPT | Mod: 26

## 2019-09-29 PROCEDURE — A9585: CPT

## 2019-09-29 PROCEDURE — 74183 MRI ABD W/O CNTR FLWD CNTR: CPT

## 2019-10-01 ENCOUNTER — APPOINTMENT (OUTPATIENT)
Dept: SURGICAL ONCOLOGY | Facility: CLINIC | Age: 84
End: 2019-10-01
Payer: MEDICARE

## 2019-10-01 VITALS
DIASTOLIC BLOOD PRESSURE: 67 MMHG | BODY MASS INDEX: 26.68 KG/M2 | SYSTOLIC BLOOD PRESSURE: 159 MMHG | WEIGHT: 170 LBS | HEART RATE: 61 BPM | HEIGHT: 67 IN | TEMPERATURE: 97.7 F | RESPIRATION RATE: 16 BRPM | OXYGEN SATURATION: 93 %

## 2019-10-01 PROCEDURE — 99213 OFFICE O/P EST LOW 20 MIN: CPT

## 2019-10-02 NOTE — ASSESSMENT
[FreeTextEntry1] : Doing well.\par \par Plan: Follow up MRI report.  Return to office in 6 months for re-exam and repeat imaging.  Sooner if clinically indicated.

## 2019-10-02 NOTE — HISTORY OF PRESENT ILLNESS
[de-identified] : Patient is an 85 y/o male who sustained a common bile duct transection during laparoscopic cholecystectomy 04/2019.\par He underwent PTC placement for decompression followed by robotic hepaticojejunostomy 04/26/2019.\par Recovered well.\par Currently has PTC in place, tube study prior to hospital discharge did not demonstrate stricture or leak.\par \par 05/23/2019: Patient presented to [facility] ED yesterday afternoon with dislodged PTC catheter.  CT abdomen/pelvis showed retraction of catheter into peritoneal cavity and was subsequently removed.  LFT and bilirubin levels were within normal limits.  He feels improved compared to last visit and is tolerating diet well.\par \par 10/01/2019: Patient returns for follow up.  He reports feeling well and denies abdominal pain, nausea/emesis.  He is eating well with stable weight and denies dark urine or pale stool.  He is much more ambulatory.  He denies fever or other constitutional symptoms.\par Follow up MRI abdomen performed 09/29/2019 - results pending.

## 2019-10-02 NOTE — PHYSICAL EXAM
[FreeTextEntry1] : Abdomen: soft, nontender/nondistended. [Normal] : supple, no neck mass and thyroid not enlarged [Normal Neck Lymph Nodes] : normal neck lymph nodes  [Normal Supraclavicular Lymph Nodes] : normal supraclavicular lymph nodes [Normal Groin Lymph Nodes] : normal groin lymph nodes [Normal Axillary Lymph Nodes] : normal axillary lymph nodes [Normal] : oriented to person, place and time, with appropriate affect [de-identified] : sclera nonicteric

## 2019-10-09 ENCOUNTER — APPOINTMENT (OUTPATIENT)
Dept: PHARMACY | Facility: CLINIC | Age: 84
End: 2019-10-09
Payer: SELF-PAY

## 2019-10-09 PROCEDURE — V5010 ASSESSMENT FOR HEARING AID: CPT | Mod: NC

## 2019-11-18 NOTE — ED ADULT NURSE NOTE - NS ED NOTE ABUSE SUSPICION NEGLECT YN
No Information: Selecting Yes will display possible errors in your note based on the variables you have selected. This validation is only offered as a suggestion for you. PLEASE NOTE THAT THE VALIDATION TEXT WILL BE REMOVED WHEN YOU FINALIZE YOUR NOTE. IF YOU WANT TO FAX A PRELIMINARY NOTE YOU WILL NEED TO TOGGLE THIS TO 'NO' IF YOU DO NOT WANT IT IN YOUR FAXED NOTE.

## 2019-12-04 NOTE — ED PROVIDER NOTE - DATE/TIME 1
Left message for patient recommend that he call to schedule an appointment with Dr Rivas to discuss results and formulate a treatment plan. Phone number to schedule provided in message.      27-Nov-2018 18:46

## 2020-03-31 ENCOUNTER — APPOINTMENT (OUTPATIENT)
Dept: SURGICAL ONCOLOGY | Facility: CLINIC | Age: 85
End: 2020-03-31

## 2020-07-25 ENCOUNTER — APPOINTMENT (OUTPATIENT)
Dept: MRI IMAGING | Facility: IMAGING CENTER | Age: 85
End: 2020-07-25

## 2020-07-25 ENCOUNTER — OUTPATIENT (OUTPATIENT)
Dept: OUTPATIENT SERVICES | Facility: HOSPITAL | Age: 85
LOS: 1 days | End: 2020-07-25
Payer: MEDICARE

## 2020-07-25 DIAGNOSIS — S36.13XA INJURY OF BILE DUCT, INITIAL ENCOUNTER: ICD-10-CM

## 2020-07-25 DIAGNOSIS — I62.01 NONTRAUMATIC ACUTE SUBDURAL HEMORRHAGE: Chronic | ICD-10-CM

## 2020-07-25 PROCEDURE — 74183 MRI ABD W/O CNTR FLWD CNTR: CPT | Mod: 26

## 2020-07-25 PROCEDURE — A9585: CPT

## 2020-07-25 PROCEDURE — 74183 MRI ABD W/O CNTR FLWD CNTR: CPT

## 2020-07-30 ENCOUNTER — APPOINTMENT (OUTPATIENT)
Dept: SURGICAL ONCOLOGY | Facility: CLINIC | Age: 85
End: 2020-07-30
Payer: MEDICARE

## 2020-07-30 VITALS
OXYGEN SATURATION: 90 % | HEART RATE: 72 BPM | DIASTOLIC BLOOD PRESSURE: 84 MMHG | SYSTOLIC BLOOD PRESSURE: 180 MMHG | BODY MASS INDEX: 27.31 KG/M2 | WEIGHT: 174 LBS | HEIGHT: 67 IN

## 2020-07-30 DIAGNOSIS — S36.13XA INJURY OF BILE DUCT, INITIAL ENCOUNTER: ICD-10-CM

## 2020-07-30 PROCEDURE — 99213 OFFICE O/P EST LOW 20 MIN: CPT

## 2020-07-31 PROBLEM — S36.13XA BILE DUCT INJURY: Status: ACTIVE | Noted: 2019-05-20

## 2020-07-31 NOTE — PHYSICAL EXAM
[FreeTextEntry1] : Abdomen: soft, nontender/nondistended. [Normal] : supple, no neck mass and thyroid not enlarged [Normal Neck Lymph Nodes] : normal neck lymph nodes  [Normal Supraclavicular Lymph Nodes] : normal supraclavicular lymph nodes [Normal Groin Lymph Nodes] : normal groin lymph nodes [Normal Axillary Lymph Nodes] : normal axillary lymph nodes [Normal] : oriented to person, place and time, with appropriate affect

## 2020-07-31 NOTE — REASON FOR VISIT
Documentation Only:  Faxed prior authorization for [employer] to insurance company for review on 04.12.2019 [place] 11:01am     [Follow-Up Visit] : a follow-up visit for [Spouse] : spouse [FreeTextEntry2] : bile duct injury

## 2020-07-31 NOTE — HISTORY OF PRESENT ILLNESS
[de-identified] : Patient is an 83 y/o male who sustained a common bile duct transection during laparoscopic cholecystectomy 04/2019.\par He underwent PTC placement for decompression followed by robotic hepaticojejunostomy 04/26/2019.\par Recovered well.\par Currently has PTC in place, tube study prior to hospital discharge did not demonstrate stricture or leak.\par \par 05/23/2019: Patient presented to [facility] ED yesterday afternoon with dislodged PTC catheter.  CT abdomen/pelvis showed retraction of catheter into peritoneal cavity and was subsequently removed.  LFT and bilirubin levels were within normal limits.  He feels improved compared to last visit and is tolerating diet well.\par \par 10/01/2019: Patient returns for follow up.  He reports feeling well and denies abdominal pain, nausea/emesis.  He is eating well with stable weight and denies dark urine or pale stool.  He is much more ambulatory.  He denies fever or other constitutional symptoms.\par Follow up MRI abdomen performed 09/29/2019 - results pending.\par \par 07/30/2020: Patient reports doing well.  He denies abdominal pain, nausea/emesis.  He is tolerating diet without weight loss.  Follow up MRCP show stable post-operative changes from hepaticojejunostomy.

## 2020-08-17 ENCOUNTER — INPATIENT (INPATIENT)
Facility: HOSPITAL | Age: 85
LOS: 16 days | Discharge: NURSING, REHAB OR LONG-TERM CARE | End: 2020-09-03
Attending: SURGERY | Admitting: SURGERY
Payer: MEDICARE

## 2020-08-17 VITALS
DIASTOLIC BLOOD PRESSURE: 66 MMHG | RESPIRATION RATE: 16 BRPM | HEART RATE: 82 BPM | TEMPERATURE: 98 F | OXYGEN SATURATION: 95 % | SYSTOLIC BLOOD PRESSURE: 129 MMHG

## 2020-08-17 DIAGNOSIS — I62.01 NONTRAUMATIC ACUTE SUBDURAL HEMORRHAGE: Chronic | ICD-10-CM

## 2020-08-17 LAB
ALBUMIN SERPL ELPH-MCNC: 4.3 G/DL — SIGNIFICANT CHANGE UP (ref 3.3–5)
ALP SERPL-CCNC: 107 U/L — SIGNIFICANT CHANGE UP (ref 40–120)
ALT FLD-CCNC: 19 U/L — SIGNIFICANT CHANGE UP (ref 4–41)
ANION GAP SERPL CALC-SCNC: 14 MMO/L — SIGNIFICANT CHANGE UP (ref 7–14)
APTT BLD: 30.5 SEC — SIGNIFICANT CHANGE UP (ref 27–36.3)
AST SERPL-CCNC: 24 U/L — SIGNIFICANT CHANGE UP (ref 4–40)
BASOPHILS # BLD AUTO: 0.02 K/UL — SIGNIFICANT CHANGE UP (ref 0–0.2)
BASOPHILS NFR BLD AUTO: 0.3 % — SIGNIFICANT CHANGE UP (ref 0–2)
BILIRUB SERPL-MCNC: 0.5 MG/DL — SIGNIFICANT CHANGE UP (ref 0.2–1.2)
BUN SERPL-MCNC: 14 MG/DL — SIGNIFICANT CHANGE UP (ref 7–23)
CALCIUM SERPL-MCNC: 8.8 MG/DL — SIGNIFICANT CHANGE UP (ref 8.4–10.5)
CHLORIDE SERPL-SCNC: 100 MMOL/L — SIGNIFICANT CHANGE UP (ref 98–107)
CO2 SERPL-SCNC: 21 MMOL/L — LOW (ref 22–31)
CREAT SERPL-MCNC: 1.18 MG/DL — SIGNIFICANT CHANGE UP (ref 0.5–1.3)
EOSINOPHIL # BLD AUTO: 0.17 K/UL — SIGNIFICANT CHANGE UP (ref 0–0.5)
EOSINOPHIL NFR BLD AUTO: 2.6 % — SIGNIFICANT CHANGE UP (ref 0–6)
GLUCOSE SERPL-MCNC: 113 MG/DL — HIGH (ref 70–99)
HCT VFR BLD CALC: 36 % — LOW (ref 39–50)
HGB BLD-MCNC: 12.2 G/DL — LOW (ref 13–17)
IMM GRANULOCYTES NFR BLD AUTO: 0.5 % — SIGNIFICANT CHANGE UP (ref 0–1.5)
INR BLD: 1.06 — SIGNIFICANT CHANGE UP (ref 0.88–1.16)
LYMPHOCYTES # BLD AUTO: 2.18 K/UL — SIGNIFICANT CHANGE UP (ref 1–3.3)
LYMPHOCYTES # BLD AUTO: 33.6 % — SIGNIFICANT CHANGE UP (ref 13–44)
MCHC RBC-ENTMCNC: 32.5 PG — SIGNIFICANT CHANGE UP (ref 27–34)
MCHC RBC-ENTMCNC: 33.9 % — SIGNIFICANT CHANGE UP (ref 32–36)
MCV RBC AUTO: 96 FL — SIGNIFICANT CHANGE UP (ref 80–100)
MONOCYTES # BLD AUTO: 0.64 K/UL — SIGNIFICANT CHANGE UP (ref 0–0.9)
MONOCYTES NFR BLD AUTO: 9.9 % — SIGNIFICANT CHANGE UP (ref 2–14)
NEUTROPHILS # BLD AUTO: 3.45 K/UL — SIGNIFICANT CHANGE UP (ref 1.8–7.4)
NEUTROPHILS NFR BLD AUTO: 53.1 % — SIGNIFICANT CHANGE UP (ref 43–77)
NRBC # FLD: 0 K/UL — SIGNIFICANT CHANGE UP (ref 0–0)
PLATELET # BLD AUTO: 257 K/UL — SIGNIFICANT CHANGE UP (ref 150–400)
PMV BLD: 9.9 FL — SIGNIFICANT CHANGE UP (ref 7–13)
POTASSIUM SERPL-MCNC: 4.5 MMOL/L — SIGNIFICANT CHANGE UP (ref 3.5–5.3)
POTASSIUM SERPL-SCNC: 4.5 MMOL/L — SIGNIFICANT CHANGE UP (ref 3.5–5.3)
PROT SERPL-MCNC: 7.8 G/DL — SIGNIFICANT CHANGE UP (ref 6–8.3)
PROTHROM AB SERPL-ACNC: 12.2 SEC — SIGNIFICANT CHANGE UP (ref 10.6–13.6)
RBC # BLD: 3.75 M/UL — LOW (ref 4.2–5.8)
RBC # FLD: 12.7 % — SIGNIFICANT CHANGE UP (ref 10.3–14.5)
SODIUM SERPL-SCNC: 135 MMOL/L — SIGNIFICANT CHANGE UP (ref 135–145)
TROPONIN T, HIGH SENSITIVITY: 16 NG/L — SIGNIFICANT CHANGE UP (ref ?–14)
WBC # BLD: 6.49 K/UL — SIGNIFICANT CHANGE UP (ref 3.8–10.5)
WBC # FLD AUTO: 6.49 K/UL — SIGNIFICANT CHANGE UP (ref 3.8–10.5)

## 2020-08-17 PROCEDURE — 99291 CRITICAL CARE FIRST HOUR: CPT

## 2020-08-17 PROCEDURE — 70450 CT HEAD/BRAIN W/O DYE: CPT | Mod: 26

## 2020-08-17 NOTE — CONSULT NOTE ADULT - SUBJECTIVE AND OBJECTIVE BOX
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 Per discussion with patient's life partner, patient was last noted to be at his baseline at 8:30am on 8/17 morning when he was eating breakfast utilizing his Right hand. Patient was next seen by his life partner around 12pm to be drinking water out of a bottle using both hands (which was unusual for him). Patient then went up the stairs to take a nap around 3:30pm and when he came back to have dinner around 6pm, was noted to be unable to utilize his Right hand to feed himself. EMS was called and patient was brought to [facility] ED where stroke code was activated. Patient himself reports that his right arm feels "a bit weak and numb." Denies any other focal neurologic complaints.   At baseline ambulates with walker.     (Stroke only)  NIHSS: 5  MRS: 3  ICH: 0    REVIEW OF SYSTEMS  A 10-system ROS was performed and is negative except for those items noted above and/or in the HPI.    PAST MEDICAL & SURGICAL HISTORY:  Cholelithiasis  History of subdural hematoma  Constipation  Hypertension  Seizure  Acute subdural hematoma: (May 2014 - subdural hematoma,  pt had SX)    FAMILY HISTORY:    SOCIAL HISTORY:   T/E/D:   Occupation:   Lives with:     MEDICATIONS (HOME):  Home Medications:  acetaminophen 325 mg oral tablet: 2 tab(s) orally every 6 hours, As needed, Temp greater or equal to 38C (100.4F), Mild Pain (1 - 3) (18 Apr 2019 10:26)  atorvastatin 20 mg oral tablet: 1 tab(s) orally once a day (12 Apr 2019 13:58)  digoxin 125 mcg (0.125 mg) oral tablet: 1 tab(s) orally once a day (12 Apr 2019 13:58)  diltiazem 24 hour extended release 240 mg/24 hours oral capsule, extended release: 1 cap(s) orally once a day (12 Apr 2019 13:58)  docusate sodium 100 mg oral capsule: 1 cap(s) orally 3 times a day (09 May 2019 12:07)  folic acid 1 mg oral tablet: 1 tab(s) orally once a day (12 Apr 2019 13:58)  levETIRAcetam 500 mg oral tablet: 1 tab(s) orally 2 times a day (12 Apr 2019 13:58)  melatonin 3 mg oral tablet: 1 tab(s) orally once a day (at bedtime) (09 May 2019 12:07)  Multiple Vitamins oral capsule: 1 cap(s) orally once a day (12 Apr 2019 13:58)  polyethylene glycol 3350 oral powder for reconstitution: 17 gram(s) orally once a day (09 May 2019 12:07)  senna oral tablet: 2 tab(s) orally once a day (at bedtime) (09 May 2019 12:07)  traZODone 50 mg oral tablet: 1 tab(s) orally once a day (at bedtime) (12 Apr 2019 13:58)  Vitamin C 500 mg oral tablet: 1 tab(s) orally once a day (12 Apr 2019 13:58)    MEDICATIONS  (STANDING):    MEDICATIONS  (PRN):    ALLERGIES/INTOLERANCES:  Allergies  No Known Allergies    Intolerances    VITALS & EXAMINATION:  Vital Signs Last 24 Hrs  T(C): 36.4 (17 Aug 2020 19:33), Max: 36.4 (17 Aug 2020 19:33)  T(F): 97.6 (17 Aug 2020 19:33), Max: 97.6 (17 Aug 2020 19:33)  HR: 82 (17 Aug 2020 19:33) (82 - 82)  BP: 129/66 (17 Aug 2020 19:33) (129/66 - 129/66)  BP(mean): --  RR: 16 (17 Aug 2020 19:33) (16 - 16)  SpO2: 95% (17 Aug 2020 19:33) (95% - 95%)    General:  Constitutional: Male, appears stated age, in no apparent distress including pain  Head: Normocephalic & atraumatic.  Extremities: No cyanosis, clubbing, or edema.  Skin: No rashes, bruising, or discoloration.    Neurological (>12):  MS: Awake, alert, oriented to person, but not time or place. Normal affect. Follows all simple commands.    Language: Speech is clear, fluent with good repetition & comprehension (able to name objects)    CNs: VFF. EOMI no nystagmus, no diplopia. V1-3 intact to LT, well developed masseter muscles b/l. No facial asymmetry b/l, Symmetric palate elevation in midline. Gag reflex deferred. Head turning & shoulder shrug intact b/l. Tongue midline, normal movements, no atrophy.    Fundoscopic: Not visualized.     Motor: Normal muscle bulk & tone. No noticeable tremor or seizure. No pronator drift.             R	Drift.   L	No drift.     	  R	No drift. 	   L	No drift. 	     Sensation: +Decreased sensation to LT in the RUE.     Cortical: Extinction on DSS (neglect): none    Coordination: +R FNF dysmetria    Gait: Not assessed.     LABORATORY:  CBC   Chem       LFTs   Coagulopathy   Lipid Panel   A1c   Cardiac enzymes     U/A   CSF  Immunological  Other    STUDIES & IMAGING:  Studies (EKG, EEG, EMG, etc):     Radiology (XR, CT, MR, U/S, TTE/JOEY):

## 2020-08-17 NOTE — ED PROVIDER NOTE - CLINICAL SUMMARY MEDICAL DECISION MAKING FREE TEXT BOX
84 y/o M PMH HTN, seizures, SDH, s/p hepaticojejunostomy and SB resection, presents to the ED with R sided arm weakness onset 1 hour PTA. states he couldn't hold fork while eating dinner.  No facial droop/ slurred speech. No recent illness. DDx ischemic stroke, SDH, SAH, lower suspicion posterior stroke, hypoglycemia. plan labs CTH CTA ekg likely admit for MRI

## 2020-08-17 NOTE — ED ADULT NURSE NOTE - OBJECTIVE STATEMENT
Received pt lying in room 4. Pt is alert and is oriented x 3. pt says his girl called the ambulance because he could not hold a fork properly to eat and had slight weakness to right arm but feels fine now. Denies any headache or pain or weakness. Labs sent as per order. 20 G Sl placed in left AC. Pt awaits CT scan. Report given to area RN.

## 2020-08-17 NOTE — ED PROVIDER NOTE - PHYSICAL EXAMINATION
Gen: well developed elderly male   HEENT: NCAT, EOMI, no nasal discharge, mucous membranes moist  CV: RRR, +S1/S2, no M/R/G  Resp: CTAB, no W/R/R  GI: Abdomen soft non-distended, NTTP, no masses  MSK: No open wounds, no bruising, no LE edema  Neuro:  +slight drift to RUE. +slow to follow motor commands. CN2-12 grossly intact, A&Ox3, MS +5/5 in UE and LE BL, gross sensation intact in UE and LE BL  Psych: appropriate mood

## 2020-08-17 NOTE — ED ADULT NURSE NOTE - CHIEF COMPLAINT QUOTE
Pt brought from home for right arm weakness starting 1 hour ago. Pt states he was eating dinner and couldn't hold fork. Pt noted to have slight right arm drift. No facial droop/ slurred speech noted. PMH seizures and HTN. MAR called for Code Stroke.

## 2020-08-17 NOTE — ED PROVIDER NOTE - OBJECTIVE STATEMENT
84 y/o M PMH HTN, seizures, SDH, s/p hepaticojejunostomy and SB resection, presents to the ED with R sided arm weakness onset 1 hour PTA. states he couldn't hold fork while eating dinner. 86 y/o M PMH HTN, seizures, SDH, s/p hepaticojejunostomy and SB resection, presents to the ED with R sided arm weakness onset 1 hour PTA. states he couldn't hold fork while eating dinner. No facial droop, LOC, head injury. No f/c, cp, sob, abd pain, n/v/d, dysuria, hematuria, other recent illness.

## 2020-08-17 NOTE — ED PROVIDER NOTE - PROGRESS NOTE DETAILS
Ford, PGY2 - spoke with Neurology resident, needs USIV for CTA, likely admit for MRI and monitoring. pt not TPA candidate. Ford, PGY2 - R sided drift improving, no facial droop or strength/sensory deficits noted.

## 2020-08-17 NOTE — CONSULT NOTE ADULT - ASSESSMENT
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 Per discussion with patient's life partner, patient was last noted to be at his baseline at 8:30am on 8/17 morning when he was eating breakfast utilizing his Right hand. Patient was next seen by his life partner around 12pm to be drinking water out of a bottle using both hands (which was unusual for him). Patient then went up the stairs to take a nap around 3:30pm and when he came back to have dinner around 6pm, was noted to be unable to utilize his Right hand to feed himself. EMS was called and patient was brought to [facility] ED where stroke code was activated. Patient himself reports that his right arm feels "a bit weak and numb." Denies any other focal neurologic complaints.   At baseline ambulates with a walker.     Neurologic exam notable for RUE drift, R FNF dysmetria, RUE sensory (mild loss), and inability to state accurate date or age.     LKN: 8:30am on 8/17/2020  NIHSS: 5  Pre-MRS: 3  CTH demonstrated no acute pathology but extensive chronic encephalomalacia in the R frontal region with multiple chronic lacunar infarctions. CTA H/N ordered however, unable to obtain IV access in the immediate setting therefore deferred until IV access established.  Not a tPA candidate given outside of the window.   Not an endovascular candidate given low NIHSS and MRS of 3 though would need to determine presence of LVO before final determination could be made.     Impression: Acute RUE sensorimotor +/- dysmetria (symptomatology not completely clear as patient is a poor historian) likely 2/2 L brain dysfunction possibly L thalamocapsular ischemic infarct vs L jossy-rolandic infarct; alternate localizations could include the R cerebellum 2/2 cryptogenic etiology    Recommendations:   Please obtain CTA H/N urgently  Permissive HTN up to 220/120 mmHg for first 48-72 hours after the symptom onset followed by gradual normotension  MRI brain without contrast  TTE with bubble study for possible valvular heart disease  Depending on infarct location, may require ILR base on suspected etiology of infarct  ASA 81 mg PO QD once patient passes swallow evaluation, if not,  MI  Lipitor 80 mg PO QHS  DVT prophylaxis with heparin/lovenox  HbA1c, LDL and continue with aggressive vascular risk factors modifications   NPO until patient passes dysphagia screen  Tele monitor  PT/OT/S&S eval  Clarify if patient is still on Keppra for seizures (documented as 500mg BID from May 2019 admission)

## 2020-08-17 NOTE — ED PROVIDER NOTE - ATTENDING CONTRIBUTION TO CARE
agree with resident note    "84 y/o M PMH HTN, seizures, SDH, s/p hepaticojejunostomy and SB resection, presents to the ED with R sided arm weakness onset 1 hour PTA. states he couldn't hold fork while eating dinner. No facial droop, LOC, head injury. No f/c, cp, sob, abd pain, n/v/d, dysuria, hematuria, other recent illness."  States at first R leg weakness but that has largely resolved.    PE: well appearing but somewhat slow to respond; VSS: CTAB/L; s1 s2 no m/r/g abd soft/NT/ND ext: no edema Neuro: Cns intact 5/5 motor UE but decrease in  strength in right hand ?slight right drift; 5/5 lower extremity motor b/l    Imp: likely CVA given hx; CTH shows no ICH; ASA; stroke code called; do not feel pt is a TPA candidate

## 2020-08-17 NOTE — CONSULT NOTE ADULT - ATTENDING COMMENTS
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 He admits to be a smoker for a period of 6 to 7 years, 1 pack/day.  His neurological exam is significant for not being oriented to month, oriented to place, has right upper motor neuron facial droop, right arm drift, right and fine motor decreased facilitation.  CT angiogram of head and neck revealed bilateral carotid atherosclerosis with severe stenosis of left ICA and stenosis of right internal carotid artery.  Posterior circulation, bilateral vertebral calcification and left vertebral stenosis.  Impression: Left hemispheric/brainstem ischemic infarct likely secondary to large artery atherosclerosis.  Continue with permissive hypertension  Ultrasound carotid  MRI brain without contrast to look for extent and distribution of stroke.  Continue antiplatelet, high-dose statins.  I spoke to the patient briefly regarding the need for carotid surgery if at all, at this point he refuses to the idea of surgical intervention.  We will follow-up after MRIs are completed.

## 2020-08-18 DIAGNOSIS — I10 ESSENTIAL (PRIMARY) HYPERTENSION: ICD-10-CM

## 2020-08-18 DIAGNOSIS — I63.9 CEREBRAL INFARCTION, UNSPECIFIED: ICD-10-CM

## 2020-08-18 DIAGNOSIS — Z02.9 ENCOUNTER FOR ADMINISTRATIVE EXAMINATIONS, UNSPECIFIED: ICD-10-CM

## 2020-08-18 DIAGNOSIS — R56.9 UNSPECIFIED CONVULSIONS: ICD-10-CM

## 2020-08-18 DIAGNOSIS — I48.0 PAROXYSMAL ATRIAL FIBRILLATION: ICD-10-CM

## 2020-08-18 DIAGNOSIS — Z29.9 ENCOUNTER FOR PROPHYLACTIC MEASURES, UNSPECIFIED: ICD-10-CM

## 2020-08-18 DIAGNOSIS — F10.10 ALCOHOL ABUSE, UNCOMPLICATED: ICD-10-CM

## 2020-08-18 DIAGNOSIS — I65.29 OCCLUSION AND STENOSIS OF UNSPECIFIED CAROTID ARTERY: ICD-10-CM

## 2020-08-18 DIAGNOSIS — Z79.899 OTHER LONG TERM (CURRENT) DRUG THERAPY: ICD-10-CM

## 2020-08-18 DIAGNOSIS — Z98.890 OTHER SPECIFIED POSTPROCEDURAL STATES: Chronic | ICD-10-CM

## 2020-08-18 LAB
ANION GAP SERPL CALC-SCNC: 13 MMO/L — SIGNIFICANT CHANGE UP (ref 7–14)
BUN SERPL-MCNC: 12 MG/DL — SIGNIFICANT CHANGE UP (ref 7–23)
CALCIUM SERPL-MCNC: 9.3 MG/DL — SIGNIFICANT CHANGE UP (ref 8.4–10.5)
CHLORIDE SERPL-SCNC: 108 MMOL/L — HIGH (ref 98–107)
CHOLEST SERPL-MCNC: 127 MG/DL — SIGNIFICANT CHANGE UP (ref 120–199)
CO2 SERPL-SCNC: 23 MMOL/L — SIGNIFICANT CHANGE UP (ref 22–31)
CREAT SERPL-MCNC: 0.92 MG/DL — SIGNIFICANT CHANGE UP (ref 0.5–1.3)
DIGOXIN SERPL-MCNC: 0.4 NG/ML — LOW (ref 0.8–2)
GLUCOSE SERPL-MCNC: 94 MG/DL — SIGNIFICANT CHANGE UP (ref 70–99)
HBA1C BLD-MCNC: 5.3 % — SIGNIFICANT CHANGE UP (ref 4–5.6)
HCT VFR BLD CALC: 40.1 % — SIGNIFICANT CHANGE UP (ref 39–50)
HDLC SERPL-MCNC: 37 MG/DL — SIGNIFICANT CHANGE UP (ref 35–55)
HGB BLD-MCNC: 13.6 G/DL — SIGNIFICANT CHANGE UP (ref 13–17)
LIPID PNL WITH DIRECT LDL SERPL: 77 MG/DL — SIGNIFICANT CHANGE UP
MAGNESIUM SERPL-MCNC: 2.3 MG/DL — SIGNIFICANT CHANGE UP (ref 1.6–2.6)
MCHC RBC-ENTMCNC: 32.6 PG — SIGNIFICANT CHANGE UP (ref 27–34)
MCHC RBC-ENTMCNC: 33.9 % — SIGNIFICANT CHANGE UP (ref 32–36)
MCV RBC AUTO: 96.2 FL — SIGNIFICANT CHANGE UP (ref 80–100)
NRBC # FLD: 0 K/UL — SIGNIFICANT CHANGE UP (ref 0–0)
PHOSPHATE SERPL-MCNC: 3 MG/DL — SIGNIFICANT CHANGE UP (ref 2.5–4.5)
PLATELET # BLD AUTO: 263 K/UL — SIGNIFICANT CHANGE UP (ref 150–400)
PMV BLD: 9.5 FL — SIGNIFICANT CHANGE UP (ref 7–13)
POTASSIUM SERPL-MCNC: 4.7 MMOL/L — SIGNIFICANT CHANGE UP (ref 3.5–5.3)
POTASSIUM SERPL-SCNC: 4.7 MMOL/L — SIGNIFICANT CHANGE UP (ref 3.5–5.3)
RBC # BLD: 4.17 M/UL — LOW (ref 4.2–5.8)
RBC # FLD: 12.4 % — SIGNIFICANT CHANGE UP (ref 10.3–14.5)
SARS-COV-2 RNA SPEC QL NAA+PROBE: SIGNIFICANT CHANGE UP
SODIUM SERPL-SCNC: 144 MMOL/L — SIGNIFICANT CHANGE UP (ref 135–145)
TRIGL SERPL-MCNC: 79 MG/DL — SIGNIFICANT CHANGE UP (ref 10–149)
TROPONIN T, HIGH SENSITIVITY: 15 NG/L — SIGNIFICANT CHANGE UP (ref ?–14)
TSH SERPL-MCNC: 3.64 UIU/ML — SIGNIFICANT CHANGE UP (ref 0.27–4.2)
WBC # BLD: 6.64 K/UL — SIGNIFICANT CHANGE UP (ref 3.8–10.5)
WBC # FLD AUTO: 6.64 K/UL — SIGNIFICANT CHANGE UP (ref 3.8–10.5)

## 2020-08-18 PROCEDURE — 99223 1ST HOSP IP/OBS HIGH 75: CPT

## 2020-08-18 PROCEDURE — 93306 TTE W/DOPPLER COMPLETE: CPT | Mod: 26

## 2020-08-18 PROCEDURE — 70496 CT ANGIOGRAPHY HEAD: CPT | Mod: 26

## 2020-08-18 PROCEDURE — 70498 CT ANGIOGRAPHY NECK: CPT | Mod: 26

## 2020-08-18 PROCEDURE — 12345: CPT | Mod: NC

## 2020-08-18 PROCEDURE — 93880 EXTRACRANIAL BILAT STUDY: CPT | Mod: 26

## 2020-08-18 RX ORDER — HEPARIN SODIUM 5000 [USP'U]/ML
5000 INJECTION INTRAVENOUS; SUBCUTANEOUS EVERY 12 HOURS
Refills: 0 | Status: DISCONTINUED | OUTPATIENT
Start: 2020-08-18 | End: 2020-08-28

## 2020-08-18 RX ORDER — LEVETIRACETAM 250 MG/1
500 TABLET, FILM COATED ORAL
Refills: 0 | Status: DISCONTINUED | OUTPATIENT
Start: 2020-08-18 | End: 2020-08-28

## 2020-08-18 RX ORDER — LEVETIRACETAM 250 MG/1
500 TABLET, FILM COATED ORAL EVERY 12 HOURS
Refills: 0 | Status: DISCONTINUED | OUTPATIENT
Start: 2020-08-18 | End: 2020-08-18

## 2020-08-18 RX ORDER — LEVETIRACETAM 250 MG/1
0 TABLET, FILM COATED ORAL
Qty: 120 | Refills: 0 | DISCHARGE

## 2020-08-18 RX ORDER — DILTIAZEM HCL 120 MG
0 CAPSULE, EXT RELEASE 24 HR ORAL
Qty: 30 | Refills: 0 | DISCHARGE

## 2020-08-18 RX ORDER — ASPIRIN/CALCIUM CARB/MAGNESIUM 324 MG
300 TABLET ORAL DAILY
Refills: 0 | Status: DISCONTINUED | OUTPATIENT
Start: 2020-08-18 | End: 2020-08-18

## 2020-08-18 RX ORDER — TAMSULOSIN HYDROCHLORIDE 0.4 MG/1
0 CAPSULE ORAL
Qty: 30 | Refills: 0 | DISCHARGE

## 2020-08-18 RX ORDER — ATORVASTATIN CALCIUM 80 MG/1
1 TABLET, FILM COATED ORAL
Qty: 0 | Refills: 0 | DISCHARGE

## 2020-08-18 RX ORDER — ATORVASTATIN CALCIUM 80 MG/1
0 TABLET, FILM COATED ORAL
Qty: 30 | Refills: 0 | DISCHARGE

## 2020-08-18 RX ORDER — ATORVASTATIN CALCIUM 80 MG/1
80 TABLET, FILM COATED ORAL AT BEDTIME
Refills: 0 | Status: DISCONTINUED | OUTPATIENT
Start: 2020-08-18 | End: 2020-08-28

## 2020-08-18 RX ORDER — ASPIRIN/CALCIUM CARB/MAGNESIUM 324 MG
81 TABLET ORAL ONCE
Refills: 0 | Status: COMPLETED | OUTPATIENT
Start: 2020-08-18 | End: 2020-08-18

## 2020-08-18 RX ORDER — FOLIC ACID 0.8 MG
1 TABLET ORAL DAILY
Refills: 0 | Status: DISCONTINUED | OUTPATIENT
Start: 2020-08-18 | End: 2020-08-18

## 2020-08-18 RX ORDER — ASPIRIN/CALCIUM CARB/MAGNESIUM 324 MG
81 TABLET ORAL DAILY
Refills: 0 | Status: DISCONTINUED | OUTPATIENT
Start: 2020-08-18 | End: 2020-08-29

## 2020-08-18 RX ORDER — TRAZODONE HCL 50 MG
1 TABLET ORAL
Qty: 0 | Refills: 0 | DISCHARGE

## 2020-08-18 RX ORDER — LISINOPRIL 2.5 MG/1
0 TABLET ORAL
Qty: 30 | Refills: 0 | DISCHARGE

## 2020-08-18 RX ORDER — THIAMINE MONONITRATE (VIT B1) 100 MG
100 TABLET ORAL DAILY
Refills: 0 | Status: DISCONTINUED | OUTPATIENT
Start: 2020-08-18 | End: 2020-08-18

## 2020-08-18 RX ORDER — DIGOXIN 250 MCG
0 TABLET ORAL
Qty: 30 | Refills: 0 | DISCHARGE

## 2020-08-18 RX ADMIN — HEPARIN SODIUM 5000 UNIT(S): 5000 INJECTION INTRAVENOUS; SUBCUTANEOUS at 18:32

## 2020-08-18 RX ADMIN — ATORVASTATIN CALCIUM 80 MILLIGRAM(S): 80 TABLET, FILM COATED ORAL at 21:33

## 2020-08-18 RX ADMIN — HEPARIN SODIUM 5000 UNIT(S): 5000 INJECTION INTRAVENOUS; SUBCUTANEOUS at 07:03

## 2020-08-18 RX ADMIN — Medication 81 MILLIGRAM(S): at 12:08

## 2020-08-18 RX ADMIN — LEVETIRACETAM 500 MILLIGRAM(S): 250 TABLET, FILM COATED ORAL at 19:00

## 2020-08-18 RX ADMIN — Medication 81 MILLIGRAM(S): at 01:47

## 2020-08-18 NOTE — H&P ADULT - RS GEN PE MLT RESP DETAILS PC
airway patent/breath sounds equal/clear to auscultation bilaterally/respirations non-labored/good air movement good air movement/no rhonchi/clear to auscultation bilaterally/airway patent/no rales/breath sounds equal/no wheezes/respirations non-labored

## 2020-08-18 NOTE — H&P ADULT - NSHPLABSRESULTS_GEN_ALL_CORE
12.2   6.49  )-----------( 257      ( 17 Aug 2020 20:35 )             36.0     08-17    135  |  100  |  14  ----------------------------<  113<H>  4.5   |  21<L>  |  1.18    Ca    8.8      17 Aug 2020 20:35    TPro  7.8  /  Alb  4.3  /  TBili  0.5  /  DBili  x   /  AST  24  /  ALT  19  /  AlkPhos  107  08-17    Troponin T, High Sensitivity: 15: ---------------------***PLEASE NOTE***----------------------  Rapid changes upward or downward in high-sensitivity  troponin levels strongly suggest acute myocardial injury.  Hemolysis may falsely lower results. Renal impairment may  increase results.    Normal: <6 - 14 ng/L  Indeterminate: 15 - 51 ng/L  Elevated: >51 ng/L    Please see "[email or web address] on the [email or web address] for more information. ng/L (08.18.20 @ 00:00)    Troponin T, High Sensitivity: 16: ---------------------***PLEASE NOTE***----------------------  Rapid changes upward or downward in high-sensitivity  troponin levels strongly suggest acute myocardial injury.  Hemolysis may falsely lower results. Renal impairment may  increase results.    Normal: <6 - 14 ng/L  Indeterminate: 15 - 51 ng/L  Elevated: >51 ng/L    Please see "[email or web address] on the [email or web address] for more information. ng/L (08.17.20 @ 20:35)    EKG: sinus with 1st deg block 69 Flat T in III, V1, AVL TWI in AVR 12.2   6.49  )-----------( 257      ( 17 Aug 2020 20:35 )             36.0     08-17    135  |  100  |  14  ----------------------------<  113<H>  4.5   |  21<L>  |  1.18    Ca    8.8      17 Aug 2020 20:35    TPro  7.8  /  Alb  4.3  /  TBili  0.5  /  DBili  x   /  AST  24  /  ALT  19  /  AlkPhos  107  08-17    Troponin T, High Sensitivity: 15: ---------------------***PLEASE NOTE***----------------------  Rapid changes upward or downward in high-sensitivity  troponin levels strongly suggest acute myocardial injury.  Hemolysis may falsely lower results. Renal impairment may  increase results.    Normal: <6 - 14 ng/L  Indeterminate: 15 - 51 ng/L  Elevated: >51 ng/L    Please see "[email or web address] on the TVPageet for more information. ng/L (08.18.20 @ 00:00)    Troponin T, High Sensitivity: 16: ---------------------***PLEASE NOTE***----------------------  Rapid changes upward or downward in high-sensitivity  troponin levels strongly suggest acute myocardial injury.  Hemolysis may falsely lower results. Renal impairment may  increase results.    Normal: <6 - 14 ng/L  Indeterminate: 15 - 51 ng/L  Elevated: >51 ng/L    Please see "[email or web address] on the Prizzm for more information. ng/L (08.17.20 @ 20:35)    EKG: sinus with 1st deg block 69 Flat T in III, V1, AVL TWI in AVR    < from: CT Angio Neck w/ IV Cont (08.18.20 @ 01:03) >    CT angiography neck:  Heavy atherosclerotic calcification involving the proximal left internal carotid artery with severe stenosis resulting in "string sign". Presumed severe stenosis of the origin of the right internal carotid artery secondary to heavy vascular atherosclerotic calcification and poor visualization of the lumen.    Occlusion of the V1 and proximal V2 segments of the left vertebral artery.    Patent right vertebral artery with moderate stenosis at the origin secondary to atherosclerotic calcification and short segment focal dissection of the distal V2 segment at the level of the C3 transverse foramina.    CT angiography brain:    Segments of severe stenosis of the intradural left vertebral artery.    Fetal origin of the right PCA. A 3 mm outpouching from the posterior aspect of the right posterior communicating artery may represent an aneurysm versus blunted hypoplastic right P1 segment.    < end of copied text > LABS and ADDITIONAL STUDIES:                 12.2   6.49  )-----------( 257      ( 17 Aug 2020 20:35 )              36.0     08-17    135  |  100  |  14  ----------------------------<  113<H>  4.5   |  21<L>  |  1.18    Ca    8.8      17 Aug 2020 20:35    TPro  7.8  /  Alb  4.3  /  TBili  0.5  /  DBili  x   /  AST  24  /  ALT  19  /  AlkPhos  107  08-17    Troponin T, High Sensitivity (08.17.20 @ 20:35)    Troponin T, High Sensitivity: 16  Troponin T, High Sensitivity (08.18.20 @ 00:00)    Troponin T, High Sensitivity: 15    EKG: sinus with 1st deg block 69. QTc 413, no significant ST-T wave changes    < from: CT Angio Neck w/ IV Cont (08.18.20 @ 01:03) >  CT angiography neck:  Heavy atherosclerotic calcification involving the proximal left internal carotid artery with severe stenosis resulting in "string sign". Presumed severe stenosis of the origin of the right internal carotid artery secondary to heavy vascular atherosclerotic calcification and poor visualization of the lumen.  Occlusion of the V1 and proximal V2 segments of the left vertebral artery.  Patent right vertebral artery with moderate stenosis at the origin secondary to atherosclerotic calcification and short segment focal dissection of the distal V2 segment at the level of the C3 transverse foramina.    CT angiography brain:  Segments of severe stenosis of the intradural left vertebral artery.  Fetal origin of the right PCA. A 3 mm outpouching from the posterior aspect of the right posterior communicating artery may represent an aneurysm versus blunted hypoplastic right P1 segment.  < end of copied text >    < from: CT Brain Stroke Protocol (08.17.20 @ 20:07) >  IMPRESSION:  No hydrocephalus, acute intracranial hemorrhage, mass effect, or brain edema.  Extensive multifocal encephalomalacia and gliosis.  < end of copied text >

## 2020-08-18 NOTE — H&P ADULT - PROBLEM SELECTOR PLAN 4
cont keppra cont keppra  keppra level cont keppra as IV for now given patient cannot tolerate PO  keppra level - cont keppra as IV for now given patient cannot tolerate PO  - keppra level

## 2020-08-18 NOTE — H&P ADULT - ATTENDING COMMENTS
Patient seen and examined on 8/18/20, case discussed with ACP ALLA Green, agree with assessment and plan as above.

## 2020-08-18 NOTE — H&P ADULT - ASSESSMENT
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 admitted for CVA 84 y/o M with hx history as above who presents with R sided arm weakness. admitted for CVA work up.

## 2020-08-18 NOTE — SWALLOW BEDSIDE ASSESSMENT ADULT - COMMENTS
H&P 86 y/o M with hx history as above who presents with R sided arm weakness. admitted for CVA work up.    Vascular Note - 85 year old gentleman with severe b/l carotid stenosis, presenting with stroke-like symptoms now resolving    Neuro Note 8/17/2020 - 86 y/o RH male with past medical history of HTN, Seizure disorder (not on medications), EtOH abuse, prior R SDH s/p craniotomy (2014) due to trauma presents to the ED as Stroke Code. Per discussion with patient's life partner, patient was last noted to be at his baseline at 8:30am on 8/17 morning when he was eating breakfast utilizing his Right hand. Patient was next seen by his life partner around 12pm to be drinking water out of a bottle using both hands (which was unusual for him). Patient then went up the stairs to take a nap around 3:30pm and when he came back to have dinner around 6pm, was noted to be unable to utilize his Right hand to feed himself. EMS was called and patient was brought to [facility] ED where stroke code was activated. Patient himself reports that his right arm feels "a bit weak and numb." Denies any other focal neurologic complaints.    Patient seen at bedside, alert and oriented x2. Patient is able to follow commands and express basic needds.

## 2020-08-18 NOTE — H&P ADULT - PROBLEM SELECTOR PLAN 5
hep sc for VTE px patient admits to drink 3-4 beers daily. Per life partner, he is only given 1 beer every 3-4 times a week. Per chart review, patient has hx of ETOH abuse with falls and had ?seizures when he stopped drinking (from H&P in 2012).   No signs of withdrawal  Will place on CIWA PRN - patient admits to drink 3-4 beers daily. Per life partner, he is only given 1 beer every 3-4 times a week. Per chart review, patient has hx of ETOH abuse with falls and had ?seizures when he stopped drinking (from H&P in 2012).   - No signs of withdrawal currently  - Will place on CIWA PRN

## 2020-08-18 NOTE — OCCUPATIONAL THERAPY INITIAL EVALUATION ADULT - LIVES WITH, PROFILE
Pt. reports he lives with his partner in a house with 6 steps to enter. Once inside, pt. reports he has a full flight of steps to negotiate to reach 2nd floor where main bedroom and bathroom are located. Per pt., he has a shower stall in his bathroom.

## 2020-08-18 NOTE — H&P ADULT - PROBLEM SELECTOR PLAN 6
1.  Name of PCP: Dr. Hampton  2.  PCP Contacted on Admission: [ ] Y    [ ] N    3.  PCP contacted at Discharge: [ ] Y    [ ] N    [ ] N/A  4.  Post-Discharge Appointment Date and Location:  5.  Summary of Handoff given to PCP: Medication list verified with the life partner.   Patient is on digoxin and Cardizem. Patient or life partner cannot recall why he is on those medications. Denies any history of afib. Check with PMD in AM regarding any previous hx of afib  Monitor on tele  check dig level Medication list verified with the life partner.   Patient is on digoxin and Cardizem. Patient or life partner cannot recall why he is on those medications. Denies any history of afib. Check with PMD in AM regarding any previous hx of afib. Cardizem on hold given permissive HTN. Digoxin held given patient failed bedside dysphagia screen. check dig level  Monitor on tele - Medication list verified with the life partner.   - Patient is on digoxin and Cardizem. Patient or life partner cannot recall why he is on those medications. Denies any history of afib. Check with PMD in AM regarding any previous hx of afib. Cardizem on hold given permissive HTN. Digoxin held given patient failed bedside dysphagia screen. check dig level  - Monitor on tele

## 2020-08-18 NOTE — H&P ADULT - PROBLEM SELECTOR PLAN 8
1.  Name of PCP: Dr. Hampton  2.  PCP Contacted on Admission: [ ] Y    [ ] N    3.  PCP contacted at Discharge: [ ] Y    [ ] N    [ ] N/A  4.  Post-Discharge Appointment Date and Location:  5.  Summary of Handoff given to PCP:

## 2020-08-18 NOTE — CONSULT NOTE ADULT - ASSESSMENT
ASSESSMENT:  Julian is an 85 year old gentleman with severe b/l carotid stenosis, presenting with stroke-like symptoms now resolving    PLAN:  - No acute vascular surgery intervention at this time  - Recommend b/l carotid doppler US   - Recommend ASA and Statin therapy  - Case discussed with vascular surgery fellow on behalf of attending  - Vascular surgery will follow    Feliciano Rg, PGY 3  Surgery [id number] ASSESSMENT:  Julian is an 85 year old gentleman with severe b/l carotid stenosis, presenting with stroke-like symptoms now resolving    PLAN:  - Recommend b/l carotid duplex  - Recommend ASA and high dose statin therapy  - Case discussed with vascular surgery fellow on behalf of attending  - Vascular surgery will follow    Feliciano Rg, PGY 3  Surgery [id number]

## 2020-08-18 NOTE — PROGRESS NOTE ADULT - SUBJECTIVE AND OBJECTIVE BOX
[facility] Division of [facility] Medicine  Rani Tucker MD  Pager [phone]      Patient is a 85y old  Male who presents with a chief complaint of R hand weakness (18 Aug 2020 06:41)      SUBJECTIVE / OVERNIGHT EVENTS:    No acute event o/n. Pt reports his R sided weakness improved but still not back to baseline     ADDITIONAL REVIEW OF SYSTEMS:    RESPIRATORY: No cough, wheezing, chills or hemoptysis; No shortness of breath  CARDIOVASCULAR: No chest pain, palpitations, dizziness, or leg swelling  GASTROINTESTINAL: No abdominal or epigastric pain. No nausea, vomiting, or hematemesis; No diarrhea or constipation. No melena or hematochezia.      MEDICATIONS  (STANDING):  aspirin enteric coated 81 milliGRAM(s) Oral daily  atorvastatin 80 milliGRAM(s) Oral at bedtime  heparin   Injectable 5000 Unit(s) SubCutaneous every 12 hours  levETIRAcetam  IVPB 500 milliGRAM(s) IV Intermittent every 12 hours    MEDICATIONS  (PRN):  LORazepam   Injectable 1 milliGRAM(s) IV Push every 1 hour PRN CIWA-Ar score 8 or greater  LORazepam   Injectable 0.5 milliGRAM(s) IV Push every 2 hours PRN CIWA-Ar score increase by 2 points and a total score of 7 or less      CAPILLARY BLOOD GLUCOSE        I&O's Summary      PHYSICAL EXAM:  Vital Signs Last 24 Hrs  T(C): 36.7 (18 Aug 2020 11:36), Max: 36.7 (18 Aug 2020 08:25)  T(F): 98 (18 Aug 2020 11:36), Max: 98 (18 Aug 2020 08:25)  HR: 79 (18 Aug 2020 11:36) (70 - 84)  BP: 155/71 (18 Aug 2020 11:36) (129/66 - 155/71)  BP(mean): --  RR: 18 (18 Aug 2020 11:36) (16 - 20)  SpO2: 99% (18 Aug 2020 11:36) (95% - 100%)    CONSTITUTIONAL: NAD,   EYES: PERRLA; conjunctiva and sclera clear  ENMT: Moist oral mucosa, no pharyngeal injection or exudates;   NECK: Supple, no palpable masses;   RESPIRATORY: Normal respiratory effort; lungs are clear to auscultation bilaterally  CARDIOVASCULAR: Regular rate and rhythm, normal S1 and S2, no murmur/rub/gallop; No lower extremity edema; Peripheral pulses are 2+ bilaterally  ABDOMEN: Nontender to palpation, normoactive bowel sounds, no rebound/guarding;   MUSCLOSKELETAL:   no clubbing or cyanosis of digits; no joint swelling or tenderness to palpation  PSYCH: A+O to person, place, and time; affect appropriate  NEUROLOGY: CN 2-12 are intact and symmetric; RUE/RLE strength 4/5. sensation intact  SKIN: No rashes; no palpable lesions    LABS:                        13.6   6.64  )-----------( 263      ( 18 Aug 2020 09:26 )             40.1     08-18    144  |  108<H>  |  12  ----------------------------<  94  4.7   |  23  |  0.92    Ca    9.3      18 Aug 2020 07:53  Phos  3.0     08-18  Mg     2.3     08-18    TPro  7.8  /  Alb  4.3  /  TBili  0.5  /  DBili  x   /  AST  24  /  ALT  19  /  AlkPhos  107  08-17    PT/INR - ( 17 Aug 2020 20:35 )   PT: 12.2 SEC;   INR: 1.06          PTT - ( 17 Aug 2020 20:35 )  PTT:30.5 SEC            RADIOLOGY & ADDITIONAL TESTS:  Results Reviewed:   Imaging Personally Reviewed:  Electrocardiogram Personally Reviewed:    COORDINATION OF CARE:  Care Discussed with Consultants/Other Providers [Y/N]:  Prior or Outpatient Records Reviewed [Y/N]:

## 2020-08-18 NOTE — H&P ADULT - NEGATIVE GENERAL SYMPTOMS
no fever/no chills/no sweating/no anorexia no sweating/no fever/no anorexia/no chills/no malaise/no fatigue

## 2020-08-18 NOTE — PROGRESS NOTE ADULT - ASSESSMENT
ASSESSMENT:  85M p/w RUE stroke-like sx, with severe b/l carotid stenosis, DEVANTE narrowing and severe LICA stenosis with string sign seen on CTA. Patient now without symptoms.      PLAN:  - Recommend b/l carotid duplex  - ASA and high dose statin therapy  - C/w neurovascular checks per routine  Will continue to follow    Please contact Vascular Surgery (p. [phone]) with any questions.     Sara Lynch, PGY2  Vascular Surgery, C Team  Pager [phone]

## 2020-08-18 NOTE — H&P ADULT - PROBLEM SELECTOR PLAN 1
Admit to tele  check cbc, bmp, a1c, flp,t sh, trend CE  Echo ordered  neuro consult appreciated  start asa and statin  neuro checks  MRI brain to be ordered after confirming if patient's screwers and plates s/p ankle surgery is MRI safe   PT/OT/fall risk Admit to tele  check cbc, bmp, a1c, flp,t sh, trend CE  Echo ordered  neuro consult appreciated  start asa and statin  dysphagia screen--> S&S  neuro checks  MRI brain ordered (patient with screws s/p ankle s urgery, but had MRCP and MRI brain in the past]  permissive HTN   PT/OT/fall risk/aspiration/seizure precautions  Discussed with Dr. Laguerre Admit to tele  check cbc, bmp, a1c, flp,t sh, trend CE  Neuro consult appreciated  Echo ordered  start asa   dysphagia screen failed--> NPO until S&S  start statin when patient can tolerate PO  neuro checks  MRI brain ordered (patient with screws s/p ankle s urgery, but had MRCP and MRI brain in the past]  permissive HTN   PT/OT/fall risk/aspiration/seizure precautions  Discussed with Dr. Laguerre - New R arm weakness/numbness, slowly improving but not fully resolved, concerning for CVA vs TIA  - Admit to tele  - check cbc, bmp, a1c, flp, tsh, trend CE  - Neuro consult appreciated -> permissive HTN for now, MRI Brain for further eval, ASA81/high dose lipitor, TTE, PT/OT/dysphagia screen  - Echo ordered  - start asa   - dysphagia screen failed--> NPO until S&S  - start statin when patient can tolerate PO  - neuro checks  - MRI brain ordered (patient with screws s/p ankle s urgery, but had MRCP and MRI brain in the past]  - PT/OT/fall risk/aspiration/seizure precautions

## 2020-08-18 NOTE — CONSULT NOTE ADULT - ATTENDING COMMENTS
I have discussed the case with the surgical house staff. I have personally seen, examined, and participated in the care of this patient. I have reviewed all pertinent clinical information.    Presents with right sided weakness, now resolving.  CTA significant for bilateral carotid stenosis with severe stenosis of left side.    Patient currently refusing any surgical intervention.  Recommend bilateral carotid duplex.  ASA, statin, BP control. I have discussed the case with the surgical house staff. I have personally seen, examined, and participated in the care of this patient. I have reviewed all pertinent clinical information.    Presents with right sided weakness, now resolving.  CTA significant for bilateral carotid stenosis with severe stenosis of left side.    Patient currently refusing any surgical intervention. Explained to patient findings on CTA of carotid stenosis and increased risk of future stroke. Patient understanding of risks but still does not want any surgical intervention.   Recommend bilateral carotid duplex.  ASA, statin, BP control.

## 2020-08-18 NOTE — OCCUPATIONAL THERAPY INITIAL EVALUATION ADULT - MD ORDER
Occupational Therapy (OT) to evaluate and treat. Occupational Therapy (OT) to evaluate and treat. Ambulate with assistance. Per STEPHANIE VO, pt is okay to participate in OT evaluation and perform activity as tolerated.

## 2020-08-18 NOTE — OCCUPATIONAL THERAPY INITIAL EVALUATION ADULT - DIAGNOSIS, OT EVAL
r/o CVA; Hx of SDH s/p Right craniotomy; Mild decreased Right UE fine-motor coordination; Mild decreased standing balance

## 2020-08-18 NOTE — PROGRESS NOTE ADULT - PROBLEM SELECTOR PLAN 1
- New R arm weakness/numbness, slowly improving but not fully resolved, concerning for CVA vs TIA  -CTH no acute pathology. CTA head/neck c/w severe L ICA stenosis and multiple cerebral artery stenosis   -MR brain, MRA head/neck, carotid duplex ordered to further characterize stenosis  -c/w asa, statin  -c/w tele  -f/u echo  -s/p swallow eval- mechanical soft with thin liquids  -PT/OT eval noted   - all risk/aspiration/seizure precautions

## 2020-08-18 NOTE — OCCUPATIONAL THERAPY INITIAL EVALUATION ADULT - MD/RN NOTIFIED
May 16, 2017      Re:   Sara Ramon  [address]  [facility] [address]                        This is to certify that Sara Ramon was seen 5/16/2017 in my clinic.  Please excuse her till 5/21/17 . She can return to work on 5/22/17 if better .      SIGNATURE:___________________________________________,   5/16/2017      MD Toya Wallace MD    [place] [facility] [address]  [address] [facility] [address]             yes

## 2020-08-18 NOTE — H&P ADULT - HISTORY OF PRESENT ILLNESS
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 Patient states he could not hold the fork while eating dinner and his life partner called EMS. Patient is a poor historian; therefore, history obtained from life partner, Lizzeth as well. Per Lizzeth, patient was doing well in AM. During lunch time, she noted that patient was using both hands while drinking water, which is out of the ordinary. In the evening, he was unable to hold the fork while eating  dinner. She called EMS for stroke eval. Denies fever, chills, cough, falls, LOC, chest pain, abdominal pain, sob, nausea vomiting, melena, hematochezia or LE edema. 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 Patient states he could not hold the fork while eating dinner and his life partner called EMS. Patient is a poor historian; therefore, history obtained from life partner, Lizzeth as well. Per Lizzeth, patient was doing well in AM. During lunch time, she noted that patient was using both hands while drinking water, which is out of the ordinary. In the evening, he was unable to hold the fork while eating  dinner. She called EMS for stroke eval. Denies fever, chills, cough, falls, LOC, chest pain, abdominal pain, sob, nausea vomiting, melena, hematochezia or LE edema. This is an 85M with history of HTN, Seizure d/o, SDH s/p R craniotomy, and Lap Crystal c/b bilary duct resection s/p hepaticojejunostomy/Small bowel resection who presents to the hospital with R sided arm weakness. Patient states he could not hold the fork while eating dinner and his life partner called EMS. Patient is a poor historian; therefore, history obtained from life partner, Lizzeth as well. Per Lizzeth, patient was doing well in AM. During lunch time, she noted that patient was using both hands while drinking water, which is out of the ordinary. In the evening, he was unable to hold the fork while eating  dinner. She called EMS for stroke eval. Denies fever, chills, cough, falls, LOC, chest pain, abdominal pain, sob, nausea vomiting, melena, hematochezia or LE edema. Denied any similar episodes previously.     On arrival to the ED, his vitals were T 97.6, P 82, /66, RR 16, O2 sat 95% RA. Lab work showed mild anemia and stable trops. 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 Neurology and Vascular Surgery consulted and following along with patient. Patient admitted to medicine for further CVA work up. Currently said that his R hand weakness/numbness are improving but not fully resolved.

## 2020-08-18 NOTE — H&P ADULT - PROBLEM SELECTOR PLAN 3
permissive HTN  lisinopril hold permissive HTN  lisinopril and Cardizem on hold for permissive HTN - permissive HTN  - lisinopril and Cardizem on hold for permissive HTN

## 2020-08-18 NOTE — H&P ADULT - NEGATIVE ENMT SYMPTOMS
no tinnitus/no hearing difficulty/no ear pain no tinnitus/no hearing difficulty/no nasal obstruction/no ear pain/no nasal discharge/no post-nasal discharge/no dry mouth/no throat pain

## 2020-08-18 NOTE — H&P ADULT - NEGATIVE CARDIOVASCULAR SYMPTOMS
no palpitations/no dyspnea on exertion/no chest pain no dyspnea on exertion/no orthopnea/no peripheral edema/no palpitations/no chest pain

## 2020-08-18 NOTE — H&P ADULT - NEGATIVE NEUROLOGICAL SYMPTOMS
no transient paralysis/no paresthesias/no weakness no syncope/no hemiparesis/no loss of sensation/no confusion/no focal seizures/no loss of consciousness/no difficulty walking/no headache/no facial palsy/no transient paralysis

## 2020-08-18 NOTE — PROGRESS NOTE ADULT - PROBLEM SELECTOR PLAN 5
- patient admits to drink 3-4 beers daily. Per life partner, he is only given 1 beer every 3-4 times a week. Per chart review, patient has hx of ETOH abuse with falls and had ?seizures when he stopped drinking (from H&P in 2012).   - No signs of withdrawal currently  -d/c JULIANA

## 2020-08-18 NOTE — OCCUPATIONAL THERAPY INITIAL EVALUATION ADULT - PERTINENT HX OF CURRENT PROBLEM, REHAB EVAL
Pt is an 85 year old male with hx of HTN, Seizures, SDH s/p Right craniotomy, and Lap Crystal c/b bilary duct resection s/p hepaticojejunostomy/Small bowel resection who presented to [facility] on 8/18/2020 with Right sided arm weakness. CT Brain Scan on 8/17/2020 displayed no hydrocephalus, acute intracranial hemorrhage, mass effect, or brain edema. Extensive multifocal encephalomalacia and gliosis.

## 2020-08-18 NOTE — PHYSICAL THERAPY INITIAL EVALUATION ADULT - PERTINENT HX OF CURRENT PROBLEM, REHAB EVAL
84 y/o Right handed male with past medical history of HTN, Seizure disorder (not on medications), EtOH abuse, prior Right SDH s/p craniotomy (2014) due to trauma presents to the ED as Stroke Code - right arm weakness.  r/o CVA

## 2020-08-18 NOTE — PATIENT PROFILE ADULT - STATED REASON FOR ADMISSION
Order for neb machine and supplies will need to be printed and sent to pharmacy. duoneb sent to pharmacy. Let him know    
Stroke

## 2020-08-18 NOTE — H&P ADULT - NSHPPHYSICALEXAM_GEN_ALL_CORE
Vital Signs Last 24 Hrs  T(C): 36.4 (18 Aug 2020 04:45), Max: 36.6 (17 Aug 2020 20:54)  T(F): 97.5 (18 Aug 2020 04:45), Max: 97.8 (17 Aug 2020 20:54)  HR: 81 (18 Aug 2020 04:45) (78 - 82)  BP: 154/86 (18 Aug 2020 04:45) (129/66 - 154/86)  BP(mean): --  RR: 18 (18 Aug 2020 04:45) (16 - 19)  SpO2: 97% (18 Aug 2020 04:45) (95% - 100%)

## 2020-08-18 NOTE — CHART NOTE - NSCHARTNOTEFT_GEN_A_CORE
This writer attempted consult however pt not at bedside and being transferred from ED. This writer attempted consult however pt not at bedside at time of arrival.

## 2020-08-18 NOTE — CHART NOTE - NSCHARTNOTEFT_GEN_A_CORE
called by vascular lab - left mild stenosis of ICA and R severe stenosis ICA   awaiting echo, mri and mra head and neck

## 2020-08-18 NOTE — H&P ADULT - NEGATIVE GENERAL GENITOURINARY SYMPTOMS
no flank pain R/no renal colic/no hematuria/no flank pain L no flank pain L/no hematuria/no renal colic/no flank pain R/normal urinary frequency

## 2020-08-18 NOTE — H&P ADULT - NSICDXPASTSURGICALHX_GEN_ALL_CORE_FT
PAST SURGICAL HISTORY:  Acute subdural hematoma (May 2014 - subdural hematoma,  pt had SX)    History of ankle surgery ORIF with screws and plate

## 2020-08-18 NOTE — CONSULT NOTE ADULT - SUBJECTIVE AND OBJECTIVE BOX
HPI:  Julian is an 85 year old gentleman with history of HTN, seizure disorder, EtOH abuse, traumatic SDH s/p craniotomy 2014, presenting to ED with stroke-like symptoms.   Per ED initial eval with patient's family member present: patient was last noted to be at his baseline at 8:30am on 8/17 morning when he was eating breakfast utilizing his Right hand. Patient was next seen by his life partner around 12pm to be drinking water out of a bottle using both hands (which was unusual for him). Patient then went up the stairs to take a nap around 3:30pm and when he came back to have dinner around 6pm, was noted to be unable to utilize his Right hand to feed himself. EMS was called and patient was brought to [facility] ED where stroke code was activated. Patient himself reports that his right arm feels "a bit weak and numb." Denies any other focal neurologic complaints. At baseline ambulates with walker.   CTA done and significant for severe b/l carotid stenosis with L ICA string sign. No hemorrhagic stroke.   Vascular consult called for carotid stenosis. Patient reports resolving RUE numbness and weakness. States he is now able to use the right hand almost at baseline, feels slightly weaker than usual. Denies any other complaints including HA/lightheadedness/dizziness, memory loss, recent illness/sick contacts, fevers/chills, chest pain/shortness of breath.       PMHx: Cholelithiasis  History of subdural hematoma  Constipation  Hypertension  Seizure    PSHx: Acute subdural hematoma    Medications (inpatient):   Medications (PRN):  Allergies: No Known Allergies  (Intolerances: )    T(C): 36.4  HR: 81 (78 - 82)  BP: 154/86 (129/66 - 154/86)  RR: 18 (16 - 19)  SpO2: 97% (95% - 100%)  Tmax: T(C): , Max: 36.6 (08-17-20 @ 20:54)      Physical Exam:  General: Well-developed elderly male, in no acute distress   Neurologic: Awake, alert, AAOx3. Occasional slurred words. No obvious focal deficits.   Respiratory: Normal respiratory effort  CVS: RRR, perfusing adequately  Abdomen: Abdomen soft, NT  Ext: Grossly symmetric, Moving all extremities. Grossly intact sensory function. RUE motor slightly diminished compared to left, RUE ROM intact.   Vascular: 2+ peripheral pulses bilaterally throughout; no edema appreciated  Skin: Warm, dry, intact, no erythema     Labs:                        12.2   6.49  )-----------( 257      ( 17 Aug 2020 20:35 )             36.0     PT/INR - ( 17 Aug 2020 20:35 )   PT: 12.2 SEC;   INR: 1.06          PTT - ( 17 Aug 2020 20:35 )  PTT:30.5 SEC  08-17    135  |  100  |  14  ----------------------------<  113<H>  4.5   |  21<L>  |  1.18    Ca    8.8      17 Aug 2020 20:35    TPro  7.8  /  Alb  4.3  /  TBili  0.5  /  DBili  x   /  AST  24  /  ALT  19  /  AlkPhos  107  08-17            Imaging and other studies:  < from: CT Angio Neck w/ IV Cont (08.18.20 @ 01:03) >    EXAM:  CT ANGIO NECK (W)AW IC      EXAM:  CT ANGIO BRAIN (W)AW IC        PROCEDURE DATE:  Aug 18 2020         INTERPRETATION:  CLINICAL INFORMATION: Right arm weakness.    TECHNIQUE:  Contrast enhanced axial CT images were acquired from the aortic arch to the vertex of the calvarium, during the angiographic phase.  Two-Dimensional and three-dimensional maximum intensity projection reformats were generated. Additional set of postcontrast images through the brain are performed. 90 cc of Omnipaque-350 mg/ml were administered intravenously, without immediate complication.    COMPARISON: CT head code stroke from earlier same day.    FINDINGS:    CT ANGIOGRAPHY NECK:    Atherosclerotic calcifications at the aortic arch and involving the great vessels. Three-vessel aortic arch. Mild mild to moderate stenosis of the right brachiocephalic artery secondary to atherosclerotic calcification. Mild stenosis of the proximal left common carotid artery and right common carotid artery origin secondary toatherosclerotic calcification. Common carotid arteries are patent to the bifurcations. Heavy atherosclerotic calcifications involving the proximal left internal carotid artery with severe stenosis resulting in "string sign". Presumed severe stenosis of the origin of the right internal carotid artery secondary to heavy vascular atherosclerotic calcification and poor visualization of the lumen. Occlusion of the V1 and proximal V2 segment of the left vertebral artery. Reconstitution of flow in the mid left V2 segment and V3 segment. Right vertebral artery is patent from the origin to the skull base, however, there is moderate stenosis at the origin secondary to heavy atherosclerotic calcification and short segment focal dissection of the distal V2 segment at the level of the C3 transverse foramina.    The regional soft tissues of the neck are otherwise unremarkable. Emphysema at the lung apices. Multilevel degenerative changes of the spine.      CT ANGIOGRAPHY BRAIN:    Note: CTA of the headextends through the [place] of [place], however, fails to include the distal vasculature and skull vertex. The skull base and intracranial carotid arteries are patent without high-grade stenosis, however, there is mild to moderate luminal narrowing secondary to vascular atherosclerotic calcifications. There is early bifurcation of the right MCA. The proximal MCAs and ACAs are patent without significant stenosis. The anterior communicating artery is visualized.    Intradural right vertebral artery is patent with short segment mild stenosis of the proximal segment secondary to vascular atherosclerotic calcification. Severe stenosis of segments of the intradural left vertebral artery. Basilar artery is patent with short segment mild stenosis of the distal segment. Proximal PCAs are patent without significant stenosis. Bilateral posterior communicating arteries are present with fetal right-sided PCA. 3 mm outpouching from the posterior aspect of the right posterior communicating artery may represent a small aneurysm or blunted hypoplastic right P1 segment.    Postcontrast images of the brain show no abnormal intracranial enhancement. Multifocal infarcts are again seen as noted on recent noncontrast CT of the head. There is a right-sided pterional craniotomy.    IMPRESSION:    CT angiography neck:  Heavy atherosclerotic calcification involving the proximal left internal carotid artery with severe stenosis resulting in "string sign". Presumed severe stenosis of the origin of the right internal carotid artery secondary to heavy vascular atherosclerotic calcification and poor visualization of the lumen.    Occlusion of the V1 and proximal V2 segments of the left vertebral artery.    Patent right vertebral artery with moderate stenosis at the origin secondary to atherosclerotic calcification and short segment focal dissection of the distal V2 segment at the level of the C3 transverse foramina.    CT angiography brain:    Segments of severe stenosis of the intradural left vertebral artery.    Fetal origin of the right PCA. A 3 mm outpouching from the posterior aspect of the right posterior communicating artery may represent an aneurysm versus blunted hypoplastic right P1 segment.            RENITA LLAMAS M.D., RESIDENT RADIOLOGIST  This document has been electronically signed.  ADAM CABRERA D.O. ATTENDING RADIOLOGIST  This document has been electronically signed. Aug 18 2020  2:34AM                  < end of copied text > HPI:  Julian is an 85 year old gentleman with history of HTN, seizure disorder, EtOH abuse, traumatic SDH s/p craniotomy 2014, presenting to ED with stroke-like symptoms.   Per ED initial eval with patient's family member present: patient was last noted to be at his baseline at 8:30am on 8/17 morning when he was eating breakfast utilizing his Right hand. Patient was next seen by his life partner around 12pm to be drinking water out of a bottle using both hands (which was unusual for him). Patient then went up the stairs to take a nap around 3:30pm and when he came back to have dinner around 6pm, was noted to be unable to utilize his Right hand to feed himself. EMS was called and patient was brought to [facility] ED where stroke code was activated. Patient himself reports that his right arm feels "a bit weak and numb." Denies any other focal neurologic complaints. At baseline ambulates with walker.   CTA done and significant for severe b/l carotid stenosis with L ICA string sign. No hemorrhagic stroke.   Vascular consult called for carotid stenosis. Patient reports resolving RUE numbness and weakness. States he is now able to use the right hand almost at baseline, feels slightly weaker than usual. Denies any other complaints including HA/lightheadedness/dizziness, memory loss, recent illness/sick contacts, fevers/chills, chest pain/shortness of breath.       PMHx: Cholelithiasis  History of subdural hematoma  Constipation  Hypertension  Seizure    PSHx: Acute subdural hematoma    Medications (inpatient):   Medications (PRN):  Allergies: No Known Allergies  (Intolerances: )    T(C): 36.4  HR: 81 (78 - 82)  BP: 154/86 (129/66 - 154/86)  RR: 18 (16 - 19)  SpO2: 97% (95% - 100%)  Tmax: T(C): , Max: 36.6 (08-17-20 @ 20:54)      Physical Exam:  General: Well-developed elderly male, in no acute distress   Neurologic: Awake, alert, AAOx3. Occasional slurred words. No obvious focal deficits. CNII-XII grossly intact.  Respiratory: Normal respiratory effort  CVS: RRR, perfusing adequately  Abdomen: Abdomen soft, NT  Ext: Grossly symmetric, Moving all extremities. Grossly intact sensory function. RUE motor slightly diminished compared to left, RUE ROM intact.   Vascular: 2+ peripheral pulses bilaterally throughout; no edema appreciated  Skin: Warm, dry, intact, no erythema     Labs:                        12.2   6.49  )-----------( 257      ( 17 Aug 2020 20:35 )             36.0     PT/INR - ( 17 Aug 2020 20:35 )   PT: 12.2 SEC;   INR: 1.06          PTT - ( 17 Aug 2020 20:35 )  PTT:30.5 SEC  08-17    135  |  100  |  14  ----------------------------<  113<H>  4.5   |  21<L>  |  1.18    Ca    8.8      17 Aug 2020 20:35    TPro  7.8  /  Alb  4.3  /  TBili  0.5  /  DBili  x   /  AST  24  /  ALT  19  /  AlkPhos  107  08-17            Imaging and other studies:  < from: CT Angio Neck w/ IV Cont (08.18.20 @ 01:03) >    EXAM:  CT ANGIO NECK (W)AW IC      EXAM:  CT ANGIO BRAIN (W)AW IC        PROCEDURE DATE:  Aug 18 2020         INTERPRETATION:  CLINICAL INFORMATION: Right arm weakness.    TECHNIQUE:  Contrast enhanced axial CT images were acquired from the aortic arch to the vertex of the calvarium, during the angiographic phase.  Two-Dimensional and three-dimensional maximum intensity projection reformats were generated. Additional set of postcontrast images through the brain are performed. 90 cc of Omnipaque-350 mg/ml were administered intravenously, without immediate complication.    COMPARISON: CT head code stroke from earlier same day.    FINDINGS:    CT ANGIOGRAPHY NECK:    Atherosclerotic calcifications at the aortic arch and involving the great vessels. Three-vessel aortic arch. Mild mild to moderate stenosis of the right brachiocephalic artery secondary to atherosclerotic calcification. Mild stenosis of the proximal left common carotid artery and right common carotid artery origin secondary toatherosclerotic calcification. Common carotid arteries are patent to the bifurcations. Heavy atherosclerotic calcifications involving the proximal left internal carotid artery with severe stenosis resulting in "string sign". Presumed severe stenosis of the origin of the right internal carotid artery secondary to heavy vascular atherosclerotic calcification and poor visualization of the lumen. Occlusion of the V1 and proximal V2 segment of the left vertebral artery. Reconstitution of flow in the mid left V2 segment and V3 segment. Right vertebral artery is patent from the origin to the skull base, however, there is moderate stenosis at the origin secondary to heavy atherosclerotic calcification and short segment focal dissection of the distal V2 segment at the level of the C3 transverse foramina.    The regional soft tissues of the neck are otherwise unremarkable. Emphysema at the lung apices. Multilevel degenerative changes of the spine.      CT ANGIOGRAPHY BRAIN:    Note: CTA of the headextends through the [place] of [place], however, fails to include the distal vasculature and skull vertex. The skull base and intracranial carotid arteries are patent without high-grade stenosis, however, there is mild to moderate luminal narrowing secondary to vascular atherosclerotic calcifications. There is early bifurcation of the right MCA. The proximal MCAs and ACAs are patent without significant stenosis. The anterior communicating artery is visualized.    Intradural right vertebral artery is patent with short segment mild stenosis of the proximal segment secondary to vascular atherosclerotic calcification. Severe stenosis of segments of the intradural left vertebral artery. Basilar artery is patent with short segment mild stenosis of the distal segment. Proximal PCAs are patent without significant stenosis. Bilateral posterior communicating arteries are present with fetal right-sided PCA. 3 mm outpouching from the posterior aspect of the right posterior communicating artery may represent a small aneurysm or blunted hypoplastic right P1 segment.    Postcontrast images of the brain show no abnormal intracranial enhancement. Multifocal infarcts are again seen as noted on recent noncontrast CT of the head. There is a right-sided pterional craniotomy.    IMPRESSION:    CT angiography neck:  Heavy atherosclerotic calcification involving the proximal left internal carotid artery with severe stenosis resulting in "string sign". Presumed severe stenosis of the origin of the right internal carotid artery secondary to heavy vascular atherosclerotic calcification and poor visualization of the lumen.    Occlusion of the V1 and proximal V2 segments of the left vertebral artery.    Patent right vertebral artery with moderate stenosis at the origin secondary to atherosclerotic calcification and short segment focal dissection of the distal V2 segment at the level of the C3 transverse foramina.    CT angiography brain:    Segments of severe stenosis of the intradural left vertebral artery.    Fetal origin of the right PCA. A 3 mm outpouching from the posterior aspect of the right posterior communicating artery may represent an aneurysm versus blunted hypoplastic right P1 segment.            RENITA LLAMAS M.D., RESIDENT RADIOLOGIST  This document has been electronically signed.  ADAM CABRERA D.O. ATTENDING RADIOLOGIST  This document has been electronically signed. Aug 18 2020  2:34AM                  < end of copied text >

## 2020-08-18 NOTE — PATIENT PROFILE ADULT - FUNCTIONAL SCREEN CURRENT LEVEL: COMMUNICATION, MLM
Called and spoke with [facility]. Patient was sent home on Monday with order from [address] with a change to Osmolyte 1.5. 340 ml TID. It provides 1 1/2 carb servings more than the Glucerna 1.0 that the patient was on prior to hospital admission. She states that the Osmolyte was on [address] The patient    Occasionally ate orally prior to hospitalization. [facility] spoke with the hospital dietician to clarify if patient can eat. She was not given a clear answer. BS was over 500 yesterday at home. [facility] states that the Endocrine MD changed patients Sliding Scale this morning based on the increased carbs and elevated BS readings since home from the hospital.    Read SLP recommendations from [address] done on 7/29 per [place] to [facility]. Copy made of result and faxed to her. Fax number [phone] would like to know if patient can be changed to Glucerna 1.5 for a couple of weeks to see how patient blood sugars respond and her oral intake? Would like it to be 1 1/2 cans TID (container 237 ml). She asked if the PCP would be willing to talk with the Endocrine doctor to discuss options for the patient to help keep her blood sugars in control? [facility] states that she would write up the formula orders needed if change is approved by MD.    [facility] prefers a phone call back if ok to go ahead with changes.  [phone] = understands/communicates without difficulty

## 2020-08-18 NOTE — PROGRESS NOTE ADULT - ASSESSMENT
84 y/o M with hx history as above who presents with R sided arm weakness. admitted for CVA work up. 84 y/o M with hx of pAfib not on AC, HTN, Seizure d/o, SDH s/p R craniotomy, ETOH abuse who presents with R sided arm weakness. admitted for CVA work up.

## 2020-08-18 NOTE — SWALLOW BEDSIDE ASSESSMENT ADULT - SWALLOW EVAL: DIAGNOSIS
Patient presents with functional oropharyngeal stage swallow characterized by adequate oral containment, slow/prolonged chewing/mashing for solid due to upper edentulous state, slow bolus manipulation and transfer for solid; adequate bolus manipulation and transfer for puree. There is laryngeal elevation upon palpation, initiation of the pharyngeal swallow.  There were no overt signs of impaired airway protection.

## 2020-08-18 NOTE — PROGRESS NOTE ADULT - PROBLEM SELECTOR PLAN 6
as per PCP remote hx of afib not on ac d/t frequent falls leading to intracranial bleed  -currently in NSR  -hold digoxin, Cardizem for now

## 2020-08-18 NOTE — H&P ADULT - PROBLEM SELECTOR PLAN 2
Vascular and neuro following  carotid dopplers ordered Vascular and neuro following  carotid dopplers ordered  start asa  cont statin Vascular and neuro following  carotid dopplers ordered  start asa [place]  cont statin when patient can tolerate PO - Vascular and neuro following  - carotid dopplers ordered  - start asa ID  - cont statin when patient can tolerate PO

## 2020-08-18 NOTE — H&P ADULT - NSHPSOCIALHISTORY_GEN_ALL_CORE
denies smoking or drug use  Drinks 3-4 beers daily per patient. per life partner, he is only given 1 beer every 3-4 days    Lives with life partner denies smoking or drug use  Drinks 3-4 beers daily per patient. with ?seizures from stopping ETOH according to chart review from 2012. per life partner, he is only given 1 beer every 3-4 days    Lives with life partner

## 2020-08-18 NOTE — CHART NOTE - NSCHARTNOTEFT_GEN_A_CORE
Briefly, 86 y/o RH male with past medical history of HTN, Seizure disorder (not on medications), EtOH abuse, prior R SDH s/p craniotomy (2014) due to trauma presents to the ED as Stroke Code on 8/17.    Neurologic exam notable for RUE drift, R FNF dysmetria, RUE sensory (mild loss), and inability to state accurate date or age.     LKN: 8:30am on 8/17/2020  NIHSS: 5  Pre-MRS: 3  CTH demonstrated no acute pathology but extensive chronic encephalomalacia in the R frontal region with multiple chronic lacunar infarctions. CTA H/N ordered however, unable to obtain IV access in the immediate setting therefore deferred until IV access established.  Not a tPA candidate given outside of the window.   Not an endovascular candidate given low NIHSS and MRS of 3 though would need to determine presence of LVO before final determination could be made.     CTA H/N completed with report at 2:34AM on 8/18  < from: CT Angio Head w/ IV Cont (08.18.20 @ 01:03) >    IMPRESSION:    CT angiography neck:  Heavy atherosclerotic calcification involving the proximal left internal carotid artery with severe stenosis resulting in "string sign". Presumed severe stenosis of the origin of the right internal carotid artery secondary to heavy vascular atherosclerotic calcification and poor visualization of the lumen.  Occlusion of the V1 and proximal V2 segments of the left vertebral artery.  Patent right vertebral artery with moderate stenosis at the origin secondary to atherosclerotic calcification and short segment focal dissection of the distal V2 segment at the level of the C3 transverse foramina.  CT angiography brain:  Segments of severe stenosis of the intradural left vertebral artery.  Fetal origin of the right PCA. A 3 mm outpouching from the posterior aspect of the right posterior communicating artery may represent an aneurysm versus blunted hypoplastic right P1 segment.  < end of copied text >    Impression: Acute RUE sensorimotor +/- dysmetria (symptomatology not completely clear as patient is a poor historian) likely 2/2 L brain dysfunction possibly L thalamocapsular ischemic infarct vs L jossy-rolandic infarct; alternate localizations could include the R cerebellum 2/2 cryptogenic etiology  Patient appears to have severe narrowing of L ICA just distal to the bulb resulting in "string sign" with distal reconstitution and patent intracranial segments; would recommend Vascular Surgery evaluation for possible early CEA  There is also reported occlusion of the Left V1/V2 segment with distal reconstitution intracranially (which may be chronic in nature) with severe stenosis of the Left V4 segment. There is moderate stenosis of the R vertebral artery at its origin with a short segment of focal dissection at distal V2 segment (CTA series 3-325). -- With these findings in mind, would recommend continuing anti-platelet therapy with Aspirin 81mg PO for now along with aggressive risk factor reduction and high dose statin and proceeding with remainder of stroke work-up as outlined in initial consult note    Recommendations:   [] Continue Aspirin 81mg PO QD   [] Continue High dose Lipitor   [] Vascular Surgery Evaluation for L ICA severe stenosis (possibly symptomatic vessel) and R vertebral artery dissection  [] Would likely require additional vessel imaging modality for comparison; MRA Neck w/ T1 Fat sat vs Carotid duplex    Attending Attestation to follow

## 2020-08-18 NOTE — PROGRESS NOTE ADULT - SUBJECTIVE AND OBJECTIVE BOX
VASCULAR SURGERY PROGRESS NOTE    85yMale    SUBJECTIVE:  Patient seen and examined at bedside. No acute events overnight. Denies any symptoms to RUE, states he does not feel weakness/numbness. Denies dizziness.     --------------------------------------------------------------------------------------------------  OBJECTIVE:     Vital Signs:  Vital Signs Last 24 Hrs  T(C): 36.4 (18 Aug 2020 04:45), Max: 36.6 (17 Aug 2020 20:54)  T(F): 97.5 (18 Aug 2020 04:45), Max: 97.8 (17 Aug 2020 20:54)  HR: 81 (18 Aug 2020 04:45) (78 - 82)  BP: 154/86 (18 Aug 2020 04:45) (129/66 - 154/86)  BP(mean): --  RR: 18 (18 Aug 2020 04:45) (16 - 19)  SpO2: 97% (18 Aug 2020 04:45) (95% - 100%)    --------------------------------------------------------------------------------------------------  Inputs/Outputs:    --------------------------------------------------------------------------------------------------  Laboratories:                        12.2   6.49  )-----------( 257      ( 17 Aug 2020 20:35 )             36.0     LIVER FUNCTIONS - ( 17 Aug 2020 20:35 )  Alb: 4.3 g/dL / Pro: 7.8 g/dL / ALK PHOS: 107 u/L / ALT: 19 u/L / AST: 24 u/L / GGT: x             17 Aug 2020 20:35    135    |  100    |  14     ----------------------------<  113    4.5     |  21     |  1.18     Ca    8.8        17 Aug 2020 20:35    TPro  7.8    /  Alb  4.3    /  TBili  0.5    /  DBili  x      /  AST  24     /  ALT  19     /  AlkPhos  107    17 Aug 2020 20:35    PT/INR - ( 17 Aug 2020 20:35 )   PT: 12.2 SEC;   INR: 1.06          PTT - ( 17 Aug 2020 20:35 )  PTT:30.5 SEC    --------------------------------------------------------------------------------------------------  Physical Exam:  General: AAOx3, NAD, resting comfortably   HEENT: NC/AT  Respiratory: no increased work of breathing   Abdomen: soft, nontender, nondistended  Neuro: full ROM/b/l UE strength 5/5 wnl, b/l  strength, no focal deficits, no sensory deficits, no slurred speech or facial droop  Ext: warm and well perfused, cap refill <2  --------------------------------------------------------------------------------------------------  Medications:  MEDICATIONS  (STANDING):  aspirin Suppository 300 milliGRAM(s) Rectal daily  heparin   Injectable 5000 Unit(s) SubCutaneous every 12 hours  levETIRAcetam  IVPB 500 milliGRAM(s) IV Intermittent every 12 hours    MEDICATIONS  (PRN):  LORazepam   Injectable 1 milliGRAM(s) IV Push every 1 hour PRN CIWA-Ar score 8 or greater  LORazepam   Injectable 0.5 milliGRAM(s) IV Push every 2 hours PRN CIWA-Ar score increase by 2 points and a total score of 7 or less    RADIOLOGY  < from: CT Angio Neck w/ IV Cont (08.18.20 @ 01:03) >  IMPRESSION:    CT angiography neck:  Heavy atherosclerotic calcification involving the proximal left internal carotid artery with severe stenosis resulting in "string sign". Presumed severe stenosis of the origin of the right internal carotid artery secondary to heavy vascular atherosclerotic calcification and poor visualization of the lumen.    Occlusion of the V1 and proximal V2 segments of the left vertebral artery.    Patent right vertebral artery with moderate stenosis at the origin secondary to atherosclerotic calcification and short segment focal dissection of the distal V2 segment at the level of the C3 transverse foramina.    CT angiography brain:    Segments of severe stenosis of the intradural left vertebral artery.    Fetal origin of the right PCA. A 3 mm outpouching from the posterior aspect of the right posterior communicating artery may represent an aneurysm versus blunted hypoplastic right P1 segment.    < end of copied text >

## 2020-08-18 NOTE — ED ADULT NURSE REASSESSMENT NOTE - NS ED NURSE REASSESS COMMENT FT1
Pt A&Ox4, resting comfortably. Breathing even and unlabored. Denies any pain or discomfort at this time. Vitals are stable. No complaints of headache, nausea, dizziness, vomiting  SOB, fever, or chills. Covid swab sent. Will continue to monitor.

## 2020-08-18 NOTE — H&P ADULT - NEGATIVE GASTROINTESTINAL SYMPTOMS
no nausea/no constipation/no diarrhea/no vomiting no nausea/no vomiting/no constipation/no abdominal pain/no diarrhea

## 2020-08-19 LAB
ANION GAP SERPL CALC-SCNC: 14 MMO/L — SIGNIFICANT CHANGE UP (ref 7–14)
BUN SERPL-MCNC: 10 MG/DL — SIGNIFICANT CHANGE UP (ref 7–23)
CALCIUM SERPL-MCNC: 8.8 MG/DL — SIGNIFICANT CHANGE UP (ref 8.4–10.5)
CHLORIDE SERPL-SCNC: 104 MMOL/L — SIGNIFICANT CHANGE UP (ref 98–107)
CO2 SERPL-SCNC: 22 MMOL/L — SIGNIFICANT CHANGE UP (ref 22–31)
CREAT SERPL-MCNC: 0.9 MG/DL — SIGNIFICANT CHANGE UP (ref 0.5–1.3)
GLUCOSE SERPL-MCNC: 84 MG/DL — SIGNIFICANT CHANGE UP (ref 70–99)
HCT VFR BLD CALC: 37.2 % — LOW (ref 39–50)
HGB BLD-MCNC: 12.7 G/DL — LOW (ref 13–17)
MAGNESIUM SERPL-MCNC: 2.2 MG/DL — SIGNIFICANT CHANGE UP (ref 1.6–2.6)
MCHC RBC-ENTMCNC: 32.3 PG — SIGNIFICANT CHANGE UP (ref 27–34)
MCHC RBC-ENTMCNC: 34.1 % — SIGNIFICANT CHANGE UP (ref 32–36)
MCV RBC AUTO: 94.7 FL — SIGNIFICANT CHANGE UP (ref 80–100)
NRBC # FLD: 0 K/UL — SIGNIFICANT CHANGE UP (ref 0–0)
PHOSPHATE SERPL-MCNC: 3.1 MG/DL — SIGNIFICANT CHANGE UP (ref 2.5–4.5)
PLATELET # BLD AUTO: 246 K/UL — SIGNIFICANT CHANGE UP (ref 150–400)
PMV BLD: 9.8 FL — SIGNIFICANT CHANGE UP (ref 7–13)
POTASSIUM SERPL-MCNC: 4 MMOL/L — SIGNIFICANT CHANGE UP (ref 3.5–5.3)
POTASSIUM SERPL-SCNC: 4 MMOL/L — SIGNIFICANT CHANGE UP (ref 3.5–5.3)
RBC # BLD: 3.93 M/UL — LOW (ref 4.2–5.8)
RBC # FLD: 12.5 % — SIGNIFICANT CHANGE UP (ref 10.3–14.5)
SODIUM SERPL-SCNC: 140 MMOL/L — SIGNIFICANT CHANGE UP (ref 135–145)
WBC # BLD: 5.7 K/UL — SIGNIFICANT CHANGE UP (ref 3.8–10.5)
WBC # FLD AUTO: 5.7 K/UL — SIGNIFICANT CHANGE UP (ref 3.8–10.5)

## 2020-08-19 PROCEDURE — 99233 SBSQ HOSP IP/OBS HIGH 50: CPT

## 2020-08-19 PROCEDURE — 99232 SBSQ HOSP IP/OBS MODERATE 35: CPT

## 2020-08-19 RX ORDER — CLOPIDOGREL BISULFATE 75 MG/1
75 TABLET, FILM COATED ORAL DAILY
Refills: 0 | Status: DISCONTINUED | OUTPATIENT
Start: 2020-08-19 | End: 2020-08-21

## 2020-08-19 RX ADMIN — LEVETIRACETAM 500 MILLIGRAM(S): 250 TABLET, FILM COATED ORAL at 06:17

## 2020-08-19 RX ADMIN — LEVETIRACETAM 500 MILLIGRAM(S): 250 TABLET, FILM COATED ORAL at 17:37

## 2020-08-19 RX ADMIN — ATORVASTATIN CALCIUM 80 MILLIGRAM(S): 80 TABLET, FILM COATED ORAL at 22:43

## 2020-08-19 RX ADMIN — HEPARIN SODIUM 5000 UNIT(S): 5000 INJECTION INTRAVENOUS; SUBCUTANEOUS at 17:37

## 2020-08-19 RX ADMIN — HEPARIN SODIUM 5000 UNIT(S): 5000 INJECTION INTRAVENOUS; SUBCUTANEOUS at 06:17

## 2020-08-19 RX ADMIN — Medication 81 MILLIGRAM(S): at 11:48

## 2020-08-19 NOTE — PROGRESS NOTE ADULT - ASSESSMENT
86 y/o M with hx of pAfib not on AC, HTN, Seizure d/o, SDH s/p R craniotomy, ETOH abuse who presents with R sided arm weakness. admitted for CVA work up.

## 2020-08-19 NOTE — PROGRESS NOTE ADULT - ASSESSMENT
ASSESSMENT:  85M p/w RUE stroke-like sx, with severe b/l carotid stenosis, R ICA narrowing and severe L ICA stenosis with string sign seen on CTA. Patient remains without symptoms.      PLAN:  - Recommend b/l carotid duplex  - ASA and high dose statin therapy  - Blood pressure control per primary team  - C/w neurovascular checks per routine    Please contact Vascular Surgery (p. [phone]) with any questions.     Sara Lynch, PGY2  Vascular Surgery, C Team  Pager [phone] ASSESSMENT:  85M p/w RUE stroke-like sx, with severe b/l carotid stenosis, R ICA narrowing and severe L ICA stenosis with string sign seen on CTA. B/l carotid duplex with significant disease. Patient remains without symptoms.      PLAN:  - Carotid duplex studies results appreciated - will review imaging  - ASA and high dose statin therapy  - Blood pressure control per primary team  - C/w neurovascular checks per routine    Please contact Vascular Surgery (p. [phone]) with any questions.     Sara Lynch, PGY2  Vascular Surgery, C Team  Pager [phone] ASSESSMENT:  85M p/w RUE stroke-like sx, with severe b/l carotid stenosis, R ICA narrowing and severe L ICA stenosis with string sign seen on CTA. B/l carotid duplex with significant disease. Patient remains without symptoms this AM.      PLAN:  - Carotid duplex studies results appreciated - will review imaging and correlate with velocities  - Continue with ASA and high dose statin therapy  - Blood pressure control per primary team    Please contact Vascular Surgery (p. [phone]) with any questions.     Sara Lynch, PGY2  Vascular Surgery, C Team  Pager [phone]

## 2020-08-19 NOTE — PROVIDER CONTACT NOTE (OTHER) - BACKGROUND
patient came in right sided weakness admitted for rule out stroke. patient has a history of subdural hematoma, hypertension, and seizure

## 2020-08-19 NOTE — PROGRESS NOTE ADULT - SUBJECTIVE AND OBJECTIVE BOX
[facility] Division of Hospital Medicine  Rani Tucker MD  Pager [phone]      Patient is a 85y old  Male who presents with a chief complaint of R hand weakness (19 Aug 2020 06:01)      SUBJECTIVE / OVERNIGHT EVENTS:    No acute event o/n. Pt offers no new complaint. Denies any new neuro deficits     ADDITIONAL REVIEW OF SYSTEMS:      RESPIRATORY: No cough, wheezing, chills or hemoptysis; No shortness of breath  CARDIOVASCULAR: No chest pain, palpitations, dizziness, or leg swelling  GASTROINTESTINAL: No abdominal or epigastric pain. No nausea, vomiting, or hematemesis; No diarrhea or constipation. No melena or hematochezia.      MEDICATIONS  (STANDING):  aspirin enteric coated 81 milliGRAM(s) Oral daily  atorvastatin 80 milliGRAM(s) Oral at bedtime  heparin   Injectable 5000 Unit(s) SubCutaneous every 12 hours  levETIRAcetam 500 milliGRAM(s) Oral two times a day    MEDICATIONS  (PRN):      CAPILLARY BLOOD GLUCOSE        I&O's Summary    18 Aug 2020 07:01  -  19 Aug 2020 07:00  --------------------------------------------------------  IN: 480 mL / OUT: 800 mL / NET: -320 mL        PHYSICAL EXAM:  Vital Signs Last 24 Hrs  T(C): 36.8 (19 Aug 2020 07:30), Max: 36.8 (19 Aug 2020 03:30)  T(F): 98.2 (19 Aug 2020 07:30), Max: 98.2 (19 Aug 2020 03:30)  HR: 73 (19 Aug 2020 07:30) (67 - 83)  BP: 126/75 (19 Aug 2020 07:30) (123/70 - 155/71)  BP(mean): --  RR: 18 (19 Aug 2020 07:30) (18 - 20)  SpO2: 98% (19 Aug 2020 07:30) (96% - 99%)    CONSTITUTIONAL: NAD,   EYES: PERRLA; conjunctiva and sclera clear  ENMT: Moist oral mucosa, no pharyngeal injection or exudates;   NECK: Supple, no palpable masses;   RESPIRATORY: Normal respiratory effort; lungs are clear to auscultation bilaterally  CARDIOVASCULAR: Regular rate and rhythm, normal S1 and S2, no murmur/rub/gallop; No lower extremity edema; Peripheral pulses are 2+ bilaterally  ABDOMEN: Nontender to palpation, normoactive bowel sounds, no rebound/guarding;   MUSCLOSKELETAL:   no clubbing or cyanosis of digits; no joint swelling or tenderness to palpation  PSYCH: A+O to person, place, and time; affect appropriate  NEUROLOGY: CN 2-12 are intact and symmetric; RUE/RLE strength 4/5. sensation intact  SKIN: No rashes; no palpable lesions    LABS:                        12.7   5.70  )-----------( 246      ( 19 Aug 2020 06:30 )             37.2     08-19    140  |  104  |  10  ----------------------------<  84  4.0   |  22  |  0.90    Ca    8.8      19 Aug 2020 06:30  Phos  3.1     08-19  Mg     2.2     08-19    TPro  7.8  /  Alb  4.3  /  TBili  0.5  /  DBili  x   /  AST  24  /  ALT  19  /  AlkPhos  107  08-17    PT/INR - ( 17 Aug 2020 20:35 )   PT: 12.2 SEC;   INR: 1.06          PTT - ( 17 Aug 2020 20:35 )  PTT:30.5 SEC            RADIOLOGY & ADDITIONAL TESTS:  Results Reviewed:   Imaging Personally Reviewed:  Electrocardiogram Personally Reviewed:    COORDINATION OF CARE:  Care Discussed with Consultants/Other Providers [Y/N]:  Prior or Outpatient Records Reviewed [Y/N]:

## 2020-08-19 NOTE — PROGRESS NOTE ADULT - SUBJECTIVE AND OBJECTIVE BOX
Interval History:  Patient seen and examined at bedside. Patient reports no acute events overnight. She endorses no motor or sensory deficits.     MEDICATIONS  (STANDING):  aspirin enteric coated 81 milliGRAM(s) Oral daily  atorvastatin 80 milliGRAM(s) Oral at bedtime  heparin   Injectable 5000 Unit(s) SubCutaneous every 12 hours  levETIRAcetam 500 milliGRAM(s) Oral two times a day    MEDICATIONS  (PRN):    Allergies  No Known Allergies    Intolerances      ROS: Pertinent positives in HPI, all other ROS were reviewed and are negative.      Vital Signs Last 24 Hrs  T(C): 36.7 (19 Aug 2020 11:27), Max: 36.8 (19 Aug 2020 03:30)  T(F): 98.1 (19 Aug 2020 11:27), Max: 98.2 (19 Aug 2020 03:30)  HR: 88 (19 Aug 2020 11:27) (67 - 88)  BP: 85/65 (19 Aug 2020 11:27) (85/65 - 155/71)  BP(mean): --  RR: 17 (19 Aug 2020 11:27) (17 - 18)  SpO2: 98% (19 Aug 2020 11:27) (96% - 99%)    NEUROLOGICAL EXAM:  MS: AAOX2, not oriented to time, fluent, attends b/l;normal attention. Occasional inappropriate laughing.   CN: VFF, EOMI, PERRL, no LUPILLO, no APD,  V1-3 intact, mild R nasolabial flattening.   Motor: Strength: RUE drift Tone: normal. Bulk: normal. DTR 2+ symm.  Plantar flex b/l. Sensation: intact to LT 4x.   Coordination: Dysmetria R FTN and HTS   Gait:  Deferred       Labs:   cbc                      12.7   5.70  )-----------( 246      ( 19 Aug 2020 06:30 )             37.2     [id number]    140  |  104  |  10  ----------------------------<  84  4.0   |  22  |  0.90    Ca    8.8      19 Aug 2020 06:30  Phos  3.1     08-19  Mg     2.2     08-19    TPro  7.8  /  Alb  4.3  /  TBili  0.5  /  DBili  x   /  AST  24  /  ALT  19  /  AlkPhos  107  08-17    CoagsPT/INR - ( 17 Aug 2020 20:35 )   PT: 12.2 SEC;   INR: 1.06          PTT - ( 17 Aug 2020 20:35 )  PTT:30.5 SEC  [id number] Chol 127 LDL 77 HDL 37 Trig 79  A1C  CardiacMarkers    LFTsLIVER FUNCTIONS - ( 17 Aug 2020 20:35 )  Alb: 4.3 g/dL / Pro: 7.8 g/dL / ALK PHOS: 107 u/L / ALT: 19 u/L / AST: 24 u/L / GGT: x           UA

## 2020-08-19 NOTE — PROGRESS NOTE ADULT - ATTENDING COMMENTS
I have discussed the case with the surgical house staff. I have personally seen, examined, and participated in the care of this patient. I have reviewed all pertinent clinical information.    Patient without complaints.    CTA with severe calcifications bilaterally.  Carotid duplex with high grade right ICA stenosis but velocities of left ICA inconsistent with CTA findings.    Awaiting MRI/MRA.    Patient refusing surgery but agree with neurology that patient likely does not have capacity to make decision.   Will need to discuss with HCP.   Patient likely will need CEA for symptomatic carotid stenosis.  Recommend cardiology evaluation.   Recommend addition of plavix 75mg PO daily.

## 2020-08-19 NOTE — PROGRESS NOTE ADULT - SUBJECTIVE AND OBJECTIVE BOX
VASCULAR SURGERY PROGRESS NOTE    85yMale    SUBJECTIVE:  Patient seen and examined at bedside. No acute events overnight. Denies any weakness/tingling/motor or sensory deficits to RUE. Asking to go home.     --------------------------------------------------------------------------------------------------  OBJECTIVE:     Vital Signs:  Vital Signs Last 24 Hrs  T(C): 36.6 (18 Aug 2020 23:30), Max: 36.7 (18 Aug 2020 08:25)  T(F): 97.9 (18 Aug 2020 23:30), Max: 98 (18 Aug 2020 08:25)  HR: 67 (18 Aug 2020 23:30) (67 - 84)  BP: 123/70 (18 Aug 2020 23:30) (123/70 - 155/71)  BP(mean): --  RR: 18 (18 Aug 2020 23:30) (17 - 20)  SpO2: 97% (18 Aug 2020 23:30) (97% - 99%)    --------------------------------------------------------------------------------------------------  Inputs/Outputs:    18 Aug 2020 07:01  -  19 Aug 2020 06:01  --------------------------------------------------------  IN:    Oral Fluid: 480 mL  Total IN: 480 mL    OUT:    Voided: 800 mL  Total OUT: 800 mL    Total NET: -320 mL        --------------------------------------------------------------------------------------------------  Laboratories:                        13.6   6.64  )-----------( 263      ( 18 Aug 2020 09:26 )             40.1     LIVER FUNCTIONS - ( 17 Aug 2020 20:35 )  Alb: 4.3 g/dL / Pro: 7.8 g/dL / ALK PHOS: 107 u/L / ALT: 19 u/L / AST: 24 u/L / GGT: x             18 Aug 2020 07:53    144    |  108    |  12     ----------------------------<  94     4.7     |  23     |  0.92     Ca    9.3        18 Aug 2020 07:53  Phos  3.0       18 Aug 2020 07:53  Mg     2.3       18 Aug 2020 07:53    TPro  7.8    /  Alb  4.3    /  TBili  0.5    /  DBili  x      /  AST  24     /  ALT  19     /  AlkPhos  107    17 Aug 2020 20:35    PT/INR - ( 17 Aug 2020 20:35 )   PT: 12.2 SEC;   INR: 1.06          PTT - ( 17 Aug 2020 20:35 )  PTT:30.5 SEC    --------------------------------------------------------------------------------------------------  Physical Exam:  General: AAOx3, NAD, resting comfortably   HEENT: NC/AT  Respiratory: no increased work of breathing   Abdomen: soft, nontender, nondistended  Neuro: full ROM/b/l UE strength 5/5 wnl, b/l  strength, no focal deficits, no sensory deficits, no slurred speech or facial droop  Ext: warm and well perfused, cap refill <2  --------------------------------------------------------------------------------------------------  Medications:  MEDICATIONS  (STANDING):  aspirin enteric coated 81 milliGRAM(s) Oral daily  atorvastatin 80 milliGRAM(s) Oral at bedtime  heparin   Injectable 5000 Unit(s) SubCutaneous every 12 hours  levETIRAcetam 500 milliGRAM(s) Oral two times a day      RADIOLOGY  CT Angio Neck w/ IV Cont (08.18.20 @ 01:03)  IMPRESSION:    CT angiography neck:  Heavy atherosclerotic calcification involving the proximal left internal carotid artery with severe stenosis resulting in "string sign". Presumed severe stenosis of the origin of the right internal carotid artery secondary to heavy vascular atherosclerotic calcification and poor visualization of the lumen.    Occlusion of the V1 and proximal V2 segments of the left vertebral artery.    Patent right vertebral artery with moderate stenosis at the origin secondary to atherosclerotic calcification and short segment focal dissection of the distal V2 segment at the level of the C3 transverse foramina.    CT angiography brain:    Segments of severe stenosis of the intradural left vertebral artery.    Fetal origin of the right PCA. A 3 mm outpouching from the posterior aspect of the right posterior communicating artery may represent an aneurysm versus blunted hypoplastic right P1 segment. VASCULAR SURGERY PROGRESS NOTE    85yMale    SUBJECTIVE:  Patient seen and examined at bedside. No acute events overnight. Denies any weakness/tingling/motor or sensory deficits to RUE. Asking to go home.     --------------------------------------------------------------------------------------------------  OBJECTIVE:     Vital Signs:  Vital Signs Last 24 Hrs  T(C): 36.6 (18 Aug 2020 23:30), Max: 36.7 (18 Aug 2020 08:25)  T(F): 97.9 (18 Aug 2020 23:30), Max: 98 (18 Aug 2020 08:25)  HR: 67 (18 Aug 2020 23:30) (67 - 84)  BP: 123/70 (18 Aug 2020 23:30) (123/70 - 155/71)  BP(mean): --  RR: 18 (18 Aug 2020 23:30) (17 - 20)  SpO2: 97% (18 Aug 2020 23:30) (97% - 99%)    --------------------------------------------------------------------------------------------------  Inputs/Outputs:    18 Aug 2020 07:01  -  19 Aug 2020 06:01  --------------------------------------------------------  IN:    Oral Fluid: 480 mL  Total IN: 480 mL    OUT:    Voided: 800 mL  Total OUT: 800 mL    Total NET: -320 mL        --------------------------------------------------------------------------------------------------  Laboratories:                        13.6   6.64  )-----------( 263      ( 18 Aug 2020 09:26 )             40.1     LIVER FUNCTIONS - ( 17 Aug 2020 20:35 )  Alb: 4.3 g/dL / Pro: 7.8 g/dL / ALK PHOS: 107 u/L / ALT: 19 u/L / AST: 24 u/L / GGT: x             18 Aug 2020 07:53    144    |  108    |  12     ----------------------------<  94     4.7     |  23     |  0.92     Ca    9.3        18 Aug 2020 07:53  Phos  3.0       18 Aug 2020 07:53  Mg     2.3       18 Aug 2020 07:53    TPro  7.8    /  Alb  4.3    /  TBili  0.5    /  DBili  x      /  AST  24     /  ALT  19     /  AlkPhos  107    17 Aug 2020 20:35    PT/INR - ( 17 Aug 2020 20:35 )   PT: 12.2 SEC;   INR: 1.06          PTT - ( 17 Aug 2020 20:35 )  PTT:30.5 SEC    --------------------------------------------------------------------------------------------------  Physical Exam:  General: AAOx3, NAD, resting comfortably   HEENT: NC/AT  Respiratory: no increased work of breathing   Abdomen: soft, nontender, nondistended  Neuro: full ROM/b/l UE strength 5/5 wnl, b/l  strength, no focal deficits, no sensory deficits, no slurred speech or facial droop  Ext: warm and well perfused, cap refill <2  --------------------------------------------------------------------------------------------------  Medications:  MEDICATIONS  (STANDING):  aspirin enteric coated 81 milliGRAM(s) Oral daily  atorvastatin 80 milliGRAM(s) Oral at bedtime  heparin   Injectable 5000 Unit(s) SubCutaneous every 12 hours  levETIRAcetam 500 milliGRAM(s) Oral two times a day      RADIOLOGY  CT Angio Neck w/ IV Cont (08.18.20 @ 01:03)  IMPRESSION:    CT angiography neck:  Heavy atherosclerotic calcification involving the proximal left internal carotid artery with severe stenosis resulting in "string sign". Presumed severe stenosis of the origin of the right internal carotid artery secondary to heavy vascular atherosclerotic calcification and poor visualization of the lumen.    Occlusion of the V1 and proximal V2 segments of the left vertebral artery.    Patent right vertebral artery with moderate stenosis at the origin secondary to atherosclerotic calcification and short segment focal dissection of the distal V2 segment at the level of the C3 transverse foramina.    CT angiography brain:    Segments of severe stenosis of the intradural left vertebral artery.    Fetal origin of the right PCA. A 3 mm outpouching from the posterior aspect of the right posterior communicating artery may represent an aneurysm versus blunted hypoplastic right P1 segment.        VA Duplex Carotid Arteries, Bilateral. (08.18.20 @ 16:21)   VELOCITY:  ------------------------------------------------------------------------  RIGHT:    Subclavian: 56 /    Prox CCA: 61/8    Mid CCA: 57/    Dist CCA: 56/    Prox ICA: 496/156    Mid ICA: 442 / 121    Distal ICA: 252 / 48    ECA: 67 / 7    Vertebral: 85 / 28    ICA/CCA: 8.1  ------------------------------------------------------------------------    Subclavian: Normal    CCA Plaque: Mild    ICA Plaque: Severe    Vertebral: Antegrade flow  ------------------------------------------------------------------------  LEFT:    Subclavian: 150 /    Prox CCA: 147/    Mid CCA: 144/    Dist CCA: 44/    Prox ICA: 144/32    Mid ICA:113 / 30    Distal ICA: 41 / 12    ECA: 73 / 14    Vertebral: 50 / 7    ICA/CCA: 0.9  ------------------------------------------------------------------------    Subclavian: Normal    CCA Plaque: Mild    ICA Plaque: Mild    Vertebral: Antegrade flow  -----------------------------------

## 2020-08-19 NOTE — PROGRESS NOTE ADULT - ASSESSMENT
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 Neurologic exam notable for RUE drift, R FNF dysmetria, RUE sensory (mild loss), and inability to state accurate date or age. CTH showed extensive encephalomalacia in R frontal region with multiple chronic lacunar infarcts. CTA H/N showed heavy atherosclerotic calcification involving the proximal left internal carotid artery with severe stenosis resulting in "string sign". Presumed severe stenosis of the origin of the right internal carotid artery secondary to heavy vascular atherosclerotic calcification and poor visualization of the lumen.Occlusion of the V1 and proximal V2 segments of the left vertebral artery.Segments of severe stenosis of the intradural left vertebral artery. Fetal origin of the right PCA. A 3 mm outpouching from the posterior aspect of the right posterior communicating artery may represent an aneurysm versus blunted hypoplastic right P1 segment. Carotid Duplex demonstrated 80-99% stenosis of R ICA and 16-49% stenosis of L ICA. TTE showed EF 59%, mild pulm htn and LV thickening. Awaiting MRI to determine best course of treatment. Do not think that patient has capacity to consent for intervention, will need discussion with HCP or next of kin.     Impression   Acute RUE sensorimotor +/- dysmetria (symptomatology not completely clear as patient is a poor historian) likely 2/2 L brain dysfunction possibly L thalamocapsular ischemic infarct vs L jossy-rolandic infarct; alternate localizations could include the R cerebellum 2/2 cryptogenic etiology    Recommendations  - Appreciate Vascular surgery recs   - MRI brain without contrast  - Continue ASA 81mg   - Continue atorvastatin 80mg   - Continue Keppra 500mg BID   - Discussion about possible intervention vs course of treatment with HC/ next of kin  - Consider cardiology pre-op clearance if Vascular recommends intervention  - PT/OT  - Will consider ILR based on MRI findings       Case discussed with neurology attending Dr. [place]

## 2020-08-19 NOTE — PROGRESS NOTE ADULT - PROBLEM SELECTOR PLAN 1
- New R arm weakness/numbness, slowly improving but not fully resolved, concerning for CVA vs TIA  -CTH no acute pathology. CTA head/neck c/w severe L ICA stenosis and multiple cerebral artery stenosis   -carotid duplex c/w severe L ICA stenosis  -MR brain, MRA head/neck ordered to further characterize stenosis  -c/w asa, statin  -c/w tele  - echo normal LVEF. bubble study not done   -s/p swallow eval- mechanical soft with thin liquids  -PT/OT eval noted   -stroke team f/u RE: ?ILR given hx of pAfib

## 2020-08-20 LAB
ANION GAP SERPL CALC-SCNC: 15 MMO/L — HIGH (ref 7–14)
BUN SERPL-MCNC: 15 MG/DL — SIGNIFICANT CHANGE UP (ref 7–23)
CALCIUM SERPL-MCNC: 9.3 MG/DL — SIGNIFICANT CHANGE UP (ref 8.4–10.5)
CHLORIDE SERPL-SCNC: 103 MMOL/L — SIGNIFICANT CHANGE UP (ref 98–107)
CO2 SERPL-SCNC: 21 MMOL/L — LOW (ref 22–31)
CREAT SERPL-MCNC: 0.93 MG/DL — SIGNIFICANT CHANGE UP (ref 0.5–1.3)
GLUCOSE SERPL-MCNC: 80 MG/DL — SIGNIFICANT CHANGE UP (ref 70–99)
HCT VFR BLD CALC: 41.6 % — SIGNIFICANT CHANGE UP (ref 39–50)
HGB BLD-MCNC: 14.3 G/DL — SIGNIFICANT CHANGE UP (ref 13–17)
MAGNESIUM SERPL-MCNC: 2.2 MG/DL — SIGNIFICANT CHANGE UP (ref 1.6–2.6)
MCHC RBC-ENTMCNC: 32.4 PG — SIGNIFICANT CHANGE UP (ref 27–34)
MCHC RBC-ENTMCNC: 34.4 % — SIGNIFICANT CHANGE UP (ref 32–36)
MCV RBC AUTO: 94.1 FL — SIGNIFICANT CHANGE UP (ref 80–100)
NRBC # FLD: 0 K/UL — SIGNIFICANT CHANGE UP (ref 0–0)
PHOSPHATE SERPL-MCNC: 3.4 MG/DL — SIGNIFICANT CHANGE UP (ref 2.5–4.5)
PLATELET # BLD AUTO: 245 K/UL — SIGNIFICANT CHANGE UP (ref 150–400)
PMV BLD: 10.1 FL — SIGNIFICANT CHANGE UP (ref 7–13)
POTASSIUM SERPL-MCNC: 3.9 MMOL/L — SIGNIFICANT CHANGE UP (ref 3.5–5.3)
POTASSIUM SERPL-SCNC: 3.9 MMOL/L — SIGNIFICANT CHANGE UP (ref 3.5–5.3)
RBC # BLD: 4.42 M/UL — SIGNIFICANT CHANGE UP (ref 4.2–5.8)
RBC # FLD: 12.4 % — SIGNIFICANT CHANGE UP (ref 10.3–14.5)
SODIUM SERPL-SCNC: 139 MMOL/L — SIGNIFICANT CHANGE UP (ref 135–145)
WBC # BLD: 5.24 K/UL — SIGNIFICANT CHANGE UP (ref 3.8–10.5)
WBC # FLD AUTO: 5.24 K/UL — SIGNIFICANT CHANGE UP (ref 3.8–10.5)

## 2020-08-20 PROCEDURE — 70544 MR ANGIOGRAPHY HEAD W/O DYE: CPT | Mod: 26,59

## 2020-08-20 PROCEDURE — 99233 SBSQ HOSP IP/OBS HIGH 50: CPT

## 2020-08-20 PROCEDURE — 70549 MR ANGIOGRAPH NECK W/O&W/DYE: CPT | Mod: 26

## 2020-08-20 PROCEDURE — 70551 MRI BRAIN STEM W/O DYE: CPT | Mod: 26

## 2020-08-20 RX ORDER — LISINOPRIL 2.5 MG/1
20 TABLET ORAL DAILY
Refills: 0 | Status: DISCONTINUED | OUTPATIENT
Start: 2020-08-20 | End: 2020-08-24

## 2020-08-20 RX ADMIN — ATORVASTATIN CALCIUM 80 MILLIGRAM(S): 80 TABLET, FILM COATED ORAL at 20:54

## 2020-08-20 RX ADMIN — LEVETIRACETAM 500 MILLIGRAM(S): 250 TABLET, FILM COATED ORAL at 05:21

## 2020-08-20 RX ADMIN — HEPARIN SODIUM 5000 UNIT(S): 5000 INJECTION INTRAVENOUS; SUBCUTANEOUS at 05:21

## 2020-08-20 RX ADMIN — Medication 81 MILLIGRAM(S): at 12:31

## 2020-08-20 RX ADMIN — LEVETIRACETAM 500 MILLIGRAM(S): 250 TABLET, FILM COATED ORAL at 18:28

## 2020-08-20 RX ADMIN — HEPARIN SODIUM 5000 UNIT(S): 5000 INJECTION INTRAVENOUS; SUBCUTANEOUS at 18:28

## 2020-08-20 RX ADMIN — CLOPIDOGREL BISULFATE 75 MILLIGRAM(S): 75 TABLET, FILM COATED ORAL at 12:31

## 2020-08-20 NOTE — PROGRESS NOTE ADULT - ASSESSMENT
86 y/o M with hx of pAfib not on AC, HTN, Seizure d/o, SDH s/p R craniotomy, ETOH abuse who presents with R sided arm weakness, found to have acute cva

## 2020-08-20 NOTE — PROGRESS NOTE ADULT - ASSESSMENT
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 Neurologic exam notable for RUE drift, R FNF dysmetria, RUE sensory (mild loss), and inability to state accurate date or age. CTH showed extensive encephalomalacia in R frontal region with multiple chronic lacunar infarcts. CTA H/N showed heavy atherosclerotic calcification involving the proximal left internal carotid artery with severe stenosis resulting in "string sign". Presumed severe stenosis of the origin of the right internal carotid artery secondary to heavy vascular atherosclerotic calcification and poor visualization of the lumen. Occlusion of the V1 and proximal V2 segments of the left vertebral artery. Segments of severe stenosis of the intradural left vertebral artery. Fetal origin of the right PCA. A 3 mm outpouching from the posterior aspect of the right posterior communicating artery may represent an aneurysm versus blunted hypoplastic right P1 segment. Carotid Duplex demonstrated 80-99% stenosis of R ICA and 16-49% stenosis of L ICA. TTE showed EF 59%, mild pulm htn and LV thickening. MRI shows acute infarct in the L frontal cortex corresponding to the primary motor cortex region, consistent with patient symptoms of R nasolabial flattening and RUE weakness.  MRA shows bilateral high grade internal carotid stenosis.  Patient likely has severe carotid stenosis bilaterally as CTA and MRA more diagnostically accurate than Carotid duplex. However patient will likely need cerebral angio prior to any surgical intervention. Discussion had with patient life partner Lizzeth Leslie about risks/benefits of cerebral angio and carotic stenting/ CEA. She is not formal HCP however has been taking care of the patient since 1995 and is the decision maker for the patient. She consents over the phone to cerebral angio and surgical intervention.      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    Case discussed with neurology attending Dr. Birmingham

## 2020-08-20 NOTE — SBIRT NOTE ADULT - NSSBIRTALCPOSREINDET_GEN_A_CORE
Pt AAOx3, however with memory loss and requested writer outreach wife: Lizzeth - [phone]. Pt's wife receptive to engagement in consult and agreeable to reviewing healthy drinking guidelines.   Provided SBIRT services: Full screen Positive. Positive reinforcement provided given patient currently within healthy guidelines. Education materials reviewed with pt's wife.  Wife notes she provides pt with 3-4 beers a week, solely at dinner time. Pt's wife notes pt previously a heavy drinker, however did not engage in any formal substance abuse services.

## 2020-08-20 NOTE — PROGRESS NOTE ADULT - ASSESSMENT
ASSESSMENT:  85M p/w RUE stroke-like sx, with severe b/l carotid stenosis, R ICA narrowing and severe L ICA stenosis with string sign seen on CTA. B/l carotid duplex with significant disease. Patient remains without symptoms, but unable to understand risks/benfits of carotid intervention.     PLAN:  - Patient will need operative intervention   - MRI ordered  - please obtain cardiac and medical clearance/risk stratification/optimization   - Care per primary team     Please contact Vascular Surgery (p. [phone]) with any questions.     Sara Lynch, PGY2  Vascular Surgery, C Team  Pager [phone]

## 2020-08-20 NOTE — PROGRESS NOTE ADULT - PROBLEM SELECTOR PLAN 1
New R arm weakness/numbness, slowly improving but not fully resolved. Etiology likely arteriosclerosis   - MR brain confirmed acute CVA L percentral gyrus   - CTA head/neck c/w severe L ICA stenosis and multiple cerebral artery stenosis   -carotid duplex c/w severe L ICA stenosis  -MRA head/neck reviewd  -c/w asa, statin  -c/w tele  - echo normal LVEF. bubble study not done   -s/p swallow eval- mechanical soft with thin liquids  -PT/OT eval noted   -stroke team f/u- likely will need cerebral angiogram and/or CEA. No indication for ILR

## 2020-08-20 NOTE — PROGRESS NOTE ADULT - ATTENDING COMMENTS
I had a detailed discussion with neuroradiology and vascular surgery regarding vascular imaging findings.  There is severe stenosis and calcification of left internal carotid artery with the lesion being 1 cm distal to bifurcation.  He also has contralateral severe right ICA stenosis and atherosclerotic disease and posterior circulation.  Based on MRI, mechanism of left MCA territory ischemic infarct is likely artery to artery embolism. I am convinced that he definitely needs a revascularization procedure either carotid endarterectomy or a carotid artery stent.  I am however unsure of his compliance based on his social situation and dementia if he were to get a stent he would definitely have to be on dual antiplatelet therapy with good compliance. I had a detailed discussion with neuroradiology and vascular surgery regarding vascular imaging findings.  There is severe stenosis and calcification of left internal carotid artery with the lesion being 1 cm distal to bifurcation.  He also has contralateral severe right ICA stenosis and atherosclerotic disease and posterior circulation.  Based on MRI, mechanism of left MCA territory ischemic infarct is likely Large artery atherosclerosis -artery to artery embolism. I am convinced that he definitely needs a revascularization procedure either carotid endarterectomy or a carotid artery stent.  I am however unsure of his compliance with medications and follow up ( based on his social situation and dementia ); if he were to get a stent he would definitely have to be on dual antiplatelet therapy with good compliance.   will review the case again with vascular surgery for a final decision.

## 2020-08-20 NOTE — PROGRESS NOTE ADULT - SUBJECTIVE AND OBJECTIVE BOX
Interval History:  Patient seen and examined at the bedside. No acute events overnight    MEDICATIONS  (STANDING):  aspirin enteric coated 81 milliGRAM(s) Oral daily  atorvastatin 80 milliGRAM(s) Oral at bedtime  clopidogrel Tablet 75 milliGRAM(s) Oral daily  heparin   Injectable 5000 Unit(s) SubCutaneous every 12 hours  levETIRAcetam 500 milliGRAM(s) Oral two times a day  lisinopril 20 milliGRAM(s) Oral daily    MEDICATIONS  (PRN):    Allergies  No Known Allergies    Intolerances      ROS: Pertinent positives in HPI, all other ROS were reviewed and are negative.      Vital Signs Last 24 Hrs  T(C): 36.4 (20 Aug 2020 03:40), Max: 36.9 (19 Aug 2020 15:33)  T(F): 97.6 (20 Aug 2020 03:40), Max: 98.4 (19 Aug 2020 15:33)  HR: 80 (20 Aug 2020 03:40) (67 - 81)  BP: 153/76 (20 Aug 2020 03:40) (134/90 - 166/88)  BP(mean): --  RR: 18 (20 Aug 2020 03:40) (17 - 18)  SpO2: 97% (20 Aug 2020 03:40) (96% - 99%)    NEUROLOGICAL EXAM:  Grossly unchanged from prior evaluation      Labs:   cbc                      14.3   5.24  )-----------( 245      ( 20 Aug 2020 06:54 )             41.6     [id number]    139  |  103  |  15  ----------------------------<  80  3.9   |  21<L>  |  0.93    Ca    9.3      20 Aug 2020 06:54  Phos  3.4     08-20  Mg     2.2     08-20      Coags  [id number] Chol 127 LDL 77 HDL 37 Trig 79  A1C  CardiacMarkers    LFTs  UA

## 2020-08-20 NOTE — PROGRESS NOTE ADULT - SUBJECTIVE AND OBJECTIVE BOX
VASCULAR SURGERY PROGRESS NOTE    85yMale    SUBJECTIVE:  Patient seen and examined at bedside. No acute events overnight. Denies any right upper extremity symptoms.     --------------------------------------------------------------------------------------------------  OBJECTIVE:     Vital Signs:  Vital Signs Last 24 Hrs  T(C): 36.5 (20 Aug 2020 12:30), Max: 36.7 (19 Aug 2020 18:31)  T(F): 97.7 (20 Aug 2020 12:30), Max: 98.1 (19 Aug 2020 18:31)  HR: 74 (20 Aug 2020 12:30) (67 - 81)  BP: 140/93 (20 Aug 2020 12:30) (137/87 - 166/88)  BP(mean): --  RR: 16 (20 Aug 2020 12:30) (16 - 18)  SpO2: 97% (20 Aug 2020 12:30) (96% - 99%)    --------------------------------------------------------------------------------------------------  Inputs/Outputs:    19 Aug 2020 07:01  -  20 Aug 2020 07:00  --------------------------------------------------------  IN:    Oral Fluid: 760 mL  Total IN: 760 mL    OUT:    Voided: 150 mL  Total OUT: 150 mL    Total NET: 610 mL        --------------------------------------------------------------------------------------------------  Laboratories:                        14.3   5.24  )-----------( 245      ( 20 Aug 2020 06:54 )             41.6         20 Aug 2020 06:54    139    |  103    |  15     ----------------------------<  80     3.9     |  21     |  0.93     Ca    9.3        20 Aug 2020 06:54  Phos  3.4       20 Aug 2020 06:54  Mg     2.2       20 Aug 2020 06:54          --------------------------------------------------------------------------------------------------  Physical Exam:  General: AAOx3, NAD, resting comfortably   HEENT: NC/AT  Respiratory: no increased work of breathing   Abdomen: soft, nontender, nondistended  Neuro: full ROM/b/l UE strength 5/5 wnl, b/l  strength, no focal deficits, no sensory deficits, no slurred speech or facial droop  Ext: warm and well perfused, cap refill <2  --------------------------------------------------------------------------------------------------  Medications:  MEDICATIONS  (STANDING):  aspirin enteric coated 81 milliGRAM(s) Oral daily  atorvastatin 80 milliGRAM(s) Oral at bedtime  clopidogrel Tablet 75 milliGRAM(s) Oral daily  heparin   Injectable 5000 Unit(s) SubCutaneous every 12 hours  levETIRAcetam 500 milliGRAM(s) Oral two times a day  lisinopril 20 milliGRAM(s) Oral daily    MEDICATIONS  (PRN):

## 2020-08-20 NOTE — PROGRESS NOTE ADULT - SUBJECTIVE AND OBJECTIVE BOX
[facility] Division of Hospital Medicine  Rani Tucker MD  Pager [phone]      Patient is a 85y old  Male who presents with a chief complaint of R hand weakness (20 Aug 2020 12:39)      SUBJECTIVE / OVERNIGHT EVENTS:    No acute event o/n. Pt denies new neuro deficits.    ADDITIONAL REVIEW OF SYSTEMS:    RESPIRATORY: No cough, wheezing, chills or hemoptysis; No shortness of breath  CARDIOVASCULAR: No chest pain, palpitations, dizziness, or leg swelling  GASTROINTESTINAL: No abdominal or epigastric pain. No nausea, vomiting, or hematemesis; No diarrhea or constipation. No melena or hematochezia.    MEDICATIONS  (STANDING):  aspirin enteric coated 81 milliGRAM(s) Oral daily  atorvastatin 80 milliGRAM(s) Oral at bedtime  clopidogrel Tablet 75 milliGRAM(s) Oral daily  heparin   Injectable 5000 Unit(s) SubCutaneous every 12 hours  levETIRAcetam 500 milliGRAM(s) Oral two times a day  lisinopril 20 milliGRAM(s) Oral daily    MEDICATIONS  (PRN):      CAPILLARY BLOOD GLUCOSE      POCT Blood Glucose.: 83 mg/dL (20 Aug 2020 09:16)    I&O's Summary    19 Aug 2020 07:01  -  20 Aug 2020 07:00  --------------------------------------------------------  IN: 760 mL / OUT: 150 mL / NET: 610 mL        PHYSICAL EXAM:  Vital Signs Last 24 Hrs  T(C): 36.4 (20 Aug 2020 03:40), Max: 36.9 (19 Aug 2020 15:33)  T(F): 97.6 (20 Aug 2020 03:40), Max: 98.4 (19 Aug 2020 15:33)  HR: 80 (20 Aug 2020 03:40) (67 - 81)  BP: 153/76 (20 Aug 2020 03:40) (134/90 - 166/88)  BP(mean): --  RR: 18 (20 Aug 2020 03:40) (17 - 18)  SpO2: 97% (20 Aug 2020 03:40) (96% - 99%)    CONSTITUTIONAL: NAD,   EYES: PERRLA; conjunctiva and sclera clear  ENMT: Moist oral mucosa, no pharyngeal injection or exudates;   NECK: Supple, no palpable masses;   RESPIRATORY: Normal respiratory effort; lungs are clear to auscultation bilaterally  CARDIOVASCULAR: Regular rate and rhythm, normal S1 and S2, no murmur/rub/gallop; No lower extremity edema; Peripheral pulses are 2+ bilaterally  ABDOMEN: Nontender to palpation, normoactive bowel sounds, no rebound/guarding;   MUSCLOSKELETAL:   no clubbing or cyanosis of digits; no joint swelling or tenderness to palpation  PSYCH: A+O to person, place, and time; affect appropriate  NEUROLOGY: CN 2-12 are intact and symmetric; RUE/RLE strength 4/5. sensation intact  SKIN: No rashes; no palpable lesions    LABS:                        14.3   5.24  )-----------( 245      ( 20 Aug 2020 06:54 )             41.6     08-20    139  |  103  |  15  ----------------------------<  80  3.9   |  21<L>  |  0.93    Ca    9.3      20 Aug 2020 06:54  Phos  3.4     08-20  Mg     2.2     08-20                  RADIOLOGY & ADDITIONAL TESTS:  Results Reviewed:   Imaging Personally Reviewed:  Electrocardiogram Personally Reviewed:    COORDINATION OF CARE:  Care Discussed with Consultants/Other Providers [Y/N]:  Prior or Outpatient Records Reviewed [Y/N]:

## 2020-08-21 LAB
ANION GAP SERPL CALC-SCNC: 16 MMO/L — HIGH (ref 7–14)
BUN SERPL-MCNC: 13 MG/DL — SIGNIFICANT CHANGE UP (ref 7–23)
CALCIUM SERPL-MCNC: 9.4 MG/DL — SIGNIFICANT CHANGE UP (ref 8.4–10.5)
CHLORIDE SERPL-SCNC: 101 MMOL/L — SIGNIFICANT CHANGE UP (ref 98–107)
CO2 SERPL-SCNC: 22 MMOL/L — SIGNIFICANT CHANGE UP (ref 22–31)
CREAT SERPL-MCNC: 0.88 MG/DL — SIGNIFICANT CHANGE UP (ref 0.5–1.3)
GLUCOSE SERPL-MCNC: 86 MG/DL — SIGNIFICANT CHANGE UP (ref 70–99)
HCT VFR BLD CALC: 42.6 % — SIGNIFICANT CHANGE UP (ref 39–50)
HGB BLD-MCNC: 13.8 G/DL — SIGNIFICANT CHANGE UP (ref 13–17)
MAGNESIUM SERPL-MCNC: 2.2 MG/DL — SIGNIFICANT CHANGE UP (ref 1.6–2.6)
MCHC RBC-ENTMCNC: 30.9 PG — SIGNIFICANT CHANGE UP (ref 27–34)
MCHC RBC-ENTMCNC: 32.4 % — SIGNIFICANT CHANGE UP (ref 32–36)
MCV RBC AUTO: 95.5 FL — SIGNIFICANT CHANGE UP (ref 80–100)
NRBC # FLD: 0 K/UL — SIGNIFICANT CHANGE UP (ref 0–0)
PHOSPHATE SERPL-MCNC: 3.2 MG/DL — SIGNIFICANT CHANGE UP (ref 2.5–4.5)
PLATELET # BLD AUTO: 262 K/UL — SIGNIFICANT CHANGE UP (ref 150–400)
PMV BLD: 9.8 FL — SIGNIFICANT CHANGE UP (ref 7–13)
POTASSIUM SERPL-MCNC: 4 MMOL/L — SIGNIFICANT CHANGE UP (ref 3.5–5.3)
POTASSIUM SERPL-SCNC: 4 MMOL/L — SIGNIFICANT CHANGE UP (ref 3.5–5.3)
RBC # BLD: 4.46 M/UL — SIGNIFICANT CHANGE UP (ref 4.2–5.8)
RBC # FLD: 12.2 % — SIGNIFICANT CHANGE UP (ref 10.3–14.5)
SODIUM SERPL-SCNC: 139 MMOL/L — SIGNIFICANT CHANGE UP (ref 135–145)
WBC # BLD: 5.6 K/UL — SIGNIFICANT CHANGE UP (ref 3.8–10.5)
WBC # FLD AUTO: 5.6 K/UL — SIGNIFICANT CHANGE UP (ref 3.8–10.5)

## 2020-08-21 PROCEDURE — 99233 SBSQ HOSP IP/OBS HIGH 50: CPT | Mod: GC

## 2020-08-21 PROCEDURE — 99233 SBSQ HOSP IP/OBS HIGH 50: CPT

## 2020-08-21 PROCEDURE — 99232 SBSQ HOSP IP/OBS MODERATE 35: CPT

## 2020-08-21 RX ADMIN — LEVETIRACETAM 500 MILLIGRAM(S): 250 TABLET, FILM COATED ORAL at 04:42

## 2020-08-21 RX ADMIN — Medication 81 MILLIGRAM(S): at 11:48

## 2020-08-21 RX ADMIN — ATORVASTATIN CALCIUM 80 MILLIGRAM(S): 80 TABLET, FILM COATED ORAL at 22:58

## 2020-08-21 RX ADMIN — LISINOPRIL 20 MILLIGRAM(S): 2.5 TABLET ORAL at 11:47

## 2020-08-21 RX ADMIN — LEVETIRACETAM 500 MILLIGRAM(S): 250 TABLET, FILM COATED ORAL at 17:26

## 2020-08-21 RX ADMIN — CLOPIDOGREL BISULFATE 75 MILLIGRAM(S): 75 TABLET, FILM COATED ORAL at 11:48

## 2020-08-21 RX ADMIN — HEPARIN SODIUM 5000 UNIT(S): 5000 INJECTION INTRAVENOUS; SUBCUTANEOUS at 04:42

## 2020-08-21 RX ADMIN — HEPARIN SODIUM 5000 UNIT(S): 5000 INJECTION INTRAVENOUS; SUBCUTANEOUS at 17:26

## 2020-08-21 NOTE — PROGRESS NOTE ADULT - PROBLEM SELECTOR PLAN 1
New R arm weakness/numbness, slowly improving but not fully resolved. Etiology likely arteriosclerosis   - MR brain confirmed acute CVA L percentral gyrus   - CTA head/neck c/w severe L ICA stenosis and multiple cerebral artery stenosis   -carotid duplex c/w severe L ICA stenosis  -MRA head/neck reviewd  -c/w asa, statin. Hold plavix pending surgery   -c/w tele  - echo normal LVEF. bubble study not done   -s/p swallow eval- mechanical soft with thin liquids  -PT/OT eval noted   -stroke team f/u appreciated- pending CEA.  No indication for ILR

## 2020-08-21 NOTE — PROGRESS NOTE ADULT - PROBLEM SELECTOR PLAN 5
as per PCP remote hx of afib not on ac d/t frequent falls leading to intracranial bleed. on cardizem/ digoxin per record but pt only takes them intermittently  -currently in NSR  -hold digoxin, Cardizem for now

## 2020-08-21 NOTE — PROGRESS NOTE ADULT - PROBLEM SELECTOR PLAN 7
1.  Name of PCP: Dr. Hampton  2.  PCP Contacted on Admission: [x ] Y    [ ] N    3.  PCP contacted at Discharge: [ ] Y    [ ] N    [ ] N/A  4.  Post-Discharge Appointment Date and Location:  5.  Summary of Handoff given to PCP:

## 2020-08-21 NOTE — PROGRESS NOTE ADULT - SUBJECTIVE AND OBJECTIVE BOX
[facility] Division of [facility] Medicine  Rani Tucker MD  Pager [phone]      Patient is a 85y old  Male who presents with a chief complaint of R hand weakness (21 Aug 2020 12:25)      SUBJECTIVE / OVERNIGHT EVENTS:    No acute event o/n. No new complaint.     ADDITIONAL REVIEW OF SYSTEMS:    RESPIRATORY: No cough, wheezing, chills or hemoptysis; No shortness of breath  CARDIOVASCULAR: No chest pain, palpitations, dizziness, or leg swelling  GASTROINTESTINAL: No abdominal or epigastric pain. No nausea, vomiting, or hematemesis; No diarrhea or constipation. No melena or hematochezia.    MEDICATIONS  (STANDING):  aspirin enteric coated 81 milliGRAM(s) Oral daily  atorvastatin 80 milliGRAM(s) Oral at bedtime  heparin   Injectable 5000 Unit(s) SubCutaneous every 12 hours  levETIRAcetam 500 milliGRAM(s) Oral two times a day  lisinopril 20 milliGRAM(s) Oral daily    MEDICATIONS  (PRN):      CAPILLARY BLOOD GLUCOSE        I&O's Summary    20 Aug 2020 07:01  -  21 Aug 2020 07:00  --------------------------------------------------------  IN: 0 mL / OUT: 350 mL / NET: -350 mL        PHYSICAL EXAM:  Vital Signs Last 24 Hrs  T(C): 36.6 (21 Aug 2020 11:46), Max: 36.7 (20 Aug 2020 20:30)  T(F): 97.9 (21 Aug 2020 11:46), Max: 98.1 (21 Aug 2020 00:30)  HR: 90 (21 Aug 2020 11:46) (68 - 90)  BP: 155/90 (21 Aug 2020 11:46) (127/77 - 155/90)  BP(mean): --  RR: 81 (21 Aug 2020 11:46) (16 - 81)  SpO2: 96% (21 Aug 2020 11:46) (96% - 98%)    CONSTITUTIONAL: NAD,   EYES: PERRLA; conjunctiva and sclera clear  ENMT: Moist oral mucosa, no pharyngeal injection or exudates;   NECK: Supple, no palpable masses;   RESPIRATORY: Normal respiratory effort; lungs are clear to auscultation bilaterally  CARDIOVASCULAR: Regular rate and rhythm, normal S1 and S2, no murmur/rub/gallop; No lower extremity edema; Peripheral pulses are 2+ bilaterally  ABDOMEN: Nontender to palpation, normoactive bowel sounds, no rebound/guarding;   MUSCLOSKELETAL:   no clubbing or cyanosis of digits; no joint swelling or tenderness to palpation  PSYCH: A+O to person, place, and time; affect appropriate  NEUROLOGY: CN 2-12 are intact and symmetric; RUE/RLE strength 4/5. sensation intact  SKIN: No rashes; no palpable lesions    LABS:                        13.8   5.60  )-----------( 262      ( 21 Aug 2020 05:35 )             42.6     08-21    139  |  101  |  13  ----------------------------<  86  4.0   |  22  |  0.88    Ca    9.4      21 Aug 2020 05:35  Phos  3.2     08-21  Mg     2.2     08-21                  RADIOLOGY & ADDITIONAL TESTS:  Results Reviewed:   Imaging Personally Reviewed:  Electrocardiogram Personally Reviewed:    COORDINATION OF CARE:  Care Discussed with Consultants/Other Providers [Y/N]:  Prior or Outpatient Records Reviewed [Y/N]:

## 2020-08-21 NOTE — PROGRESS NOTE ADULT - ASSESSMENT
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 Neurologic exam notable for RUE drift, R FNF dysmetria, RUE sensory (mild loss), and inability to state accurate date or age. There is severe stenosis and calcification of left internal carotid artery with the lesion being 1 cm distal to bifurcation seen on CTA and MRA.  He also has contralateral severe right ICA stenosis and atherosclerotic disease and posterior circulation.  Based on MRI, mechanism of left MCA territory ischemic infarct is likely Large artery atherosclerosis -artery to artery embolism. Patient needs revascularization procedure. Patient has a component of underlying dementia and does not have capacity to make decisions. Discussed this with life partner over the phone yesterday and she consents to surgical intervention. Although stent would be preferred, there is concern of medication compliance as patient would required DAPT with stenting. Vascular note indicates L CEA.     Impression  R hemiparesis 2/2 L MCA territory infarct 2/2 large artery atherosclerosis artery-artery embolism in the setting of sever L ICA stenosis and calcification with concurrent asymptomatic severe R ICA stenosis     Recommendation  - Agree to revascularization procedure, CEA vs stent per vascular surgery  - Continue DAPT with ASA 81mg + Plavix 75mg   - Continue Atorvastatin 80mg   - Cardiac and medical pre-op clearance     Case discussed with neurology attending Dr. 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 Neurologic exam notable for RUE drift, R FNF dysmetria, RUE sensory (mild loss), and inability to state accurate date or age. There is severe stenosis and calcification of left internal carotid artery with the lesion being 1 cm distal to bifurcation seen on CTA and MRA.  He also has contralateral severe right ICA stenosis and atherosclerotic disease and posterior circulation.  Based on MRI, mechanism of left MCA territory ischemic infarct is likely Large artery atherosclerosis -artery to artery embolism. Patient needs revascularization procedure. Patient has a component of underlying dementia and does not have capacity to make decisions. Discussed this with life partner over the phone yesterday and she consents to surgical intervention. Although stent would be preferred, there is concern of medication compliance as patient would required DAPT with stenting. Vascular note indicates L CEA.     Impression:    Right hemiparesis 2/2 L MCA territory infarct 2/2 large artery atherosclerosis artery-artery embolism in the setting of severe Left ICA stenosis and calcification with concurrent asymptomatic severe Right ICA stenosis     Recommendation:    - Agree to revascularization procedure, left CEA vs stent per vascular surgery  - Continue DAPT with ASA 81mg + Plavix 75mg   - Continue Atorvastatin 80mg   - Cardiac and medical pre-op clearance     Case discussed and seen with neurology attending Dr. Vazquez

## 2020-08-21 NOTE — PROGRESS NOTE ADULT - SUBJECTIVE AND OBJECTIVE BOX
Interval History:  Patient seen and examined at the bedside. No acute events overnight.     MEDICATIONS  (STANDING):  aspirin enteric coated 81 milliGRAM(s) Oral daily  atorvastatin 80 milliGRAM(s) Oral at bedtime  clopidogrel Tablet 75 milliGRAM(s) Oral daily  heparin   Injectable 5000 Unit(s) SubCutaneous every 12 hours  levETIRAcetam 500 milliGRAM(s) Oral two times a day  lisinopril 20 milliGRAM(s) Oral daily    MEDICATIONS  (PRN):    Allergies  No Known Allergies    Intolerances      ROS: Pertinent positives in HPI, all other ROS were reviewed and are negative.      Vital Signs Last 24 Hrs  T(C): 36.6 (21 Aug 2020 11:46), Max: 36.7 (20 Aug 2020 20:30)  T(F): 97.9 (21 Aug 2020 11:46), Max: 98.1 (21 Aug 2020 00:30)  HR: 90 (21 Aug 2020 11:46) (68 - 90)  BP: 155/90 (21 Aug 2020 11:46) (127/77 - 155/90)  BP(mean): --  RR: 81 (21 Aug 2020 11:46) (16 - 81)  SpO2: 96% (21 Aug 2020 11:46) (96% - 98%)    NEUROLOGICAL EXAM:  MS: AAOX2, fluent, attends b/l; normal attention, .   CN: VFF, EOMI, V1-3 intact, mild r nasolabial flattening,  Motor: Strength: 5/5 4x. + RUE drift Tone: normal. Bulk: normal. DTR 2+ symm.  Plantar flex b/l.   Sensation: intact to LT 4x.         Labs:   cbc                      13.8   5.60  )-----------( 262      ( 21 Aug 2020 05:35 )             42.6     [id number]    139  |  101  |  13  ----------------------------<  86  4.0   |  22  |  0.88    Ca    9.4      21 Aug 2020 05:35  Phos  3.2     08-21  Mg     2.2     08-21      Coags  [id number] Chol 127 LDL 77 HDL 37 Trig 79  A1C  CardiacMarkers    LFTs  UA Interval History:    Patient seen and examined at the bedside. No acute events overnight. Patient sitting in chair comfortably and denies any new neurologic symptoms.     MEDICATIONS  (STANDING):  aspirin enteric coated 81 milliGRAM(s) Oral daily  atorvastatin 80 milliGRAM(s) Oral at bedtime  clopidogrel Tablet 75 milliGRAM(s) Oral daily  heparin   Injectable 5000 Unit(s) SubCutaneous every 12 hours  levETIRAcetam 500 milliGRAM(s) Oral two times a day  lisinopril 20 milliGRAM(s) Oral daily    MEDICATIONS  (PRN):    Allergies  No Known Allergies    Intolerances      ROS: Pertinent positives in HPI, all other ROS were reviewed and are negative.      Vital Signs Last 24 Hrs  T(C): 36.6 (21 Aug 2020 11:46), Max: 36.7 (20 Aug 2020 20:30)  T(F): 97.9 (21 Aug 2020 11:46), Max: 98.1 (21 Aug 2020 00:30)  HR: 90 (21 Aug 2020 11:46) (68 - 90)  BP: 155/90 (21 Aug 2020 11:46) (127/77 - 155/90)  BP(mean): --  RR: 81 (21 Aug 2020 11:46) (16 - 81)  SpO2: 96% (21 Aug 2020 11:46) (96% - 98%)    NEUROLOGICAL EXAM:  MS: AAOX2, fluent, attends b/l; normal attention, .   CN: VFF, EOMI, V1-3 intact, mild r nasolabial flattening,  Motor: Strength: 5/5 4x. + RUE drift Tone: normal. Bulk: normal. DTR 2+ symm.  Plantar flex b/l.   Sensation: intact to LT 4x.         Labs:   cbc                      13.8   5.60  )-----------( 262      ( 21 Aug 2020 05:35 )             42.6     [id number]    139  |  101  |  13  ----------------------------<  86  4.0   |  22  |  0.88    Ca    9.4      21 Aug 2020 05:35  Phos  3.2     08-21  Mg     2.2     08-21      Coags  [id number] Chol 127 LDL 77 HDL 37 Trig 79  A1C  CardiacMarkers    LFTs  UA

## 2020-08-21 NOTE — PROGRESS NOTE ADULT - ATTENDING COMMENTS
I have discussed the case with the surgical house staff. I have personally seen, examined, and participated in the care of this patient. I have reviewed all pertinent clinical information.    Left symptomatic high grade carotid stenosis. Right asymptomatic high grade carotid stenosis.   After reviewing images, recommend left carotid endarterectomy.   Will speak with patient's emergency contact, Lizzeth, regarding procedure. If agreeable, plan for next wednesday.

## 2020-08-21 NOTE — CONSULT NOTE ADULT - SUBJECTIVE AND OBJECTIVE BOX
Patient seen and evaluated at bedside    Chief Complaint: R hand weakness    HPI:  This is an 85M with history of HTN, Seizure d/o, SDH s/p R craniotomy, and Lap Crystal c/b bilary duct resection s/p hepaticojejunostomy/Small bowel resection who presents to the hospital with R sided arm weakness. Neurology and Vascular surgery consulted and following along with patient. Patient admitted to medicine, found with MR brain acute CVA of the L percentral gyrus on MR brain. CTA head/neck c/w severe L ICA and L vertebral stenosis, to undergo CEA next week.       PMHx:   Cholelithiasis  History of subdural hematoma  Constipation  Hypertension  Seizure      PSHx:   History of ankle surgery  Acute subdural hematoma      Allergies:  No Known Allergies      Home Medications:  atorvastatin 10 mg oral tablet: 1 tab(s) orally once a day (18 Aug 2020 05:39)  digoxin 125 mcg (0.125 mg) oral tablet: 1 tab(s) orally once a day (18 Aug 2020 05:39)  diltiazem 24 hour extended release 240 mg/24 hours oral capsule, extended release: 1 cap(s) orally once a day (18 Aug 2020 05:39)  folic acid 1 mg oral tablet: 1 tab(s) orally once a day (18 Aug 2020 05:39)  levETIRAcetam 500 mg oral tablet: 1 tab(s) orally 2 times a day (18 Aug 2020 05:39)  lisinopril 20 mg oral tablet: 1 tab(s) orally once a day (18 Aug 2020 05:39)  Vitamin C 500 mg oral tablet: 1 tab(s) orally once a day (18 Aug 2020 05:39)      Current Medications:   aspirin enteric coated 81 milliGRAM(s) Oral daily  atorvastatin 80 milliGRAM(s) Oral at bedtime  heparin   Injectable 5000 Unit(s) SubCutaneous every 12 hours  levETIRAcetam 500 milliGRAM(s) Oral two times a day  lisinopril 20 milliGRAM(s) Oral daily      FAMILY HISTORY:  No pertinent family history in first degree relatives      Social History:  Smoking History: denies  Alcohol Use: denies  Drug Use: denies    REVIEW OF SYSTEMS:  as above      Physical Exam:  T(F): 97.9 (08-21), Max: 98.1 (08-21)  HR: 90 (08-21) (68 - 90)  BP: 155/90 (08-21) (127/77 - 155/90)  RR: 81 (08-21)  SpO2: 96% (08-21)  CONSTITUTIONAL: NAD,   EYES: PERRLA; conjunctiva and sclera clear  ENMT: Moist oral mucosa, no pharyngeal injection or exudates;   NECK: Supple, no palpable masses   RESPIRATORY: Normal respiratory effort; lungs are clear to auscultation bilaterally  CARDIOVASCULAR: Regular rate and rhythm, normal S1 and S2, no murmur/rub/gallop; No lower extremity edema; Peripheral pulses are 2+ bilaterally  ABDOMEN: Nontender to palpation, normoactive bowel sounds, no rebound/guarding;   MUSCLOSKELETAL: no clubbing or cyanosis of digits; no joint swelling or tenderness to palpation  PSYCH: A+O to person, place, and time; affect appropriate  NEUROLOGY: RUE/RLE strength 4/5. sensation intact  SKIN: No rashes; no palpable lesions    Cardiovascular Diagnostic Testing:    ECG: Personally reviewed    Echo: Personally reviewed  < from: Transthoracic Echocardiogram (08.18.20 @ 16:39) >  Ejection Fraction (Teicholtz): 59 %  ------------------------------------------------------------------------  OBSERVATIONS:  Mitral Valve: Mitral annular calcification, otherwise  normal mitral valve. Minimal mitral regurgitation.  Aortic Root: Normal aortic root.  Aortic Valve: Calcified trileaflet aortic valve with normal  opening.  Left Atrium: LA volume index = 13 cc/m2.  Left Ventricle: Normal left ventricular systolic function.  No segmental wall motion abnormalities. Increased relative  wall thickness with normal left ventricular mass index,  consistent with concentric left ventricular remodeling.  Right Heart: Normal right atrium. Normal right ventricular  size and function. Normal tricuspid valve.Mild tricuspid  regurgitation. Normal pulmonic valve. Minimal pulmonic  regurgitation.  Pericardium/PleuraNormal pericardium with no pericardial  effusion.  Hemodynamic: Estimated right ventricular systolic pressure  equals 43 mm Hg, assuming right atrial pressure equals 10  mm Hg, consistent with mild pulmonary hypertension.  Agitated saline injection demonstrates no obvious evidence  of a patent foramen ovale.  ------------------------------------------------------------------------  CONCLUSIONS:  1. Mitral annular calcification, otherwise normal mitral  valve. Minimal mitral regurgitation.  2. Calcified trileaflet aortic valve with normal opening.  3. Increased relative wall thickness with normal left  ventricular mass index, consistent with concentric left  ventricular remodeling.  4. Normal left ventricular systolic function. No segmental  wall motion abnormalities.  5. Normal right ventricular size and function.  6. Normal tricuspid valve. Mild tricuspid regurgitation.  7. Estimated pulmonary artery systolic pressure equals 43  mm Hg, assuming right atrial pressure equals 10  mm Hg,  consistent with mild pulmonary hypertension.    < end of copied text >      Stress Testing: none    Cath: none    Imaging: none  < from: CT Angio Neck w/ IV Cont (08.18.20 @ 01:03) >  IMPRESSION:    CT angiography neck:  Heavy atherosclerotic calcification involving the proximal left internal carotid artery with severe stenosis resulting in "string sign". Presumed severe stenosis of the origin of the right internal carotid artery secondary to heavy vascular atherosclerotic calcification and poor visualization of the lumen.    Occlusion of the V1 and proximal V2 segments of the left vertebral artery.    Patent right vertebral artery with moderate stenosis at the origin secondary to atherosclerotic calcification and short segment focal dissection of the distal V2 segment at the level of the C3 transverse foramina.    CT angiography brain:    Segments of severe stenosis of the intradural left vertebral artery.    Fetal origin of the right PCA. A 3 mm outpouching from the posterior aspect of the right posterior communicating artery may represent an aneurysm versus blunted hypoplastic right P1 segment.    < end of copied text >      CXR: Personally reviewed    Labs: Personally reviewed                        13.8   5.60  )-----------( 262      ( 21 Aug 2020 05:35 )             42.6     08-21    139  |  101  |  13  ----------------------------<  86  4.0   |  22  |  0.88    Ca    9.4      21 Aug 2020 05:35  Phos  3.2     08-21  Mg     2.2     08-21    Troponin T, High Sensitivity (08.18.20 @ 00:00)    Troponin T, High Sensitivity: 15: ---------------------***PLEASE NOTE***----------------------  Rapid changes upward or downward in high-sensitivity  troponin levels strongly suggest acute myocardial injury.  Hemolysis may falsely lower results. Renal impairment may  increase results.    Normal: <6 - 14 ng/L  Indeterminate: 15 - 51 ng/L  Elevated: >51 ng/L    Please see "[email or web address]" on the [employer] for more information. ng/L      Troponin T, High Sensitivity (08.17.20 @ 20:35)    Troponin T, High Sensitivity: 16: ---------------------***PLEASE NOTE***----------------------  Rapid changes upward or downward in high-sensitivity  troponin levels strongly suggest acute myocardial injury.  Hemolysis may falsely lower results. Renal impairment may  increase results.    Normal: <6 - 14 ng/L  Indeterminate: 15 - 51 ng/L  Elevated: >51 ng/L    Please see "[email or web address]" on the [employer] for more information. ng/L    Thyroid Stimulating Hormone, Serum: 3.64 uIU/mL (08-18 @ 07:52) Patient seen and evaluated at bedside    Chief Complaint: R hand weakness    HPI:  This is an 85M with history of HTN, Seizure d/o, SDH s/p R craniotomy, and Lap Crystal c/b bilary duct resection s/p hepaticojejunostomy/Small bowel resection who presents to the hospital with R sided arm weakness. Neurology and Vascular surgery consulted and following along with patient. Patient admitted to medicine, found with MR brain acute CVA of the L percentral gyrus on MR brain. CTA head/neck c/w severe L ICA and L vertebral stenosis, to undergo CEA next week.       PMHx:   Cholelithiasis  History of subdural hematoma  Constipation  Hypertension  Seizure      PSHx:   History of ankle surgery  Acute subdural hematoma      Allergies:  No Known Allergies      Home Medications:  atorvastatin 10 mg oral tablet: 1 tab(s) orally once a day (18 Aug 2020 05:39)  digoxin 125 mcg (0.125 mg) oral tablet: 1 tab(s) orally once a day (18 Aug 2020 05:39)  diltiazem 24 hour extended release 240 mg/24 hours oral capsule, extended release: 1 cap(s) orally once a day (18 Aug 2020 05:39)  folic acid 1 mg oral tablet: 1 tab(s) orally once a day (18 Aug 2020 05:39)  levETIRAcetam 500 mg oral tablet: 1 tab(s) orally 2 times a day (18 Aug 2020 05:39)  lisinopril 20 mg oral tablet: 1 tab(s) orally once a day (18 Aug 2020 05:39)  Vitamin C 500 mg oral tablet: 1 tab(s) orally once a day (18 Aug 2020 05:39)      Current Medications:   aspirin enteric coated 81 milliGRAM(s) Oral daily  atorvastatin 80 milliGRAM(s) Oral at bedtime  heparin   Injectable 5000 Unit(s) SubCutaneous every 12 hours  levETIRAcetam 500 milliGRAM(s) Oral two times a day  lisinopril 20 milliGRAM(s) Oral daily      FAMILY HISTORY:  No pertinent family history in first degree relatives      Social History:  Smoking History: denies  Alcohol Use: denies  Drug Use: denies    REVIEW OF SYSTEMS:  as above      Physical Exam:  T(F): 97.9 (08-21), Max: 98.1 (08-21)  HR: 90 (08-21) (68 - 90)  BP: 155/90 (08-21) (127/77 - 155/90)  RR: 81 (08-21)  SpO2: 96% (08-21)  CONSTITUTIONAL: NAD,   EYES: PERRLA; conjunctiva and sclera clear  ENMT: Moist oral mucosa, no pharyngeal injection or exudates;   NECK: Supple, no palpable masses   RESPIRATORY: Normal respiratory effort; lungs are clear to auscultation bilaterally  CARDIOVASCULAR: Regular rate and rhythm, normal S1 and S2, no murmur/rub/gallop; No lower extremity edema; Peripheral pulses are 2+ bilaterally  ABDOMEN: Nontender to palpation, normoactive bowel sounds, no rebound/guarding;   MUSCLOSKELETAL: no clubbing or cyanosis of digits; no joint swelling or tenderness to palpation  PSYCH: AAOx2 (person, place), affect appropriate  NEUROLOGY: RUE/RLE strength 4/5.  SKIN: No rashes; no palpable lesions    Cardiovascular Diagnostic Testing:    ECG: Personally reviewed    Echo: Personally reviewed  < from: Transthoracic Echocardiogram (08.18.20 @ 16:39) >  Ejection Fraction (Teicholtz): 59 %  ------------------------------------------------------------------------  OBSERVATIONS:  Mitral Valve: Mitral annular calcification, otherwise  normal mitral valve. Minimal mitral regurgitation.  Aortic Root: Normal aortic root.  Aortic Valve: Calcified trileaflet aortic valve with normal  opening.  Left Atrium: LA volume index = 13 cc/m2.  Left Ventricle: Normal left ventricular systolic function.  No segmental wall motion abnormalities. Increased relative  wall thickness with normal left ventricular mass index,  consistent with concentric left ventricular remodeling.  Right Heart: Normal right atrium. Normal right ventricular  size and function. Normal tricuspid valve.Mild tricuspid  regurgitation. Normal pulmonic valve. Minimal pulmonic  regurgitation.  Pericardium/PleuraNormal pericardium with no pericardial  effusion.  Hemodynamic: Estimated right ventricular systolic pressure  equals 43 mm Hg, assuming right atrial pressure equals 10  mm Hg, consistent with mild pulmonary hypertension.  Agitated saline injection demonstrates no obvious evidence  of a patent foramen ovale.  ------------------------------------------------------------------------  CONCLUSIONS:  1. Mitral annular calcification, otherwise normal mitral  valve. Minimal mitral regurgitation.  2. Calcified trileaflet aortic valve with normal opening.  3. Increased relative wall thickness with normal left  ventricular mass index, consistent with concentric left  ventricular remodeling.  4. Normal left ventricular systolic function. No segmental  wall motion abnormalities.  5. Normal right ventricular size and function.  6. Normal tricuspid valve. Mild tricuspid regurgitation.  7. Estimated pulmonary artery systolic pressure equals 43  mm Hg, assuming right atrial pressure equals 10  mm Hg,  consistent with mild pulmonary hypertension.    < end of copied text >      Stress Testing: none    Cath: none    Imaging: none  < from: CT Angio Neck w/ IV Cont (08.18.20 @ 01:03) >  IMPRESSION:    CT angiography neck:  Heavy atherosclerotic calcification involving the proximal left internal carotid artery with severe stenosis resulting in "string sign". Presumed severe stenosis of the origin of the right internal carotid artery secondary to heavy vascular atherosclerotic calcification and poor visualization of the lumen.    Occlusion of the V1 and proximal V2 segments of the left vertebral artery.    Patent right vertebral artery with moderate stenosis at the origin secondary to atherosclerotic calcification and short segment focal dissection of the distal V2 segment at the level of the C3 transverse foramina.    CT angiography brain:    Segments of severe stenosis of the intradural left vertebral artery.    Fetal origin of the right PCA. A 3 mm outpouching from the posterior aspect of the right posterior communicating artery may represent an aneurysm versus blunted hypoplastic right P1 segment.    < end of copied text >      CXR: Personally reviewed    Labs: Personally reviewed                        13.8   5.60  )-----------( 262      ( 21 Aug 2020 05:35 )             42.6     08-21    139  |  101  |  13  ----------------------------<  86  4.0   |  22  |  0.88    Ca    9.4      21 Aug 2020 05:35  Phos  3.2     08-21  Mg     2.2     08-21    Troponin T, High Sensitivity (08.18.20 @ 00:00)    Troponin T, High Sensitivity: 15: ---------------------***PLEASE NOTE***----------------------  Rapid changes upward or downward in high-sensitivity  troponin levels strongly suggest acute myocardial injury.  Hemolysis may falsely lower results. Renal impairment may  increase results.    Normal: <6 - 14 ng/L  Indeterminate: 15 - 51 ng/L  Elevated: >51 ng/L    Please see "[email or web address]" on the [employer] for more information. ng/L      Troponin T, High Sensitivity (08.17.20 @ 20:35)    Troponin T, High Sensitivity: 16: ---------------------***PLEASE NOTE***----------------------  Rapid changes upward or downward in high-sensitivity  troponin levels strongly suggest acute myocardial injury.  Hemolysis may falsely lower results. Renal impairment may  increase results.    Normal: <6 - 14 ng/L  Indeterminate: 15 - 51 ng/L  Elevated: >51 ng/L    Please see "[email or web address] on the [employer] for more information. ng/L    Thyroid Stimulating Hormone, Serum: 3.64 uIU/mL (08-18 @ 07:52)

## 2020-08-21 NOTE — PROGRESS NOTE ADULT - ASSESSMENT
ASSESSMENT:  85M p/w right upper extremity stroke-like sx, with severe b/l carotid stenosis, MR significant for acute left-sided infarct. Pt in need of CEA 2/2 symptomatic L-ICA stenosis.     PLAN:  - Patient will need operative intervention L CEA 2 weeks from onset of symptomatology (8/17)  - Please obtain cardiac and medical clearance/risk stratification/optimization this admission  - C/w ASA and statin  - C/w care per primary team     Please contact Vascular Surgery (p. [phone]) with any questions.     Sara Lynch, PGY2  Vascular Surgery, C Team  Pager [phone] ASSESSMENT:  85M p/w right upper extremity stroke-like sx, with severe b/l carotid stenosis, MR significant for acute left-sided infarct. Pt in need of CEA 2/2 symptomatic L-ICA stenosis.     PLAN:  - Patient will need operative intervention L CEA   - Please obtain cardiac and medical clearance/risk stratification/optimization this admission  - C/w ASA and statin  - C/w care per primary team     Please contact Vascular Surgery (p. [phone]) with any questions.     Sara Lynch, PGY2  Vascular Surgery, C Team  Pager [phone]

## 2020-08-21 NOTE — PROGRESS NOTE ADULT - ASSESSMENT
84 y/o M with hx of pAfib not on AC, HTN, Seizure d/o, SDH s/p R craniotomy, ETOH abuse who presents with R sided arm weakness, found to have acute cva

## 2020-08-21 NOTE — CONSULT NOTE ADULT - ATTENDING COMMENTS
The patient's care was discussed with the consulting cardiology fellow.  I independently evaluated the patient.    He is an 85-year-old man with atrial fibrillation and prior subdural hematoma requiring craniotomy, who is not chronically on anticoagulation who presented with an acute stroke. We are asked to provide jossy-operative risk stratification prior to a planned carotid endarterectomy.    The patient denies experiencing current chest pain or dyspnea. As noted above, he gives a report of chest pain recently, but the description is somewhat limited. In general, he does not report frequent or recurrent chest pain. There is no evidence of heart failure on examination. His JVP is not elevated. Lungs are clear and there is no peripheral edema. His extremities are warm and perfused. Labs studies, including hs-troponin, are unremarkable. ECG demonstrates atrial fibrillation without evidence of ongoing ischemia or infarction.     I agree with the risk assessment and estimation noted above. Given the patient's recent episode of chest pain, and his known atherosclerotic vascular disease and recent stroke, it would be reasonable to risk stratify this patient prior to an open vascular procedure with a pharmacologic stress testing with myocardial perfusion imaging to exclude high risk findings. This can be performed prior to his surgery which is scheduled for Wednesday.     Bj Moya MD  Cardiology  [phone]

## 2020-08-21 NOTE — PROGRESS NOTE ADULT - ATTENDING COMMENTS
Patient seen and examined with neurology stroke resident and above note reviewed and I agree with assessment and plan as outlined. Patient hx as noted and he had an acute left sided infarct secondary to his left carotid stenosis.  ON exam he has a right arm drift but no other focal deficits.    Imaging including CT A and MRA reviewed in detail with Dr. Trcaey from neuroradiology   He has bilateral ICA stenosis however the left side is symptomatic     Assessment and Plan : 85 year old man with right sided arm weakness and dysmetria found to have an acute infarct in the left MCA territory in the motor cortex.    1. Agree with vascular as patient will need a re vascularization procedure given the high grade symptomatic left IOCA stenosis , left CEA vs stent   but given that his compliance with dual antiplatelet may be low with a stent would favor left CEA.    2. Continue high dose statin    3. Appreciate vascular follow up     4. Awaiting on cardio and medical clearance and continue medical mgt and supportive care     5. Discussed with neuroradiology and Dr. [place]

## 2020-08-21 NOTE — PROGRESS NOTE ADULT - SUBJECTIVE AND OBJECTIVE BOX
VASCULAR SURGERY PROGRESS NOTE    85yMale    SUBJECTIVE:  Patient seen and examined at bedside. No acute events overnight. Denies any pain. No RUE weakness.     --------------------------------------------------------------------------------------------------  OBJECTIVE:     Vital Signs:  Vital Signs Last 24 Hrs  T(C): 36.5 (21 Aug 2020 04:35), Max: 36.7 (20 Aug 2020 20:30)  T(F): 97.7 (21 Aug 2020 04:35), Max: 98.1 (21 Aug 2020 00:30)  HR: 68 (21 Aug 2020 04:35) (68 - 78)  BP: 127/77 (21 Aug 2020 04:35) (127/77 - 149/79)  BP(mean): --  RR: 16 (21 Aug 2020 04:35) (16 - 17)  SpO2: 97% (21 Aug 2020 04:35) (97% - 98%)    --------------------------------------------------------------------------------------------------  Inputs/Outputs:    20 Aug 2020 07:01  -  21 Aug 2020 07:00  --------------------------------------------------------  IN:  Total IN: 0 mL    OUT:    Voided: 350 mL  Total OUT: 350 mL    Total NET: -350 mL    --------------------------------------------------------------------------------------------------  Laboratories:                        13.8   5.60  )-----------( 262      ( 21 Aug 2020 05:35 )             42.6     21 Aug 2020 05:35    139    |  101    |  13     ----------------------------<  86     4.0     |  22     |  0.88     Ca    9.4        21 Aug 2020 05:35  Phos  3.2       21 Aug 2020 05:35  Mg     2.2       21 Aug 2020 05:35      --------------------------------------------------------------------------------------------------  Physical Exam:  General: AAOx3, NAD, resting comfortably   HEENT: NC/AT  Respiratory: no increased work of breathing   Abdomen: soft, nontender, nondistended  Neuro: No gross deficits. full ROM/b/l UE strength 5/5 wnl, b/l  strength, no focal deficits, no sensory deficits, no slurred speech or facial droop  Ext: warm and well perfused, cap refill <2  --------------------------------------------------------------------------------------------------  Medications:  MEDICATIONS  (STANDING):  aspirin enteric coated 81 milliGRAM(s) Oral daily  atorvastatin 80 milliGRAM(s) Oral at bedtime  clopidogrel Tablet 75 milliGRAM(s) Oral daily  heparin   Injectable 5000 Unit(s) SubCutaneous every 12 hours  levETIRAcetam 500 milliGRAM(s) Oral two times a day  lisinopril 20 milliGRAM(s) Oral daily    MEDICATIONS  (PRN):

## 2020-08-21 NOTE — CONSULT NOTE ADULT - ASSESSMENT
84 y/o M with hx of pAfib not on AC, HTN, Seizure d/o, SDH s/p R craniotomy, ETOH abuse who presents with R sided arm weakness, found to have acute cva, now requiring preop for CEA.    #Preop: METS <4. Denies current CP, dyspnea. With hx of paroxysmal AF however not on AC 2/2 falls and hx SDH, currently rate controlled. Recent CVA likely 2/2 L ICA and L vertebral stenosis. No valvulopathy present on echo. RCRI: Class III Risk 10.1 % 30-day risk of death, MI, or cardiac arrest. Reynolds score: 0.8 % Risk of myocardial infarction or cardiac arrest, intraoperatively or up to 30 days post-op. Patient is an intermediate risk patient undergoing an intermediate risk surgery.   -no further preop testing needed    #HTN: mildly hypertensive  -c/w lisinopril 20mg qd    #pAF: rate controlled  -AV jeniffer blockers being held by primary team    Jose Alejandro Strong MD  Cardiology fellow, F1  [phone] 84 y/o M with hx of pAfib not on AC, HTN, Seizure d/o, SDH s/p R craniotomy, ETOH abuse who presents with R sided arm weakness, found to have acute cva, now requiring preop for CEA.    #Preop: METS >4. Patient able to walk multiple blocks before fatigue w/ assistance of a cane. Denies current CP and dyspnea however reports a recent episode of CP several days ago, lasting 30min. No prior stress testing or cath hx. With hx of paroxysmal AF however not on AC 2/2 falls and hx SDH, currently rate controlled. Recent CVA. No valvulopathy or WMA present on echo. RCRI: Class III Risk 10.1 % 30-day risk of death, MI, or cardiac arrest. Reynolds score: 0.8 % Risk of myocardial infarction or cardiac arrest, intraoperatively or up to 30 days post-op. Patient is an intermediate risk patient undergoing an intermediate risk surgery.   -no further preop testing needed    #HTN: mildly hypertensive  -c/w lisinopril 20mg qd    #pAF: rate controlled  -AV jeniffer blockers being held by primary team    Jose Alejandro Strong MD  Cardiology fellow, F1  [phone] 84 y/o M with hx of pAfib not on AC, HTN, Seizure d/o, SDH s/p R craniotomy, ETOH abuse who presents with R sided arm weakness, found to have acute cva, now requiring preop for CEA.    #Preop: METS >4. Patient able to walk multiple blocks before fatigue w/ assistance of a cane. Denies current CP and dyspnea however reports a recent episode of CP several days ago, lasting 30min. No prior stress testing or cath hx. With hx of paroxysmal AF however not on AC 2/2 falls and hx SDH, currently rate controlled. Recent CVA. No valvulopathy or WMA present on echo. ECG w/ NSR and 1st degree AVB. Tele w/ occasional PVCs, sinus tachycardia, PACS, and sinus bradycardia. No AF noted. RCRI: Class III Risk 10.1 % 30-day risk of death, MI, or cardiac arrest. Reynolds score: 0.8 % Risk of myocardial infarction or cardiac arrest, intraoperatively or up to 30 days post-op. Patient is an intermediate risk patient undergoing an intermediate risk surgery.   -would recommend stress testing given recent reports of chest pain and acute CVA hx.    #HTN: mildly hypertensive  -c/w lisinopril 20mg qd    #pAF: rate controlled  -AV jeniffer blockers being held by primary team    Jose Alejandro Strong MD  Cardiology fellow, F1  [phone] 84 y/o M with hx of pAfib not on AC, HTN, Seizure d/o, SDH s/p R craniotomy, ETOH abuse who presents with R sided arm weakness, found to have acute cva, now requiring preop for CEA.    #Preop: METS >4. Patient able to walk multiple blocks before fatigue w/ assistance of a cane. Denies current CP and dyspnea however reports a recent episode of CP several days ago, lasting 30min. Patient's reliability is questionable given AAOx2 on exam and when asked about whether patient has a history of AF or CVA, patient denies both. No prior stress testing or cath hx. With hx of paroxysmal AF however not on AC 2/2 falls and hx SDH, currently rate controlled. Recent CVA. No valvulopathy or WMA present on echo. ECG w/ NSR and 1st degree AVB. Tele w/ occasional PVCs, sinus tachycardia, PACS, and sinus bradycardia. No AF noted. RCRI: Class III Risk 10.1 % 30-day risk of death, MI, or cardiac arrest. Reynolds score: 0.8 % Risk of myocardial infarction or cardiac arrest, intraoperatively or up to 30 days post-op. Patient is an intermediate risk patient undergoing an intermediate risk surgery.   -would recommend pharmacologic stress testing given recent reports of chest pain (unclear reliability of patient's report however in the setting of multiple stenotic vascular lesions, underlying cardiac vascular lesions are not unlikely) and acute CVA hx.     #HTN: mildly hypertensive  -c/w lisinopril 20mg qd    #pAF: rate controlled  -AV jeniffer blockers being held by primary team    Jose Alejandro Strong MD  Cardiology fellow, F1  [phone]

## 2020-08-22 LAB
AMPHET UR-MCNC: NEGATIVE — SIGNIFICANT CHANGE UP
BARBITURATES UR SCN-MCNC: NEGATIVE — SIGNIFICANT CHANGE UP
BENZODIAZ UR-MCNC: NEGATIVE — SIGNIFICANT CHANGE UP
CANNABINOIDS UR-MCNC: NEGATIVE — SIGNIFICANT CHANGE UP
COCAINE METAB.OTHER UR-MCNC: NEGATIVE — SIGNIFICANT CHANGE UP
METHADONE UR-MCNC: NEGATIVE — SIGNIFICANT CHANGE UP
OPIATES UR-MCNC: NEGATIVE — SIGNIFICANT CHANGE UP
OXYCODONE UR-MCNC: NEGATIVE — SIGNIFICANT CHANGE UP
PCP UR-MCNC: NEGATIVE — SIGNIFICANT CHANGE UP

## 2020-08-22 PROCEDURE — 99233 SBSQ HOSP IP/OBS HIGH 50: CPT

## 2020-08-22 RX ADMIN — ATORVASTATIN CALCIUM 80 MILLIGRAM(S): 80 TABLET, FILM COATED ORAL at 21:53

## 2020-08-22 RX ADMIN — Medication 81 MILLIGRAM(S): at 11:46

## 2020-08-22 RX ADMIN — HEPARIN SODIUM 5000 UNIT(S): 5000 INJECTION INTRAVENOUS; SUBCUTANEOUS at 17:04

## 2020-08-22 RX ADMIN — LEVETIRACETAM 500 MILLIGRAM(S): 250 TABLET, FILM COATED ORAL at 17:04

## 2020-08-22 RX ADMIN — LEVETIRACETAM 500 MILLIGRAM(S): 250 TABLET, FILM COATED ORAL at 05:09

## 2020-08-22 RX ADMIN — HEPARIN SODIUM 5000 UNIT(S): 5000 INJECTION INTRAVENOUS; SUBCUTANEOUS at 05:09

## 2020-08-22 NOTE — PROGRESS NOTE ADULT - SUBJECTIVE AND OBJECTIVE BOX
[facility] Division of Hospital Medicine  Rani Tucker MD  Pager [phone]      Patient is a 85y old  Male who presents with a chief complaint of R hand weakness (21 Aug 2020 16:56)      SUBJECTIVE / OVERNIGHT EVENTS:    No acute event o/n. No new complaint     ADDITIONAL REVIEW OF SYSTEMS:    RESPIRATORY: No cough, wheezing, chills or hemoptysis; No shortness of breath  CARDIOVASCULAR: No chest pain, palpitations, dizziness, or leg swelling  GASTROINTESTINAL: No abdominal or epigastric pain. No nausea, vomiting, or hematemesis; No diarrhea or constipation. No melena or hematochezia.      MEDICATIONS  (STANDING):  aspirin enteric coated 81 milliGRAM(s) Oral daily  atorvastatin 80 milliGRAM(s) Oral at bedtime  heparin   Injectable 5000 Unit(s) SubCutaneous every 12 hours  levETIRAcetam 500 milliGRAM(s) Oral two times a day  lisinopril 20 milliGRAM(s) Oral daily    MEDICATIONS  (PRN):      CAPILLARY BLOOD GLUCOSE        I&O's Summary      PHYSICAL EXAM:  Vital Signs Last 24 Hrs  T(C): 36.4 (22 Aug 2020 05:07), Max: 36.7 (21 Aug 2020 19:18)  T(F): 97.5 (22 Aug 2020 05:07), Max: 98 (21 Aug 2020 19:18)  HR: 73 (22 Aug 2020 05:07) (62 - 75)  BP: 113/71 (22 Aug 2020 05:07) (109/65 - 127/86)  BP(mean): --  RR: 18 (22 Aug 2020 05:07) (18 - 18)  SpO2: 97% (22 Aug 2020 05:07) (96% - 99%)    CONSTITUTIONAL: NAD,   EYES: PERRLA; conjunctiva and sclera clear  ENMT: Moist oral mucosa, no pharyngeal injection or exudates;   NECK: Supple, no palpable masses;   RESPIRATORY: Normal respiratory effort; lungs are clear to auscultation bilaterally  CARDIOVASCULAR: Regular rate and rhythm, normal S1 and S2, no murmur/rub/gallop; No lower extremity edema; Peripheral pulses are 2+ bilaterally  ABDOMEN: Nontender to palpation, normoactive bowel sounds, no rebound/guarding;   MUSCLOSKELETAL:   no clubbing or cyanosis of digits; no joint swelling or tenderness to palpation  PSYCH: A+O to person, place, and time; affect appropriate  NEUROLOGY: CN 2-12 are intact and symmetric; RUE/RLE strength 4/5. sensation intact  SKIN: No rashes; no palpable lesions      LABS:                        13.8   5.60  )-----------( 262      ( 21 Aug 2020 05:35 )             42.6     08-21    139  |  101  |  13  ----------------------------<  86  4.0   |  22  |  0.88    Ca    9.4      21 Aug 2020 05:35  Phos  3.2     08-21  Mg     2.2     08-21                  RADIOLOGY & ADDITIONAL TESTS:  Results Reviewed:   Imaging Personally Reviewed:  Electrocardiogram Personally Reviewed:    COORDINATION OF CARE:  Care Discussed with Consultants/Other Providers [Y/N]:  Prior or Outpatient Records Reviewed [Y/N]:

## 2020-08-23 LAB
ANION GAP SERPL CALC-SCNC: 15 MMO/L — HIGH (ref 7–14)
BUN SERPL-MCNC: 20 MG/DL — SIGNIFICANT CHANGE UP (ref 7–23)
CALCIUM SERPL-MCNC: 9 MG/DL — SIGNIFICANT CHANGE UP (ref 8.4–10.5)
CHLORIDE SERPL-SCNC: 104 MMOL/L — SIGNIFICANT CHANGE UP (ref 98–107)
CO2 SERPL-SCNC: 18 MMOL/L — LOW (ref 22–31)
CREAT SERPL-MCNC: 1.25 MG/DL — SIGNIFICANT CHANGE UP (ref 0.5–1.3)
GLUCOSE SERPL-MCNC: 118 MG/DL — HIGH (ref 70–99)
HCT VFR BLD CALC: 41.2 % — SIGNIFICANT CHANGE UP (ref 39–50)
HGB BLD-MCNC: 14.2 G/DL — SIGNIFICANT CHANGE UP (ref 13–17)
MAGNESIUM SERPL-MCNC: 2.2 MG/DL — SIGNIFICANT CHANGE UP (ref 1.6–2.6)
MCHC RBC-ENTMCNC: 31.8 PG — SIGNIFICANT CHANGE UP (ref 27–34)
MCHC RBC-ENTMCNC: 34.5 % — SIGNIFICANT CHANGE UP (ref 32–36)
MCV RBC AUTO: 92.4 FL — SIGNIFICANT CHANGE UP (ref 80–100)
NRBC # FLD: 0 K/UL — SIGNIFICANT CHANGE UP (ref 0–0)
PHOSPHATE SERPL-MCNC: 2.9 MG/DL — SIGNIFICANT CHANGE UP (ref 2.5–4.5)
PLATELET # BLD AUTO: 306 K/UL — SIGNIFICANT CHANGE UP (ref 150–400)
PMV BLD: 9.8 FL — SIGNIFICANT CHANGE UP (ref 7–13)
POTASSIUM SERPL-MCNC: 4.3 MMOL/L — SIGNIFICANT CHANGE UP (ref 3.5–5.3)
POTASSIUM SERPL-SCNC: 4.3 MMOL/L — SIGNIFICANT CHANGE UP (ref 3.5–5.3)
RBC # BLD: 4.46 M/UL — SIGNIFICANT CHANGE UP (ref 4.2–5.8)
RBC # FLD: 12.4 % — SIGNIFICANT CHANGE UP (ref 10.3–14.5)
SODIUM SERPL-SCNC: 137 MMOL/L — SIGNIFICANT CHANGE UP (ref 135–145)
WBC # BLD: 6.23 K/UL — SIGNIFICANT CHANGE UP (ref 3.8–10.5)
WBC # FLD AUTO: 6.23 K/UL — SIGNIFICANT CHANGE UP (ref 3.8–10.5)

## 2020-08-23 PROCEDURE — 99233 SBSQ HOSP IP/OBS HIGH 50: CPT

## 2020-08-23 RX ORDER — DILTIAZEM HCL 120 MG
240 CAPSULE, EXT RELEASE 24 HR ORAL DAILY
Refills: 0 | Status: DISCONTINUED | OUTPATIENT
Start: 2020-08-23 | End: 2020-08-24

## 2020-08-23 RX ADMIN — HEPARIN SODIUM 5000 UNIT(S): 5000 INJECTION INTRAVENOUS; SUBCUTANEOUS at 05:01

## 2020-08-23 RX ADMIN — HEPARIN SODIUM 5000 UNIT(S): 5000 INJECTION INTRAVENOUS; SUBCUTANEOUS at 17:01

## 2020-08-23 RX ADMIN — Medication 81 MILLIGRAM(S): at 12:03

## 2020-08-23 RX ADMIN — Medication 240 MILLIGRAM(S): at 17:01

## 2020-08-23 RX ADMIN — LEVETIRACETAM 500 MILLIGRAM(S): 250 TABLET, FILM COATED ORAL at 05:01

## 2020-08-23 RX ADMIN — LEVETIRACETAM 500 MILLIGRAM(S): 250 TABLET, FILM COATED ORAL at 17:01

## 2020-08-23 RX ADMIN — ATORVASTATIN CALCIUM 80 MILLIGRAM(S): 80 TABLET, FILM COATED ORAL at 22:47

## 2020-08-23 RX ADMIN — LISINOPRIL 20 MILLIGRAM(S): 2.5 TABLET ORAL at 05:01

## 2020-08-23 NOTE — PROGRESS NOTE ADULT - SUBJECTIVE AND OBJECTIVE BOX
[facility] Division of Hospital Medicine  Rani Tucker MD  Pager [phone]      Patient is a 85y old  Male who presents with a chief complaint of R hand weakness (22 Aug 2020 13:13)      SUBJECTIVE / OVERNIGHT EVENTS:    No acute event o/n. No new complaint    ADDITIONAL REVIEW OF SYSTEMS:    RESPIRATORY: No cough, wheezing, chills or hemoptysis; No shortness of breath  CARDIOVASCULAR: No chest pain, palpitations, dizziness, or leg swelling  GASTROINTESTINAL: No abdominal or epigastric pain. No nausea, vomiting, or hematemesis; No diarrhea or constipation. No melena or hematochezia.    MEDICATIONS  (STANDING):  aspirin enteric coated 81 milliGRAM(s) Oral daily  atorvastatin 80 milliGRAM(s) Oral at bedtime  heparin   Injectable 5000 Unit(s) SubCutaneous every 12 hours  levETIRAcetam 500 milliGRAM(s) Oral two times a day  lisinopril 20 milliGRAM(s) Oral daily    MEDICATIONS  (PRN):      CAPILLARY BLOOD GLUCOSE        I&O's Summary      PHYSICAL EXAM:  Vital Signs Last 24 Hrs  T(C): 36.6 (23 Aug 2020 12:00), Max: 36.6 (23 Aug 2020 01:50)  T(F): 97.8 (23 Aug 2020 12:00), Max: 97.8 (23 Aug 2020 01:50)  HR: 80 (23 Aug 2020 12:00) (69 - 83)  BP: 145/78 (23 Aug 2020 12:00) (119/71 - 145/78)  BP(mean): --  RR: 18 (23 Aug 2020 12:00) (18 - 18)  SpO2: 100% (23 Aug 2020 12:00) (97% - 100%)    CONSTITUTIONAL: NAD,   EYES: PERRLA; conjunctiva and sclera clear  ENMT: Moist oral mucosa, no pharyngeal injection or exudates;   NECK: Supple, no palpable masses;   RESPIRATORY: Normal respiratory effort; lungs are clear to auscultation bilaterally  CARDIOVASCULAR: Regular rate and rhythm, normal S1 and S2, no murmur/rub/gallop; No lower extremity edema; Peripheral pulses are 2+ bilaterally  ABDOMEN: Nontender to palpation, normoactive bowel sounds, no rebound/guarding;   MUSCLOSKELETAL:   no clubbing or cyanosis of digits; no joint swelling or tenderness to palpation  PSYCH: A+O to person, place, and time; affect appropriate  NEUROLOGY: CN 2-12 are intact and symmetric; RUE/RLE strength 4/5. sensation intact  SKIN: No rashes; no palpable lesions    LABS:                      RADIOLOGY & ADDITIONAL TESTS:  Results Reviewed:   Imaging Personally Reviewed:  Electrocardiogram Personally Reviewed:    COORDINATION OF CARE:  Care Discussed with Consultants/Other Providers [Y/N]:  Prior or Outpatient Records Reviewed [Y/N]:

## 2020-08-23 NOTE — PROGRESS NOTE ADULT - PROBLEM SELECTOR PLAN 6
Condition:: History of bcc Please Describe Your Condition:: Located to right clavicular neck S/P [facility] 1/30/20 - hep sc for VTE px

## 2020-08-23 NOTE — PROGRESS NOTE ADULT - PROBLEM SELECTOR PLAN 5
as per PCP remote hx of afib not on ac d/t frequent falls leading to intracranial bleed. on cardizem/ digoxin per record but pt only takes them intermittently  -currently in NSR  -hold digoxin, Cardizem for now as per PCP remote hx of afib not on ac d/t frequent falls leading to intracranial bleed. on cardizem/ digoxin per record but pt only takes them intermittently  -in afib with RVR -150 today  -hold digoxin, resume Cardizem for now

## 2020-08-24 DIAGNOSIS — N17.9 ACUTE KIDNEY FAILURE, UNSPECIFIED: ICD-10-CM

## 2020-08-24 LAB
ANION GAP SERPL CALC-SCNC: 13 MMO/L — SIGNIFICANT CHANGE UP (ref 7–14)
BUN SERPL-MCNC: 23 MG/DL — SIGNIFICANT CHANGE UP (ref 7–23)
CALCIUM SERPL-MCNC: 8.8 MG/DL — SIGNIFICANT CHANGE UP (ref 8.4–10.5)
CHLORIDE SERPL-SCNC: 102 MMOL/L — SIGNIFICANT CHANGE UP (ref 98–107)
CHLORIDE UR-SCNC: 32 MMOL/L — SIGNIFICANT CHANGE UP
CO2 SERPL-SCNC: 21 MMOL/L — LOW (ref 22–31)
CREAT ?TM UR-MCNC: 352.2 MG/DL — SIGNIFICANT CHANGE UP
CREAT SERPL-MCNC: 1.48 MG/DL — HIGH (ref 0.5–1.3)
GLUCOSE SERPL-MCNC: 87 MG/DL — SIGNIFICANT CHANGE UP (ref 70–99)
HCT VFR BLD CALC: 40.7 % — SIGNIFICANT CHANGE UP (ref 39–50)
HGB BLD-MCNC: 13.9 G/DL — SIGNIFICANT CHANGE UP (ref 13–17)
MAGNESIUM SERPL-MCNC: 2.1 MG/DL — SIGNIFICANT CHANGE UP (ref 1.6–2.6)
MCHC RBC-ENTMCNC: 32.3 PG — SIGNIFICANT CHANGE UP (ref 27–34)
MCHC RBC-ENTMCNC: 34.2 % — SIGNIFICANT CHANGE UP (ref 32–36)
MCV RBC AUTO: 94.7 FL — SIGNIFICANT CHANGE UP (ref 80–100)
NRBC # FLD: 0 K/UL — SIGNIFICANT CHANGE UP (ref 0–0)
PHOSPHATE SERPL-MCNC: 3.6 MG/DL — SIGNIFICANT CHANGE UP (ref 2.5–4.5)
PLATELET # BLD AUTO: 272 K/UL — SIGNIFICANT CHANGE UP (ref 150–400)
PMV BLD: 10 FL — SIGNIFICANT CHANGE UP (ref 7–13)
POTASSIUM SERPL-MCNC: 3.8 MMOL/L — SIGNIFICANT CHANGE UP (ref 3.5–5.3)
POTASSIUM SERPL-SCNC: 3.8 MMOL/L — SIGNIFICANT CHANGE UP (ref 3.5–5.3)
POTASSIUM UR-SCNC: 63.6 MMOL/L — SIGNIFICANT CHANGE UP
PROT UR-MCNC: 53.5 MG/DL — SIGNIFICANT CHANGE UP
RBC # BLD: 4.3 M/UL — SIGNIFICANT CHANGE UP (ref 4.2–5.8)
RBC # FLD: 12.4 % — SIGNIFICANT CHANGE UP (ref 10.3–14.5)
SODIUM SERPL-SCNC: 136 MMOL/L — SIGNIFICANT CHANGE UP (ref 135–145)
SODIUM UR-SCNC: 63 MMOL/L — SIGNIFICANT CHANGE UP
WBC # BLD: 5.85 K/UL — SIGNIFICANT CHANGE UP (ref 3.8–10.5)
WBC # FLD AUTO: 5.85 K/UL — SIGNIFICANT CHANGE UP (ref 3.8–10.5)

## 2020-08-24 PROCEDURE — 99233 SBSQ HOSP IP/OBS HIGH 50: CPT

## 2020-08-24 PROCEDURE — 99232 SBSQ HOSP IP/OBS MODERATE 35: CPT

## 2020-08-24 RX ORDER — DILTIAZEM HCL 120 MG
120 CAPSULE, EXT RELEASE 24 HR ORAL DAILY
Refills: 0 | Status: DISCONTINUED | OUTPATIENT
Start: 2020-08-24 | End: 2020-08-29

## 2020-08-24 RX ORDER — SODIUM CHLORIDE 9 MG/ML
1000 INJECTION INTRAMUSCULAR; INTRAVENOUS; SUBCUTANEOUS
Refills: 0 | Status: DISCONTINUED | OUTPATIENT
Start: 2020-08-24 | End: 2020-08-27

## 2020-08-24 RX ADMIN — Medication 81 MILLIGRAM(S): at 11:37

## 2020-08-24 RX ADMIN — HEPARIN SODIUM 5000 UNIT(S): 5000 INJECTION INTRAVENOUS; SUBCUTANEOUS at 07:14

## 2020-08-24 RX ADMIN — LEVETIRACETAM 500 MILLIGRAM(S): 250 TABLET, FILM COATED ORAL at 07:15

## 2020-08-24 RX ADMIN — LEVETIRACETAM 500 MILLIGRAM(S): 250 TABLET, FILM COATED ORAL at 17:12

## 2020-08-24 RX ADMIN — ATORVASTATIN CALCIUM 80 MILLIGRAM(S): 80 TABLET, FILM COATED ORAL at 22:31

## 2020-08-24 RX ADMIN — SODIUM CHLORIDE 75 MILLILITER(S): 9 INJECTION INTRAMUSCULAR; INTRAVENOUS; SUBCUTANEOUS at 11:36

## 2020-08-24 RX ADMIN — HEPARIN SODIUM 5000 UNIT(S): 5000 INJECTION INTRAVENOUS; SUBCUTANEOUS at 17:12

## 2020-08-24 NOTE — PROGRESS NOTE ADULT - NSHPATTENDINGPLANDISCUSS_GEN_ALL_CORE
stroke team
Dr. Guerra
patient, neurology, medical and neuroradiology teams.
pt, ACP, RN

## 2020-08-24 NOTE — PROGRESS NOTE ADULT - SUBJECTIVE AND OBJECTIVE BOX
Interval History:  Patient seen and examined at the bedside. No acute events overnight    MEDICATIONS  (STANDING):  aspirin enteric coated 81 milliGRAM(s) Oral daily  atorvastatin 80 milliGRAM(s) Oral at bedtime  clopidogrel Tablet 75 milliGRAM(s) Oral daily  heparin   Injectable 5000 Unit(s) SubCutaneous every 12 hours  levETIRAcetam 500 milliGRAM(s) Oral two times a day  lisinopril 20 milliGRAM(s) Oral daily    MEDICATIONS  (PRN):    Allergies  No Known Allergies    Intolerances      ROS: Pertinent positives in HPI, all other ROS were reviewed and are negative.      Vital Signs Last 24 Hrs  T(C): 36.4 (20 Aug 2020 03:40), Max: 36.9 (19 Aug 2020 15:33)  T(F): 97.6 (20 Aug 2020 03:40), Max: 98.4 (19 Aug 2020 15:33)  HR: 80 (20 Aug 2020 03:40) (67 - 81)  BP: 153/76 (20 Aug 2020 03:40) (134/90 - 166/88)  BP(mean): --  RR: 18 (20 Aug 2020 03:40) (17 - 18)  SpO2: 97% (20 Aug 2020 03:40) (96% - 99%)    NEUROLOGICAL EXAM:  Mental status: Unable to state exact age (incorrect by 2 years), knows the season but not current month, does not know name of current president  Coordination: Moderate R arm ataxia w/ FTN  Motor: b/l arm drift w/ R>L, deltoid strength b/l 4/5, mild R leg drift      Labs:      CBC                   13.9   5.85  )-----------( 272      ( 24 Aug 2020 06:00 )             40.7     08-24  Chem panel  136  |  102  |  23  ----------------------------<  87  3.8   |  21<L>  |  1.48<H>    Ca    8.8      24 Aug 2020 06:00  Phos  3.6     08-24  Mg     2.1     08-24 Interval History:  Patient seen and examined at the bedside. No acute events overnight    MEDICATIONS  (STANDING):  aspirin enteric coated 81 milliGRAM(s) Oral daily  atorvastatin 80 milliGRAM(s) Oral at bedtime  clopidogrel Tablet 75 milliGRAM(s) Oral daily  heparin   Injectable 5000 Unit(s) SubCutaneous every 12 hours  levETIRAcetam 500 milliGRAM(s) Oral two times a day  lisinopril 20 milliGRAM(s) Oral daily    MEDICATIONS  (PRN):    Allergies  No Known Allergies    Intolerances      ROS: Pertinent positives in HPI, all other ROS were reviewed and are negative.      Vital Signs Last 24 Hrs  T(C): 36.4 (20 Aug 2020 03:40), Max: 36.9 (19 Aug 2020 15:33)  T(F): 97.6 (20 Aug 2020 03:40), Max: 98.4 (19 Aug 2020 15:33)  HR: 80 (20 Aug 2020 03:40) (67 - 81)  BP: 153/76 (20 Aug 2020 03:40) (134/90 - 166/88)  BP(mean): --  RR: 18 (20 Aug 2020 03:40) (17 - 18)  SpO2: 97% (20 Aug 2020 03:40) (96% - 99%)    NEUROLOGICAL EXAM:  Mental status: Unable to state exact age (incorrect by 2 years), knows the season but not current month or year, does not know name of current president  Coordination: Moderate R arm ataxia w/ FTN  Motor: b/l arm drift w/ R>L, deltoid strength b/l 4/5, mild R leg drift  sensory: intact to LT and temp      Labs:      CBC                   13.9   5.85  )-----------( 272      ( 24 Aug 2020 06:00 )             40.7     08-24  Chem panel  136  |  102  |  23  ----------------------------<  87  3.8   |  21<L>  |  1.48<H>    Ca    8.8      24 Aug 2020 06:00  Phos  3.6     08-24  Mg     2.1     08-24

## 2020-08-24 NOTE — PROGRESS NOTE ADULT - SUBJECTIVE AND OBJECTIVE BOX
GENERAL SURGERY PROGRESS NOTE    INTERVAL EVENTS: No acute events overnight. Patient has not had stress test yet as he is pending neurology clearance. Pending CEA on Wednesday if medical and cardiac optimization is acheived      OBJECTIVE:    Vital Signs Last 24 Hrs  T(C): 36.3 (24 Aug 2020 08:00), Max: 36.8 (23 Aug 2020 21:00)  T(F): 97.3 (24 Aug 2020 08:00), Max: 98.3 (23 Aug 2020 22:43)  HR: 76 (24 Aug 2020 08:00) (66 - 86)  BP: 120/69 (24 Aug 2020 08:00) (90/53 - 155/83)  BP(mean): --  RR: 18 (24 Aug 2020 08:00) (18 - 18)  SpO2: 97% (24 Aug 2020 08:00) (97% - 100%)    General: AAOx3, NAD, resting comfortably   HEENT: NC/AT  Respiratory: no increased work of breathing   Abdomen: soft, nontender, nondistended  Neuro: No gross deficits. full ROM/b/l UE strength 5/5 wnl, b/l  strength, no focal deficits, no sensory deficits, no slurred speech or facial droop  Ext: warm and well perfused, cap refill <2      I&O's Summary    I&O's Detail        LABS:                        13.9   5.85  )-----------( 272      ( 24 Aug 2020 06:00 )             40.7     08-24    136  |  102  |  23  ----------------------------<  87  3.8   |  21<L>  |  1.48<H>    Ca    8.8      24 Aug 2020 06:00  Phos  3.6     08-24  Mg     2.1     08-24            RADIOLOGY & ADDITIONAL STUDIES:

## 2020-08-24 NOTE — PROGRESS NOTE ADULT - PROBLEM SELECTOR PLAN 4
- cont keppra BP acceptable   -Was on  lisinopril/Cardizem, Lisinopril on hold given FRANCA   - Avoid hypotension

## 2020-08-24 NOTE — PROGRESS NOTE ADULT - ASSESSMENT
ASSESSMENT:  85M p/w right upper extremity stroke-like sx, with severe b/l carotid stenosis, MR significant for acute left-sided infarct. Pt in need of CEA 2/2 symptomatic L-ICA stenosis.     PLAN:  - Patient will need operative intervention L CEA   - Pending cardiac stress test prior to surgery  - Surgery tentatively scheduled for Wednesday- will need stress test before   - C/w ASA and statin  - C/w care per primary team       Yoon Ramirez, PGY2  Vascular Surgery, C Team  Pager [phone]

## 2020-08-24 NOTE — PROGRESS NOTE ADULT - ATTENDING COMMENTS
I have discussed the case with the surgical house staff. I have personally seen, examined, and participated in the care of this patient. I have reviewed all pertinent clinical information.    Patient with left symptomatic carotid stenosis, right asymptomatic carotid stenosis.  Planning for left carotid endarterectomy pending cardiac clearance.  Left message for Lizzeth Leslie (emergency contact) but have been unable to speak with her. Will attempt again.

## 2020-08-24 NOTE — PROGRESS NOTE ADULT - SUBJECTIVE AND OBJECTIVE BOX
Colleen Cantorira  Hospitalist  Pager- [phone]      Patient is a 85y old  Male who presents with a chief complaint of R hand weakness (22 Aug 2020 13:13)      SUBJECTIVE / OVERNIGHT EVENTS:   No acute event o/n. No new complaint  intermittently confused     ADDITIONAL REVIEW OF SYSTEMS:    RESPIRATORY: No cough, wheezing, chills or hemoptysis; No shortness of breath  CARDIOVASCULAR: No chest pain, palpitations, dizziness, or leg swelling  GASTROINTESTINAL: No abdominal or epigastric pain. No nausea, vomiting, or hematemesis; No diarrhea or constipation. No melena or hematochezia.    MEDICATIONS  (STANDING):  aspirin enteric coated 81 milliGRAM(s) Oral daily  atorvastatin 80 milliGRAM(s) Oral at bedtime  diltiazem    milliGRAM(s) Oral daily  heparin   Injectable 5000 Unit(s) SubCutaneous every 12 hours  levETIRAcetam 500 milliGRAM(s) Oral two times a day  lisinopril 20 milliGRAM(s) Oral daily  sodium chloride 0.9%. 1000 milliLiter(s) (75 mL/Hr) IV Continuous <Continuous>    MEDICATIONS  (PRN):      CAPILLARY BLOOD GLUCOSE        I&O's Summary      PHYSICAL EXAM:  Vital Signs Last 24 Hrs  T(C): 36.3 (24 Aug 2020 08:00), Max: 36.8 (23 Aug 2020 21:00)  T(F): 97.3 (24 Aug 2020 08:00), Max: 98.3 (23 Aug 2020 22:43)  HR: 76 (24 Aug 2020 08:00) (66 - 86)  BP: 120/69 (24 Aug 2020 08:00) (90/53 - 155/83)  BP(mean): --  RR: 18 (24 Aug 2020 08:00) (18 - 18)  SpO2: 97% (24 Aug 2020 08:00) (97% - 100%)  CONSTITUTIONAL: NAD,   EYES: PERRLA; conjunctiva and sclera clear  ENMT: Moist oral mucosa, no pharyngeal injection or exudates;   NECK: Supple, no palpable masses;   RESPIRATORY: Normal respiratory effort; lungs are clear to auscultation bilaterally  CARDIOVASCULAR: Regular rate and rhythm, normal S1 and S2, no murmur/rub/gallop; No lower extremity edema; Peripheral pulses are 2+ bilaterally  ABDOMEN: Nontender to palpation, normoactive bowel sounds, no rebound/guarding;   MUSCLOSKELETAL:   no clubbing or cyanosis of digits; no joint swelling or tenderness to palpation  PSYCH: A+O to person, place, and time; affect appropriate  NEUROLOGY: CN 2-12 are intact and symmetric; RUE/RLE strength 4/5. sensation intact  SKIN: No rashes; no palpable lesions    LABS:                            13.9   5.85  )-----------( 272      ( 24 Aug 2020 06:00 )             40.7     08-24    136  |  102  |  23  ----------------------------<  87  3.8   |  21<L>  |  1.48<H>    Ca    8.8      24 Aug 2020 06:00  Phos  3.6     08-24  Mg     2.1     08-24                  RADIOLOGY & ADDITIONAL TESTS:  Results Reviewed:   Imaging Personally Reviewed:  Electrocardiogram Personally Reviewed:    COORDINATION OF CARE:  Care Discussed with Consultants/Other Providers [Y/N]:  Prior or Outpatient Records Reviewed [Y/N]:

## 2020-08-24 NOTE — PROGRESS NOTE ADULT - PROBLEM SELECTOR PLAN 5
HR in 70-80s  as per PCP remote hx of afib not on ac d/t frequent falls leading to intracranial bleed. on cardizem/ digoxin per record but pt only takes them intermittently  -in afib with RVR -150 today  -hold digoxin, resume Cardizem for now cont keppra

## 2020-08-24 NOTE — PROGRESS NOTE ADULT - PROBLEM SELECTOR PLAN 1
Acute CVA with  RUE hemiparesis /numbness,etiology  likely arteriosclerosis 2/2 symptomatic L-ICA stenosis.  slowly improving but not fully resolved.   MR brain confirmed acute CVA L percentral gyrus    CTA head/neck c/w severe L ICA stenosis and multiple cerebral artery stenosis   -carotid duplex c/w severe L ICA stenosis  -MRA head/neck reviewd  Apprecite vascular- tentative plan for CEA on Wednesday   -c/w asa, statin. Hold plavix pending surgery   -c/w tele  - echo normal LVEF. bubble study not done   -s/p swallow eval- mechanical soft with thin liquids  -PT/OT eval noted   -stroke team f/u appreciated- pending CEA.  No indication for ILR

## 2020-08-24 NOTE — PROGRESS NOTE ADULT - PROBLEM SELECTOR PLAN 6
- hep sc for VTE px HR in 70-80s  as per PCP remote hx of afib not on ac d/t frequent falls leading to intracranial bleed. on cardizem/ digoxin per record but pt only takes them intermittently  -in afib with RVR -150 today  -hold digoxin, resume Cardizem for now

## 2020-08-24 NOTE — PROGRESS NOTE ADULT - PROBLEM SELECTOR PLAN 3
BP acceptable   - lisinopril/Cardizem resumed  - held this AM for hold parameters likely pre-renal  from hemodynamic instability( BP dropped to the 90s overnight)   Other possibility is LEONARDA  Pt received contrast on 8/18, creatinine started to uptrend on 8/22  Will dc Lisinopril  will administer gentle IVF  and monitor

## 2020-08-24 NOTE — CHART NOTE - NSCHARTNOTEFT_GEN_A_CORE
Discussed with stress lab that patient may be unable to tolerate pharmacologic stress test. Gen cards now recommending dobutamine stress echo.    Jose Alejandro Strong MD  Cardiology fellow, F1  [phone]

## 2020-08-24 NOTE — PROGRESS NOTE ADULT - ASSESSMENT
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 Neurologic exam notable for RUE drift, R FNF dysmetria, RUE sensory (mild loss), and inability to state accurate date or age. CTH showed extensive encephalomalacia in R frontal region with multiple chronic lacunar infarcts. CTA H/N showed heavy atherosclerotic calcification involving the proximal left internal carotid artery with severe stenosis resulting in "string sign". Presumed severe stenosis of the origin of the right internal carotid artery secondary to heavy vascular atherosclerotic calcification and poor visualization of the lumen. Occlusion of the V1 and proximal V2 segments of the left vertebral artery. Segments of severe stenosis of the intradural left vertebral artery. Fetal origin of the right PCA. A 3 mm outpouching from the posterior aspect of the right posterior communicating artery may represent an aneurysm versus blunted hypoplastic right P1 segment. Carotid Duplex demonstrated 80-99% stenosis of R ICA and 16-49% stenosis of L ICA. TTE showed EF 59%, mild pulm htn and LV thickening. MRI shows acute infarct in the L frontal cortex corresponding to the primary motor cortex region, consistent with patient symptoms of R nasolabial flattening and RUE weakness.  MRA shows bilateral high grade internal carotid stenosis.  Patient likely has severe carotid stenosis bilaterally as CTA and MRA more diagnostically accurate than Carotid duplex. However patient will likely need cerebral angio prior to any surgical intervention. Discussion had with patient life partner Lizzeth Leslie about risks/benefits of cerebral angio and carotic stenting/ CEA. She is not formal HCP however has been taking care of the patient since 1995 and is the decision maker for the patient. She consents over the phone to cerebral angio and surgical intervention.      Impression   R hemiparesis + dysmetria 2/2 L frontal cortex ischemia in the primary motor cortex region likely 2/2 artery-artery embolus given high grade L ICA stenosis with concurrent asymptomatic high grade R ICA stenosis. Given hx of afib, competing infarct mechanism of cardioembolic source is also of consideration.     Recommendations  - Patient to receive L CEA procedure, appreciate vascular recs  - Continue ASA 81mg  - Continue Atorvastatin 80mg  - Plavix 75mg on hold 2/2 CEA, per medicine note  - No ILR needed as pt has hx of afib.    Case discussed with neurology attending Dr. 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 Neurologic exam notable for RUE drift, R FNF dysmetria, RUE sensory (mild loss), and inability to state accurate date or age. CTH showed extensive encephalomalacia in R frontal region with multiple chronic lacunar infarcts. CTA H/N showed heavy atherosclerotic calcification involving the proximal left internal carotid artery with severe stenosis resulting in "string sign". Presumed severe stenosis of the origin of the right internal carotid artery secondary to heavy vascular atherosclerotic calcification and poor visualization of the lumen. Occlusion of the V1 and proximal V2 segments of the left vertebral artery. Segments of severe stenosis of the intradural left vertebral artery. Fetal origin of the right PCA. A 3 mm outpouching from the posterior aspect of the right posterior communicating artery may represent an aneurysm versus blunted hypoplastic right P1 segment. Carotid Duplex demonstrated 80-99% stenosis of R ICA and 16-49% stenosis of L ICA. TTE showed EF 59%, mild pulm htn and LV thickening. MRI shows acute infarct in the L frontal cortex corresponding to the primary motor cortex region, consistent with patient symptoms of R nasolabial flattening and RUE weakness.  MRA shows bilateral high grade internal carotid stenosis.  Patient likely has severe carotid stenosis bilaterally as per CTA and MRA, although results are discrepant with Carotid duplex.     Impression   R hemiparesis + dysmetria 2/2 L frontal cortex infarction in the primary motor cortex region likely 2/2 artery-artery embolism given apparent  high grade L ICA stenosis with concurrent asymptomatic high grade R ICA stenosis. Given hx of afib, competing infarct mechanism of cardioembolic source is also of consideration.     Recommendations  - Patient to undergo L CEA ,   - Continue ASA 81mg  - Continue Atorvastatin 80mg  - Plavix 75mg on hold 2/2 CEA, per medicine note  - No ILR needed as pt has hx of afib.  -Following CEA, when cleared by vascular surgery, can start anticoagulation if no contraindication (and stop ASA)    Case discussed with neurology attending Dr. Libman

## 2020-08-24 NOTE — PROGRESS NOTE ADULT - PROBLEM SELECTOR PLAN 7
1.  Name of PCP: Dr. Hampton  2.  PCP Contacted on Admission: [x ] Y    [ ] N    3.  PCP contacted at Discharge: [ ] Y    [ ] N    [ ] N/A  4.  Post-Discharge Appointment Date and Location:  5.  Summary of Handoff given to PCP: - hep sc for VTE px

## 2020-08-25 LAB
ANION GAP SERPL CALC-SCNC: 13 MMO/L — SIGNIFICANT CHANGE UP (ref 7–14)
BUN SERPL-MCNC: 19 MG/DL — SIGNIFICANT CHANGE UP (ref 7–23)
CALCIUM SERPL-MCNC: 8.6 MG/DL — SIGNIFICANT CHANGE UP (ref 8.4–10.5)
CHLORIDE SERPL-SCNC: 106 MMOL/L — SIGNIFICANT CHANGE UP (ref 98–107)
CO2 SERPL-SCNC: 19 MMOL/L — LOW (ref 22–31)
CREAT SERPL-MCNC: 1.09 MG/DL — SIGNIFICANT CHANGE UP (ref 0.5–1.3)
GLUCOSE SERPL-MCNC: 92 MG/DL — SIGNIFICANT CHANGE UP (ref 70–99)
HCT VFR BLD CALC: 38.7 % — LOW (ref 39–50)
HGB BLD-MCNC: 12.9 G/DL — LOW (ref 13–17)
MAGNESIUM SERPL-MCNC: 2 MG/DL — SIGNIFICANT CHANGE UP (ref 1.6–2.6)
MCHC RBC-ENTMCNC: 31 PG — SIGNIFICANT CHANGE UP (ref 27–34)
MCHC RBC-ENTMCNC: 33.3 % — SIGNIFICANT CHANGE UP (ref 32–36)
MCV RBC AUTO: 93 FL — SIGNIFICANT CHANGE UP (ref 80–100)
NRBC # FLD: 0 K/UL — SIGNIFICANT CHANGE UP (ref 0–0)
PHOSPHATE SERPL-MCNC: 3 MG/DL — SIGNIFICANT CHANGE UP (ref 2.5–4.5)
PLATELET # BLD AUTO: 229 K/UL — SIGNIFICANT CHANGE UP (ref 150–400)
PMV BLD: 9.8 FL — SIGNIFICANT CHANGE UP (ref 7–13)
POTASSIUM SERPL-MCNC: 4.3 MMOL/L — SIGNIFICANT CHANGE UP (ref 3.5–5.3)
POTASSIUM SERPL-SCNC: 4.3 MMOL/L — SIGNIFICANT CHANGE UP (ref 3.5–5.3)
RBC # BLD: 4.16 M/UL — LOW (ref 4.2–5.8)
RBC # FLD: 12.3 % — SIGNIFICANT CHANGE UP (ref 10.3–14.5)
SODIUM SERPL-SCNC: 138 MMOL/L — SIGNIFICANT CHANGE UP (ref 135–145)
WBC # BLD: 5.53 K/UL — SIGNIFICANT CHANGE UP (ref 3.8–10.5)
WBC # FLD AUTO: 5.53 K/UL — SIGNIFICANT CHANGE UP (ref 3.8–10.5)

## 2020-08-25 PROCEDURE — 93010 ELECTROCARDIOGRAM REPORT: CPT

## 2020-08-25 PROCEDURE — 99232 SBSQ HOSP IP/OBS MODERATE 35: CPT | Mod: GC

## 2020-08-25 PROCEDURE — 99233 SBSQ HOSP IP/OBS HIGH 50: CPT

## 2020-08-25 RX ADMIN — SODIUM CHLORIDE 75 MILLILITER(S): 9 INJECTION INTRAMUSCULAR; INTRAVENOUS; SUBCUTANEOUS at 02:34

## 2020-08-25 RX ADMIN — Medication 120 MILLIGRAM(S): at 05:48

## 2020-08-25 RX ADMIN — Medication 81 MILLIGRAM(S): at 12:40

## 2020-08-25 RX ADMIN — LEVETIRACETAM 500 MILLIGRAM(S): 250 TABLET, FILM COATED ORAL at 17:31

## 2020-08-25 RX ADMIN — ATORVASTATIN CALCIUM 80 MILLIGRAM(S): 80 TABLET, FILM COATED ORAL at 22:07

## 2020-08-25 RX ADMIN — LEVETIRACETAM 500 MILLIGRAM(S): 250 TABLET, FILM COATED ORAL at 05:48

## 2020-08-25 RX ADMIN — HEPARIN SODIUM 5000 UNIT(S): 5000 INJECTION INTRAVENOUS; SUBCUTANEOUS at 05:48

## 2020-08-25 NOTE — DIETITIAN INITIAL EVALUATION ADULT. - PERTINENT MEDS FT
MEDICATIONS  (STANDING):  aspirin enteric coated 81 milliGRAM(s) Oral daily  atorvastatin 80 milliGRAM(s) Oral at bedtime  diltiazem    milliGRAM(s) Oral daily  heparin   Injectable 5000 Unit(s) SubCutaneous every 12 hours  levETIRAcetam 500 milliGRAM(s) Oral two times a day  sodium chloride 0.9%. 1000 milliLiter(s) (75 mL/Hr) IV Continuous <Continuous>

## 2020-08-25 NOTE — CHART NOTE - NSCHARTNOTEFT_GEN_A_CORE
Patient not a candidate for dobutamine stress echo. Will discuss w/ interventionist re: diagnostic cath.

## 2020-08-25 NOTE — PROGRESS NOTE ADULT - ASSESSMENT
86 y/o M with hx of pAfib not on AC, HTN, Seizure d/o, SDH s/p R craniotomy, ETOH abuse who presents with R sided arm weakness, found to have acute cva, now requiring preop for CEA.    #Preop: METS >4. Patient able to walk multiple blocks before fatigue w/ assistance of a cane. Denies current CP and dyspnea however reports a recent episode of CP several days ago, lasting 30min. Patient's reliability is questionable given AAOx2 on exam and when asked about whether patient has a history of AF or CVA, patient denies both. No prior stress testing or cath hx. With hx of paroxysmal AF however not on AC 2/2 falls and hx SDH, currently rate controlled. Recent CVA. No valvulopathy or WMA present on echo. ECG w/ NSR and 1st degree AVB. Tele w/ occasional PVCs, sinus tachycardia, PACS, and sinus bradycardia. No AF noted. RCRI: Class III Risk 10.1 % 30-day risk of death, MI, or cardiac arrest. Reynolds score: 0.8 % Risk of myocardial infarction or cardiac arrest, intraoperatively or up to 30 days post-op. Patient is an intermediate risk patient undergoing an intermediate risk surgery.   -will f/u dobutamine stress echo    #HTN: mildly hypertensive  -c/w lisinopril 20mg qd, diltiazem 120mg qd    #pAF: rate controlled  -c/w diltiazem 120mg qd    Jose Alejandro Strong MD  Cardiology fellow, F1  [phone]

## 2020-08-25 NOTE — PROGRESS NOTE ADULT - ATTENDING COMMENTS
I have discussed the case with the surgical house staff. I have personally seen, examined, and participated in the care of this patient. I have reviewed all pertinent clinical information.    Discussed results of imaging studies and recommendations for left cea with patient's partner and sister. Discussed risk of recurrent stroke as well as risks of CEA including stroke, reperfusion syndrome, nerve injury. At this time, they are agreeable for endarterectomy.  Awaiting cardiology clearance.

## 2020-08-25 NOTE — PROGRESS NOTE ADULT - SUBJECTIVE AND OBJECTIVE BOX
VASCULAR SURGERY PROGRESS NOTE    INTERVAL EVENTS: No acute events overnight. Patient unable to undergo stress test due to neurological risk. Now for dobutamine stress test.       OBJECTIVE:    Vital Signs Last 24 Hrs  T(C): 36.8 (25 Aug 2020 09:40), Max: 36.8 (25 Aug 2020 09:40)  T(F): 98.2 (25 Aug 2020 09:40), Max: 98.2 (25 Aug 2020 09:40)  HR: 71 (25 Aug 2020 09:40) (65 - 93)  BP: 130/65 (25 Aug 2020 09:40) (117/59 - 130/73)  BP(mean): --  RR: 16 (25 Aug 2020 09:40) (16 - 18)  SpO2: 100% (25 Aug 2020 09:40) (97% - 100%)    General: AAOx3, NAD, resting comfortably   HEENT: NC/AT  Respiratory: no increased work of breathing   Abdomen: soft, nontender, nondistended  Neuro: No gross deficits. full ROM/b/l UE strength 5/5 wnl, b/l  strength, no focal deficits, no sensory deficits, no slurred speech or facial droop  Ext: warm and well perfused, cap refill <2    I&O's Summary    24 Aug 2020 07:01  -  25 Aug 2020 07:00  --------------------------------------------------------  IN: 0 mL / OUT: 600 mL / NET: -600 mL      I&O's Detail    24 Aug 2020 07:01  -  25 Aug 2020 07:00  --------------------------------------------------------  IN:  Total IN: 0 mL    OUT:    Voided: 600 mL  Total OUT: 600 mL    Total NET: -600 mL            LABS:                        12.9   5.53  )-----------( 229      ( 25 Aug 2020 06:30 )             38.7     08-25    138  |  106  |  19  ----------------------------<  92  4.3   |  19<L>  |  1.09    Ca    8.6      25 Aug 2020 06:30  Phos  3.0     08-25  Mg     2.0     08-25            RADIOLOGY & ADDITIONAL STUDIES:

## 2020-08-25 NOTE — CHART NOTE - NSCHARTNOTEFT_GEN_A_CORE
Patient pending OR for L CEA. Awaiting dobutamine stress test. As of right now, patient has not had stress test yet and per echo lab may occur tonight.    In anticipation of possible OR in AM, please make NPO at midnight and obtain type and screen with morning labs. If echo shows abnormalities requiring intervention we will cancel the case and await cardiac optimization.    Vascular Surgery [id number]

## 2020-08-25 NOTE — PROGRESS NOTE ADULT - ASSESSMENT
85M p/w right upper extremity stroke-like sx, with severe b/l carotid stenosis, MR significant for acute left-sided infarct. Pt in need of CEA 2/2 symptomatic L-ICA stenosis.     PLAN:  - Planning for operative intervention L CEA pending cardiac and medical optimization for OR  - Pending dobutamine stress test tody- will follow up results   - C/w ASA and statin  - C/w care per primary team       Yoon Ramirez, PGY2  Vascular Surgery, C Team  Pager [phone]

## 2020-08-25 NOTE — DIETITIAN INITIAL EVALUATION ADULT. - PROBLEM SELECTOR PLAN 5
- patient admits to drink 3-4 beers daily. Per life partner, he is only given 1 beer every 3-4 times a week. Per chart review, patient has hx of ETOH abuse with falls and had ?seizures when he stopped drinking (from H&P in 2012).   - No signs of withdrawal currently  - Will place on CIWA PRN

## 2020-08-25 NOTE — DIETITIAN INITIAL EVALUATION ADULT. - PROBLEM SELECTOR PLAN 1
- New R arm weakness/numbness, slowly improving but not fully resolved, concerning for CVA vs TIA  - Admit to tele  - check cbc, bmp, a1c, flp, tsh, trend CE  - Neuro consult appreciated -> permissive HTN for now, MRI Brain for further eval, ASA81/high dose lipitor, TTE, PT/OT/dysphagia screen  - Echo ordered  - start asa   - dysphagia screen failed--> NPO until S&S  - start statin when patient can tolerate PO  - neuro checks  - MRI brain ordered (patient with screws s/p ankle s urgery, but had MRCP and MRI brain in the past]  - PT/OT/fall risk/aspiration/seizure precautions

## 2020-08-25 NOTE — PROGRESS NOTE ADULT - PROBLEM SELECTOR PLAN 4
BP acceptable   -Was on  lisinopril/Cardizem, Lisinopril on hold given FRANCA   - Avoid hypotension BP acceptable   -Was on  lisinopril/Cardizem, Lisinopril on hold given FRANCA   - Cardizem reduced to 120 mg   - Avoid hypotension

## 2020-08-25 NOTE — DIETITIAN INITIAL EVALUATION ADULT. - PROBLEM SELECTOR PLAN 2
- Vascular and neuro following  - carotid dopplers ordered  - start asa MN  - cont statin when patient can tolerate PO

## 2020-08-25 NOTE — PROGRESS NOTE ADULT - PROBLEM SELECTOR PLAN 1
Acute CVA with  RUE hemiparesis /numbness,etiology  likely arteriosclerosis 2/2 symptomatic L-ICA stenosis.  slowly improving but not fully resolved.   MR brain confirmed acute CVA L percentral gyrus    CTA head/neck c/w severe L ICA stenosis and multiple cerebral artery stenosis   -carotid duplex c/w  L ICA stenosis  -MRA head/neck reviewd  Appreciate vascular- tentative plan for CEA on Wednesday pending cardiac clearance  -c/w asa, statin. Hold plavix pending surgery   -c/w tele  - echo normal LVEF. bubble study not done   -s/p swallow eval- mechanical soft with thin liquids  -PT/OT eval noted   -stroke team f/u appreciated- pending CEA.  No indication for ILR Acute CVA with  RUE hemiparesis /numbness,etiology  likely arteriosclerosis 2/2 symptomatic L-ICA stenosis.  slowly improving but not fully resolved.   MR brain confirmed acute CVA L percentral gyrus    CTA head/neck c/w severe L ICA stenosis and multiple cerebral artery stenosis   -carotid duplex c/w  L ICA stenosis  -MRA head/neck reviewd  Appreciate vascular- tentative plan for CEA on Wednesday pending cardiac clearance  -c/w asa, statin. Hold plavix pending surgery   -c/w tele  - echo normal LVEF. bubble study not done   -s/p swallow eval- mechanical soft with thin liquids  -PT/OT eval noted   -stroke team f/u appreciated- pending CEA.  No indication for ILR. Following CEA, when cleared by vascular surgery, can start anticoagulation if no contraindication (and stop ASA)

## 2020-08-25 NOTE — DIETITIAN INITIAL EVALUATION ADULT. - PERTINENT LABORATORY DATA
08-25 Na138 mmol/L Glu 92 mg/dL K+ 4.3 mmol/L Cr  1.09 mg/dL BUN 19 mg/dL 08-25 Phos 3.0 mg/dL 08-18 Chol 127 mg/dL LDL 77 mg/dL HDL 37 mg/dL Trig 79 mg/dL    A1C with Estimated Average Glucose: 5.3 %

## 2020-08-25 NOTE — PROGRESS NOTE ADULT - PROBLEM SELECTOR PLAN 3
likely pre-renal  from hemodynamic instability( BP dropped to the 90s overnight)   Other possibility is LEONARDA( Pt received contrast on 8/18)  Creatinine improving   s/p  gentle IVF    Monitor BMP

## 2020-08-25 NOTE — PROGRESS NOTE ADULT - PROBLEM SELECTOR PLAN 6
HR in 70-80s  as per PCP remote hx of afib not on ac d/t frequent falls leading to intracranial bleed. on cardizem/ digoxin per record but pt only takes them intermittently  -ihold digoxin, resume Cardizem for now  - Will consider AC post CEA if acceptable by pt and family HR in 70-80s  as per PCP remote hx of afib not on ac d/t frequent falls leading to intracranial bleed. on cardizem/ digoxin per record but pt only takes them intermittently  -hold digoxin, resume Cardizem for now  - Will consider AC post CEA if acceptable by pt and family

## 2020-08-25 NOTE — DIETITIAN INITIAL EVALUATION ADULT. - ADD RECOMMEND
1- Continue current diet order, which remains appropriate at this time. 2- Monitor weights, labs, BM's, skin integrity, p.o. intake. 3- Please Encourage po intake, assist with meals and menu selections, provide alternatives PRN. 4- RD remains available, re-consult as needed. Richard Yuan MS, RDN Pager #[phone]

## 2020-08-25 NOTE — PROGRESS NOTE ADULT - SUBJECTIVE AND OBJECTIVE BOX
Colleen Messer  Hospitalist  Pager- [phone]      Patient is a 85y old  Male who presents with a chief complaint of R hand weakness (22 Aug 2020 13:13)      SUBJECTIVE / OVERNIGHT EVENTS:   No acute event o/n. No new complaint  intermittently confused     ADDITIONAL REVIEW OF SYSTEMS:    RESPIRATORY: No cough, wheezing, chills or hemoptysis; No shortness of breath  CARDIOVASCULAR: No chest pain, palpitations, dizziness, or leg swelling  GASTROINTESTINAL: No abdominal or epigastric pain. No nausea, vomiting, or hematemesis; No diarrhea or constipation. No melena or hematochezia.    MEDICATIONS  (STANDING):  aspirin enteric coated 81 milliGRAM(s) Oral daily  atorvastatin 80 milliGRAM(s) Oral at bedtime  diltiazem    milliGRAM(s) Oral daily  heparin   Injectable 5000 Unit(s) SubCutaneous every 12 hours  levETIRAcetam 500 milliGRAM(s) Oral two times a day  sodium chloride 0.9%. 1000 milliLiter(s) (75 mL/Hr) IV Continuous <Continuous>    MEDICATIONS  (PRN):      CAPILLARY BLOOD GLUCOSE        I&O's Summary      PHYSICAL EXAM:    Vital Signs Last 24 Hrs  T(C): 36.8 (25 Aug 2020 09:40), Max: 36.8 (25 Aug 2020 09:40)  T(F): 98.2 (25 Aug 2020 09:40), Max: 98.2 (25 Aug 2020 09:40)  HR: 71 (25 Aug 2020 09:40) (65 - 93)  BP: 130/65 (25 Aug 2020 09:40) (117/59 - 130/73)  BP(mean): --  RR: 16 (25 Aug 2020 09:40) (16 - 18)  SpO2: 100% (25 Aug 2020 09:40) (97% - 100%)      CONSTITUTIONAL: NAD,   EYES: PERRLA; conjunctiva and sclera clear  ENMT: Moist oral mucosa, no pharyngeal injection or exudates;   NECK: Supple, no palpable masses;   RESPIRATORY: Normal respiratory effort; lungs are clear to auscultation bilaterally  CARDIOVASCULAR: Regular rate and rhythm, normal S1 and S2, no murmur/rub/gallop; No lower extremity edema; Peripheral pulses are 2+ bilaterally  ABDOMEN: Nontender to palpation, normoactive bowel sounds, no rebound/guarding;   MUSCLOSKELETAL:   no clubbing or cyanosis of digits; no joint swelling or tenderness to palpation  PSYCH: A+O to person, place, and time; affect appropriate  NEUROLOGY: CN 2-12 are intact and symmetric; RUE/RLE strength 4/5. sensation intact  SKIN: No rashes; no palpable lesions    LABS:                                               12.9   5.53  )-----------( 229      ( 25 Aug 2020 06:30 )             38.7         08-25    138  |  106  |  19  ----------------------------<  92  4.3   |  19<L>  |  1.09    Ca    8.6      25 Aug 2020 06:30  Phos  3.0     08-25  Mg     2.0     08-25              RADIOLOGY & ADDITIONAL TESTS:  Results Reviewed:   Imaging Personally Reviewed:  Electrocardiogram Personally Reviewed:    COORDINATION OF CARE:  Care Discussed with Consultants/Other Providers [Y/N]: vascular, Neurology   Prior or Outpatient Records Reviewed [Y/N]: Colleen Cantorira  Hospitalist  Pager- [phone]      Patient is a 85y old  Male who presents with a chief complaint of R hand weakness (22 Aug 2020 13:13)      SUBJECTIVE / OVERNIGHT EVENTS:   No acute event o/n. No new complaint  Pt aware he may be getting surgery tomorrow   Denies CP/SOB/ No new symptoms     ADDITIONAL REVIEW OF SYSTEMS:    RESPIRATORY: No cough, wheezing, chills or hemoptysis; No shortness of breath  CARDIOVASCULAR: No chest pain, palpitations, dizziness, or leg swelling  GASTROINTESTINAL: No abdominal or epigastric pain. No nausea, vomiting, or hematemesis; No diarrhea or constipation. No melena or hematochezia.    MEDICATIONS  (STANDING):  aspirin enteric coated 81 milliGRAM(s) Oral daily  atorvastatin 80 milliGRAM(s) Oral at bedtime  diltiazem    milliGRAM(s) Oral daily  heparin   Injectable 5000 Unit(s) SubCutaneous every 12 hours  levETIRAcetam 500 milliGRAM(s) Oral two times a day  sodium chloride 0.9%. 1000 milliLiter(s) (75 mL/Hr) IV Continuous <Continuous>    MEDICATIONS  (PRN):      CAPILLARY BLOOD GLUCOSE        I&O's Summary      PHYSICAL EXAM:    Vital Signs Last 24 Hrs  T(C): 36.8 (25 Aug 2020 09:40), Max: 36.8 (25 Aug 2020 09:40)  T(F): 98.2 (25 Aug 2020 09:40), Max: 98.2 (25 Aug 2020 09:40)  HR: 71 (25 Aug 2020 09:40) (65 - 93)  BP: 130/65 (25 Aug 2020 09:40) (117/59 - 130/73)  BP(mean): --  RR: 16 (25 Aug 2020 09:40) (16 - 18)  SpO2: 100% (25 Aug 2020 09:40) (97% - 100%)      CONSTITUTIONAL: NAD,   EYES: PERRLA; conjunctiva and sclera clear  ENMT: Moist oral mucosa, no pharyngeal injection or exudates;   NECK: Supple, no palpable masses;   RESPIRATORY: Normal respiratory effort; lungs are clear to auscultation bilaterally  CARDIOVASCULAR: Regular rate and rhythm, normal S1 and S2, no murmur/rub/gallop; No lower extremity edema; Peripheral pulses are 2+ bilaterally  ABDOMEN: Nontender to palpation, normoactive bowel sounds, no rebound/guarding;   MUSCLOSKELETAL:   no clubbing or cyanosis of digits; no joint swelling or tenderness to palpation  PSYCH: A+O to person, place, and time; affect appropriate  NEUROLOGY: CN 2-12 are intact and symmetric; RUE/RLE strength 4/5. sensation intact  SKIN: No rashes; no palpable lesions    LABS:                                               12.9   5.53  )-----------( 229      ( 25 Aug 2020 06:30 )             38.7         08-25    138  |  106  |  19  ----------------------------<  92  4.3   |  19<L>  |  1.09    Ca    8.6      25 Aug 2020 06:30  Phos  3.0     08-25  Mg     2.0     08-25              RADIOLOGY & ADDITIONAL TESTS:  Results Reviewed:   Imaging Personally Reviewed:  Electrocardiogram Personally Reviewed:    COORDINATION OF CARE:  Care Discussed with Consultants/Other Providers [Y/N]: vascular, Neurology   Prior or Outpatient Records Reviewed [Y/N]:

## 2020-08-25 NOTE — DIETITIAN INITIAL EVALUATION ADULT. - PROBLEM SELECTOR PLAN 6
- Medication list verified with the life partner.   - Patient is on digoxin and Cardizem. Patient or life partner cannot recall why he is on those medications. Denies any history of afib. Check with PMD in AM regarding any previous hx of afib. Cardizem on hold given permissive HTN. Digoxin held given patient failed bedside dysphagia screen. check dig level  - Monitor on tele

## 2020-08-25 NOTE — PROGRESS NOTE ADULT - SUBJECTIVE AND OBJECTIVE BOX
INTERVAL EVENTS: pt denies CP, dyspnea, palpitations. For dobutamine stress echo later today.    PAST MEDICAL & SURGICAL HISTORY:  Cholelithiasis  History of subdural hematoma  Constipation  Hypertension  Seizure  History of ankle surgery: ORIF with screws and plate  Acute subdural hematoma: (May 2014 - subdural hematoma,  pt had SX)      MEDICATIONS  (STANDING):  aspirin enteric coated 81 milliGRAM(s) Oral daily  atorvastatin 80 milliGRAM(s) Oral at bedtime  diltiazem    milliGRAM(s) Oral daily  heparin   Injectable 5000 Unit(s) SubCutaneous every 12 hours  levETIRAcetam 500 milliGRAM(s) Oral two times a day  sodium chloride 0.9%. 1000 milliLiter(s) (75 mL/Hr) IV Continuous <Continuous>    MEDICATIONS  (PRN):      Vital Signs Last 24 Hrs  T(C): 36.6 (25 Aug 2020 05:44), Max: 36.7 (25 Aug 2020 04:00)  T(F): 97.9 (25 Aug 2020 05:44), Max: 98.1 (25 Aug 2020 04:00)  HR: 68 (25 Aug 2020 05:44) (65 - 93)  BP: 130/73 (25 Aug 2020 05:44) (117/59 - 130/73)  BP(mean): --  RR: 18 (25 Aug 2020 05:44) (18 - 18)  SpO2: 100% (25 Aug 2020 05:44) (97% - 100%)     PHYSICAL EXAM:  CONSTITUTIONAL: NAD   EYES: PERRLA; conjunctiva and sclera clear  ENMT: Moist oral mucosa, no pharyngeal injection or exudates;   NECK: Supple, no palpable masses   RESPIRATORY: Normal respiratory effort; lungs are clear to auscultation bilaterally  CARDIOVASCULAR: Regular rate and rhythm, normal S1 and S2, no murmur/rub/gallop; No lower extremity edema; Peripheral pulses are 2+ bilaterally  ABDOMEN: Nontender to palpation, normoactive bowel sounds, no rebound/guarding;   MUSCLOSKELETAL: no clubbing or cyanosis of digits; no joint swelling or tenderness to palpation  NEUROLOGY: RUE/RLE strength 4/5.  SKIN: No rashes; no palpable lesions    LABS:                        12.9   5.53  )-----------( 229      ( 25 Aug 2020 06:30 )             38.7     08-25    138  |  106  |  19  ----------------------------<  92  4.3   |  19<L>  |  1.09    Ca    8.6      25 Aug 2020 06:30  Phos  3.0     08-25  Mg     2.0     08-25      I&O's Summary    24 Aug 2020 07:01  -  25 Aug 2020 07:00  --------------------------------------------------------  IN: 0 mL / OUT: 600 mL / NET: -600 mL      BNP  RADIOLOGY & ADDITIONAL STUDIES:    TELEMETRY: reviewed    EKG: reviewed

## 2020-08-26 ENCOUNTER — TRANSCRIPTION ENCOUNTER (OUTPATIENT)
Age: 85
End: 2020-08-26

## 2020-08-26 LAB
ANION GAP SERPL CALC-SCNC: 15 MMO/L — HIGH (ref 7–14)
APTT BLD: 30.5 SEC — SIGNIFICANT CHANGE UP (ref 27–36.3)
BLD GP AB SCN SERPL QL: NEGATIVE — SIGNIFICANT CHANGE UP
BUN SERPL-MCNC: 14 MG/DL — SIGNIFICANT CHANGE UP (ref 7–23)
CALCIUM SERPL-MCNC: 9 MG/DL — SIGNIFICANT CHANGE UP (ref 8.4–10.5)
CHLORIDE SERPL-SCNC: 104 MMOL/L — SIGNIFICANT CHANGE UP (ref 98–107)
CO2 SERPL-SCNC: 19 MMOL/L — LOW (ref 22–31)
CREAT SERPL-MCNC: 0.97 MG/DL — SIGNIFICANT CHANGE UP (ref 0.5–1.3)
GLUCOSE SERPL-MCNC: 92 MG/DL — SIGNIFICANT CHANGE UP (ref 70–99)
HCT VFR BLD CALC: 38.5 % — LOW (ref 39–50)
HGB BLD-MCNC: 13 G/DL — SIGNIFICANT CHANGE UP (ref 13–17)
INR BLD: 1.23 — HIGH (ref 0.88–1.16)
MAGNESIUM SERPL-MCNC: 2.2 MG/DL — SIGNIFICANT CHANGE UP (ref 1.6–2.6)
MCHC RBC-ENTMCNC: 31.8 PG — SIGNIFICANT CHANGE UP (ref 27–34)
MCHC RBC-ENTMCNC: 33.8 % — SIGNIFICANT CHANGE UP (ref 32–36)
MCV RBC AUTO: 94.1 FL — SIGNIFICANT CHANGE UP (ref 80–100)
NRBC # FLD: 0 K/UL — SIGNIFICANT CHANGE UP (ref 0–0)
PHOSPHATE SERPL-MCNC: 2.8 MG/DL — SIGNIFICANT CHANGE UP (ref 2.5–4.5)
PLATELET # BLD AUTO: 259 K/UL — SIGNIFICANT CHANGE UP (ref 150–400)
PMV BLD: 10 FL — SIGNIFICANT CHANGE UP (ref 7–13)
POTASSIUM SERPL-MCNC: 4 MMOL/L — SIGNIFICANT CHANGE UP (ref 3.5–5.3)
POTASSIUM SERPL-SCNC: 4 MMOL/L — SIGNIFICANT CHANGE UP (ref 3.5–5.3)
PROTHROM AB SERPL-ACNC: 13.9 SEC — HIGH (ref 10.6–13.6)
RBC # BLD: 4.09 M/UL — LOW (ref 4.2–5.8)
RBC # FLD: 12.3 % — SIGNIFICANT CHANGE UP (ref 10.3–14.5)
RH IG SCN BLD-IMP: POSITIVE — SIGNIFICANT CHANGE UP
SODIUM SERPL-SCNC: 138 MMOL/L — SIGNIFICANT CHANGE UP (ref 135–145)
WBC # BLD: 6.62 K/UL — SIGNIFICANT CHANGE UP (ref 3.8–10.5)
WBC # FLD AUTO: 6.62 K/UL — SIGNIFICANT CHANGE UP (ref 3.8–10.5)

## 2020-08-26 PROCEDURE — 93010 ELECTROCARDIOGRAM REPORT: CPT

## 2020-08-26 PROCEDURE — 99233 SBSQ HOSP IP/OBS HIGH 50: CPT

## 2020-08-26 RX ADMIN — Medication 81 MILLIGRAM(S): at 12:46

## 2020-08-26 RX ADMIN — HEPARIN SODIUM 5000 UNIT(S): 5000 INJECTION INTRAVENOUS; SUBCUTANEOUS at 06:20

## 2020-08-26 RX ADMIN — LEVETIRACETAM 500 MILLIGRAM(S): 250 TABLET, FILM COATED ORAL at 06:20

## 2020-08-26 RX ADMIN — Medication 120 MILLIGRAM(S): at 06:20

## 2020-08-26 RX ADMIN — HEPARIN SODIUM 5000 UNIT(S): 5000 INJECTION INTRAVENOUS; SUBCUTANEOUS at 17:54

## 2020-08-26 RX ADMIN — LEVETIRACETAM 500 MILLIGRAM(S): 250 TABLET, FILM COATED ORAL at 18:40

## 2020-08-26 RX ADMIN — ATORVASTATIN CALCIUM 80 MILLIGRAM(S): 80 TABLET, FILM COATED ORAL at 22:22

## 2020-08-26 NOTE — PROGRESS NOTE ADULT - PROBLEM SELECTOR PLAN 4
BP acceptable   -Was on  lisinopril/Cardizem, Lisinopril on hold given FRANCA   - Cardizem reduced to 120 mg   - Avoid hypotension

## 2020-08-26 NOTE — PROGRESS NOTE ADULT - PROBLEM SELECTOR PLAN 6
HR in 70-80s, with an episode to 110s   as per PCP remote hx of afib not on ac d/t frequent falls leading to intracranial bleed. on cardizem/ digoxin per record but pt only takes them intermittently  -hold digoxin, resume Cardizem for now  - Will consider AC post CEA if acceptable by pt and family

## 2020-08-26 NOTE — PROGRESS NOTE ADULT - SUBJECTIVE AND OBJECTIVE BOX
Colleen Messer  Hospitalist  Pager- [phone]      Patient is a 85y old  Male who presents with a chief complaint of R hand weakness (22 Aug 2020 13:13)      SUBJECTIVE / OVERNIGHT EVENTS:   No acute event o/n. No new complaint  Denies CP/SOB/ No new symptoms   know his name, that he is in the hospital but does not know the month or the year    ADDITIONAL REVIEW OF SYSTEMS:    RESPIRATORY: No cough, wheezing, chills or hemoptysis; No shortness of breath  CARDIOVASCULAR: No chest pain, palpitations, dizziness, or leg swelling  GASTROINTESTINAL: No abdominal or epigastric pain. No nausea, vomiting, or hematemesis; No diarrhea or constipation. No melena or hematochezia.    MEDICATIONS  (STANDING):  aspirin enteric coated 81 milliGRAM(s) Oral daily  atorvastatin 80 milliGRAM(s) Oral at bedtime  diltiazem    milliGRAM(s) Oral daily  heparin   Injectable 5000 Unit(s) SubCutaneous every 12 hours  levETIRAcetam 500 milliGRAM(s) Oral two times a day  sodium chloride 0.9%. 1000 milliLiter(s) (75 mL/Hr) IV Continuous <Continuous>    MEDICATIONS  (PRN):      CAPILLARY BLOOD GLUCOSE        I&O's Summary      PHYSICAL EXAM:    Vital Signs Last 24 Hrs  T(C): 36.4 (26 Aug 2020 08:00), Max: 36.8 (25 Aug 2020 16:35)  T(F): 97.5 (26 Aug 2020 08:00), Max: 98.3 (25 Aug 2020 16:35)  HR: 110 (26 Aug 2020 08:00) (64 - 110)  BP: 116/73 (26 Aug 2020 08:00) (116/73 - 136/69)  BP(mean): --  RR: 16 (26 Aug 2020 08:00) (16 - 17)  SpO2: 96% (26 Aug 2020 08:00) (96% - 99%)      CONSTITUTIONAL: NAD,   EYES: PERRLA; conjunctiva and sclera clear  ENMT: Moist oral mucosa, no pharyngeal injection or exudates;   NECK: Supple, no palpable masses;   RESPIRATORY: Normal respiratory effort; lungs are clear to auscultation bilaterally  CARDIOVASCULAR: Regular rate and rhythm, normal S1 and S2, no murmur/rub/gallop; No lower extremity edema; Peripheral pulses are 2+ bilaterally  ABDOMEN: Nontender to palpation, normoactive bowel sounds, no rebound/guarding;   MUSCLOSKELETAL:   no clubbing or cyanosis of digits; no joint swelling or tenderness to palpation  PSYCH: A+O to person, place, and time; affect appropriate  NEUROLOGY: CN 2-12 are intact and symmetric; RUE/RLE strength 4/5. sensation intact  SKIN: No rashes; no palpable lesions    LABS:                                       13.0   6.62  )-----------( 259      ( 26 Aug 2020 05:30 )             38.5       08-26    138  |  104  |  14  ----------------------------<  92  4.0   |  19<L>  |  0.97    Ca    9.0      26 Aug 2020 05:30  Phos  2.8     08-26  Mg     2.2     08-26            RADIOLOGY & ADDITIONAL TESTS:  Results Reviewed:   Imaging Personally Reviewed:  Electrocardiogram Personally Reviewed:    COORDINATION OF CARE:  Care Discussed with Consultants/Other Providers [Y/N]: vascular, Neurology   Prior or Outpatient Records Reviewed [Y/N]:

## 2020-08-26 NOTE — PROGRESS NOTE ADULT - ATTENDING COMMENTS
Patient seen and discussed with Dr. Strong. I agree with the plan of care he delineated. Cardiac functional status is difficult to assess, but ejection fraction is normal. Unable to perform pharmacologic stress testing and coronary intervention is unlikely to meaningfully modify risk. Patient is optimized to proceed with CEA from the cardiac standpoint.    Continue medication for rate control.  continue medical management of atherosclerosis.

## 2020-08-26 NOTE — PROGRESS NOTE ADULT - PROBLEM SELECTOR PLAN 1
Acute CVA with  RUE hemiparesis /numbness,etiology  likely arteriosclerosis 2/2 symptomatic L-ICA stenosis.  MR brain confirmed acute CVA L percentral gyrus    CTA head/neck c/w severe L ICA stenosis and multiple cerebral artery stenosis   -carotid duplex c/w  L ICA stenosis  -MRA head/neck reviewd  Appreciate vascular- tentative plan for CEA on Wednesday pending cardiac clearance  -c/w asa, statin. Hold plavix pending surgery   -c/w tele  - echo normal LVEF. bubble study not done   -s/p swallow eval- mechanical soft with thin liquids  -PT/OT eval noted   -stroke team f/u appreciated- pending CEA.  No indication for ILR. Following CEA, when cleared by vascular surgery, can start anticoagulation if no contraindication (and stop ASA)

## 2020-08-26 NOTE — PROGRESS NOTE ADULT - ASSESSMENT
85M p/w right upper extremity stroke-like sx, with severe b/l carotid stenosis, MR significant for acute left-sided infarct. Pt in need of CEA 2/2 symptomatic L-ICA stenosis.     PLAN:  - Planning for operative intervention L CEA pending cardiac and medical optimization for OR  - Awaiting cardiology decision regarding diagnostic cath  - C/w ASA and statin  - C/w care per primary team       Yoon Ramirez, PGY2  Vascular Surgery, C Team  Pager [phone]

## 2020-08-26 NOTE — DISCHARGE NOTE NURSING/CASE MANAGEMENT/SOCIAL WORK - PATIENT PORTAL LINK FT
You can access the FollowMyHealth Patient Portal offered by [facility] by registering at the following website: [email or web address]. By joining [employer]’s FollowMyHealth portal, you will also be able to view your health information using other applications (apps) compatible with our system.

## 2020-08-26 NOTE — DISCHARGE NOTE NURSING/CASE MANAGEMENT/SOCIAL WORK - NSDCPEPTSTRK_GEN_ALL_CORE
Need for follow up after discharge/Prescribed medications/Risk factors for stroke/Stroke education booklet/Stroke support groups for patients, families, and friends/Call 911 for stroke/Stroke warning signs and symptoms/Signs and symptoms of stroke

## 2020-08-26 NOTE — PROGRESS NOTE ADULT - SUBJECTIVE AND OBJECTIVE BOX
VASCULAR SURGERY PROGRESS NOTE    INTERVAL EVENTS: Patient now deemed unfit for dobutamine stress echo with plans for diagnostic cath.      OBJECTIVE:    Vital Signs Last 24 Hrs  T(C): 36.4 (26 Aug 2020 08:00), Max: 36.8 (25 Aug 2020 16:35)  T(F): 97.5 (26 Aug 2020 08:00), Max: 98.3 (25 Aug 2020 16:35)  HR: 110 (26 Aug 2020 08:00) (64 - 110)  BP: 116/73 (26 Aug 2020 08:00) (116/73 - 136/69)  BP(mean): --  RR: 16 (26 Aug 2020 08:00) (16 - 17)  SpO2: 96% (26 Aug 2020 08:00) (96% - 99%)    General: AAOx3, NAD, resting comfortably   HEENT: NC/AT  Respiratory: no increased work of breathing   Abdomen: soft, nontender, nondistended  Neuro: No gross deficits. full ROM/b/l UE strength 5/5 wnl, b/l  strength, no focal deficits, no sensory deficits, no slurred speech or facial droop  Ext: warm and well perfused, cap refill <2    I&O's Summary    I&O's Detail        LABS:                        13.0   6.62  )-----------( 259      ( 26 Aug 2020 05:30 )             38.5     08-26    138  |  104  |  14  ----------------------------<  92  4.0   |  19<L>  |  0.97    Ca    9.0      26 Aug 2020 05:30  Phos  2.8     08-26  Mg     2.2     08-26      PT/INR - ( 26 Aug 2020 05:30 )   PT: 13.9 SEC;   INR: 1.23          PTT - ( 26 Aug 2020 05:30 )  PTT:30.5 SEC      RADIOLOGY & ADDITIONAL STUDIES:

## 2020-08-26 NOTE — PROGRESS NOTE ADULT - ASSESSMENT
86 y/o M with hx of pAfib not on AC, HTN, Seizure d/o, SDH s/p R craniotomy, ETOH abuse who presents with R sided arm weakness, found to have acute cva, now requiring preop for CEA.    #Preop: METS >4. Patient able to walk multiple blocks before fatigue w/ assistance of a cane. Denies current CP and dyspnea however reports a recent episode of CP several days ago, lasting 30min. Patient's reliability is questionable given AAOx2 on exam and when asked about whether patient has a history of AF or CVA, patient denies both. No prior stress testing or cath hx. With hx of paroxysmal AF however not on AC 2/2 falls and hx SDH, currently rate controlled. Recent CVA. No valvulopathy or WMA present on echo. ECG w/ NSR and 1st degree AVB. Tele w/ occasional PVCs, sinus tachycardia, PACS, and sinus bradycardia. No AF noted. RCRI: Class III Risk 10.1 % 30-day risk of death, MI, or cardiac arrest. Reynolds score: 0.8 % Risk of myocardial infarction or cardiac arrest, intraoperatively or up to 30 days post-op. Patient is an intermediate risk patient undergoing an intermediate risk surgery.   -Per discussion with interventionist, cardiac cath of less benefit in patient undergoing this intermediate risk procedure. No further cardiac testing necessary prior to procedure.    #HTN: mildly hypertensive  -c/w lisinopril 20mg qd, diltiazem 120mg qd    #pAF: rate controlled  -c/w diltiazem 120mg qd    Jose Alejandro Strong MD  Cardiology fellow, F1  [phone] 84 y/o M with hx of pAfib not on AC due to prior intracranial bleed, HTN, Seizure d/o, SDH s/p R craniotomy, ETOH abuse who presented with R sided arm weakness, found to have acute left frontal cva in setting of bilateral carotid stenosis. Asked to evaluate prior to Left CEA.    #Preop: METS >4. Patient able to walk multiple blocks before fatigue w/ assistance of a cane. Denies current CP and dyspnea however reports a recent episode of CP several days ago, lasting 30min. Patient's reliability is questionable given AAOx2 on exam and when asked about whether patient has a history of AF or CVA, patient denies both. No prior stress testing or cath hx. With hx of paroxysmal AF however not on AC 2/2 falls and hx SDH, currently rate controlled. Recent CVA. No valvulopathy or WMA present on echo. ECG w/ NSR and 1st degree AVB. Tele w/ occasional PVCs, sinus tachycardia, PACS, and sinus bradycardia. No AF noted. RCRI: Class III Risk 10.1 % 30-day risk of death, MI, or cardiac arrest. Reynolds score: 0.8 % Risk of myocardial infarction or cardiac arrest, intraoperatively or up to 30 days post-op.     -Per discussion with interventionist, cardiac cath of less benefit in patient undergoing this intermediate risk procedure. No further cardiac testing necessary prior to procedure.    #HTN: mildly hypertensive  -c/w lisinopril 20mg qd, diltiazem 120mg qd    #pAF: rate controlled  -c/w diltiazem 120mg qd    Jose Alejandro Strong MD  Cardiology fellow, F1  [phone]

## 2020-08-26 NOTE — PROGRESS NOTE ADULT - SUBJECTIVE AND OBJECTIVE BOX
INTERVAL EVENTS: no o/n events.     PAST MEDICAL & SURGICAL HISTORY:  Cholelithiasis  History of subdural hematoma  Constipation  Hypertension  Seizure  History of ankle surgery: ORIF with screws and plate  Acute subdural hematoma: (May 2014 - subdural hematoma,  pt had SX)      MEDICATIONS  (STANDING):  aspirin enteric coated 81 milliGRAM(s) Oral daily  atorvastatin 80 milliGRAM(s) Oral at bedtime  diltiazem    milliGRAM(s) Oral daily  heparin   Injectable 5000 Unit(s) SubCutaneous every 12 hours  levETIRAcetam 500 milliGRAM(s) Oral two times a day  sodium chloride 0.9%. 1000 milliLiter(s) (75 mL/Hr) IV Continuous <Continuous>    MEDICATIONS  (PRN):      Vital Signs Last 24 Hrs  T(C): 36.3 (26 Aug 2020 12:30), Max: 36.7 (25 Aug 2020 20:35)  T(F): 97.3 (26 Aug 2020 12:30), Max: 98 (25 Aug 2020 20:35)  HR: 65 (26 Aug 2020 12:30) (65 - 110)  BP: 129/62 (26 Aug 2020 12:30) (116/73 - 136/69)  BP(mean): --  RR: 16 (26 Aug 2020 12:30) (16 - 17)  SpO2: 100% (26 Aug 2020 12:30) (96% - 100%)     PHYSICAL EXAM:  CONSTITUTIONAL: NAD   EYES: PERRLA; conjunctiva and sclera clear  ENMT: Moist oral mucosa, no pharyngeal injection or exudates;   NECK: Supple, no palpable masses   RESPIRATORY: Normal respiratory effort; lungs are clear to auscultation bilaterally  CARDIOVASCULAR: Regular rate and rhythm, normal S1 and S2, no murmur/rub/gallop; No lower extremity edema; Peripheral pulses are 2+ bilaterally  ABDOMEN: Nontender to palpation, normoactive bowel sounds, no rebound/guarding;   MUSCLOSKELETAL: no clubbing or cyanosis of digits; no joint swelling or tenderness to palpation  NEUROLOGY: RUE/RLE strength 4/5.  SKIN: No rashes; no palpable lesions    LABS:                        13.0   6.62  )-----------( 259      ( 26 Aug 2020 05:30 )             38.5     08-26    138  |  104  |  14  ----------------------------<  92  4.0   |  19<L>  |  0.97    Ca    9.0      26 Aug 2020 05:30  Phos  2.8     08-26  Mg     2.2     08-26          PT/INR - ( 26 Aug 2020 05:30 )   PT: 13.9 SEC;   INR: 1.23          PTT - ( 26 Aug 2020 05:30 )  PTT:30.5 SEC    I&O's Summary    BNP  RADIOLOGY & ADDITIONAL STUDIES:    TELEMETRY: reviewed    EKG: reviewed

## 2020-08-27 ENCOUNTER — RESULT REVIEW (OUTPATIENT)
Age: 85
End: 2020-08-27

## 2020-08-27 LAB
ANION GAP SERPL CALC-SCNC: 15 MMO/L — HIGH (ref 7–14)
APTT BLD: 32.2 SEC — SIGNIFICANT CHANGE UP (ref 27–36.3)
BASE EXCESS BLDA CALC-SCNC: -2.8 MMOL/L — SIGNIFICANT CHANGE UP
BASE EXCESS BLDA CALC-SCNC: -3.7 MMOL/L — SIGNIFICANT CHANGE UP
BLD GP AB SCN SERPL QL: NEGATIVE — SIGNIFICANT CHANGE UP
BUN SERPL-MCNC: 16 MG/DL — SIGNIFICANT CHANGE UP (ref 7–23)
CA-I BLDA-SCNC: 1.09 MMOL/L — LOW (ref 1.15–1.29)
CA-I BLDA-SCNC: 1.15 MMOL/L — SIGNIFICANT CHANGE UP (ref 1.15–1.29)
CALCIUM SERPL-MCNC: 9.3 MG/DL — SIGNIFICANT CHANGE UP (ref 8.4–10.5)
CHLORIDE SERPL-SCNC: 103 MMOL/L — SIGNIFICANT CHANGE UP (ref 98–107)
CO2 SERPL-SCNC: 22 MMOL/L — SIGNIFICANT CHANGE UP (ref 22–31)
CREAT SERPL-MCNC: 1.08 MG/DL — SIGNIFICANT CHANGE UP (ref 0.5–1.3)
GLUCOSE BLDA-MCNC: 108 MG/DL — HIGH (ref 70–99)
GLUCOSE BLDA-MCNC: 109 MG/DL — HIGH (ref 70–99)
GLUCOSE SERPL-MCNC: 99 MG/DL — SIGNIFICANT CHANGE UP (ref 70–99)
HCO3 BLDA-SCNC: 21 MMOL/L — LOW (ref 22–26)
HCO3 BLDA-SCNC: 22 MMOL/L — SIGNIFICANT CHANGE UP (ref 22–26)
HCT VFR BLD CALC: 41.2 % — SIGNIFICANT CHANGE UP (ref 39–50)
HCT VFR BLDA CALC: 30.8 % — LOW (ref 39–51)
HCT VFR BLDA CALC: 37.1 % — LOW (ref 39–51)
HGB BLD-MCNC: 14 G/DL — SIGNIFICANT CHANGE UP (ref 13–17)
HGB BLDA-MCNC: 10 G/DL — LOW (ref 13–17)
HGB BLDA-MCNC: 12 G/DL — LOW (ref 13–17)
INR BLD: 1.23 — HIGH (ref 0.88–1.16)
MAGNESIUM SERPL-MCNC: 2.1 MG/DL — SIGNIFICANT CHANGE UP (ref 1.6–2.6)
MCHC RBC-ENTMCNC: 32.1 PG — SIGNIFICANT CHANGE UP (ref 27–34)
MCHC RBC-ENTMCNC: 34 % — SIGNIFICANT CHANGE UP (ref 32–36)
MCV RBC AUTO: 94.5 FL — SIGNIFICANT CHANGE UP (ref 80–100)
NRBC # FLD: 0 K/UL — SIGNIFICANT CHANGE UP (ref 0–0)
PCO2 BLDA: 40 MMHG — SIGNIFICANT CHANGE UP (ref 35–48)
PCO2 BLDA: 45 MMHG — SIGNIFICANT CHANGE UP (ref 35–48)
PH BLDA: 7.32 PH — LOW (ref 7.35–7.45)
PH BLDA: 7.35 PH — SIGNIFICANT CHANGE UP (ref 7.35–7.45)
PHOSPHATE SERPL-MCNC: 2.9 MG/DL — SIGNIFICANT CHANGE UP (ref 2.5–4.5)
PLATELET # BLD AUTO: 276 K/UL — SIGNIFICANT CHANGE UP (ref 150–400)
PMV BLD: 10 FL — SIGNIFICANT CHANGE UP (ref 7–13)
PO2 BLDA: 205 MMHG — HIGH (ref 83–108)
PO2 BLDA: 353 MMHG — HIGH (ref 83–108)
POTASSIUM BLDA-SCNC: 3.9 MMOL/L — SIGNIFICANT CHANGE UP (ref 3.4–4.5)
POTASSIUM BLDA-SCNC: 4.2 MMOL/L — SIGNIFICANT CHANGE UP (ref 3.4–4.5)
POTASSIUM SERPL-MCNC: 4.4 MMOL/L — SIGNIFICANT CHANGE UP (ref 3.5–5.3)
POTASSIUM SERPL-SCNC: 4.4 MMOL/L — SIGNIFICANT CHANGE UP (ref 3.5–5.3)
PROTHROM AB SERPL-ACNC: 13.9 SEC — HIGH (ref 10.6–13.6)
RBC # BLD: 4.36 M/UL — SIGNIFICANT CHANGE UP (ref 4.2–5.8)
RBC # FLD: 12.3 % — SIGNIFICANT CHANGE UP (ref 10.3–14.5)
RH IG SCN BLD-IMP: POSITIVE — SIGNIFICANT CHANGE UP
SAO2 % BLDA: 99.4 % — HIGH (ref 95–99)
SAO2 % BLDA: 99.4 % — HIGH (ref 95–99)
SODIUM BLDA-SCNC: 136 MMOL/L — SIGNIFICANT CHANGE UP (ref 136–146)
SODIUM BLDA-SCNC: 136 MMOL/L — SIGNIFICANT CHANGE UP (ref 136–146)
SODIUM SERPL-SCNC: 140 MMOL/L — SIGNIFICANT CHANGE UP (ref 135–145)
WBC # BLD: 5.37 K/UL — SIGNIFICANT CHANGE UP (ref 3.8–10.5)
WBC # FLD AUTO: 5.37 K/UL — SIGNIFICANT CHANGE UP (ref 3.8–10.5)

## 2020-08-27 PROCEDURE — 71045 X-RAY EXAM CHEST 1 VIEW: CPT | Mod: 26

## 2020-08-27 PROCEDURE — 72040 X-RAY EXAM NECK SPINE 2-3 VW: CPT | Mod: 26

## 2020-08-27 PROCEDURE — 99232 SBSQ HOSP IP/OBS MODERATE 35: CPT

## 2020-08-27 PROCEDURE — 35301 RECHANNELING OF ARTERY: CPT

## 2020-08-27 PROCEDURE — 88311 DECALCIFY TISSUE: CPT | Mod: 26

## 2020-08-27 PROCEDURE — 35301 RECHANNELING OF ARTERY: CPT | Mod: 80

## 2020-08-27 PROCEDURE — 88304 TISSUE EXAM BY PATHOLOGIST: CPT | Mod: 26

## 2020-08-27 RX ORDER — SODIUM CHLORIDE 9 MG/ML
1000 INJECTION INTRAMUSCULAR; INTRAVENOUS; SUBCUTANEOUS
Refills: 0 | Status: DISCONTINUED | OUTPATIENT
Start: 2020-08-27 | End: 2020-08-28

## 2020-08-27 RX ADMIN — Medication 120 MILLIGRAM(S): at 05:38

## 2020-08-27 RX ADMIN — HEPARIN SODIUM 5000 UNIT(S): 5000 INJECTION INTRAVENOUS; SUBCUTANEOUS at 05:38

## 2020-08-27 RX ADMIN — LEVETIRACETAM 500 MILLIGRAM(S): 250 TABLET, FILM COATED ORAL at 05:39

## 2020-08-27 RX ADMIN — SODIUM CHLORIDE 50 MILLILITER(S): 9 INJECTION INTRAMUSCULAR; INTRAVENOUS; SUBCUTANEOUS at 15:19

## 2020-08-27 RX ADMIN — Medication 81 MILLIGRAM(S): at 12:54

## 2020-08-27 NOTE — PROGRESS NOTE ADULT - PROBLEM SELECTOR PLAN 2
- Vascular and neuro following. plan as above  - pt now amenable for possible surgical intervention
- Vascular and neuro following. plan as above  - pt now amenable for possible surgical intervention
- Vascular consulted-  Pending cardiac cardiac clearance, tentatively scheduled for Wednesday  - cardiology consult appreciated- Unable to do  stress test as given recent CVA, awaiting Stress ECHO to further stratify cardiac risk
- Vascular consulted-  Pending cardiac stress test prior to surgery,  tentatively scheduled for Wednesday  - cardiology consult appreciated- scheduled for stress test to further stratify cardiac risk
- Vascular consulted- plan for L CEA next week  - cardiology consult appreciated- scheduled for stress test to further stratify cardiac risk
- Vascular consulted- plan for L CEA next week  - cardiology consult appreciated- scheduled for stress test to further stratify cardiac risk
- Vascular consulted- plan for L CEA next week  - cardiology consult pending for pre-op eval. will f/u recs  - Pt is medically optimized for CEA
Vascular consulted-  Pending cardiac cardiac clearance  cardiology consult appreciated- Unable to do  stress test or stress echo  as given recent CVA. Cardiology to discuss w/ interventionist re: diagnostic cath.   Awaiting cardiology recs ath
Vascular consulted- plan for CEA today   cardiology consult appreciated- Unable to do  stress test or stress echo  as given recent CVA, no benefit to cardiac cath  Cardiology clearance in chart
- Vascular and neuro following. plan as above  - pt now amenable for possible surgical intervention

## 2020-08-27 NOTE — PROGRESS NOTE ADULT - PROBLEM SELECTOR PROBLEM 2
Carotid stenosis

## 2020-08-27 NOTE — PROGRESS NOTE ADULT - PROBLEM/PLAN-4
DISPLAY PLAN FREE TEXT
Consent (Ear)/Introductory Paragraph: The rationale for Mohs was explained to the patient and consent was obtained. The risks, benefits and alternatives to therapy were discussed in detail. Specifically, the risks of ear deformity, infection, scarring, bleeding, prolonged wound healing, incomplete removal, allergy to anesthesia, nerve injury and recurrence were addressed. Prior to the procedure, the treatment site was clearly identified and confirmed by the patient. All components of Universal Protocol/PAUSE Rule completed.

## 2020-08-27 NOTE — ASU PREOP CHECKLIST - SITE MARKED BY SURGEON
At 1250, this P1 now P2 delivered a viable female infant vaginally without complications. APGARs 9/9, weight 8lb7oz  With good maternal pushing effort, baby presented in MAYA. Shoulders were gently guided through birth canal. After delivery of the body, baby was placed on mother's abdomen. After 30s, cord was clamped and cut. Placenta delivered spontaneously intact with normal 3VC. Uterine fundus firm with bimanual massage and IV pitocin. 2nd degree perineal laceration repaired with 2-0 chromic. No sulcal, periurethral, or clitoral lacerations. Both mother and baby are recovering well.
yes

## 2020-08-27 NOTE — PROGRESS NOTE ADULT - PROBLEM SELECTOR PLAN 3
likely pre-renal  from hemodynamic instability( BP dropped to the 90s overnight)   Other possibility is LEONARDA( Pt received contrast on 8/18)  Creatinine improving   Monitor BMP

## 2020-08-27 NOTE — PROGRESS NOTE ADULT - PROBLEM SELECTOR PLAN 7
- hep sc for VTE px  PT- home with home PT  Called pts partner Daviealisha 8/25- Left VM  Dispo -Plan for CEA 8/27

## 2020-08-27 NOTE — PROGRESS NOTE ADULT - SUBJECTIVE AND OBJECTIVE BOX
VASCULAR SURGERY PROGRESS NOTE    INTERVAL EVENTS: Patient consented for OR today. Cardiology discussed risks and benefits of diagnostic cath and decided to delay cath for needs of more urgent endarterectomy.       OBJECTIVE:    Vital Signs Last 24 Hrs  T(C): 36.6 (27 Aug 2020 05:36), Max: 36.6 (26 Aug 2020 22:21)  T(F): 97.8 (27 Aug 2020 05:36), Max: 97.9 (26 Aug 2020 22:21)  HR: 75 (27 Aug 2020 05:36) (65 - 75)  BP: 119/57 (27 Aug 2020 05:36) (103/61 - 129/62)  BP(mean): --  RR: 16 (27 Aug 2020 05:36) (16 - 16)  SpO2: 96% (27 Aug 2020 05:36) (96% - 100%)    General: AAOx3, NAD, resting comfortably   HEENT: NC/AT  Respiratory: no increased work of breathing   Abdomen: soft, nontender, nondistended  Neuro: No gross deficits. full ROM/b/l UE strength 5/5 wnl, b/l  strength, no focal deficits, no sensory deficits, no slurred speech or facial droop  Ext: warm and well perfused, cap refill <2    I&O's Summary    I&O's Detail        LABS:                        13.0   6.62  )-----------( 259      ( 26 Aug 2020 05:30 )             38.5     08-26    138  |  104  |  14  ----------------------------<  92  4.0   |  19<L>  |  0.97    Ca    9.0      26 Aug 2020 05:30  Phos  2.8     08-26  Mg     2.2     08-26      PT/INR - ( 26 Aug 2020 05:30 )   PT: 13.9 SEC;   INR: 1.23          PTT - ( 26 Aug 2020 05:30 )  PTT:30.5 SEC      RADIOLOGY & ADDITIONAL STUDIES:

## 2020-08-27 NOTE — PROGRESS NOTE ADULT - PROBLEM SELECTOR PLAN 1
Acute CVA with  RUE hemiparesis /numbness, etiology  likely arteriosclerosis 2/2 symptomatic L-ICA stenosis.  MR brain confirmed acute CVA L percentral gyrus    CTA head/neck c/w severe L ICA stenosis and multiple cerebral artery stenosis   -carotid duplex c/w  L ICA stenosis  -MRA head/neck reviewd  Appreciate vascular-Plan for CEA  today   -c/w asa, statin.  -c/w tele  - echo normal LVEF. bubble study not done   -s/p swallow eval- mechanical soft with thin liquids  -PT/OT eval noted   -stroke team f/u appreciated- pending CEA.  No indication for ILR. Following CEA, when cleared by vascular surgery, can start anticoagulation if no contraindication (and stop ASA)

## 2020-08-27 NOTE — PROGRESS NOTE ADULT - ASSESSMENT
85M p/w right upper extremity stroke-like sx, with severe b/l carotid stenosis, MR significant for acute left-sided infarct. Pt in need of CEA 2/2 symptomatic L-ICA stenosis.     PLAN:  - L CEA today at 230  - Consent obtained from Jennifer collins  - C/w ASA and statin  - Pending cardiac work up after OR      Yoon Ramirez, PGY2  Vascular Surgery, C Team  Pager [phone]

## 2020-08-27 NOTE — PROGRESS NOTE ADULT - SUBJECTIVE AND OBJECTIVE BOX
Colleen Messer  Hospitalist  Pager- [phone]      Patient is a 85y old  Male who presents with a chief complaint of R hand weakness (22 Aug 2020 13:13)      SUBJECTIVE / OVERNIGHT EVENTS:   No acute event o/n. No new complaint  Denies CP/SOB/ No new symptoms   Awaiting surgery today   know his name, that he is in the hospital but does not know the month or the year    ADDITIONAL REVIEW OF SYSTEMS:    RESPIRATORY: No cough, wheezing, chills or hemoptysis; No shortness of breath  CARDIOVASCULAR: No chest pain, palpitations, dizziness, or leg swelling  GASTROINTESTINAL: No abdominal or epigastric pain. No nausea, vomiting, or hematemesis; No diarrhea or constipation. No melena or hematochezia.      MEDICATIONS  (STANDING):  aspirin enteric coated 81 milliGRAM(s) Oral daily  atorvastatin 80 milliGRAM(s) Oral at bedtime  diltiazem    milliGRAM(s) Oral daily  heparin   Injectable 5000 Unit(s) SubCutaneous every 12 hours  levETIRAcetam 500 milliGRAM(s) Oral two times a day  sodium chloride 0.9%. 1000 milliLiter(s) (75 mL/Hr) IV Continuous <Continuous>    MEDICATIONS  (PRN):      CAPILLARY BLOOD GLUCOSE    I&O's Summary      PHYSICAL EXAM:   Tele - NSR in 70s with occasional  PACS  Vital Signs Last 24 Hrs  T(C): 36.6 (27 Aug 2020 05:36), Max: 36.6 (26 Aug 2020 22:21)  T(F): 97.8 (27 Aug 2020 05:36), Max: 97.9 (26 Aug 2020 22:21)  HR: 75 (27 Aug 2020 05:36) (65 - 75)  BP: 119/57 (27 Aug 2020 05:36) (103/61 - 129/62)  BP(mean): --  RR: 16 (27 Aug 2020 05:36) (16 - 16)  SpO2: 96% (27 Aug 2020 05:36) (96% - 100%)      CONSTITUTIONAL: NAD,   EYES: PERRLA; conjunctiva and sclera clear  ENMT: Moist oral mucosa, no pharyngeal injection or exudates;   NECK: Supple, no palpable masses;   RESPIRATORY: Normal respiratory effort; lungs are clear to auscultation bilaterally  CARDIOVASCULAR: Regular rate and rhythm, normal S1 and S2, no murmur/rub/gallop; No lower extremity edema; Peripheral pulses are 2+ bilaterally  ABDOMEN: Nontender to palpation, normoactive bowel sounds, no rebound/guarding;   MUSCLOSKELETAL:   no clubbing or cyanosis of digits; no joint swelling or tenderness to palpation  PSYCH: A+O to person, place, and time; affect appropriate  NEUROLOGY: CN 2-12 are intact and symmetric; RUE/RLE strength 4/5. sensation intact  SKIN: No rashes; no palpable lesions    LABS:                                                            13.0   6.62  )-----------( 259      ( 26 Aug 2020 05:30 )             38.5       08-26    138  |  104  |  14  ----------------------------<  92  4.0   |  19<L>  |  0.97    Ca    9.0      26 Aug 2020 05:30  Phos  2.8     08-26  Mg     2.2     08-26          RADIOLOGY & ADDITIONAL TESTS:  Results Reviewed:   Imaging Personally Reviewed:  Electrocardiogram Personally Reviewed:    COORDINATION OF CARE:  Care Discussed with Consultants/Other Providers [Y/N]: vascular, Neurology   Prior or Outpatient Records Reviewed [Y/N]:

## 2020-08-27 NOTE — PROGRESS NOTE ADULT - ATTENDING COMMENTS
I have discussed the case with the surgical house staff. I have personally seen, examined, and participated in the care of this patient. I have reviewed all pertinent clinical information.    Plan for left CEA today.

## 2020-08-28 LAB
ALBUMIN SERPL ELPH-MCNC: 4 G/DL — SIGNIFICANT CHANGE UP (ref 3.3–5)
ALP SERPL-CCNC: 103 U/L — SIGNIFICANT CHANGE UP (ref 40–120)
ALT FLD-CCNC: 26 U/L — SIGNIFICANT CHANGE UP (ref 4–41)
ANION GAP SERPL CALC-SCNC: 14 MMO/L — SIGNIFICANT CHANGE UP (ref 7–14)
APTT BLD: 53.1 SEC — HIGH (ref 27–36.3)
AST SERPL-CCNC: 27 U/L — SIGNIFICANT CHANGE UP (ref 4–40)
BASE EXCESS BLDA CALC-SCNC: -2.9 MMOL/L — SIGNIFICANT CHANGE UP
BASOPHILS # BLD AUTO: 0.01 K/UL — SIGNIFICANT CHANGE UP (ref 0–0.2)
BASOPHILS NFR BLD AUTO: 0.1 % — SIGNIFICANT CHANGE UP (ref 0–2)
BILIRUB SERPL-MCNC: 0.8 MG/DL — SIGNIFICANT CHANGE UP (ref 0.2–1.2)
BLOOD GAS ARTERIAL - FIO2: 21 — SIGNIFICANT CHANGE UP
BUN SERPL-MCNC: 18 MG/DL — SIGNIFICANT CHANGE UP (ref 7–23)
CA-I BLDA-SCNC: 1.16 MMOL/L — SIGNIFICANT CHANGE UP (ref 1.15–1.29)
CALCIUM SERPL-MCNC: 9 MG/DL — SIGNIFICANT CHANGE UP (ref 8.4–10.5)
CHLORIDE SERPL-SCNC: 102 MMOL/L — SIGNIFICANT CHANGE UP (ref 98–107)
CO2 SERPL-SCNC: 19 MMOL/L — LOW (ref 22–31)
CREAT SERPL-MCNC: 0.95 MG/DL — SIGNIFICANT CHANGE UP (ref 0.5–1.3)
EOSINOPHIL # BLD AUTO: 0 K/UL — SIGNIFICANT CHANGE UP (ref 0–0.5)
EOSINOPHIL NFR BLD AUTO: 0 % — SIGNIFICANT CHANGE UP (ref 0–6)
GLUCOSE BLDA-MCNC: 134 MG/DL — HIGH (ref 70–99)
GLUCOSE SERPL-MCNC: 140 MG/DL — HIGH (ref 70–99)
HCO3 BLDA-SCNC: 22 MMOL/L — SIGNIFICANT CHANGE UP (ref 22–26)
HCT VFR BLD CALC: 30.9 % — LOW (ref 39–50)
HCT VFR BLDA CALC: 31.9 % — LOW (ref 39–51)
HGB BLD-MCNC: 10.4 G/DL — LOW (ref 13–17)
HGB BLDA-MCNC: 10.3 G/DL — LOW (ref 13–17)
IMM GRANULOCYTES NFR BLD AUTO: 0.4 % — SIGNIFICANT CHANGE UP (ref 0–1.5)
INR BLD: 1.34 — HIGH (ref 0.88–1.16)
LACTATE BLDA-SCNC: 1 MMOL/L — SIGNIFICANT CHANGE UP (ref 0.5–2)
LYMPHOCYTES # BLD AUTO: 0.6 K/UL — LOW (ref 1–3.3)
LYMPHOCYTES # BLD AUTO: 5.7 % — LOW (ref 13–44)
MAGNESIUM SERPL-MCNC: 1.8 MG/DL — SIGNIFICANT CHANGE UP (ref 1.6–2.6)
MCHC RBC-ENTMCNC: 31.2 PG — SIGNIFICANT CHANGE UP (ref 27–34)
MCHC RBC-ENTMCNC: 33.7 % — SIGNIFICANT CHANGE UP (ref 32–36)
MCV RBC AUTO: 92.8 FL — SIGNIFICANT CHANGE UP (ref 80–100)
MONOCYTES # BLD AUTO: 0.27 K/UL — SIGNIFICANT CHANGE UP (ref 0–0.9)
MONOCYTES NFR BLD AUTO: 2.6 % — SIGNIFICANT CHANGE UP (ref 2–14)
NEUTROPHILS # BLD AUTO: 9.52 K/UL — HIGH (ref 1.8–7.4)
NEUTROPHILS NFR BLD AUTO: 91.2 % — HIGH (ref 43–77)
NRBC # FLD: 0 K/UL — SIGNIFICANT CHANGE UP (ref 0–0)
PCO2 BLDA: 36 MMHG — SIGNIFICANT CHANGE UP (ref 35–48)
PH BLDA: 7.39 PH — SIGNIFICANT CHANGE UP (ref 7.35–7.45)
PHOSPHATE SERPL-MCNC: 2.6 MG/DL — SIGNIFICANT CHANGE UP (ref 2.5–4.5)
PLATELET # BLD AUTO: 232 K/UL — SIGNIFICANT CHANGE UP (ref 150–400)
PMV BLD: 9.6 FL — SIGNIFICANT CHANGE UP (ref 7–13)
PO2 BLDA: 104 MMHG — SIGNIFICANT CHANGE UP (ref 83–108)
POTASSIUM BLDA-SCNC: 4.1 MMOL/L — SIGNIFICANT CHANGE UP (ref 3.4–4.5)
POTASSIUM SERPL-MCNC: 4.3 MMOL/L — SIGNIFICANT CHANGE UP (ref 3.5–5.3)
POTASSIUM SERPL-SCNC: 4.3 MMOL/L — SIGNIFICANT CHANGE UP (ref 3.5–5.3)
PROT SERPL-MCNC: 7.1 G/DL — SIGNIFICANT CHANGE UP (ref 6–8.3)
PROTHROM AB SERPL-ACNC: 15.2 SEC — HIGH (ref 10.6–13.6)
RBC # BLD: 3.33 M/UL — LOW (ref 4.2–5.8)
RBC # FLD: 12.1 % — SIGNIFICANT CHANGE UP (ref 10.3–14.5)
SAO2 % BLDA: 97.3 % — SIGNIFICANT CHANGE UP (ref 95–99)
SODIUM BLDA-SCNC: 136 MMOL/L — SIGNIFICANT CHANGE UP (ref 136–146)
SODIUM SERPL-SCNC: 135 MMOL/L — SIGNIFICANT CHANGE UP (ref 135–145)
WBC # BLD: 10.44 K/UL — SIGNIFICANT CHANGE UP (ref 3.8–10.5)
WBC # FLD AUTO: 10.44 K/UL — SIGNIFICANT CHANGE UP (ref 3.8–10.5)

## 2020-08-28 PROCEDURE — 99291 CRITICAL CARE FIRST HOUR: CPT | Mod: 25

## 2020-08-28 PROCEDURE — 99222 1ST HOSP IP/OBS MODERATE 55: CPT | Mod: 25

## 2020-08-28 RX ORDER — SODIUM CHLORIDE 9 MG/ML
3 INJECTION INTRAMUSCULAR; INTRAVENOUS; SUBCUTANEOUS EVERY 8 HOURS
Refills: 0 | Status: DISCONTINUED | OUTPATIENT
Start: 2020-08-28 | End: 2020-09-03

## 2020-08-28 RX ORDER — CHLORHEXIDINE GLUCONATE 213 G/1000ML
1 SOLUTION TOPICAL DAILY
Refills: 0 | Status: DISCONTINUED | OUTPATIENT
Start: 2020-08-28 | End: 2020-09-01

## 2020-08-28 RX ORDER — CLEVIDIPINE BUTYRATE 50MG/100ML
5 VIAL (ML) INTRAVENOUS
Qty: 25 | Refills: 0 | Status: DISCONTINUED | OUTPATIENT
Start: 2020-08-28 | End: 2020-08-28

## 2020-08-28 RX ORDER — HEPARIN SODIUM 5000 [USP'U]/ML
5000 INJECTION INTRAVENOUS; SUBCUTANEOUS EVERY 8 HOURS
Refills: 0 | Status: DISCONTINUED | OUTPATIENT
Start: 2020-08-28 | End: 2020-09-03

## 2020-08-28 RX ORDER — ACETAMINOPHEN 500 MG
975 TABLET ORAL EVERY 6 HOURS
Refills: 0 | Status: DISCONTINUED | OUTPATIENT
Start: 2020-08-28 | End: 2020-08-28

## 2020-08-28 RX ORDER — MAGNESIUM SULFATE 500 MG/ML
2 VIAL (ML) INJECTION ONCE
Refills: 0 | Status: COMPLETED | OUTPATIENT
Start: 2020-08-28 | End: 2020-08-28

## 2020-08-28 RX ORDER — SODIUM CHLORIDE 9 MG/ML
1000 INJECTION, SOLUTION INTRAVENOUS
Refills: 0 | Status: DISCONTINUED | OUTPATIENT
Start: 2020-08-28 | End: 2020-08-28

## 2020-08-28 RX ORDER — SODIUM,POTASSIUM PHOSPHATES 278-250MG
1 POWDER IN PACKET (EA) ORAL
Refills: 0 | Status: DISCONTINUED | OUTPATIENT
Start: 2020-08-28 | End: 2020-08-28

## 2020-08-28 RX ORDER — POTASSIUM PHOSPHATE, MONOBASIC POTASSIUM PHOSPHATE, DIBASIC 236; 224 MG/ML; MG/ML
15 INJECTION, SOLUTION INTRAVENOUS ONCE
Refills: 0 | Status: COMPLETED | OUTPATIENT
Start: 2020-08-28 | End: 2020-08-28

## 2020-08-28 RX ORDER — LISINOPRIL 2.5 MG/1
20 TABLET ORAL DAILY
Refills: 0 | Status: DISCONTINUED | OUTPATIENT
Start: 2020-08-28 | End: 2020-08-28

## 2020-08-28 RX ORDER — TAMSULOSIN HYDROCHLORIDE 0.4 MG/1
0.4 CAPSULE ORAL AT BEDTIME
Refills: 0 | Status: DISCONTINUED | OUTPATIENT
Start: 2020-08-28 | End: 2020-08-28

## 2020-08-28 RX ORDER — CLEVIDIPINE BUTYRATE 50MG/100ML
5 VIAL (ML) INTRAVENOUS
Qty: 25 | Refills: 0 | Status: DISCONTINUED | OUTPATIENT
Start: 2020-08-28 | End: 2020-08-30

## 2020-08-28 RX ORDER — ACETAMINOPHEN 500 MG
1000 TABLET ORAL EVERY 6 HOURS
Refills: 0 | Status: COMPLETED | OUTPATIENT
Start: 2020-08-28 | End: 2020-08-29

## 2020-08-28 RX ORDER — LEVETIRACETAM 250 MG/1
500 TABLET, FILM COATED ORAL EVERY 12 HOURS
Refills: 0 | Status: DISCONTINUED | OUTPATIENT
Start: 2020-08-28 | End: 2020-08-29

## 2020-08-28 RX ADMIN — Medication 10 MG/HR: at 00:30

## 2020-08-28 RX ADMIN — CHLORHEXIDINE GLUCONATE 1 APPLICATION(S): 213 SOLUTION TOPICAL at 21:04

## 2020-08-28 RX ADMIN — Medication 400 MILLIGRAM(S): at 18:00

## 2020-08-28 RX ADMIN — Medication 120 MILLIGRAM(S): at 06:22

## 2020-08-28 RX ADMIN — HEPARIN SODIUM 5000 UNIT(S): 5000 INJECTION INTRAVENOUS; SUBCUTANEOUS at 20:48

## 2020-08-28 RX ADMIN — SODIUM CHLORIDE 75 MILLILITER(S): 9 INJECTION, SOLUTION INTRAVENOUS at 00:30

## 2020-08-28 RX ADMIN — HEPARIN SODIUM 5000 UNIT(S): 5000 INJECTION INTRAVENOUS; SUBCUTANEOUS at 05:27

## 2020-08-28 RX ADMIN — LEVETIRACETAM 400 MILLIGRAM(S): 250 TABLET, FILM COATED ORAL at 17:59

## 2020-08-28 RX ADMIN — Medication 81 MILLIGRAM(S): at 12:27

## 2020-08-28 RX ADMIN — LEVETIRACETAM 500 MILLIGRAM(S): 250 TABLET, FILM COATED ORAL at 05:25

## 2020-08-28 RX ADMIN — Medication 50 GRAM(S): at 05:24

## 2020-08-28 RX ADMIN — Medication 975 MILLIGRAM(S): at 05:25

## 2020-08-28 RX ADMIN — POTASSIUM PHOSPHATE, MONOBASIC POTASSIUM PHOSPHATE, DIBASIC 62.5 MILLIMOLE(S): 236; 224 INJECTION, SOLUTION INTRAVENOUS at 11:11

## 2020-08-28 RX ADMIN — Medication 975 MILLIGRAM(S): at 12:27

## 2020-08-28 RX ADMIN — HEPARIN SODIUM 5000 UNIT(S): 5000 INJECTION INTRAVENOUS; SUBCUTANEOUS at 14:57

## 2020-08-28 RX ADMIN — SODIUM CHLORIDE 3 MILLILITER(S): 9 INJECTION INTRAMUSCULAR; INTRAVENOUS; SUBCUTANEOUS at 20:49

## 2020-08-28 RX ADMIN — LISINOPRIL 20 MILLIGRAM(S): 2.5 TABLET ORAL at 11:11

## 2020-08-28 NOTE — PROGRESS NOTE ADULT - ATTENDING COMMENTS
I have discussed the case with the surgical house staff. I have personally seen, examined, and participated in the care of this patient. I have reviewed all pertinent clinical information.    s/p L cea for symptomatic high grade stenosis, doing well postop.  pt at high risk for reperfusion given contralateral high grade stenosis, L vert occlusion, R vert stenosis. On cleviprex to target -130.

## 2020-08-28 NOTE — SWALLOW BEDSIDE ASSESSMENT ADULT - ORAL PHASE
Decreased anterior-posterior movement of the bolus/suspect premature spillage Decreased anterior-posterior movement of the bolus

## 2020-08-28 NOTE — SWALLOW BEDSIDE ASSESSMENT ADULT - COMMENTS
as per SICU note 8/28/20: 85y Male with PMH HTN, seizure disorder secondary to previous alcohol abuse, SDH s/p right craniotomy, Lap Crystal c/b bilary duct resection s/p hepaticojejunostomy/Small bowel resection who presents to the hospital with R sided arm weakness found to have symptomatic L-ICA stenosis now s/p left carotid endarterectomy.    MRI 8/20/20: 1. 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 Evidence of prior insults as described above.Specifically, suspicion of a prior junctional infarct in the left watershed territory at the level of the centrum semiovale ovale region.    2. Bilateral high-grade stenoses at the origins of the internal carotid arteries as described with associated dense atherosclerotic calcified plaques. Please note string sign described on the CTA is not identified on the MRA. There is a focal high-grade stenosis in the left ICA approximately a centimeter distal to its origin but the distal vessel is well visualized.    Proximal left vertebral artery not well visualized to the mid cervical region. The distal left vertebral artery is not well seen and better appreciated with associated dense atherosclerotic plaque on the patient's prior CTA. Evidence of a right vertebral origin stenosis. as per SICU note 8/28/20: 85y Male with PMH HTN, seizure disorder secondary to previous alcohol abuse, SDH s/p right craniotomy, Lap Crystal c/b bilary duct resection s/p hepaticojejunostomy/Small bowel resection who presents to the hospital with R sided arm weakness found to have symptomatic L-ICA stenosis now s/p left carotid endarterectomy.    MRI 8/20/20: 1. 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 Evidence of prior insults as described above.  Specifically, suspicion of a prior junctional infarct in the left watershed territory at the level of the centrum semiovale ovale region. 2. Bilateral high-grade stenoses at the origins of the internal carotid arteries as described with associated dense atherosclerotic calcified plaques. Please note string sign described on the CTA is not identified on the MRA. There is a focal high-grade stenosis in the left ICA approximately a centimeter distal to its origin but the distal vessel is well visualized. 3. Proximal left vertebral artery not well visualized to the mid cervical region. The distal left vertebral artery is not well seen and better appreciated with associated dense atherosclerotic plaque on the patient's prior CTA. Evidence of a right vertebral origin stenosis.    CXR 8/27/20: clear lungs    Patient seen by this service on 8/18/20, please refer to report for full details. Since last evaluation by this service, patient was diagnosed with an acute left precentral gyrus infract.  Additionally, patient had a left carotid endarterectomy yesterday.      SLP recent patient in bed, awake, alert, able to answer simple questions and follow simple directions, is receptive to PO trials. Noted bandage on anterior neck and drain in place.  Patient denies pain.  Baseline SPO2 99%.  Noted hoarse vocal quality at baseline. as per SICU note 8/28/20: 85y Male with PMH HTN, seizure disorder secondary to previous alcohol abuse, SDH s/p right craniotomy, Lap Crystal c/b bilary duct resection s/p hepaticojejunostomy/Small bowel resection who presents to the hospital with R sided arm weakness found to have symptomatic L-ICA stenosis now s/p left carotid endarterectomy.    MRI 8/20/20: 1. 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 Evidence of prior insults as described above.  Specifically, suspicion of a prior junctional infarct in the left watershed territory at the level of the centrum semiovale ovale region. 2. Bilateral high-grade stenoses at the origins of the internal carotid arteries as described with associated dense atherosclerotic calcified plaques. Please note string sign described on the CTA is not identified on the MRA. There is a focal high-grade stenosis in the left ICA approximately a centimeter distal to its origin but the distal vessel is well visualized. 3. Proximal left vertebral artery not well visualized to the mid cervical region. The distal left vertebral artery is not well seen and better appreciated with associated dense atherosclerotic plaque on the patient's prior CTA. Evidence of a right vertebral origin stenosis.    CXR 8/27/20: clear lungs    Patient seen by this service on 8/18/20, please refer to report for full details. Since last evaluation by this service, patient was diagnosed with an acute left precentral gyrus infract.  Additionally, patient had a left carotid endarterectomy yesterday.      SLP recent patient in bed, awake, alert, able to answer simple questions and follow simple directions, is receptive to PO trials. Noted bandage on anterior neck and ANAYELI drain in place.  Patient denies pain.  Baseline SPO2 99%.  Noted hoarse vocal quality at baseline.

## 2020-08-28 NOTE — CONSULT NOTE ADULT - ASSESSMENT
ASSESSMENT:  85y Male with PMH HTN, seizure disorder secondary to previous alcohol abuse, SDH s/p right craniotomy, Lap Crysatl c/b bilary duct resection s/p hepaticojejunostomy/Small bowel resection who presents to the hospital with R sided arm weakness found to have symptomatic L-ICA stenosis now s/p left carotid endarterectomy.    PLAN:  Neurologic:   - q1hr neuro checks  - Pain control with acetaminophen  - Adjunctive narcotics PRN  - Continue Keppra    Respiratory:   - Saturating well on room air  - OOB/IS    Cardiovascular:   - Hypertensive at baseline on diltiazem   - Strict BP goal 110-130  - Clevidipine infusion for post operative hypertension  - Continue ASA/statin    Gastrointestinal/Nutrition:   - NPO overnight with plan to resume Dysphagia diet in AM    Renal/Genitourinary:   - LR @ 75ml/hr while NPO  - Monitor intake & output  - Replete electrolytes PRN    Hematologic:   - H/H stable  - VTE ppx w/ SQH    Infectious Disease:   - Monitor off antibiotics    Tubes/Lines/Drains:   - CLARIBEL  - Jacquelyn    Endocrine:   - Hgb A1C 5.3  - No active issues    Disposition: SICU    --------------------------------------------------------------------------------------    Critical Care Diagnoses: ASSESSMENT:  85y Male with PMH HTN, seizure disorder secondary to previous alcohol abuse, SDH s/p right craniotomy, Lap Crystal c/b bilary duct resection s/p hepaticojejunostomy/Small bowel resection who presents to the hospital with R sided arm weakness found to have symptomatic L-ICA stenosis now s/p left carotid endarterectomy.    PLAN:  Neurologic:   - q1hr neuro checks  - Pain control with acetaminophen  - Adjunctive narcotics PRN  - Continue Keppra    Respiratory:   - Saturating well on room air  - OOB/IS    Cardiovascular:   - Hypertensive at baseline on diltiazem   - Strict BP goal 110-130  - Clevidipine infusion for post operative hypertension  - Continue ASA/statin  - Hx paroxysmal a-fib, episode of RVR to 110's, not on AC's due to SDH    Gastrointestinal/Nutrition:   - NPO overnight with plan to resume Dysphagia diet in AM    Renal/Genitourinary:   - LR @ 75ml/hr while NPO  - Monitor intake & output  - Replete electrolytes PRN    Hematologic:   - H/H stable  - VTE ppx w/ SQH    Infectious Disease:   - Monitor off antibiotics    Tubes/Lines/Drains:   - PIV  - Valladares    Endocrine:   - Hgb A1C 5.3  - No active issues    Disposition: SICU    --------------------------------------------------------------------------------------    Critical Care Diagnoses: ASSESSMENT:  85y Male with PMH HTN, seizure disorder secondary to previous alcohol abuse, SDH s/p right craniotomy, Lap Crystal c/b bilary duct resection s/p hepaticojejunostomy/Small bowel resection who presents to the hospital with R sided arm weakness found to have symptomatic L-ICA stenosis now s/p left carotid endarterectomy.    PLAN:  Neurologic:   - q1hr neuro checks, monitor for signs of reperfusion syndrome per vascular  - Pain control with acetaminophen  - Continue Keppra 500mg BID    Respiratory:   - Saturating well on room air  - OOB/IS    Cardiovascular:   - Hypertensive at baseline on diltiazem, f/u if pt is on lisinopril  - Strict BP goal 110-130  - Clevidipine infusion @ 3 for post operative hypertension, attempt to wean  - Continue ASA/statin  - Hx paroxysmal a-fib, episode of RVR to 110's, not on AC's due to SDH, f/u cardiology for AC recommendation    Gastrointestinal/Nutrition:   - NPO overnight with plan to resume Dysphagia diet in AM  - Speech/swallow per vascular    Renal/Genitourinary:   - LR @ 75ml/hr while NPO  - Monitor intake & output  - Replete electrolytes PRN  - D/c Jacquelyn, f/u ToV    Hematologic:   - H/H stable  - VTE ppx w/ SQH    Infectious Disease:   - Monitor off antibiotics    Tubes/Lines/Drains:   - PIV  - Jacquelyn    Endocrine:   - Hgb A1C 5.3  - No active issues    Disposition: SICU    --------------------------------------------------------------------------------------    Critical Care Diagnoses:

## 2020-08-28 NOTE — SWALLOW BEDSIDE ASSESSMENT ADULT - PHARYNGEAL PHASE
Delayed cough post oral intake/change in SPO2 from 99% to 92%/Delayed pharyngeal swallow/Throat clear post oral intake Delayed throat clear post oral intake/Delayed pharyngeal swallow/Throat clear post oral intake Throat clear post oral intake/Delayed cough post oral intake/change in SPO2 from 99% to 92% with thin liquids and nectar thick liquids/Delayed pharyngeal swallow change in SPO2 from 99% to 92%/Delayed pharyngeal swallow/Throat clear post oral intake/Delayed throat clear post oral intake

## 2020-08-28 NOTE — PROGRESS NOTE ADULT - SUBJECTIVE AND OBJECTIVE BOX
VASCULAR SURGERY DAILY PROGRESS NOTE:       Subjective: Patient examined at bedside following his procedure. OR for L CEA overnight.  Patient says pain is controlled.  Denies nausea/vomiting.      Patient was not amenable to participating in a neurologic examination besides lifting up his extremities.       Objective:    Vital Signs Last 24 Hrs  T(C): 36.2 (28 Aug 2020 04:00), Max: 36.6 (27 Aug 2020 05:36)  T(F): 97.2 (28 Aug 2020 04:00), Max: 97.9 (27 Aug 2020 16:17)  HR: 106 (28 Aug 2020 04:00) (68 - 108)  BP: 145/78 (27 Aug 2020 16:17) (119/57 - 145/78)  BP(mean): --  RR: 20 (28 Aug 2020 04:00) (11 - 20)  SpO2: 96% (28 Aug 2020 04:00) (95% - 100%)    I&O's Detail    27 Aug 2020 07:01  -  28 Aug 2020 05:28  --------------------------------------------------------  IN:    clevidipine Infusion: 50 mL    lactated ringers.: 375 mL  Total IN: 425 mL    OUT:    Voided: 175 mL  Total OUT: 175 mL    Total NET: 250 mL          Physical Exam:    General: NAD, laying comfortably in bed  HEENT: wound w/ serosanguinous strikethrough, no hematoma appreciated  Resp: no increased work of breathing  Abd: soft, non distended non tender  Ext: patient able to lift all extremities; not amenable to participate further in neurological exam at time of exam      Labaratory Results:                          10.4   10.44 )-----------( 232      ( 28 Aug 2020 00:30 )             30.9     08-28    135  |  102  |  18  ----------------------------<  140<H>  4.3   |  19<L>  |  0.95    Ca    9.0      28 Aug 2020 01:29  Phos  2.6     08-28  Mg     1.8     08-28    TPro  7.1  /  Alb  4.0  /  TBili  0.8  /  DBili  x   /  AST  27  /  ALT  26  /  AlkPhos  103  08-28    PT/INR - ( 28 Aug 2020 00:30 )   PT: 15.2 SEC;   INR: 1.34          PTT - ( 28 Aug 2020 00:30 )  PTT:53.1 SEC      Radiology and Additional Tests:    Medications:    MEDICATIONS  (STANDING):  acetaminophen   Tablet .. 975 milliGRAM(s) Oral every 6 hours  aspirin enteric coated 81 milliGRAM(s) Oral daily  atorvastatin 80 milliGRAM(s) Oral at bedtime  chlorhexidine 4% Liquid 1 Application(s) Topical daily  clevidipine Infusion 5 mG/Hr (10 mL/Hr) IV Continuous <Continuous>  diltiazem    milliGRAM(s) Oral daily  heparin   Injectable 5000 Unit(s) SubCutaneous every 8 hours  lactated ringers. 1000 milliLiter(s) (75 mL/Hr) IV Continuous <Continuous>  levETIRAcetam 500 milliGRAM(s) Oral two times a day  magnesium sulfate  IVPB 2 Gram(s) IV Intermittent once  potassium phosphate / sodium phosphate Powder (PHOS-NaK) 1 Packet(s) Oral three times a day with meals    MEDICATIONS  (PRN): VASCULAR SURGERY DAILY PROGRESS NOTE:       Subjective: Patient examined at bedside following his procedure. OR for L CEA overnight.  Patient says pain is controlled.  Denies nausea/vomiting.      Patient was not amenable to participating in a neurologic examination besides lifting up his extremities.       Objective:    Vital Signs Last 24 Hrs  T(C): 36.2 (28 Aug 2020 04:00), Max: 36.6 (27 Aug 2020 05:36)  T(F): 97.2 (28 Aug 2020 04:00), Max: 97.9 (27 Aug 2020 16:17)  HR: 106 (28 Aug 2020 04:00) (68 - 108)  BP: 145/78 (27 Aug 2020 16:17) (119/57 - 145/78)  BP(mean): --  RR: 20 (28 Aug 2020 04:00) (11 - 20)  SpO2: 96% (28 Aug 2020 04:00) (95% - 100%)    I&O's Detail    27 Aug 2020 07:01  -  28 Aug 2020 05:28  --------------------------------------------------------  IN:    clevidipine Infusion: 50 mL    lactated ringers.: 375 mL  Total IN: 425 mL    OUT:    Voided: 175 mL  Total OUT: 175 mL    Total NET: 250 mL          Physical Exam:    General: NAD, laying comfortably in bed  HEENT: wound w/ serosanguinous strikethrough, no hematoma appreciated, ANAYELI w/SS output  Resp: no increased work of breathing  Abd: soft, non distended non tender  Ext: patient able to lift all extremities; not amenable to participate further in neurological exam at time of exam      Labaratory Results:                          10.4   10.44 )-----------( 232      ( 28 Aug 2020 00:30 )             30.9     08-28    135  |  102  |  18  ----------------------------<  140<H>  4.3   |  19<L>  |  0.95    Ca    9.0      28 Aug 2020 01:29  Phos  2.6     08-28  Mg     1.8     08-28    TPro  7.1  /  Alb  4.0  /  TBili  0.8  /  DBili  x   /  AST  27  /  ALT  26  /  AlkPhos  103  08-28    PT/INR - ( 28 Aug 2020 00:30 )   PT: 15.2 SEC;   INR: 1.34          PTT - ( 28 Aug 2020 00:30 )  PTT:53.1 SEC      Radiology and Additional Tests:    Medications:    MEDICATIONS  (STANDING):  acetaminophen   Tablet .. 975 milliGRAM(s) Oral every 6 hours  aspirin enteric coated 81 milliGRAM(s) Oral daily  atorvastatin 80 milliGRAM(s) Oral at bedtime  chlorhexidine 4% Liquid 1 Application(s) Topical daily  clevidipine Infusion 5 mG/Hr (10 mL/Hr) IV Continuous <Continuous>  diltiazem    milliGRAM(s) Oral daily  heparin   Injectable 5000 Unit(s) SubCutaneous every 8 hours  lactated ringers. 1000 milliLiter(s) (75 mL/Hr) IV Continuous <Continuous>  levETIRAcetam 500 milliGRAM(s) Oral two times a day  magnesium sulfate  IVPB 2 Gram(s) IV Intermittent once  potassium phosphate / sodium phosphate Powder (PHOS-NaK) 1 Packet(s) Oral three times a day with meals    MEDICATIONS  (PRN): VASCULAR SURGERY DAILY PROGRESS NOTE:       Subjective: Patient examined at bedside following his procedure. OR for L CEA overnight.  Patient says pain is controlled.  Denies nausea/vomiting.      Patient participated in neuro exam with no focal deficits identified.      Objective:    Vital Signs Last 24 Hrs  T(C): 36.2 (28 Aug 2020 04:00), Max: 36.6 (27 Aug 2020 05:36)  T(F): 97.2 (28 Aug 2020 04:00), Max: 97.9 (27 Aug 2020 16:17)  HR: 106 (28 Aug 2020 04:00) (68 - 108)  BP: 145/78 (27 Aug 2020 16:17) (119/57 - 145/78)  BP(mean): --  RR: 20 (28 Aug 2020 04:00) (11 - 20)  SpO2: 96% (28 Aug 2020 04:00) (95% - 100%)    I&O's Detail    27 Aug 2020 07:01  -  28 Aug 2020 05:28  --------------------------------------------------------  IN:    clevidipine Infusion: 50 mL    lactated ringers.: 375 mL  Total IN: 425 mL    OUT:    Voided: 175 mL  Total OUT: 175 mL    Total NET: 250 mL          Physical Exam:    General: NAD, laying comfortably in bed  HEENT: wound w/ serosanguinous strikethrough, no hematoma appreciated, ANAYELI w/SS output  Resp: no increased work of breathing  Abd: soft, non distended non tender  Ext: patient able to lift all extremities; not amenable to participate further in neurological exam at time of exam      Labaratory Results:                          10.4   10.44 )-----------( 232      ( 28 Aug 2020 00:30 )             30.9     08-28    135  |  102  |  18  ----------------------------<  140<H>  4.3   |  19<L>  |  0.95    Ca    9.0      28 Aug 2020 01:29  Phos  2.6     08-28  Mg     1.8     08-28    TPro  7.1  /  Alb  4.0  /  TBili  0.8  /  DBili  x   /  AST  27  /  ALT  26  /  AlkPhos  103  08-28    PT/INR - ( 28 Aug 2020 00:30 )   PT: 15.2 SEC;   INR: 1.34          PTT - ( 28 Aug 2020 00:30 )  PTT:53.1 SEC      Radiology and Additional Tests:    Medications:    MEDICATIONS  (STANDING):  acetaminophen   Tablet .. 975 milliGRAM(s) Oral every 6 hours  aspirin enteric coated 81 milliGRAM(s) Oral daily  atorvastatin 80 milliGRAM(s) Oral at bedtime  chlorhexidine 4% Liquid 1 Application(s) Topical daily  clevidipine Infusion 5 mG/Hr (10 mL/Hr) IV Continuous <Continuous>  diltiazem    milliGRAM(s) Oral daily  heparin   Injectable 5000 Unit(s) SubCutaneous every 8 hours  lactated ringers. 1000 milliLiter(s) (75 mL/Hr) IV Continuous <Continuous>  levETIRAcetam 500 milliGRAM(s) Oral two times a day  magnesium sulfate  IVPB 2 Gram(s) IV Intermittent once  potassium phosphate / sodium phosphate Powder (PHOS-NaK) 1 Packet(s) Oral three times a day with meals    MEDICATIONS  (PRN): VASCULAR SURGERY DAILY PROGRESS NOTE:       Subjective: Patient examined at bedside following his procedure. OR for L CEA last night.  Patient says pain is controlled.  Denies nausea/vomiting.      Patient participated in neuro exam with no focal deficits identified.      Objective:    Vital Signs Last 24 Hrs  T(C): 36.2 (28 Aug 2020 04:00), Max: 36.6 (27 Aug 2020 05:36)  T(F): 97.2 (28 Aug 2020 04:00), Max: 97.9 (27 Aug 2020 16:17)  HR: 106 (28 Aug 2020 04:00) (68 - 108)  BP: 145/78 (27 Aug 2020 16:17) (119/57 - 145/78)  BP(mean): --  RR: 20 (28 Aug 2020 04:00) (11 - 20)  SpO2: 96% (28 Aug 2020 04:00) (95% - 100%)    I&O's Detail    27 Aug 2020 07:01  -  28 Aug 2020 05:28  --------------------------------------------------------  IN:    clevidipine Infusion: 50 mL    lactated ringers.: 375 mL  Total IN: 425 mL    OUT:    Voided: 175 mL  Total OUT: 175 mL    Total NET: 250 mL          Physical Exam:    General: NAD, laying comfortably in bed  HEENT: wound w/ serosanguinous strikethrough, no hematoma appreciated, ANAYELI w/SS output  Resp: no increased work of breathing  Abd: soft, non distended non tender  Ext: patient able to lift all extremities; not amenable to participate further in neurological exam at time of exam RUE 4/5, RLE 4/5 (stable from preop)      Laboratory Results:                          10.4   10.44 )-----------( 232      ( 28 Aug 2020 00:30 )             30.9     08-28    135  |  102  |  18  ----------------------------<  140<H>  4.3   |  19<L>  |  0.95    Ca    9.0      28 Aug 2020 01:29  Phos  2.6     08-28  Mg     1.8     08-28    TPro  7.1  /  Alb  4.0  /  TBili  0.8  /  DBili  x   /  AST  27  /  ALT  26  /  AlkPhos  103  08-28    PT/INR - ( 28 Aug 2020 00:30 )   PT: 15.2 SEC;   INR: 1.34          PTT - ( 28 Aug 2020 00:30 )  PTT:53.1 SEC      Radiology and Additional Tests:    Medications:    MEDICATIONS  (STANDING):  acetaminophen   Tablet .. 975 milliGRAM(s) Oral every 6 hours  aspirin enteric coated 81 milliGRAM(s) Oral daily  atorvastatin 80 milliGRAM(s) Oral at bedtime  chlorhexidine 4% Liquid 1 Application(s) Topical daily  clevidipine Infusion 5 mG/Hr (10 mL/Hr) IV Continuous <Continuous>  diltiazem    milliGRAM(s) Oral daily  heparin   Injectable 5000 Unit(s) SubCutaneous every 8 hours  lactated ringers. 1000 milliLiter(s) (75 mL/Hr) IV Continuous <Continuous>  levETIRAcetam 500 milliGRAM(s) Oral two times a day  magnesium sulfate  IVPB 2 Gram(s) IV Intermittent once  potassium phosphate / sodium phosphate Powder (PHOS-NaK) 1 Packet(s) Oral three times a day with meals    MEDICATIONS  (PRN):

## 2020-08-28 NOTE — CONSULT NOTE ADULT - ATTENDING COMMENTS
Agree with notes of health care providers on my service (PAs, residents and House Staff).  The patient is a patient with hemodynamic and metabolic instability being consulted for SICU care.   I have personally discussed with other consultants, House staff and PA's.   These diagnoses are unrelated to the surgical procedure noted above.  85y Male with PMH HTN, seizure disorder, SDH s/p right craniotomy, Lap Crystal c/b bilary duct resection s/p hepaticojejunostomy/Small bowel resection with symptomatic L-ICA stenosis now s/p left carotid endarterectomy.  I have personally examined the patient, reviewed data and laboratory tests/x-rays and all pertinent electronic images.  EXAM  NEUROLOGY  Exam: Normal, NAD, alert, oriented to self and place  RUE: 4/5, sensation grossly intact, LUE 5/5  HEENT  Exam: Normocephalic, atraumatic. Neck incision with dressing w/ serosanguinous strikethrough, soft without appreciable hematoma, ANAYELI serosanguinous  RESPIRATORY  Exam: Lungs clear to auscultation,   CARDIOVASCULAR  Exam: S1, S2.  Regular rate and rhythm.   GI/NUTRITION  Exam: Abdomen soft, Non-tender, Non-distended.   VASCULAR  Exam: Extremities warm,  The assessment and plan is specified.  PLAN:  Neurologic:   - q1hr neuro checks  - Pain control with acetaminophen  - Adjunctive narcotics PRN  - Continue Keppra    Respiratory:   - Saturating well on room air  - OOB/IS    Cardiovascular:   - Hypertensive at baseline on diltiazem   - Strict BP goal 110-130  - Clevidipine infusion for post operative hypertension  - Continue ASA/statin  - Hx paroxysmal a-fib, episode of RVR to 110's, not on AC's due to SDH    Gastrointestinal/Nutrition:   - NPO overnight with plan to resume Dysphagia diet in AM    Renal/Genitourinary:   - LR @ 75ml/hr while NPO  - Monitor intake & output  - Replete electrolytes PRN    Hematologic:   - H/H stable  - VTE ppx w/ SQH    Infectious Disease:   - Monitor off antibiotics    Tubes/Lines/Drains:   - PIV  - Valladares    Endocrine:   - Hgb A1C 5.3  - No active issues    Disposition: SICU    Critical Care Diagnoses: severe HTN

## 2020-08-28 NOTE — PROGRESS NOTE ADULT - ASSESSMENT
85M p/w right upper extremity stroke-like sx, with severe b/l carotid stenosis, MR significant for acute left-sided infarct no s/p  CEA 2/2 for symptomatic L-ICA stenosis on 8/27, in SICU for post operative hemodynamic monitoring and neuro checks.. 85M p/w right upper extremity stroke-like sx, with severe b/l carotid stenosis, MR significant for acute left-sided infarct no s/p CEA for symptomatic L-ICA stenosis on 8/27, transferred to SICU for post operative hemodynamic monitoring and neuro checks.    PLAN:  -Pain control  -F/U ANAYELI output  -NPO/IVF  -DVT ppx/ASA  -Valladares  -F/U labs    Vascular Surgery   [id number] 85M p/w right upper extremity stroke-like sx, with severe b/l carotid stenosis, MR significant for acute left-sided infarct no s/p CEA for symptomatic L-ICA stenosis on 8/27, transferred to SICU for post operative hemodynamic monitoring and neuro checks.    PLAN:  -Pain control  -F/U ANAYELI output  -NPO/IVF, advance to [facility] if able to tolerate  -Speech and swallow study  -DVT ppx/ASA  -Valladares out today  -F/U labs    Vascular Surgery   [phone]

## 2020-08-28 NOTE — CONSULT NOTE ADULT - SUBJECTIVE AND OBJECTIVE BOX
SICU Consultation Note  =====================================================  HPI: 85y male with PMH HTN, seizure disorder secondary to previous alcohol abuse, SDH s/p right craniotomy, Lap Crystal c/b bilary duct resection s/p hepaticojejunostomy/Small bowel resection who presents to the hospital with R sided arm weakness found to have symptomatic L-ICA stenosis. He underwent left carotid endarterectomy. Patient slow to wake up from anesthesia. He was transferred directly to SICU for close hemodynamic monitoring, strict blood pressure goals as well as q1hr neurovascular checks. Upon arrival patient with full motor function on the right, though strength continues to be decreased.    Surgery Information: Left carotid endarterectomy performed  Case Duration: 8hrs 	EBL: 5ml	IV Fluids: 2500ml	Blood Products: N/A	Urine Output: 520ml   Complications: Patient slow to wake up with some residual right sided weakness, but still with motor function    HISTORY  This is an 85M with history of HTN, Seizure d/o, SDH s/p R craniotomy, and Lap Crystal c/b bilary duct resection s/p hepaticojejunostomy/Small bowel resection who presents to the hospital with R sided arm weakness. Patient states he could not hold the fork while eating dinner and his life partner called EMS. Patient is a poor historian; therefore, history obtained from life partner, Lizzeth as well. Per Lizzeth, patient was doing well in AM. During lunch time, she noted that patient was using both hands while drinking water, which is out of the ordinary. In the evening, he was unable to hold the fork while eating  dinner. She called EMS for stroke eval. Denies fever, chills, cough, falls, LOC, chest pain, abdominal pain, sob, nausea vomiting, melena, hematochezia or LE edema. Denied any similar episodes previously.     On arrival to the ED, his vitals were T 97.6, P 82, /66, RR 16, O2 sat 95% RA. Lab work showed mild anemia and stable trops. 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 Neurology and Vascular Surgery consulted and following along with patient. Patient admitted to medicine for further CVA work up. Currently said that his R hand weakness/numbness are improving but not fully resolved. (18 Aug 2020 05:26) Patient was found to have severe b/l carotid stenosis, MR significant for acute left-sided infarct.    Allergies:   PAST MEDICAL & SURGICAL HISTORY:  Cholelithiasis  History of subdural hematoma  Constipation  Hypertension  Seizure  History of ankle surgery: ORIF with screws and plate  Acute subdural hematoma: (May 2014 - subdural hematoma,  pt had SX)    FAMILY HISTORY:  No pertinent family history in first degree relatives    SOCIAL HISTORY:  Unable to obtain due to mental status    ADVANCE DIRECTIVES: Full Code    REVIEW OF SYSTEMS:  Constitutional Symptoms: Denies weight loss/gain, fatigue, fever/chills, sweats, headache, appetite changes  Allergic/Immunologic: Denies drug/food/pet allergies, hayfever  Integumentary: Denies color changes, itch, rash, sores, hives, moles, alopecia  Eyes: Denies vision changes, blurriness, dryness, pain, cataracts, glasses/contacts, photophobia   ENT: Denies tinnitus, hearing difficulty, ear discharge/pain/obstruction, sinusitis, PND, epistaxis, sore throat, dysphagia, canker sores  Cardiovascular: Denies chest pain upon exertion, murmurs, angina, HTN, MI, palpitations, thrombosis, PVD, DVT  Respiratory: Denies dyspnea, cough, infection, sputum, wheezing, SOB, hemoptysis, congestion  Musculoskeletal: Denies pain, weakness, cramps, joint pain, swelling, arthritis, strain/sprain/fracture, scoliosis, atrophy  Gastrointestinal: Denies bloating, nausea, vomiting, heartburn, diarrhea, constipation, abdominal pain, hematemesis, BRBPR  Neurological: Denies syncope, seizures, vertigo, coordination, memory loss, paralysis, tremors, blackouts, ataxia, numbness  Genitourinary: Denies dysuria, hematuria, frequency, burning, urgency, renal stones, STD, infertility  Endocrine: Denies diabetes, excessive hunger/thirst/urination, goiter  Hematologic/Lymphatic: Denies bleeding disorders, anemia  Psychiatric: Denies depression, anxiety, hallucinations, suicidal ideation, tension, mood changes, substance abuse, addictions    HOME MEDICATIONS:   --------------------------------------------------------------------------------------  Home Medications:  atorvastatin 10 mg oral tablet: 1 tab(s) orally once a day (18 Aug 2020 05:39)  digoxin 125 mcg (0.125 mg) oral tablet: 1 tab(s) orally once a day (18 Aug 2020 05:39)  diltiazem 24 hour extended release 240 mg/24 hours oral capsule, extended release: 1 cap(s) orally once a day (18 Aug 2020 05:39)  folic acid 1 mg oral tablet: 1 tab(s) orally once a day (18 Aug 2020 05:39)  levETIRAcetam 500 mg oral tablet: 1 tab(s) orally 2 times a day (18 Aug 2020 05:39)  lisinopril 20 mg oral tablet: 1 tab(s) orally once a day (18 Aug 2020 05:39)  Vitamin C 500 mg oral tablet: 1 tab(s) orally once a day (18 Aug 2020 05:39)    --------------------------------------------------------------------------------------    CURRENT MEDICATIONS:   --------------------------------------------------------------------------------------  Neurologic Medications  acetaminophen   Tablet .. 975 milliGRAM(s) Oral every 6 hours  levETIRAcetam 500 milliGRAM(s) Oral two times a day    Respiratory Medications    Cardiovascular Medications  clevidipine Infusion 5 mG/Hr IV Continuous <Continuous>  diltiazem    milliGRAM(s) Oral daily    Gastrointestinal Medications  lactated ringers. 1000 milliLiter(s) IV Continuous <Continuous>    Genitourinary Medications    Hematologic/Oncologic Medications  aspirin enteric coated 81 milliGRAM(s) Oral daily  heparin   Injectable 5000 Unit(s) SubCutaneous every 8 hours    Antimicrobial/Immunologic Medications    Endocrine/Metabolic Medications  atorvastatin 80 milliGRAM(s) Oral at bedtime    Topical/Other Medications  chlorhexidine 4% Liquid 1 Application(s) Topical daily    --------------------------------------------------------------------------------------    VITAL SIGNS, INS/OUTS (last 24 hours):  --------------------------------------------------------------------------------------  T(C): 36.5 (08-28-20 @ 00:25), Max: 36.6 (08-27-20 @ 05:36)  HR: 108 (08-28-20 @ 00:45) (68 - 108)  BP: 145/78 (08-27-20 @ 16:17) (112/62 - 145/78)  BP(mean): --  ABP: 152/65 (08-28-20 @ 00:45) (152/65 - 187/78)  ABP(mean): 97 (08-28-20 @ 00:45) (97 - 119)  RR: 16 (08-28-20 @ 00:45) (11 - 18)  SpO2: 100% (08-28-20 @ 00:45) (96% - 100%)  Wt(kg): --  CVP(mm Hg): --  CI: --  CAPILLARY BLOOD GLUCOSE     N/A    08-27 @ 07:01 - 08-28 @ 01:21  --------------------------------------------------------  IN:    clevidipine Infusion: 30 mL    lactated ringers.: 225 mL  Total IN: 255 mL    OUT:    Voided: 175 mL  Total OUT: 175 mL    Total NET: 80 mL    --------------------------------------------------------------------------------------    EXAM  NEUROLOGY  Exam: Normal, NAD, alert, oriented to self and place  RUE: 4/5, sensation grossly intact, LUE 5/5    HEENT  Exam: Normocephalic, atraumatic. Neck incision with dressing w/ serosanguinous strikethrough, soft without appreciable hematoma, ANAYELI serosanguinous    RESPIRATORY  Exam: Lungs clear to auscultation, Normal expansion/effort.    CARDIOVASCULAR  Exam: S1, S2.  Regular rate and rhythm.     GI/NUTRITION  Exam: Abdomen soft, Non-tender, Non-distended.   Current Diet:  NPO     VASCULAR  Exam: Extremities warm, pink, well-perfused.    MUSCULOSKELETAL  Exam: All extremities moving spontaneously    SKIN:  Exam: Good skin turgor, no skin breakdown.     METABOLIC/FLUIDS/ELECTROLYTES  lactated ringers. 1000 milliLiter(s) IV Continuous <Continuous>      HEMATOLOGIC  [x] DVT Prophylaxis: aspirin enteric coated 81 milliGRAM(s) Oral daily  heparin   Injectable 5000 Unit(s) SubCutaneous every 8 hours    Transfusions:	[] PRBC	[] Platelets		[] FFP	[] Cryoprecipitate    INFECTIOUS DISEASE  Antimicrobials/Immunologic Medications:    Tubes/Lines/Drains   [x] Peripheral IV  [] Central Venous Line     	[] R	[x] L	[] IJ	[] Fem	[] SC	Date Placed:   [x] Arterial Line		[] R	[] L	[] Fem	[] Rad	[] Ax	Date Placed:   [] PICC:         	[] Midline		[] Mediport  [x] Urinary Catheter		Date Placed:     LABS  --------------------------------------------------------------------------------------  (CBC (08-28 @ 00:30)                          10.4<L>                   10.44   )--------------(  232        91.2<H>% Neuts, 5.7<L>% Lymphs, ANC: 9.52<H>                          30.9<L>  CBC (08-27 @ 15:15)                          14.0                     5.37    )--------------(  276        --    % Neuts, --    % Lymphs, ANC: --                              41.2      BMP (08-28 @ 01:29)       135     |  102     |  18    			Ca++ --      Ca 9.0          ---------------------------------( 140<H>		Mg 1.8          4.3     |  19<L>   |  0.95  			Ph 2.6     BMP (08-27 @ 15:15)       140     |  103     |  16    			Ca++ --      Ca 9.3          ---------------------------------( 99    		Mg 2.1          4.4     |  22      |  1.08  			Ph 2.9       LFTs (08-28 @ 01:29)      TPro 7.1 / Alb 4.0 / TBili 0.8 / DBili -- / AST 27 / ALT 26 / AlkPhos 103    Coags (08-28 @ 00:30)  aPTT 53.1<H> / INR 1.34<H> / PT 15.2<H>  Coags (08-27 @ 15:15)  aPTT 32.2 / INR 1.23<H> / PT 13.9<H>      ABG (08-28 @ 00:30)     7.39 / 36 / 104 / 22 / -2.9 / 97.3%     Lactate: 1.0   ABG (08-27 @ 20:17)     7.35 / 40 / 205<H> / 21<L> / -3.7 / 99.4<H>%     Lactate:      --------------------------------------------------------------------------------------    OTHER LABS    IMAGING RESULTS  Echo:   CT:   Xray:

## 2020-08-28 NOTE — PROGRESS NOTE ADULT - SUBJECTIVE AND OBJECTIVE BOX
POST ANESTHESIA EVALUATION    85y Male POSTOP DAY 1 S/P left CEA    MENTAL STATUS: Patient participation [  ] Awake     [ x ] Arousable     [  ] Sedated    AIRWAY PATENCY: [ x ] Satisfactory  [  ] Other:     Vital Signs Last 24 Hrs  T(C): 36.1 (28 Aug 2020 08:00), Max: 36.6 (27 Aug 2020 16:17)  T(F): 97 (28 Aug 2020 08:00), Max: 97.9 (27 Aug 2020 16:17)  HR: 63 (28 Aug 2020 09:30) (63 - 108)  BP: 112/64 (28 Aug 2020 08:00) (112/64 - 145/78)  BP(mean): 73 (28 Aug 2020 08:00) (73 - 73)  RR: 9 (28 Aug 2020 09:30) (9 - 20)  SpO2: 97% (28 Aug 2020 09:30) (95% - 100%)  I&O's Summary    27 Aug 2020 07:01  -  28 Aug 2020 07:00  --------------------------------------------------------  IN: 850 mL / OUT: 1670 mL / NET: -820 mL    28 Aug 2020 07:01  -  28 Aug 2020 11:03  --------------------------------------------------------  IN: 186 mL / OUT: 285 mL / NET: -99 mL          NAUSEA/ VOMITTING:  [  x] NONE  [  ] CONTROLLED [  ] OTHER     PAIN: [ x ] CONTROLLED WITH CURRENT REGIMEN  [  ] OTHER    [x  ] NO APPARENT ANESTHESIA COMPLICATIONS      Comments:

## 2020-08-28 NOTE — SWALLOW BEDSIDE ASSESSMENT ADULT - SWALLOW EVAL: DIAGNOSIS
Patient consumed trials of puree, honey thick liquids, nectar thick liquids and thin liquids presenting with severe oropharyngeal dysphagia.  Oral phase characterized by adequate acceptance, adequate anterior bolus containment, suspect premature spillage with liquid trials and no oral cavity residue post swallow.  Pharyngeal phase characterized by suspect delayed initiation of the pharyngeal swallow, hyolaryngeal elevation and excursion noted upon palpation.  There was throat clearing and delayed coughing following all trials indicative of laryngeal penetration/aspiration.  Noted, SPO2 changed from 99% to 92% during trials of puree, nectar thick liquids and thin liquids.

## 2020-08-28 NOTE — BRIEF OPERATIVE NOTE - OPERATION/FINDINGS
Left carotid endarterectomy performed. High bifurcation of external and internal carotid. Proximal and distal control obtained and patient was heparinized. Eversion technique used with removal of large plaque. Primary end to end anastomosis of carotid artery. Good flow through carotid via doppler signal after anastomosis. Electromonitoring used during case with no abnormalities. After case, patient slow to wake up with some residual right sided weakness, but still with motor function

## 2020-08-28 NOTE — CHART NOTE - NSCHARTNOTEFT_GEN_A_CORE
SICU attending progress note    Patient with multiple comorbidities including seizure disorder, stroke with residual weakness, s/p CEA overnight admitted for close hemodynamic monitoring. Patient started on clevidipine gtt yesterday with systolic bp control from 110-130s.     N patient neurologically grossly intact, residual weakness, pain controlled  resp breathing well on room air  cv maintain systolic between 110-130s per vascular - c/w clevidpine drip, restarted home medications, plan to wean off clevidipine  gi reg diet  gu/renal monitor uop, d/c campoverde  heme vte ppx, asa/statin  id no abx  endo no changes    The patient is a critical care patient with life threatening hemodynamic and metabolic instability in SICU.  I have personally interviewed when possible and examined the patient, reviewed data and laboratory tests/x-rays and all pertinent electronic images.  I was physically present for the key portions of the evaluation and management (E/M) service provided.   The SICU team has a constant risk benefit analyzes discussion with the primary team, all consultants, House Staff and PA's on all decisions.  These diagnoses are unrelated to the surgical procedure noted above.  I meet with family if needed to get further history, discuss the case and make care decisions for this patient who might not be able to participate.  Time involved in performance of separately billable procedures was not counted toward my critical care time. There is no overlap.  I spent 55-75 minutes ( 0800Hrs-0915Hrs in AM/ 1600Hrs-1715Hrs in PM, or other time indicated) of critical care time for the diagnoses, assessment, plan and interventions.  This time excludes time spent on separate procedures and teaching.

## 2020-08-29 LAB
ANION GAP SERPL CALC-SCNC: 14 MMO/L — SIGNIFICANT CHANGE UP (ref 7–14)
APTT BLD: 29.1 SEC — SIGNIFICANT CHANGE UP (ref 27–36.3)
BUN SERPL-MCNC: 11 MG/DL — SIGNIFICANT CHANGE UP (ref 7–23)
CALCIUM SERPL-MCNC: 9.1 MG/DL — SIGNIFICANT CHANGE UP (ref 8.4–10.5)
CHLORIDE SERPL-SCNC: 104 MMOL/L — SIGNIFICANT CHANGE UP (ref 98–107)
CO2 SERPL-SCNC: 21 MMOL/L — LOW (ref 22–31)
CREAT SERPL-MCNC: 0.81 MG/DL — SIGNIFICANT CHANGE UP (ref 0.5–1.3)
GLUCOSE SERPL-MCNC: 115 MG/DL — HIGH (ref 70–99)
HCT VFR BLD CALC: 30.8 % — LOW (ref 39–50)
HGB BLD-MCNC: 10.6 G/DL — LOW (ref 13–17)
INR BLD: 1.18 — HIGH (ref 0.88–1.16)
MAGNESIUM SERPL-MCNC: 2.1 MG/DL — SIGNIFICANT CHANGE UP (ref 1.6–2.6)
MCHC RBC-ENTMCNC: 31.3 PG — SIGNIFICANT CHANGE UP (ref 27–34)
MCHC RBC-ENTMCNC: 34.4 % — SIGNIFICANT CHANGE UP (ref 32–36)
MCV RBC AUTO: 90.9 FL — SIGNIFICANT CHANGE UP (ref 80–100)
NRBC # FLD: 0 K/UL — SIGNIFICANT CHANGE UP (ref 0–0)
PHOSPHATE SERPL-MCNC: 2.8 MG/DL — SIGNIFICANT CHANGE UP (ref 2.5–4.5)
PLATELET # BLD AUTO: 251 K/UL — SIGNIFICANT CHANGE UP (ref 150–400)
PMV BLD: 9.8 FL — SIGNIFICANT CHANGE UP (ref 7–13)
POTASSIUM SERPL-MCNC: 3.7 MMOL/L — SIGNIFICANT CHANGE UP (ref 3.5–5.3)
POTASSIUM SERPL-SCNC: 3.7 MMOL/L — SIGNIFICANT CHANGE UP (ref 3.5–5.3)
PROTHROM AB SERPL-ACNC: 13.3 SEC — SIGNIFICANT CHANGE UP (ref 10.6–13.6)
RBC # BLD: 3.39 M/UL — LOW (ref 4.2–5.8)
RBC # FLD: 11.9 % — SIGNIFICANT CHANGE UP (ref 10.3–14.5)
SODIUM SERPL-SCNC: 139 MMOL/L — SIGNIFICANT CHANGE UP (ref 135–145)
WBC # BLD: 15.49 K/UL — HIGH (ref 3.8–10.5)
WBC # FLD AUTO: 15.49 K/UL — HIGH (ref 3.8–10.5)

## 2020-08-29 PROCEDURE — 71045 X-RAY EXAM CHEST 1 VIEW: CPT | Mod: 26

## 2020-08-29 PROCEDURE — 99291 CRITICAL CARE FIRST HOUR: CPT | Mod: 25

## 2020-08-29 RX ORDER — DILTIAZEM HCL 120 MG
120 CAPSULE, EXT RELEASE 24 HR ORAL
Refills: 0 | Status: DISCONTINUED | OUTPATIENT
Start: 2020-08-29 | End: 2020-08-30

## 2020-08-29 RX ORDER — ASPIRIN/CALCIUM CARB/MAGNESIUM 324 MG
81 TABLET ORAL DAILY
Refills: 0 | Status: DISCONTINUED | OUTPATIENT
Start: 2020-08-29 | End: 2020-09-03

## 2020-08-29 RX ORDER — LEVETIRACETAM 250 MG/1
500 TABLET, FILM COATED ORAL
Refills: 0 | Status: DISCONTINUED | OUTPATIENT
Start: 2020-08-29 | End: 2020-08-29

## 2020-08-29 RX ORDER — LEVETIRACETAM 250 MG/1
500 TABLET, FILM COATED ORAL
Refills: 0 | Status: DISCONTINUED | OUTPATIENT
Start: 2020-08-29 | End: 2020-08-31

## 2020-08-29 RX ORDER — POTASSIUM PHOSPHATE, MONOBASIC POTASSIUM PHOSPHATE, DIBASIC 236; 224 MG/ML; MG/ML
15 INJECTION, SOLUTION INTRAVENOUS ONCE
Refills: 0 | Status: COMPLETED | OUTPATIENT
Start: 2020-08-29 | End: 2020-08-29

## 2020-08-29 RX ORDER — DILTIAZEM HCL 120 MG
120 CAPSULE, EXT RELEASE 24 HR ORAL DAILY
Refills: 0 | Status: DISCONTINUED | OUTPATIENT
Start: 2020-08-29 | End: 2020-08-29

## 2020-08-29 RX ORDER — ATORVASTATIN CALCIUM 80 MG/1
10 TABLET, FILM COATED ORAL AT BEDTIME
Refills: 0 | Status: DISCONTINUED | OUTPATIENT
Start: 2020-08-29 | End: 2020-08-31

## 2020-08-29 RX ORDER — LISINOPRIL 2.5 MG/1
20 TABLET ORAL DAILY
Refills: 0 | Status: DISCONTINUED | OUTPATIENT
Start: 2020-08-29 | End: 2020-08-30

## 2020-08-29 RX ADMIN — Medication 120 MILLIGRAM(S): at 17:50

## 2020-08-29 RX ADMIN — ATORVASTATIN CALCIUM 10 MILLIGRAM(S): 80 TABLET, FILM COATED ORAL at 22:28

## 2020-08-29 RX ADMIN — Medication 10 MG/HR: at 19:00

## 2020-08-29 RX ADMIN — HEPARIN SODIUM 5000 UNIT(S): 5000 INJECTION INTRAVENOUS; SUBCUTANEOUS at 22:28

## 2020-08-29 RX ADMIN — SODIUM CHLORIDE 3 MILLILITER(S): 9 INJECTION INTRAMUSCULAR; INTRAVENOUS; SUBCUTANEOUS at 22:27

## 2020-08-29 RX ADMIN — LISINOPRIL 20 MILLIGRAM(S): 2.5 TABLET ORAL at 14:23

## 2020-08-29 RX ADMIN — Medication 10 MG/HR: at 07:59

## 2020-08-29 RX ADMIN — LEVETIRACETAM 500 MILLIGRAM(S): 250 TABLET, FILM COATED ORAL at 19:08

## 2020-08-29 RX ADMIN — SODIUM CHLORIDE 3 MILLILITER(S): 9 INJECTION INTRAMUSCULAR; INTRAVENOUS; SUBCUTANEOUS at 05:25

## 2020-08-29 RX ADMIN — Medication 2.5 MILLIGRAM(S): at 23:10

## 2020-08-29 RX ADMIN — Medication 400 MILLIGRAM(S): at 05:24

## 2020-08-29 RX ADMIN — Medication 120 MILLIGRAM(S): at 14:27

## 2020-08-29 RX ADMIN — Medication 400 MILLIGRAM(S): at 00:19

## 2020-08-29 RX ADMIN — HEPARIN SODIUM 5000 UNIT(S): 5000 INJECTION INTRAVENOUS; SUBCUTANEOUS at 14:27

## 2020-08-29 RX ADMIN — Medication 81 MILLIGRAM(S): at 14:23

## 2020-08-29 RX ADMIN — SODIUM CHLORIDE 3 MILLILITER(S): 9 INJECTION INTRAMUSCULAR; INTRAVENOUS; SUBCUTANEOUS at 15:04

## 2020-08-29 RX ADMIN — CHLORHEXIDINE GLUCONATE 1 APPLICATION(S): 213 SOLUTION TOPICAL at 17:50

## 2020-08-29 RX ADMIN — LEVETIRACETAM 400 MILLIGRAM(S): 250 TABLET, FILM COATED ORAL at 05:24

## 2020-08-29 RX ADMIN — POTASSIUM PHOSPHATE, MONOBASIC POTASSIUM PHOSPHATE, DIBASIC 62.5 MILLIMOLE(S): 236; 224 INJECTION, SOLUTION INTRAVENOUS at 05:25

## 2020-08-29 NOTE — PROGRESS NOTE ADULT - ASSESSMENT
85M p/w right upper extremity stroke-like sx, with severe b/l carotid stenosis, MR significant for acute left-sided infarct no s/p eversion CEA for symptomatic L-ICA stenosis on 8/27, transferred to SICU for post operative hemodynamic monitoring and neuro checks.    PLAN:  -POD2, dressing removal tomorrow  -Pain control  -F/U ANAYELI output  -Speech and swallow study failed  -NPO/IVF  -DVT ppx/ASA  -Valladares out today  -F/U labs      Terry Dowling, PGY-1  Vascular Surgery Pager #[phone] 85M p/w right upper extremity stroke-like sx, with severe b/l carotid stenosis, MR significant for acute left-sided infarct no s/p eversion CEA for symptomatic L-ICA stenosis on 8/27, transferred to SICU for post operative hemodynamic monitoring and neuro checks.    PLAN:  -POD2, dressing removal tomorrow  -Pain control  -F/U ANAYELI output  -Speech and swallow study failed  -NPO/IVF  -DVT ppx/ASA  -F/U labs      Terry Dowling, PGY-1  Vascular Surgery Pager #[phone]

## 2020-08-29 NOTE — PROGRESS NOTE ADULT - ATTENDING COMMENTS
Patient no issues overvnight, maintained on clevidipine drip unable to be weaned off with SBP monitoring.   N at baselin  resp saturating well on room air  cv maintain systolic bp between 110-130 per vascular, titrate off clevidipine, will place ngt and resume home antihypertensives  gi ngt, will start tf  gu/renal monitor uop  heme vte ppx, on asa,statin  id no abx  endo no changes    The patient is a critical care patient with life threatening hemodynamic and metabolic instability in SICU.  I have personally interviewed when possible and examined the patient, reviewed data and laboratory tests/x-rays and all pertinent electronic images.  I was physically present for the key portions of the evaluation and management (E/M) service provided.   The SICU team has a constant risk benefit analyzes discussion with the primary team, all consultants, House Staff and PA's on all decisions.  These diagnoses are unrelated to the surgical procedure noted above.  I meet with family if needed to get further history, discuss the case and make care decisions for this patient who might not be able to participate.  Time involved in performance of separately billable procedures was not counted toward my critical care time. There is no overlap.  I spent 55-75 minutes ( 0800Hrs-0915Hrs in AM/ 1600Hrs-1715Hrs in PM, or other time indicated) of critical care time for the diagnoses, assessment, plan and interventions.  This time excludes time spent on separate procedures and teaching.

## 2020-08-29 NOTE — PROGRESS NOTE ADULT - ATTENDING COMMENTS
I have discussed the case with the surgical house staff. I have personally seen, examined, and participated in the care of this patient. I have reviewed all pertinent clinical information.    Doing well post left CEA.  Neurologic function at baseline.   Failed swallow eval.   - Discussed w/ sicu regarding placement of feeding tube for oral antihypertensive medications with plans to repeat swallow study in short interval.   - continued BP control.  - PT eval.

## 2020-08-29 NOTE — PROGRESS NOTE ADULT - ASSESSMENT
85y Male with PMH HTN, seizure disorder secondary to previous alcohol abuse, SDH s/p right craniotomy, Lap Crystal c/b bilary duct resection s/p hepaticojejunostomy/Small bowel resection who presents to the hospital with R sided arm weakness found to have symptomatic L-ICA stenosis now s/p left carotid endarterectomy.    PLAN:  Neurologic:   - q1 hr neuro checks, monitor for signs of reperfusion syndrome per vascular  - Pain control with acetaminophen  - Continue Keppra 500mg BID    Respiratory:   - Saturating well on room air  - OOB/IS    Cardiovascular:   - Hypertensive at baseline on diltiazem, f/u if pt is on lisinopril  - Strict BP goal 110-130  - Clevidipine infusion @ 3 for post operative hypertension, attempt to wean  - Continue ASA/statin  - Hx paroxysmal a-fib, episode of RVR to 110's, not on AC's due to SDH, f/u cardiology for AC recommendation    Gastrointestinal/Nutrition:   - NPO overnight with plan to resume Dysphagia diet in AM  - Speech/swallow per vascular    Renal/Genitourinary:   - LR @ 75ml/hr while NPO  - Monitor intake & output  - Replete electrolytes PRN  - D/c Valladares, f/u ToV    Hematologic:   - H/H stable  - VTE ppx w/ SQH    Infectious Disease:   - Monitor off antibiotics    Tubes/Lines/Drains:   - PIV  - Valladares    Endocrine:   - Hgb A1C 5.3  - No active issues    Disposition: SICU 85y Male with PMH HTN, seizure disorder secondary to previous alcohol abuse, SDH s/p right craniotomy, Lap Crystal c/b bilary duct resection s/p hepaticojejunostomy/Small bowel resection who presents to the hospital with R sided arm weakness found to have symptomatic L-ICA stenosis now s/p left carotid endarterectomy.    PLAN:  Neurologic:   - q1 hr neuro checks, monitor for signs of reperfusion syndrome per vascular  - Pain control with acetaminophen  - Continue Keppra 500mg BID    Respiratory:   - Saturating well on room air  - OOB/IS    Cardiovascular:   - Hypertensive at baseline on diltiazem  - Strict BP goal 110-130  - Clevidipine infusion, attempt to wean  - Start PO BP meds lisinopril/cardizem  - Continue ASA/statin  - Hx paroxysmal a-fib, episode of RVR to 110's, no AC per cardiology    Gastrointestinal/Nutrition:   - NPO  - NGT placed  - Failed speech/swallow, f/u on Monday 8/31    Renal/Genitourinary:   - LR @ 75ml/hr while NPO  - Monitor intake & output  - Replete electrolytes PRN  - Valladares removed    Hematologic:   - H/H stable  - VTE ppx w/ SQH    Infectious Disease:   - Monitor off antibiotics    Tubes/Lines/Drains:   - PIV    Endocrine:   - Hgb A1C 5.3  - No active issues    Disposition: SICU

## 2020-08-29 NOTE — PROGRESS NOTE ADULT - SUBJECTIVE AND OBJECTIVE BOX
HISTORY   85y male with PMH HTN, seizure disorder secondary to previous alcohol abuse, SDH s/p right craniotomy, Lap Crystal c/b bilary duct resection s/p hepaticojejunostomy/Small bowel resection who presents to the hospital with R sided arm weakness found to have symptomatic L-ICA stenosis. He underwent left carotid endarterectomy. Patient slow to wake up from anesthesia. He was transferred directly to SICU for close hemodynamic monitoring, strict blood pressure goals as well as q1hr neurovascular checks.     24 HOUR EVENTS:  - discontinued campoverde, passed TOV  - Started on PO home BP meds  - Failed formal speech and swallow  - Dc'd PO meds per swallow eval    SUBJECTIVE/ROS:  [x ] A ten-point review of systems was otherwise negative except as noted.  [ ] Due to altered mental status/intubation, subjective information were not able to be obtained from the patient. History was obtained, to the extent possible, from review of the chart and collateral sources of information.      NEURO  Exam: NAD, alert, oriented to self and place  RUE: 4/5, sensation grossly intact, LUE 5/5  Meds: acetaminophen  IVPB .. 1000 milliGRAM(s) IV Intermittent every 6 hours  levETIRAcetam  IVPB 500 milliGRAM(s) IV Intermittent every 12 hours    [x] Adequacy of sedation and pain control has been assessed and adjusted      RESPIRATORY  RR: 23 (08-28-20 @ 23:00) (9 - 24)  SpO2: 91% (08-28-20 @ 23:00) (91% - 100%)  Wt(kg): --  Exam: Unlabored respirations   ABG - ( 28 Aug 2020 00:30 )  pH: 7.39  /  pCO2: 36    /  pO2: 104   / HCO3: 22    / Base Excess: -2.9  /  SaO2: 97.3    Lactate: 1.0        CARDIOVASCULAR  HR: 103 (08-28-20 @ 23:00) (62 - 108)  BP: 112/64 (08-28-20 @ 08:00) (112/64 - 112/64)  BP(mean): 73 (08-28-20 @ 08:00) (73 - 73)  ABP: 135/56 (08-28-20 @ 23:00) (101/41 - 187/78)  ABP(mean): 86 (08-28-20 @ 23:00) (61 - 119)  Wt(kg): --  CVP(cm H2O): --      Exam:  Cardiac Rhythm:  Perfusion     [x ]Adequate   [ ]Inadequate  Mentation   [x ]Baseline       [ ]Reduced  Extremities  [x ]Warm         [ ]Cool  Volume Status [ ]Hypervolemic [x ]Euvolemic [ ]Hypovolemic  Meds: clevidipine Infusion 5 mG/Hr IV Continuous <Continuous>  diltiazem    milliGRAM(s) Oral daily        GI/NUTRITION  Exam: soft, NTND    GENITOURINARY  I&O's Detail    08-27 @ 07:01 - 08-28 @ 07:00  --------------------------------------------------------  IN:    clevidipine Infusion: 200 mL    IV PiggyBack: 50 mL    lactated ringers.: 600 mL  Total IN: 850 mL    OUT:    Bulb: 20 mL    Indwelling Catheter - Urethral: 1475 mL    Voided: 175 mL  Total OUT: 1670 mL    Total NET: -820 mL      08-28 @ 07:01 - 08-29 @ 00:15  --------------------------------------------------------  IN:    clevidipine Infusion: 212 mL    IV PiggyBack: 450 mL    lactated ringers.: 525 mL  Total IN: 1187 mL    OUT:    Bulb: 10 mL    Indwelling Catheter - Urethral: 435 mL    Voided: 1100 mL  Total OUT: 1545 mL    Total NET: -358 mL          08-28    135  |  102  |  18  ----------------------------<  140<H>  4.3   |  19<L>  |  0.95    Ca    9.0      28 Aug 2020 01:29  Phos  2.6     08-28  Mg     1.8     08-28    TPro  7.1  /  Alb  4.0  /  TBili  0.8  /  DBili  x   /  AST  27  /  ALT  26  /  AlkPhos  103  08-28    [ ] Campoverde catheter, indication:   Meds: sodium chloride 0.9% lock flush 3 milliLiter(s) IV Push every 8 hours        HEMATOLOGIC  Meds: aspirin enteric coated 81 milliGRAM(s) Oral daily  heparin   Injectable 5000 Unit(s) SubCutaneous every 8 hours    [x] VTE Prophylaxis                        10.4   10.44 )-----------( 232      ( 28 Aug 2020 00:30 )             30.9     PT/INR - ( 28 Aug 2020 00:30 )   PT: 15.2 SEC;   INR: 1.34          PTT - ( 28 Aug 2020 00:30 )  PTT:53.1 SEC  Transfusion     [ ] PRBC   [ ] Platelets   [ ] FFP   [ ] Cryoprecipitate      INFECTIOUS DISEASES  T(C): 36.3 (08-28-20 @ 20:00), Max: 36.5 (08-28-20 @ 00:25)  Wt(kg): --  WBC Count: 10.44 K/uL (08-28 @ 00:30)      ENDOCRINE  Capillary Blood Glucose    OTHER MEDICATIONS:  chlorhexidine 4% Liquid 1 Application(s) Topical daily      CODE STATUS:   Full Code   Tubes/Lines/Drains   [x] Peripheral IV  [] Central Venous Line     	[] R	[x] L	[] IJ	[] Fem	[] SC	Date Placed:   [x] Arterial Line		[] R	[] L	[] Fem	[] Rad	[] Ax	Date Placed:   [] PICC:         	[] Midline		[] Mediport  [x] Urinary Catheter

## 2020-08-29 NOTE — PROGRESS NOTE ADULT - SUBJECTIVE AND OBJECTIVE BOX
VASCULAR SURGERY PROGRESS NOTE    POD2 L eversion CEA    24 hr events:  - discontinued campoverde, passed TOV  - Started on PO home BP meds  - Failed formal speech and swallow  - Dc'd PO meds per swallow eval    SUBJECTIVE:  No acute events overnight  Pt examined at bedside  Denies nausea, vomiting  NPO, failed speech and swallow  Ambulating independently    OBJECTIVE:  Vital Signs Last 24 Hrs  T(C): 36.2 (29 Aug 2020 08:00), Max: 36.8 (29 Aug 2020 00:00)  T(F): 97.2 (29 Aug 2020 08:00), Max: 98.2 (29 Aug 2020 00:00)  HR: 71 (29 Aug 2020 10:00) (64 - 114)  BP: --  BP(mean): --  RR: 11 (29 Aug 2020 10:00) (11 - 25)  SpO2: 94% (29 Aug 2020 10:00) (90% - 98%)    Physical Examination:  GEN: NAD, resting quietly  NEURO: AAOx3, CN II-XII grossly intact, no focal deficits  PULM: symmetric chest rise bilaterally, no increased WOB  ABD: soft, nontender, nondistended  EXTR: no LE erythema, moving all extremities  VASC: LE warm and well-perfused    LABS:                        10.6   15.49 )-----------( 251      ( 29 Aug 2020 00:20 )             30.8     08-29    139  |  104  |  11  ----------------------------<  115<H>  3.7   |  21<L>  |  0.81    Ca    9.1      29 Aug 2020 00:20  Phos  2.8     08-29  Mg     2.1     08-29    TPro  7.1  /  Alb  4.0  /  TBili  0.8  /  DBili  x   /  AST  27  /  ALT  26  /  AlkPhos  103  08-28    PT/INR - ( 29 Aug 2020 00:20 )   PT: 13.3 SEC;   INR: 1.18          PTT - ( 29 Aug 2020 00:20 )  PTT:29.1 SEC

## 2020-08-30 LAB
ANION GAP SERPL CALC-SCNC: 16 MMO/L — HIGH (ref 7–14)
APTT BLD: 28.4 SEC — SIGNIFICANT CHANGE UP (ref 27–36.3)
BUN SERPL-MCNC: 14 MG/DL — SIGNIFICANT CHANGE UP (ref 7–23)
CALCIUM SERPL-MCNC: 8.8 MG/DL — SIGNIFICANT CHANGE UP (ref 8.4–10.5)
CHLORIDE SERPL-SCNC: 101 MMOL/L — SIGNIFICANT CHANGE UP (ref 98–107)
CO2 SERPL-SCNC: 22 MMOL/L — SIGNIFICANT CHANGE UP (ref 22–31)
CREAT SERPL-MCNC: 0.84 MG/DL — SIGNIFICANT CHANGE UP (ref 0.5–1.3)
GLUCOSE SERPL-MCNC: 125 MG/DL — HIGH (ref 70–99)
HCT VFR BLD CALC: 32.2 % — LOW (ref 39–50)
HGB BLD-MCNC: 11.2 G/DL — LOW (ref 13–17)
INR BLD: 1.13 — SIGNIFICANT CHANGE UP (ref 0.88–1.16)
MAGNESIUM SERPL-MCNC: 2 MG/DL — SIGNIFICANT CHANGE UP (ref 1.6–2.6)
MCHC RBC-ENTMCNC: 32.2 PG — SIGNIFICANT CHANGE UP (ref 27–34)
MCHC RBC-ENTMCNC: 34.8 % — SIGNIFICANT CHANGE UP (ref 32–36)
MCV RBC AUTO: 92.5 FL — SIGNIFICANT CHANGE UP (ref 80–100)
NRBC # FLD: 0 K/UL — SIGNIFICANT CHANGE UP (ref 0–0)
PHOSPHATE SERPL-MCNC: 2.7 MG/DL — SIGNIFICANT CHANGE UP (ref 2.5–4.5)
PLATELET # BLD AUTO: 252 K/UL — SIGNIFICANT CHANGE UP (ref 150–400)
PMV BLD: 10.2 FL — SIGNIFICANT CHANGE UP (ref 7–13)
POTASSIUM SERPL-MCNC: 3.5 MMOL/L — SIGNIFICANT CHANGE UP (ref 3.5–5.3)
POTASSIUM SERPL-SCNC: 3.5 MMOL/L — SIGNIFICANT CHANGE UP (ref 3.5–5.3)
PROTHROM AB SERPL-ACNC: 12.9 SEC — SIGNIFICANT CHANGE UP (ref 10.6–13.6)
RBC # BLD: 3.48 M/UL — LOW (ref 4.2–5.8)
RBC # FLD: 12.2 % — SIGNIFICANT CHANGE UP (ref 10.3–14.5)
SODIUM SERPL-SCNC: 139 MMOL/L — SIGNIFICANT CHANGE UP (ref 135–145)
WBC # BLD: 11.59 K/UL — HIGH (ref 3.8–10.5)
WBC # FLD AUTO: 11.59 K/UL — HIGH (ref 3.8–10.5)

## 2020-08-30 PROCEDURE — 99291 CRITICAL CARE FIRST HOUR: CPT | Mod: 25

## 2020-08-30 RX ORDER — CLEVIDIPINE BUTYRATE 50MG/100ML
5 VIAL (ML) INTRAVENOUS
Qty: 25 | Refills: 0 | Status: DISCONTINUED | OUTPATIENT
Start: 2020-08-30 | End: 2020-09-01

## 2020-08-30 RX ORDER — DILTIAZEM HCL 120 MG
240 CAPSULE, EXT RELEASE 24 HR ORAL DAILY
Refills: 0 | Status: DISCONTINUED | OUTPATIENT
Start: 2020-08-30 | End: 2020-08-30

## 2020-08-30 RX ORDER — POTASSIUM CHLORIDE 20 MEQ
40 PACKET (EA) ORAL ONCE
Refills: 0 | Status: COMPLETED | OUTPATIENT
Start: 2020-08-30 | End: 2020-08-30

## 2020-08-30 RX ORDER — DILTIAZEM HCL 120 MG
300 CAPSULE, EXT RELEASE 24 HR ORAL DAILY
Refills: 0 | Status: DISCONTINUED | OUTPATIENT
Start: 2020-08-30 | End: 2020-09-01

## 2020-08-30 RX ORDER — LISINOPRIL 2.5 MG/1
20 TABLET ORAL DAILY
Refills: 0 | Status: COMPLETED | OUTPATIENT
Start: 2020-08-30 | End: 2020-08-30

## 2020-08-30 RX ORDER — LISINOPRIL 2.5 MG/1
40 TABLET ORAL DAILY
Refills: 0 | Status: DISCONTINUED | OUTPATIENT
Start: 2020-08-30 | End: 2020-09-03

## 2020-08-30 RX ADMIN — Medication 120 MILLIGRAM(S): at 05:44

## 2020-08-30 RX ADMIN — SODIUM CHLORIDE 3 MILLILITER(S): 9 INJECTION INTRAMUSCULAR; INTRAVENOUS; SUBCUTANEOUS at 21:25

## 2020-08-30 RX ADMIN — Medication 81 MILLIGRAM(S): at 12:58

## 2020-08-30 RX ADMIN — LEVETIRACETAM 500 MILLIGRAM(S): 250 TABLET, FILM COATED ORAL at 05:44

## 2020-08-30 RX ADMIN — Medication 10 MG/HR: at 10:00

## 2020-08-30 RX ADMIN — CHLORHEXIDINE GLUCONATE 1 APPLICATION(S): 213 SOLUTION TOPICAL at 12:14

## 2020-08-30 RX ADMIN — LEVETIRACETAM 500 MILLIGRAM(S): 250 TABLET, FILM COATED ORAL at 17:36

## 2020-08-30 RX ADMIN — Medication 10 MG/HR: at 19:54

## 2020-08-30 RX ADMIN — Medication 2.5 MILLIGRAM(S): at 05:43

## 2020-08-30 RX ADMIN — LISINOPRIL 20 MILLIGRAM(S): 2.5 TABLET ORAL at 17:36

## 2020-08-30 RX ADMIN — ATORVASTATIN CALCIUM 10 MILLIGRAM(S): 80 TABLET, FILM COATED ORAL at 21:20

## 2020-08-30 RX ADMIN — SODIUM CHLORIDE 3 MILLILITER(S): 9 INJECTION INTRAMUSCULAR; INTRAVENOUS; SUBCUTANEOUS at 12:14

## 2020-08-30 RX ADMIN — Medication 240 MILLIGRAM(S): at 12:59

## 2020-08-30 RX ADMIN — Medication 40 MILLIEQUIVALENT(S): at 05:43

## 2020-08-30 RX ADMIN — SODIUM CHLORIDE 3 MILLILITER(S): 9 INJECTION INTRAMUSCULAR; INTRAVENOUS; SUBCUTANEOUS at 05:30

## 2020-08-30 RX ADMIN — LISINOPRIL 20 MILLIGRAM(S): 2.5 TABLET ORAL at 05:44

## 2020-08-30 RX ADMIN — HEPARIN SODIUM 5000 UNIT(S): 5000 INJECTION INTRAVENOUS; SUBCUTANEOUS at 21:20

## 2020-08-30 NOTE — PROGRESS NOTE ADULT - ASSESSMENT
85M p/w right upper extremity stroke-like sx, with severe b/l carotid stenosis, MR significant for acute left-sided infarct no s/p eversion CEA for symptomatic L-ICA stenosis on 8/27, transferred to SICU for post operative hemodynamic monitoring and neuro checks.    PLAN:  -Care per SICU  -POD3, dressing changed, drain pulled  -Pain control  -Speech and swallow study failed, f/u repeat when available  -NPO/IVF  -Feeds and PO home meds via tube  -DVT ppx/ASA  -F/U labs      Terry Dowling, PGY-1  Vascular Surgery Pager #[phone]

## 2020-08-30 NOTE — PROGRESS NOTE ADULT - SUBJECTIVE AND OBJECTIVE BOX
HISTORY   85y male with PMH HTN, seizure disorder secondary to previous alcohol abuse, SDH s/p right craniotomy, Lap Crystal c/b bilary duct resection s/p hepaticojejunostomy/Small bowel resection who presents to the hospital with R sided arm weakness found to have symptomatic L-ICA stenosis. He underwent left carotid endarterectomy. Patient slow to wake up from anesthesia. He was transferred directly to SICU for close hemodynamic monitoring, strict blood pressure goals as well as q1hr neurovascular checks.     24 HOUR EVENTS:  -q4 neurochecks  -betty feeding tube placed, started tube feeds and home BP meds  -added vasotec 2.5mg q6h overnight    SUBJECTIVE/ROS:  [x] A ten-point review of systems was otherwise negative except as noted.  [ ] Due to altered mental status/intubation, subjective information were not able to be obtained from the patient. History was obtained, to the extent possible, from review of the chart and collateral sources of information.      NEURO  RASS:     GCS: 15    CAM ICU:  Exam: awake, alert  Meds: levETIRAcetam  Solution 500 milliGRAM(s) Oral two times a day    [x] Adequacy of sedation and pain control has been assessed and adjusted      RESPIRATORY  RR: 23 (08-30-20 @ 00:00) (11 - 26)  SpO2: 96% (08-30-20 @ 00:00) (89% - 98%)  Wt(kg): --  Exam: unlabored, clear to auscultation bilaterally  Mechanical Ventilation:   ABG - ( 28 Aug 2020 00:30 )  pH: 7.39  /  pCO2: 36    /  pO2: 104   / HCO3: 22    / Base Excess: -2.9  /  SaO2: 97.3    Lactate: 1.0              [N/A] Extubation Readiness Assessed  Meds:       CARDIOVASCULAR  HR: 92 (08-30-20 @ 00:00) (70 - 99)  BP: 118/56 (08-29-20 @ 21:40) (106/51 - 118/56)  BP(mean): 71 (08-29-20 @ 21:40) (61 - 71)  ABP: 111/45 (08-30-20 @ 00:00) (111/45 - 140/65)  ABP(mean): 67 (08-30-20 @ 00:00) (67 - 94)  Wt(kg): --  CVP(cm H2O): --      Exam: regular rate and rhythm, L neck incision c/d/i with strikethrough and ANAYELI SS  Cardiac Rhythm: sinus  Perfusion     [x]Adequate   [ ]Inadequate  Mentation   [x]Normal       [ ]Reduced  Extremities  [x]Warm         [ ]Cool  Volume Status [ ]Hypervolemic [x]Euvolemic [ ]Hypovolemic  Meds: clevidipine Infusion 5 mG/Hr IV Continuous <Continuous>  diltiazem    Tablet 120 milliGRAM(s) Oral two times a day  enalaprilat Injectable 2.5 milliGRAM(s) IV Push every 6 hours  lisinopril 20 milliGRAM(s) Oral daily        GI/NUTRITION  Exam: soft, nontender, nondistended  Diet: TF via [employer]  Meds:     GENITOURINARY  I&O's Detail    08-28 @ 07:01 - 08-29 @ 07:00  --------------------------------------------------------  IN:    clevidipine Infusion: 356 mL    IV PiggyBack: 450 mL    lactated ringers.: 525 mL  Total IN: 1331 mL    OUT:    Bulb: 10 mL    Indwelling Catheter - Urethral: 435 mL    Voided: 2200 mL  Total OUT: 2645 mL    Total NET: -1314 mL      08-29 @ 07:01 - 08-30 @ 00:25  --------------------------------------------------------  IN:    clevidipine Infusion: 302 mL    ns in tub fed  lnctug30: 200 mL  Total IN: 502 mL    OUT:    Bulb: 10 mL    Voided: 550 mL  Total OUT: 560 mL    Total NET: -58 mL          08-29    139  |  104  |  11  ----------------------------<  115<H>  3.7   |  21<L>  |  0.81    Ca    9.1      29 Aug 2020 00:20  Phos  2.8     08-29  Mg     2.1     08-29    TPro  7.1  /  Alb  4.0  /  TBili  0.8  /  DBili  x   /  AST  27  /  ALT  26  /  AlkPhos  103  08-28    [ ] Valladares catheter, indication: N/A  Meds: sodium chloride 0.9% lock flush 3 milliLiter(s) IV Push every 8 hours        HEMATOLOGIC  Meds: aspirin  chewable 81 milliGRAM(s) Oral daily  heparin   Injectable 5000 Unit(s) SubCutaneous every 8 hours    [x] VTE Prophylaxis                        10.6   15.49 )-----------( 251      ( 29 Aug 2020 00:20 )             30.8     PT/INR - ( 29 Aug 2020 00:20 )   PT: 13.3 SEC;   INR: 1.18          PTT - ( 29 Aug 2020 00:20 )  PTT:29.1 SEC  Transfusion     [ ] PRBC   [ ] Platelets   [ ] FFP   [ ] Cryoprecipitate      INFECTIOUS DISEASES    RECENT CULTURES:    Meds:       ENDOCRINE  CAPILLARY BLOOD GLUCOSE        Meds: atorvastatin 10 milliGRAM(s) Oral at bedtime        ACCESS DEVICES:  [x] Peripheral IV  [ ] Central Venous Line	[ ] R	[ ] L	[ ] IJ	[ ] Fem	[ ] SC	Placed:   [x] Arterial Line		[ ] R	[ ] L	[ ] Fem	[ ] Rad	[ ] Ax	Placed:   [ ] PICC:					[ ] Mediport  [ ] Urinary Catheter, Date Placed:   [x] Necessity of urinary, arterial, and venous catheters discussed    OTHER MEDICATIONS:  chlorhexidine 4% Liquid 1 Application(s) Topical daily      CODE STATUS:      IMAGING: HISTORY   85y male with PMH HTN, seizure disorder secondary to previous alcohol abuse, SDH s/p right craniotomy, Lap Crystal c/b bilary duct resection s/p hepaticojejunostomy/Small bowel resection who presents to the hospital with R sided arm weakness found to have symptomatic L-ICA stenosis. He underwent left carotid endarterectomy. Patient slow to wake up from anesthesia. He was transferred directly to SICU for close hemodynamic monitoring, strict blood pressure goals as well as q1hr neurovascular checks.     24 HOUR EVENTS:  -q4 neurochecks  -betty feeding tube placed, started tube feeds and home BP meds  -strict BP goal 110-140   -lisinopril inc 40   -cardizem inc to 240       SUBJECTIVE/ROS:  [x] A ten-point review of systems was otherwise negative except as noted.  [ ] Due to altered mental status/intubation, subjective information were not able to be obtained from the patient. History was obtained, to the extent possible, from review of the chart and collateral sources of information.      NEURO  RASS:     GCS: 15    CAM ICU:  Exam: awake, alert  Meds: levETIRAcetam  Solution 500 milliGRAM(s) Oral two times a day    [x] Adequacy of sedation and pain control has been assessed and adjusted      RESPIRATORY  RR: 23 (08-30-20 @ 00:00) (11 - 26)  SpO2: 96% (08-30-20 @ 00:00) (89% - 98%)  Wt(kg): --  Exam: unlabored, clear to auscultation bilaterally  Mechanical Ventilation:   ABG - ( 28 Aug 2020 00:30 )  pH: 7.39  /  pCO2: 36    /  pO2: 104   / HCO3: 22    / Base Excess: -2.9  /  SaO2: 97.3    Lactate: 1.0              [N/A] Extubation Readiness Assessed  Meds:       CARDIOVASCULAR  HR: 92 (08-30-20 @ 00:00) (70 - 99)  BP: 118/56 (08-29-20 @ 21:40) (106/51 - 118/56)  BP(mean): 71 (08-29-20 @ 21:40) (61 - 71)  ABP: 111/45 (08-30-20 @ 00:00) (111/45 - 140/65)  ABP(mean): 67 (08-30-20 @ 00:00) (67 - 94)  Wt(kg): --  CVP(cm H2O): --      Exam: regular rate and rhythm, L neck incision c/d/i with strikethrough and ANAYELI SS  Cardiac Rhythm: sinus  Perfusion     [x]Adequate   [ ]Inadequate  Mentation   [x]Normal       [ ]Reduced  Extremities  [x]Warm         [ ]Cool  Volume Status [ ]Hypervolemic [x]Euvolemic [ ]Hypovolemic  Meds: clevidipine Infusion 5 mG/Hr IV Continuous <Continuous>  diltiazem    Tablet 120 milliGRAM(s) Oral two times a day  enalaprilat Injectable 2.5 milliGRAM(s) IV Push every 6 hours  lisinopril 20 milliGRAM(s) Oral daily        GI/NUTRITION  Exam: soft, nontender, nondistended  Diet: TF via betty  Meds:     GENITOURINARY  I&O's Detail    08-28 @ 07:01 - 08-29 @ 07:00  --------------------------------------------------------  IN:    clevidipine Infusion: 356 mL    IV PiggyBack: 450 mL    lactated ringers.: 525 mL  Total IN: 1331 mL    OUT:    Bulb: 10 mL    Indwelling Catheter - Urethral: 435 mL    Voided: 2200 mL  Total OUT: 2645 mL    Total NET: -1314 mL      08-29 @ 07:01 - 08-30 @ 00:25  --------------------------------------------------------  IN:    clevidipine Infusion: 302 mL    ns in tub fed  fifhoc24: 200 mL  Total IN: 502 mL    OUT:    Bulb: 10 mL    Voided: 550 mL  Total OUT: 560 mL    Total NET: -58 mL          08-29    139  |  104  |  11  ----------------------------<  115<H>  3.7   |  21<L>  |  0.81    Ca    9.1      29 Aug 2020 00:20  Phos  2.8     08-29  Mg     2.1     08-29    TPro  7.1  /  Alb  4.0  /  TBili  0.8  /  DBili  x   /  AST  27  /  ALT  26  /  AlkPhos  103  08-28    [ ] Valladares catheter, indication: N/A  Meds: sodium chloride 0.9% lock flush 3 milliLiter(s) IV Push every 8 hours        HEMATOLOGIC  Meds: aspirin  chewable 81 milliGRAM(s) Oral daily  heparin   Injectable 5000 Unit(s) SubCutaneous every 8 hours    [x] VTE Prophylaxis                        10.6   15.49 )-----------( 251      ( 29 Aug 2020 00:20 )             30.8     PT/INR - ( 29 Aug 2020 00:20 )   PT: 13.3 SEC;   INR: 1.18          PTT - ( 29 Aug 2020 00:20 )  PTT:29.1 SEC  Transfusion     [ ] PRBC   [ ] Platelets   [ ] FFP   [ ] Cryoprecipitate      INFECTIOUS DISEASES    RECENT CULTURES:    Meds:       ENDOCRINE  CAPILLARY BLOOD GLUCOSE        Meds: atorvastatin 10 milliGRAM(s) Oral at bedtime        ACCESS DEVICES:  [x] Peripheral IV  [ ] Central Venous Line	[ ] R	[ ] L	[ ] IJ	[ ] Fem	[ ] SC	Placed:   [x] Arterial Line		[ ] R	[ ] L	[ ] Fem	[ ] Rad	[ ] Ax	Placed:   [ ] PICC:					[ ] Mediport  [ ] Urinary Catheter, Date Placed:   [x] Necessity of urinary, arterial, and venous catheters discussed    OTHER MEDICATIONS:  chlorhexidine 4% Liquid 1 Application(s) Topical daily      CODE STATUS:      IMAGING:

## 2020-08-30 NOTE — PROGRESS NOTE ADULT - ASSESSMENT
85y Male with PMH HTN, seizure disorder secondary to previous alcohol abuse, SDH s/p right craniotomy, Lap Crystal c/b bilary duct resection s/p hepaticojejunostomy/Small bowel resection who presents to the hospital with R sided arm weakness found to have symptomatic L-ICA stenosis now s/p left carotid endarterectomy.    PLAN:  Neurologic: post op R sided weakness, same as pre-op, improved 8/29  - q4 hr neuro checks, monitor for signs of reperfusion syndrome per vascular  - Pain control with acetaminophen  - Continue Keppra 500mg BID    Respiratory:   - Saturating well on room air  - OOB/IS    Cardiovascular: hx of HTN, s/p L CEA 8/28  - Strict BP goal 110-130  - Clevidipine infusion, attempt to wean  - Started PO BP meds lisinopril/cardizem  - added vasotec overnight, if responds can consider increasing lisinopril dosage  - Continue ASA/statin (lipitor 10mg PO daily home medication resumed)  - Hx paroxysmal a-fib, episode of RVR to 110's, no AC per cardiology  - vascular surgery f/u plan for ANAYELI    Gastrointestinal/Nutrition:   - NPO with tube feeds  - betty feeding tube placed, started on Jevity 8/29  - Failed speech/swallow, f/u on Monday 8/31    Renal/Genitourinary:   - Monitor intake & output  - Replete electrolytes PRN    Hematologic:   - H/H stable  - VTE ppx w/ SQH    Infectious Disease:   - Monitor off antibiotics    Tubes/Lines/Drains:   - PIV  - L radial arterial line    Endocrine:   - Hgb A1C 5.3  - No active issues    Disposition: SICU 85y Male with PMH HTN, seizure disorder secondary to previous alcohol abuse, SDH s/p right craniotomy, Lap Crystal c/b bilary duct resection s/p hepaticojejunostomy/Small bowel resection who presents to the hospital with R sided arm weakness found to have symptomatic L-ICA stenosis now s/p left carotid endarterectomy.    PLAN:  Neurologic: post op R sided weakness, same as pre-op, improved 8/29  - q4 hr neuro checks, monitor for signs of reperfusion syndrome per vascular  - Pain control with acetaminophen  - Continue Keppra 500mg BID    Respiratory:   - Saturating well on room air  - OOB/IS    Cardiovascular: hx of HTN, s/p L CEA 8/28  - Strict BP goal 110-140   - Clevidipine infusion, attempt to wean  - lisinopril inc to 40, cardizem inc to 240   - Continue ASA/statin (lipitor 10mg PO daily home medication resumed)  - Hx paroxysmal a-fib, episode of RVR to 110's, no AC per cardiology  - vascular surgery f/u plan for ANAYELI     Gastrointestinal/Nutrition:   - NPO with tube feeds  - betty feeding tube placed, started on Jevity 8/29  - Failed speech/swallow, f/u on Monday 8/31    Renal/Genitourinary:   - Monitor intake & output  - Replete electrolytes PRN    Hematologic:   - H/H stable  - VTE ppx w/ SQH    Infectious Disease:   - Monitor off antibiotics    Tubes/Lines/Drains:   - PIV  - L radial arterial line    Endocrine:   - Hgb A1C 5.3  - No active issues    Disposition: SICU

## 2020-08-30 NOTE — PROGRESS NOTE ADULT - SUBJECTIVE AND OBJECTIVE BOX
VASCULAR SURGERY PROGRESS NOTE    POD3 L CEA with eversion    24hr events:  -q1 down to q4 neurochecks  -betty feeding tube placed, started tube feeds and home BP meds  -added vasotec 2.5mg q6h overnight    SUBJECTIVE:  No acute events overnight  Pt examined at bedside  Denies pain  Denies nausea, vomiting  Pt observed utilizing right arm, no obvious deficits  Is passing gas and having bowel movements. Denies diarrhea  Tube feeds after failed speech/swallow    OBJECTIVE:  Vital Signs Last 24 Hrs  T(C): 36.6 (30 Aug 2020 00:00), Max: 36.6 (29 Aug 2020 16:00)  T(F): 97.8 (30 Aug 2020 00:00), Max: 97.8 (29 Aug 2020 16:00)  HR: 88 (30 Aug 2020 07:00) (71 - 102)  BP: 118/56 (29 Aug 2020 21:40) (106/51 - 118/56)  BP(mean): 71 (29 Aug 2020 21:40) (61 - 71)  RR: 23 (30 Aug 2020 07:00) (11 - 26)  SpO2: 99% (30 Aug 2020 07:00) (89% - 99%)    Physical Examination:  GEN: NAD, resting quietly  HEENT: drain removed, dressing changed  NEURO: AAOx3, CN II-XII grossly intact, no focal deficits  PULM: symmetric chest rise bilaterally, no increased WOB  ABD: soft, nontender, nondistended  EXTR: no LE erythema, moving all extremities  VASC: LE warm and well-perfused    LABS:                        11.2   11.59 )-----------( 252      ( 30 Aug 2020 00:10 )             32.2     08-30    139  |  101  |  14  ----------------------------<  125<H>  3.5   |  22  |  0.84    Ca    8.8      30 Aug 2020 00:10  Phos  2.7     08-30  Mg     2.0     08-30      PT/INR - ( 30 Aug 2020 00:10 )   PT: 12.9 SEC;   INR: 1.13          PTT - ( 30 Aug 2020 00:10 )  PTT:28.4 SEC

## 2020-08-30 NOTE — PROGRESS NOTE ADULT - ATTENDING COMMENTS
Patient s/p cea, requiring icu for close hemodynamic monitoring for reperfusion and on clevidipine drip. Patient bp is labile and on home medications, titrating bp medications up to wean off clevidipine.    N neurologically at baseline  resp on room air  cv clevidipine gtt at 5, plan to increase lisinopril and diltiazem, maintain systolic 110-140  gi ngt, tf  gu/renal voiding  heme vte ppx, asa  id no abx  endo no changes    The patient is a critical care patient with life threatening hemodynamic and metabolic instability in SICU.  I have personally interviewed when possible and examined the patient, reviewed data and laboratory tests/x-rays and all pertinent electronic images.  I was physically present for the key portions of the evaluation and management (E/M) service provided.   The SICU team has a constant risk benefit analyzes discussion with the primary team, all consultants, House Staff and PA's on all decisions.  These diagnoses are unrelated to the surgical procedure noted above.  I meet with family if needed to get further history, discuss the case and make care decisions for this patient who might not be able to participate.  Time involved in performance of separately billable procedures was not counted toward my critical care time. There is no overlap.  I spent 55-75 minutes ( 0800Hrs-0915Hrs in AM/ 1600Hrs-1715Hrs in PM, or other time indicated) of critical care time for the diagnoses, assessment, plan and interventions.  This time excludes time spent on separate procedures and teaching.

## 2020-08-30 NOTE — PROGRESS NOTE ADULT - ATTENDING COMMENTS
I have discussed the case with the surgical house staff. I have personally seen, examined, and participated in the care of this patient. I have reviewed all pertinent clinical information.    s/p L cea for symptomatic high grade stenosis, doing well postop.  Neuro exam stable compared to preop.  pt at high risk for reperfusion given contralateral high grade stenosis, L vert occlusion, R vert stenosis.   On cleviprex to target -140. Transitioning to oral meds.

## 2020-08-31 ENCOUNTER — TRANSCRIPTION ENCOUNTER (OUTPATIENT)
Age: 85
End: 2020-08-31

## 2020-08-31 LAB
ANION GAP SERPL CALC-SCNC: 14 MMO/L — SIGNIFICANT CHANGE UP (ref 7–14)
APPEARANCE UR: CLEAR — SIGNIFICANT CHANGE UP
BACTERIA # UR AUTO: NEGATIVE — SIGNIFICANT CHANGE UP
BILIRUB UR-MCNC: NEGATIVE — SIGNIFICANT CHANGE UP
BLOOD UR QL VISUAL: SIGNIFICANT CHANGE UP
BUN SERPL-MCNC: 14 MG/DL — SIGNIFICANT CHANGE UP (ref 7–23)
CALCIUM SERPL-MCNC: 8.7 MG/DL — SIGNIFICANT CHANGE UP (ref 8.4–10.5)
CHLORIDE SERPL-SCNC: 100 MMOL/L — SIGNIFICANT CHANGE UP (ref 98–107)
CO2 SERPL-SCNC: 23 MMOL/L — SIGNIFICANT CHANGE UP (ref 22–31)
COLOR SPEC: YELLOW — SIGNIFICANT CHANGE UP
CREAT SERPL-MCNC: 0.75 MG/DL — SIGNIFICANT CHANGE UP (ref 0.5–1.3)
GLUCOSE SERPL-MCNC: 124 MG/DL — HIGH (ref 70–99)
GLUCOSE UR-MCNC: NEGATIVE — SIGNIFICANT CHANGE UP
HCT VFR BLD CALC: 34.7 % — LOW (ref 39–50)
HGB BLD-MCNC: 11.4 G/DL — LOW (ref 13–17)
HYALINE CASTS # UR AUTO: NEGATIVE — SIGNIFICANT CHANGE UP
KETONES UR-MCNC: NEGATIVE — SIGNIFICANT CHANGE UP
LEUKOCYTE ESTERASE UR-ACNC: NEGATIVE — SIGNIFICANT CHANGE UP
MAGNESIUM SERPL-MCNC: 1.9 MG/DL — SIGNIFICANT CHANGE UP (ref 1.6–2.6)
MCHC RBC-ENTMCNC: 30.9 PG — SIGNIFICANT CHANGE UP (ref 27–34)
MCHC RBC-ENTMCNC: 32.9 % — SIGNIFICANT CHANGE UP (ref 32–36)
MCV RBC AUTO: 94 FL — SIGNIFICANT CHANGE UP (ref 80–100)
NITRITE UR-MCNC: NEGATIVE — SIGNIFICANT CHANGE UP
NRBC # FLD: 0 K/UL — SIGNIFICANT CHANGE UP (ref 0–0)
PH UR: 7 — SIGNIFICANT CHANGE UP (ref 5–8)
PHOSPHATE SERPL-MCNC: 2.8 MG/DL — SIGNIFICANT CHANGE UP (ref 2.5–4.5)
PLATELET # BLD AUTO: 270 K/UL — SIGNIFICANT CHANGE UP (ref 150–400)
PMV BLD: 10.3 FL — SIGNIFICANT CHANGE UP (ref 7–13)
POTASSIUM SERPL-MCNC: 3.7 MMOL/L — SIGNIFICANT CHANGE UP (ref 3.5–5.3)
POTASSIUM SERPL-SCNC: 3.7 MMOL/L — SIGNIFICANT CHANGE UP (ref 3.5–5.3)
PROT UR-MCNC: 30 — SIGNIFICANT CHANGE UP
RBC # BLD: 3.69 M/UL — LOW (ref 4.2–5.8)
RBC # FLD: 12.1 % — SIGNIFICANT CHANGE UP (ref 10.3–14.5)
RBC CASTS # UR COMP ASSIST: HIGH (ref 0–?)
SODIUM SERPL-SCNC: 137 MMOL/L — SIGNIFICANT CHANGE UP (ref 135–145)
SP GR SPEC: 1.02 — SIGNIFICANT CHANGE UP (ref 1–1.04)
SQUAMOUS # UR AUTO: SIGNIFICANT CHANGE UP
UROBILINOGEN FLD QL: HIGH
WBC # BLD: 8.86 K/UL — SIGNIFICANT CHANGE UP (ref 3.8–10.5)
WBC # FLD AUTO: 8.86 K/UL — SIGNIFICANT CHANGE UP (ref 3.8–10.5)
WBC UR QL: SIGNIFICANT CHANGE UP (ref 0–?)

## 2020-08-31 PROCEDURE — 71045 X-RAY EXAM CHEST 1 VIEW: CPT | Mod: 26

## 2020-08-31 PROCEDURE — 99233 SBSQ HOSP IP/OBS HIGH 50: CPT | Mod: GC

## 2020-08-31 RX ORDER — DIGOXIN 250 MCG
0.12 TABLET ORAL DAILY
Refills: 0 | Status: DISCONTINUED | OUTPATIENT
Start: 2020-08-31 | End: 2020-09-03

## 2020-08-31 RX ORDER — TAMSULOSIN HYDROCHLORIDE 0.4 MG/1
0.4 CAPSULE ORAL AT BEDTIME
Refills: 0 | Status: DISCONTINUED | OUTPATIENT
Start: 2020-08-31 | End: 2020-09-03

## 2020-08-31 RX ORDER — ACETAMINOPHEN 500 MG
1000 TABLET ORAL ONCE
Refills: 0 | Status: DISCONTINUED | OUTPATIENT
Start: 2020-08-31 | End: 2020-08-31

## 2020-08-31 RX ORDER — LEVETIRACETAM 250 MG/1
500 TABLET, FILM COATED ORAL
Refills: 0 | Status: DISCONTINUED | OUTPATIENT
Start: 2020-08-31 | End: 2020-09-03

## 2020-08-31 RX ORDER — POTASSIUM PHOSPHATE, MONOBASIC POTASSIUM PHOSPHATE, DIBASIC 236; 224 MG/ML; MG/ML
15 INJECTION, SOLUTION INTRAVENOUS ONCE
Refills: 0 | Status: DISCONTINUED | OUTPATIENT
Start: 2020-08-31 | End: 2020-08-31

## 2020-08-31 RX ORDER — ATORVASTATIN CALCIUM 80 MG/1
10 TABLET, FILM COATED ORAL AT BEDTIME
Refills: 0 | Status: DISCONTINUED | OUTPATIENT
Start: 2020-08-31 | End: 2020-09-03

## 2020-08-31 RX ORDER — POTASSIUM CHLORIDE 20 MEQ
10 PACKET (EA) ORAL
Refills: 0 | Status: DISCONTINUED | OUTPATIENT
Start: 2020-08-31 | End: 2020-08-31

## 2020-08-31 RX ORDER — ACETAMINOPHEN 500 MG
650 TABLET ORAL ONCE
Refills: 0 | Status: COMPLETED | OUTPATIENT
Start: 2020-08-31 | End: 2020-08-31

## 2020-08-31 RX ORDER — POTASSIUM CHLORIDE 20 MEQ
20 PACKET (EA) ORAL ONCE
Refills: 0 | Status: COMPLETED | OUTPATIENT
Start: 2020-08-31 | End: 2020-08-31

## 2020-08-31 RX ORDER — MAGNESIUM SULFATE 500 MG/ML
1 VIAL (ML) INJECTION ONCE
Refills: 0 | Status: DISCONTINUED | OUTPATIENT
Start: 2020-08-31 | End: 2020-08-31

## 2020-08-31 RX ADMIN — Medication 10 MG/HR: at 07:46

## 2020-08-31 RX ADMIN — Medication 650 MILLIGRAM(S): at 08:22

## 2020-08-31 RX ADMIN — HEPARIN SODIUM 5000 UNIT(S): 5000 INJECTION INTRAVENOUS; SUBCUTANEOUS at 22:53

## 2020-08-31 RX ADMIN — CHLORHEXIDINE GLUCONATE 1 APPLICATION(S): 213 SOLUTION TOPICAL at 11:46

## 2020-08-31 RX ADMIN — LISINOPRIL 40 MILLIGRAM(S): 2.5 TABLET ORAL at 05:08

## 2020-08-31 RX ADMIN — HEPARIN SODIUM 5000 UNIT(S): 5000 INJECTION INTRAVENOUS; SUBCUTANEOUS at 05:07

## 2020-08-31 RX ADMIN — ATORVASTATIN CALCIUM 10 MILLIGRAM(S): 80 TABLET, FILM COATED ORAL at 22:53

## 2020-08-31 RX ADMIN — LEVETIRACETAM 500 MILLIGRAM(S): 250 TABLET, FILM COATED ORAL at 18:37

## 2020-08-31 RX ADMIN — HEPARIN SODIUM 5000 UNIT(S): 5000 INJECTION INTRAVENOUS; SUBCUTANEOUS at 13:50

## 2020-08-31 RX ADMIN — LEVETIRACETAM 500 MILLIGRAM(S): 250 TABLET, FILM COATED ORAL at 05:08

## 2020-08-31 RX ADMIN — Medication 20 MILLIEQUIVALENT(S): at 05:07

## 2020-08-31 RX ADMIN — SODIUM CHLORIDE 3 MILLILITER(S): 9 INJECTION INTRAMUSCULAR; INTRAVENOUS; SUBCUTANEOUS at 13:50

## 2020-08-31 RX ADMIN — SODIUM CHLORIDE 3 MILLILITER(S): 9 INJECTION INTRAMUSCULAR; INTRAVENOUS; SUBCUTANEOUS at 05:05

## 2020-08-31 RX ADMIN — Medication 81 MILLIGRAM(S): at 11:45

## 2020-08-31 RX ADMIN — Medication 0.12 MILLIGRAM(S): at 11:45

## 2020-08-31 RX ADMIN — TAMSULOSIN HYDROCHLORIDE 0.4 MILLIGRAM(S): 0.4 CAPSULE ORAL at 23:04

## 2020-08-31 RX ADMIN — Medication 300 MILLIGRAM(S): at 05:07

## 2020-08-31 RX ADMIN — Medication 650 MILLIGRAM(S): at 09:30

## 2020-08-31 RX ADMIN — SODIUM CHLORIDE 3 MILLILITER(S): 9 INJECTION INTRAMUSCULAR; INTRAVENOUS; SUBCUTANEOUS at 22:54

## 2020-08-31 NOTE — SWALLOW BEDSIDE ASSESSMENT ADULT - SWALLOW EVAL: RECOMMENDED DIET
Mechanical Soft with Thin Liquids
Puree Consistency and Honey Thick Liquids
PO is contraindicated at this time, consideration for short term alternate means of nutrition/hydration/medication

## 2020-08-31 NOTE — DISCHARGE NOTE PROVIDER - NSDCCPCAREPLAN_GEN_ALL_CORE_FT
PRINCIPAL DISCHARGE DIAGNOSIS  Diagnosis: Stroke  Assessment and Plan of Treatment: Found to have carotid stenosis   s/p Left carotid endarterectomy  WOUND CARE:  Please keep incisions clean and dry. Please do not Scrub or rub incisions. Do not use lotion or powder on incisions.   BATHING: You may shower and/or sponge bathe. You may use warm soapy water in the shower and rinse, pat dry.  ACTIVITY: No heavy lifting or straining. Otherwise, you may return to your usual level of physical activity. If you are taking narcotic pain medication DO NOT drive a car, operate machinery or make important decisions.  NOTIFY YOUR SURGEON IF YOU HAVE: any bleeding that does not stop, any pus draining from your wound(s), any fever (over 100.4 F) persistent nausea/vomiting, or if your pain is not controlled on your discharge pain medications, unable to urinate.  Please follow up with your primary care physician in one week regarding your hospitalization, bring copies of your discharge paperwork.  Please follow up with your surgeon, Dr. Jennifer Chamberlain      SECONDARY DISCHARGE DIAGNOSES  Diagnosis: Carotid stenosis  Assessment and Plan of Treatment: Carotid stenosis

## 2020-08-31 NOTE — DISCHARGE NOTE PROVIDER - NSDCMRMEDTOKEN_GEN_ALL_CORE_FT
atorvastatin 10 mg oral tablet: 1 tab(s) orally once a day  digoxin 125 mcg (0.125 mg) oral tablet: 1 tab(s) orally once a day  diltiazem 24 hour extended release 240 mg/24 hours oral capsule, extended release: 1 cap(s) orally once a day  folic acid 1 mg oral tablet: 1 tab(s) orally once a day  levETIRAcetam 500 mg oral tablet: 1 tab(s) orally 2 times a day  lisinopril 20 mg oral tablet: 1 tab(s) orally once a day  Vitamin C 500 mg oral tablet: 1 tab(s) orally once a day

## 2020-08-31 NOTE — SWALLOW BEDSIDE ASSESSMENT ADULT - PHARYNGEAL PHASE
Delayed pharyngeal swallow/Throat clear post oral intake Cough post oral intake/Delayed pharyngeal swallow Within functional limits

## 2020-08-31 NOTE — CHART NOTE - NSCHARTNOTEFT_GEN_A_CORE
NUTRITION FOLLOW-UP:    Pt seen for nutrition follow/up.  Pt starting on bolus tube feeding today, changed from continuous drip.  S/P OR on 8/28 for L carotid artery stenosis, L carotid endarterectomy.  Prior to surgery, pt was receiving a Dyspaghia 2 diet w/thin fluids, DASH, no caffeine, carbonated beverage or chocolate.  Pt was noted with a good po intake with 75% of meals consumed.  Pt was seen for a dysphagia evaluation post-op and it was determined that all po intake is contraindicated with consideration for short term alternate means of nutrition/hydration.  Pt was started on enteral nutrition on 8/29.  Met w/patient this morning.  Pt reports that his appetite is good and feels hungry (TF on hold at time of visit).  Pt denies food allergies, wt changes PTA or modified diet in the past.  Current wt is 6.1kg above admission wt.  Pt w/o edema at this time.      Weight:  8/29 - 82.1kg     Adm - 76kg     UBW - 77.1kg     IBW - 67kg  Labs:  H/H 11.4/347  Glu 124    Current Diet:  Jevity 1.2 342mL q6h via NGT  Enteral Recommendations:  TF as ordered to provide 1642kcal w/75gm protein.  Estimated kcal needs = 20-25kcal/kg =  1562-1962kcal.  Estimated protein needs = 1.3gm/kg IBW = 87gms/d.  Pat may benefit by protein module- suggest add 1 pack no carb prosource/d to increase protein intake to 90gms/d to meet current needs.      RD to Remain Available:  yes    Additional Recommendations:   1) Monitor weights, labs, BM's, skin integrity, tolerance to TF  2) Suggest continue current TF regimen  3) Add protein module to achieve protein needs.

## 2020-08-31 NOTE — SWALLOW BEDSIDE ASSESSMENT ADULT - ASR SWALLOW ASPIRATION MONITOR
pneumonia/throat clearing/upper respiratory infection/change of breathing pattern/cough/fever/oral hygiene/position upright (90Y)/gurgly voice
change of breathing pattern/cough/throat clearing/gurgly voice/pneumonia/upper respiratory infection/oral hygiene/position upright (90Y)/fever

## 2020-08-31 NOTE — SWALLOW BEDSIDE ASSESSMENT ADULT - ADDITIONAL RECOMMENDATIONS
Monitor for any evolving neurological/mental status changes that may impact on oral intake.
1. Monitor for PO tolerance/intake  2. Monitor for any change in neurological status that may impact oral intake  3. Reconsult this service to re-assess for possible diet advancement as overall medical status improves
1. Oral care to prevent the colonization of oral cavity bacteria  2. Reconsult this service as patient is medically optimized

## 2020-08-31 NOTE — PROGRESS NOTE ADULT - ATTENDING COMMENTS
Critical Care Dx     I63.9 Cerebrovascular accident (CVA), unspecified mechanism   -q 4 neuro checks, ordered PT/OT today to start rehabilitation process  -ASA  I48.20 Chronic atrial fibrillation   -Digoxin restarted today, rate controlled, no hemodynamic compromise   I10 Hypertension goal BP (blood pressure) < 140/80  -cleviprex gtt will be weaned today, likely will live with BP   -home meds restarted    I have personally interviewed and examined the patient, reviewed data and laboratory tests/x-rays and all pertinent electronic images.  The SICU team has a constant risk benefit analyzes discussion with the primary team, all consultants, House Staff and PA's on all decisions.   Time involved in performance of separately billable procedures was not counted toward my critical care time. There is no overlap.    I have personally provided 45 minutes of critical care time concurrently with the resident/fellow.     López Medina MD  Acute and Critical Care Surgery Critical Care Dx     Mr. Johnson is recovering s/p CVA and carotid endarterectomy with postop hypertension. To avoid postoperative bleed or intracranial edema, vascular surgery would like the systolic BP < 140mmHG. This requires a cleviprex gtt and oral medications as well as q1 vascular checks.     I63.9 Cerebrovascular accident (CVA), unspecified mechanism   -q 4 neuro checks, ordered PT/OT today to start rehabilitation process  -ASA  I48.20 Chronic atrial fibrillation   -Digoxin restarted today, rate controlled, no hemodynamic compromise   I10 Hypertension goal BP (blood pressure) < 140/80  -cleviprex gtt will be weaned today, likely will live with BP   -home meds restarted    I have personally interviewed and examined the patient, reviewed data and laboratory tests/x-rays and all pertinent electronic images.  The SICU team has a constant risk benefit analyzes discussion with the primary team, all consultants, House Staff and PA's on all decisions.   Time involved in performance of separately billable procedures was not counted toward my critical care time. There is no overlap.    I have personally provided 45 minutes of critical care time concurrently with the resident/fellow.     López Medina MD  Acute and Critical Care Surgery

## 2020-08-31 NOTE — DISCHARGE NOTE PROVIDER - HOSPITAL COURSE
85M with history of HTN, Seizure d/o, SDH s/p R craniotomy, and Lap Crystal c/b bilary duct resection s/p hepaticojejunostomy/Small bowel resection who presents to the hospital with R sided arm weakness. On arrival to the ED, his vitals were T 97.6, P 82, /66, RR 16, O2 sat 95% RA. Lab work showed mild anemia and stable trops. 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 Neurology and Vascular Surgery consulted. Patient admitted to medicine for further CVA work up. Neurology recommended asprin and Atorvastatin 80mg.       Patient was found to have severe b/l carotid stenosis, MR significant for acute left-sided infarct. Vascular planned for CEA. Cardiology consulted for OR optimization ECHO revealed ejection fraction is normal. Unable to perform pharmacologic stress testing and coronary intervention is unlikely to meaningfully modify risk. Patient is optimized to proceed with CEA from the cardiac standpoint. Patient went to the OR on 8/27 s/p Left carotid endarterectomy post operative went to SICU for hemodynamic monitoring and neuro checks. POD he had a swollow study and PO nutrition was deemed contraindicated. 8/29 KO feeding tube was placed and patient started on tube feeds. Patient on cleviprex for HTN. 85M with history of HTN, Seizure d/o, SDH s/p R craniotomy, and Lap Crystal c/b bilary duct resection s/p hepaticojejunostomy/Small bowel resection who presents to the hospital with R sided arm weakness. On arrival to the ED, his vitals were T 97.6, P 82, /66, RR 16, O2 sat 95% RA. Lab work showed mild anemia and stable trops. 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 Neurology and Vascular Surgery consulted. Patient admitted to medicine for further CVA work up. Neurology recommended asprin and Atorvastatin 80mg.       Patient was found to have severe b/l carotid stenosis, MR significant for acute left-sided infarct. Vascular planned for CEA. Cardiology consulted for OR optimization ECHO revealed ejection fraction is normal. Unable to perform pharmacologic stress testing and coronary intervention is unlikely to meaningfully modify risk. Patient is optimized to proceed with CEA from the cardiac standpoint. Patient went to the OR on 8/27 s/p Left carotid endarterectomy post operative went to SICU for hemodynamic monitoring and neuro checks. POD he had a swollow study and PO nutrition was deemed contraindicated. 8/29 KO feeding tube was placed and patient started on tube feeds. Patient on cleviprex for HTN. Patient weaned off cleviprex, he was transferred to the floor. He had a speech and swallow and passed days later. He was started on a diet.   Pt has been deemed stable for discharge at this time.

## 2020-08-31 NOTE — PROGRESS NOTE ADULT - ATTENDING COMMENTS
I have discussed the case with the surgical house staff. I have personally seen, examined, and participated in the care of this patient. I have reviewed all pertinent clinical information.    s/p L cea for symptomatic high grade stenosis, doing well postop.  Neuro exam stable compared to preop.  pt at high risk for reperfusion given contralateral high grade stenosis, L vert occlusion, R vert stenosis.   On cleviprex to target -140. Transitioning to oral meds.  plan for S&S re-eval today.

## 2020-08-31 NOTE — DISCHARGE NOTE PROVIDER - NSDCQMANTITHROMCONTRAOTHER_GEN_A_CORE_FT
Patient will follow up with Cardiology in 1 week to discuss the potential of starting AC. Previously off AC due to history of SDH and frequent falls.   Patient will be discharged on ASA 81mg daily

## 2020-08-31 NOTE — DISCHARGE NOTE PROVIDER - CARE PROVIDER_API CALL
Jennifer Chamberlain (MD)  Surgery  [address] [facility] [facility], [address] [place] [address]  Phone: [phone]  Fax: [phone]  Follow Up Time: 2 weeks

## 2020-08-31 NOTE — PROGRESS NOTE ADULT - ASSESSMENT
85y Male with PMH HTN, seizure disorder secondary to previous alcohol abuse, SDH s/p right craniotomy, Lap Crystal c/b bilary duct resection s/p hepaticojejunostomy/Small bowel resection who presents to the hospital with R sided arm weakness found to have symptomatic L-ICA stenosis now s/p left carotid endarterectomy.    PLAN:  Neurologic: post op R sided weakness, same as pre-op, improved 8/29  - q4 hr neuro checks, monitor for signs of reperfusion syndrome per vascular  - Pain control with acetaminophen  - Continue Keppra 500mg BID    Respiratory:   - Saturating well on room air  - OOB/IS    Cardiovascular: hx of HTN, s/p L CEA 8/28  - Strict BP goal 110-140   - Clevidipine infusion, attempt to wean  - lisinopril inc to 40, cardizem inc to 300  - Continue ASA/statin (lipitor 10mg PO daily home medication resumed)  - Hx paroxysmal a-fib, episode of RVR to 110's, no AC per cardiology  - vascular surgery f/u plan for ANAYELI     Gastrointestinal/Nutrition:   - NPO with tube feeds  - betty feeding tube placed, started on Jevity 8/29  - Failed speech/swallow, f/u on Monday 8/31    Renal/Genitourinary:   - Monitor intake & output  - Replete electrolytes PRN    Hematologic:   - H/H stable  - VTE ppx w/ SQH    Infectious Disease:   - Monitor off antibiotics    Tubes/Lines/Drains:   - PIV  - L radial arterial line    Endocrine:   - Hgb A1C 5.3  - No active issues    Disposition: SICU 85y Male with PMH HTN, seizure disorder secondary to previous alcohol abuse, SDH s/p right craniotomy, Lap Crystal c/b bilary duct resection s/p hepaticojejunostomy/Small bowel resection who presents to the hospital with R sided arm weakness found to have symptomatic L-ICA stenosis now s/p left carotid endarterectomy.    PLAN:  Neurologic: post op R sided weakness, same as pre-op, improved 8/29  - q4 hr neuro checks, monitor for signs of reperfusion syndrome per vascular  - Pain control with acetaminophen  - Continue Keppra 500mg BID  - PT/OT    Respiratory:   - Saturating well on room air  - OOB/IS    Cardiovascular: hx of HTN, s/p L CEA 8/28  - Strict BP goal 110-140   - Clevidipine infusion, attempt to wean  - lisinopril inc to 40, cardizem inc to 300  - Continue ASA/statin (lipitor 10mg PO daily home medication resumed)  - Restart digoxin 0.125mg daily  - Hx paroxysmal a-fib, episode of RVR to 110's, no AC per cardiology  - vascular surgery f/u plan for ANAYELI     Gastrointestinal/Nutrition:   - NPO with tube feeds  - betty feeding tube placed, started on Jevity 8/29  - Failed speech/swallow, f/u on Monday 8/31    Renal/Genitourinary:   - Monitor intake & output  - Replete electrolytes PRN    Hematologic:   - H/H stable  - VTE ppx w/ SQH    Infectious Disease:   - Febril 100.5, tylenol given, UA/CXR ordered    Tubes/Lines/Drains:   - PIV  - L radial arterial line    Endocrine:   - Hgb A1C 5.3  - No active issues    Disposition: SICU

## 2020-08-31 NOTE — PROGRESS NOTE ADULT - ASSESSMENT
85M p/w right upper extremity stroke-like sx, with severe b/l carotid stenosis, MR significant for acute left-sided infarct no s/p eversion CEA for symptomatic L-ICA stenosis on 8/27, transferred to SICU for post operative hemodynamic monitoring and neuro checks.    PLAN:  -Continue to monitor BP  -drain pulled 8/30  -Pain control  -NPO/IVF-failed speech/swallow  -Feeds and PO home meds via tube  -DVT ppx/ASA  -F/U labs  -Remainder of care per SICU    Vascular Surgery  [id number] 85M p/w right upper extremity stroke-like sx, with severe b/l carotid stenosis, MR significant for acute left-sided infarct no s/p eversion CEA for symptomatic L-ICA stenosis on 8/27, transferred to SICU for post operative hemodynamic monitoring and neuro checks.    PLAN:  -Continue to monitor BP; goals 110-140  -drain pulled 8/30  -Pain control  -NPO/IVF-failed speech/swallow; rec follow up today  -Feeds and PO home meds via tube  -DVT ppx/ASA  -F/U labs  -Rec PT to see  -Remainder of care per SICU    Vascular Surgery  [id number]

## 2020-08-31 NOTE — SWALLOW BEDSIDE ASSESSMENT ADULT - SWALLOW EVAL: DIAGNOSIS
Patient presents with Moderate Oropharyngeal Dysphagia. The Oral Phase was marked by adequate stripping of bolus from utensil, adequate oral containment, extended mastication for mechanical soft solids, slow bolus manipulation and suspected posterior loss of bolus for nectar thick liquids and thin liquids. The Pharyngeal Phase was marked by laryngeal elevation upon digital palpation with suspected delay in initiation of pharyngeal swallow for nectar thick liquids and thin liquids. There was evidence of throat clear response subsequent nectar thick liquids and cough response subsequent thin liquids indicative of laryngeal penetration/aspiration. There were no overt s/s of laryngeal penetration/aspiration for puree consistency, mechanical soft solids and honey thick liquids.

## 2020-08-31 NOTE — SWALLOW BEDSIDE ASSESSMENT ADULT - COMMENTS
H&P: 85y Male with PMH HTN, seizure disorder secondary to previous alcohol abuse, SDH s/p right craniotomy, Lap Crystal c/b bilary duct resection s/p hepaticojejunostomy/Small bowel resection who presents to the hospital with R sided arm weakness found to have symptomatic L-ICA stenosis now s/p left carotid endarterectomy.    Initial Clinical bedside swallow evaluation completed on 8/18/20 and Repeat Clinical bedside swallow evaluation completed on 8/28/20 (see notes for details).     Patient was seen seated upright in recliner chair with NGT in place. Patient was alert/awake and verbally responsive to simple questions. Patient able to follow some simple directions. BASELINE STATS: SpO2 %

## 2020-08-31 NOTE — SWALLOW BEDSIDE ASSESSMENT ADULT - SWALLOW EVAL: RECOMMENDED FEEDING/EATING TECHNIQUES
small sips/bites/oral hygiene/alternate food with liquid/position upright (90 degrees)/crush medication (when feasible)/maintain upright posture during/after eating for 30 mins

## 2020-08-31 NOTE — DISCHARGE NOTE PROVIDER - NSDCQMANTICOAGCONTRAOTHER_GEN_A_CORE_FT
Patient will follow up with Cardiology in 1 week to discuss the potential of starting AC. Previously off AC due to history of SDH and frequent falls.

## 2020-08-31 NOTE — PROGRESS NOTE ADULT - SUBJECTIVE AND OBJECTIVE BOX
Vascular Surgery AM Progress Note    STATUS POST:  Left carotid endarterectomy-Eversion      POST OPERATIVE DAY #: 4    SICU 24hr events:  24 HOUR EVENTS:  - Weening cleviprex, currently at rate of 5mg/hr   - Ko feeding tube placed, started tube feeds and home BP meds   - Lisinopril inc 40   - Cardizem inc to 300     Subjective:  Pt seen and examined at bedside this AM.  No acute events overnight.  Denies chest pain, shortness of breath, dizziness, nausea, vomiting.    Vital Signs Last 24 Hrs  T(C): 37.2 (31 Aug 2020 04:00), Max: 37.6 (31 Aug 2020 00:00)  T(F): 98.9 (31 Aug 2020 04:00), Max: 99.6 (31 Aug 2020 00:00)  HR: 97 (31 Aug 2020 03:00) (78 - 102)  BP: 130/66 (31 Aug 2020 00:00) (115/60 - 141/74)  BP(mean): 81 (31 Aug 2020 00:00) (70 - 104)  RR: 23 (31 Aug 2020 03:00) (16 - 24)  SpO2: 100% (31 Aug 2020 03:00) (91% - 100%)    Physical Exam:  General Appearance:  Appears well, NAD,   HEENT: no hematoma  Neuro: CN intact, no focal deficits  Chest: no increased work of breathing  Abdomen: Soft, nondistended, nontender.  Extremities: LE grossly symmetric, Warm and well perfused       I&O's Summary    29 Aug 2020 07:01  -  30 Aug 2020 07:00  --------------------------------------------------------  IN: 1050 mL / OUT: 1212 mL / NET: -162 mL    30 Aug 2020 07:01  -  31 Aug 2020 05:36  --------------------------------------------------------  IN: 1654 mL / OUT: 1100 mL / NET: 554 mL         LABS:                        11.4   8.86  )-----------( 270      ( 31 Aug 2020 00:50 )             34.7     08-31    137  |  100  |  14  ----------------------------<  124<H>  3.7   |  23  |  0.75    Ca    8.7      31 Aug 2020 00:50  Phos  2.8     08-31  Mg     1.9     08-31      PT/INR - ( 30 Aug 2020 00:10 )   PT: 12.9 SEC;   INR: 1.13          PTT - ( 30 Aug 2020 00:10 )  PTT:28.4 SEC      RADIOLOGY & ADDITIONAL STUDIES:

## 2020-09-01 LAB
ANION GAP SERPL CALC-SCNC: 15 MMO/L — HIGH (ref 7–14)
BUN SERPL-MCNC: 21 MG/DL — SIGNIFICANT CHANGE UP (ref 7–23)
CALCIUM SERPL-MCNC: 8.7 MG/DL — SIGNIFICANT CHANGE UP (ref 8.4–10.5)
CHLORIDE SERPL-SCNC: 100 MMOL/L — SIGNIFICANT CHANGE UP (ref 98–107)
CO2 SERPL-SCNC: 21 MMOL/L — LOW (ref 22–31)
CREAT SERPL-MCNC: 0.89 MG/DL — SIGNIFICANT CHANGE UP (ref 0.5–1.3)
GLUCOSE SERPL-MCNC: 111 MG/DL — HIGH (ref 70–99)
HCT VFR BLD CALC: 30.6 % — LOW (ref 39–50)
HGB BLD-MCNC: 10.4 G/DL — LOW (ref 13–17)
MAGNESIUM SERPL-MCNC: 1.8 MG/DL — SIGNIFICANT CHANGE UP (ref 1.6–2.6)
MCHC RBC-ENTMCNC: 31.2 PG — SIGNIFICANT CHANGE UP (ref 27–34)
MCHC RBC-ENTMCNC: 34 % — SIGNIFICANT CHANGE UP (ref 32–36)
MCV RBC AUTO: 91.9 FL — SIGNIFICANT CHANGE UP (ref 80–100)
NRBC # FLD: 0 K/UL — SIGNIFICANT CHANGE UP (ref 0–0)
PHOSPHATE SERPL-MCNC: 3.1 MG/DL — SIGNIFICANT CHANGE UP (ref 2.5–4.5)
PLATELET # BLD AUTO: 280 K/UL — SIGNIFICANT CHANGE UP (ref 150–400)
PMV BLD: 10.2 FL — SIGNIFICANT CHANGE UP (ref 7–13)
POTASSIUM SERPL-MCNC: 4.2 MMOL/L — SIGNIFICANT CHANGE UP (ref 3.5–5.3)
POTASSIUM SERPL-SCNC: 4.2 MMOL/L — SIGNIFICANT CHANGE UP (ref 3.5–5.3)
RBC # BLD: 3.33 M/UL — LOW (ref 4.2–5.8)
RBC # FLD: 12.4 % — SIGNIFICANT CHANGE UP (ref 10.3–14.5)
SODIUM SERPL-SCNC: 136 MMOL/L — SIGNIFICANT CHANGE UP (ref 135–145)
WBC # BLD: 11.69 K/UL — HIGH (ref 3.8–10.5)
WBC # FLD AUTO: 11.69 K/UL — HIGH (ref 3.8–10.5)

## 2020-09-01 PROCEDURE — 93010 ELECTROCARDIOGRAM REPORT: CPT

## 2020-09-01 RX ORDER — HALOPERIDOL DECANOATE 100 MG/ML
2.5 INJECTION INTRAMUSCULAR ONCE
Refills: 0 | Status: COMPLETED | OUTPATIENT
Start: 2020-09-01 | End: 2020-09-01

## 2020-09-01 RX ORDER — ACETAMINOPHEN 500 MG
975 TABLET ORAL ONCE
Refills: 0 | Status: COMPLETED | OUTPATIENT
Start: 2020-09-01 | End: 2020-09-01

## 2020-09-01 RX ORDER — DILTIAZEM HCL 120 MG
360 CAPSULE, EXT RELEASE 24 HR ORAL DAILY
Refills: 0 | Status: DISCONTINUED | OUTPATIENT
Start: 2020-09-02 | End: 2020-09-03

## 2020-09-01 RX ORDER — DILTIAZEM HCL 120 MG
60 CAPSULE, EXT RELEASE 24 HR ORAL ONCE
Refills: 0 | Status: COMPLETED | OUTPATIENT
Start: 2020-09-01 | End: 2020-09-01

## 2020-09-01 RX ORDER — MAGNESIUM SULFATE 500 MG/ML
2 VIAL (ML) INJECTION ONCE
Refills: 0 | Status: COMPLETED | OUTPATIENT
Start: 2020-09-01 | End: 2020-09-01

## 2020-09-01 RX ADMIN — Medication 50 GRAM(S): at 03:48

## 2020-09-01 RX ADMIN — Medication 300 MILLIGRAM(S): at 08:57

## 2020-09-01 RX ADMIN — Medication 0.12 MILLIGRAM(S): at 06:29

## 2020-09-01 RX ADMIN — Medication 975 MILLIGRAM(S): at 04:17

## 2020-09-01 RX ADMIN — Medication 60 MILLIGRAM(S): at 10:18

## 2020-09-01 RX ADMIN — ATORVASTATIN CALCIUM 10 MILLIGRAM(S): 80 TABLET, FILM COATED ORAL at 21:34

## 2020-09-01 RX ADMIN — LEVETIRACETAM 500 MILLIGRAM(S): 250 TABLET, FILM COATED ORAL at 06:28

## 2020-09-01 RX ADMIN — HEPARIN SODIUM 5000 UNIT(S): 5000 INJECTION INTRAVENOUS; SUBCUTANEOUS at 06:29

## 2020-09-01 RX ADMIN — TAMSULOSIN HYDROCHLORIDE 0.4 MILLIGRAM(S): 0.4 CAPSULE ORAL at 21:34

## 2020-09-01 RX ADMIN — LISINOPRIL 40 MILLIGRAM(S): 2.5 TABLET ORAL at 06:30

## 2020-09-01 RX ADMIN — Medication 81 MILLIGRAM(S): at 11:41

## 2020-09-01 RX ADMIN — HEPARIN SODIUM 5000 UNIT(S): 5000 INJECTION INTRAVENOUS; SUBCUTANEOUS at 13:41

## 2020-09-01 RX ADMIN — HEPARIN SODIUM 5000 UNIT(S): 5000 INJECTION INTRAVENOUS; SUBCUTANEOUS at 21:34

## 2020-09-01 RX ADMIN — SODIUM CHLORIDE 3 MILLILITER(S): 9 INJECTION INTRAMUSCULAR; INTRAVENOUS; SUBCUTANEOUS at 23:01

## 2020-09-01 RX ADMIN — HALOPERIDOL DECANOATE 2.5 MILLIGRAM(S): 100 INJECTION INTRAMUSCULAR at 02:54

## 2020-09-01 RX ADMIN — SODIUM CHLORIDE 3 MILLILITER(S): 9 INJECTION INTRAMUSCULAR; INTRAVENOUS; SUBCUTANEOUS at 13:41

## 2020-09-01 RX ADMIN — LEVETIRACETAM 500 MILLIGRAM(S): 250 TABLET, FILM COATED ORAL at 17:05

## 2020-09-01 RX ADMIN — CHLORHEXIDINE GLUCONATE 1 APPLICATION(S): 213 SOLUTION TOPICAL at 08:30

## 2020-09-01 RX ADMIN — Medication 975 MILLIGRAM(S): at 03:47

## 2020-09-01 NOTE — PROGRESS NOTE ADULT - SUBJECTIVE AND OBJECTIVE BOX
HISTORY  85y male with PMH HTN, seizure disorder secondary to previous alcohol abuse, SDH s/p right craniotomy, Lap Crystal c/b bilary duct resection s/p hepaticojejunostomy/Small bowel resection who presents to the hospital with R sided arm weakness found to have symptomatic L-ICA stenosis. He underwent left carotid endarterectomy. Patient slow to wake up from anesthesia. He was transferred directly to SICU for close hemodynamic monitoring, strict blood pressure goals as well as q1hr neurovascular checks.     24 HOUR EVENTS:  - Pt passed repeat speech and swallow eval - advanced to dysphagia diet   - resumed home digoxin   - weaning cleviprex gtt, off overnight bp 120s-130s  - transitioned to bolus feeds q6hrs   - Straight cath x1 ovn for retention       SUBJECTIVE/ROS:  [x ] A ten-point review of systems was otherwise negative except as noted.  [ ] Due to altered mental status/intubation, subjective information were not able to be obtained from the patient. History was obtained, to the extent possible, from review of the chart and collateral sources of information.      NEURO  Exam: awake, alert  Meds: levETIRAcetam  Solution 500 milliGRAM(s) Oral two times a day    [x] Adequacy of sedation and pain control has been assessed and adjusted      RESPIRATORY  RR: 21 (09-01-20 @ 00:00) (18 - 27)  SpO2: 100% (09-01-20 @ 00:00) (99% - 100%)  Wt(kg): --  Exam: Unlabored respirations     CARDIOVASCULAR  HR: 84 (09-01-20 @ 00:00) (78 - 106)  BP: 120/61 (08-31-20 @ 18:00) (120/61 - 148/67)  BP(mean): 75 (08-31-20 @ 18:00) (75 - 84)  ABP: 137/58 (09-01-20 @ 00:00) (111/44 - 148/60)  ABP(mean): 83 (09-01-20 @ 00:00) (64 - 92)  Wt(kg): --  CVP(cm H2O): --    Exam: RRR  Perfusion     [x ]Adequate   [ ]Inadequate  Mentation   [x ]Normal       [ ]Reduced  Extremities  [x ]Warm         [ ]Cool  Volume Status [ ]Hypervolemic [x ]Euvolemic [ ]Hypovolemic  Meds: clevidipine Infusion 5 mG/Hr IV Continuous <Continuous>  digoxin     Tablet 0.125 milliGRAM(s) Oral daily  diltiazem    milliGRAM(s) Oral daily  lisinopril 40 milliGRAM(s) Oral daily  tamsulosin 0.4 milliGRAM(s) Oral at bedtime      GI/NUTRITION  Exam: soft, NTND. No rebound or guarding.     GENITOURINARY  I&O's Detail    08-30 @ 07:01  -  08-31 @ 07:00  --------------------------------------------------------  IN:    clevidipine Infusion: 28 mL    clevidipine Infusion: 259 mL    Enteral Tube Flush: 240 mL    ns in tub fed  [id number]: 1368 mL  Total IN: 1895 mL    OUT:    Voided: 1100 mL  Total OUT: 1100 mL    Total NET: 795 mL      08-31 @ 07:01 - 09-01 @ 00:19  --------------------------------------------------------  IN:    clevidipine Infusion: 112 mL    Enteral Tube Flush: 150 mL    ns in tub fed  [id number]: 57 mL    Oral Fluid: 240 mL  Total IN: 559 mL    OUT:    Intermittent Catheterization - Urethral: 400 mL    Voided: 300 mL  Total OUT: 700 mL    Total NET: -141 mL    08-31    137  |  100  |  14  ----------------------------<  124<H>  3.7   |  23  |  0.75    Ca    8.7      31 Aug 2020 00:50  Phos  2.8     08-31  Mg     1.9     08-31    Meds: sodium chloride 0.9% lock flush 3 milliLiter(s) IV Push every 8 hours        HEMATOLOGIC  Meds: aspirin  chewable 81 milliGRAM(s) Oral daily  heparin   Injectable 5000 Unit(s) SubCutaneous every 8 hours    [x] VTE Prophylaxis                        11.4   8.86  )-----------( 270      ( 31 Aug 2020 00:50 )             34.7       INFECTIOUS DISEASES  T(C): 37.1 (09-01-20 @ 00:00), Max: 38.1 (08-31-20 @ 08:00)  Wt(kg): --  WBC Count: 8.86 K/uL (08-31 @ 00:50)    ENDOCRINE  Capillary Blood Glucose    Meds: atorvastatin 10 milliGRAM(s) Oral at bedtime    OTHER MEDICATIONS:  chlorhexidine 4% Liquid 1 Application(s) Topical daily

## 2020-09-01 NOTE — CHART NOTE - NSCHARTNOTEFT_GEN_A_CORE
VASCULAR SURGERY TRANSFER NOTE:     Subjective:  Patient seen and examined at bedside.   Transferred from SICU to floor. Resting at the time.   Pain well managed.  Denies CP, SOB, N/V, Dizziness.    Objective:    Vital Signs Last 24 Hrs  T(C): 36.6 (01 Sep 2020 22:58), Max: 36.6 (01 Sep 2020 08:00)  T(F): 97.9 (01 Sep 2020 22:58), Max: 97.9 (01 Sep 2020 08:00)  HR: 76 (01 Sep 2020 22:58) (70 - 102)  BP: 129/62 (01 Sep 2020 22:58) (89/49 - 142/76)  BP(mean): 81 (01 Sep 2020 22:00) (57 - 91)  RR: 18 (01 Sep 2020 22:58) (15 - 22)  SpO2: 100% (01 Sep 2020 22:58) (99% - 100%)    Physical Exam  Gen Obs: Pt appears well, in no acute distress  Resp: normal RR, breathing well on room air  CV: RRR, no JVD  abd: soft, nontender, nondistended.   Extremities, warm and well perfused x 4    I&O's Detail    31 Aug 2020 07:01  -  01 Sep 2020 07:00  --------------------------------------------------------  IN:    clevidipine Infusion: 112 mL    Enteral Tube Flush: 150 mL    IV PiggyBack: 50 mL    ns in tub fed  [id number]: 57 mL    Oral Fluid: 240 mL  Total IN: 609 mL    OUT:    Intermittent Catheterization - Urethral: 400 mL    Voided: 700 mL  Total OUT: 1100 mL    Total NET: -491 mL      01 Sep 2020 07:01  -  02 Sep 2020 04:39  --------------------------------------------------------  IN:    Oral Fluid: 170 mL  Total IN: 170 mL    OUT:    Voided: 825 mL  Total OUT: 825 mL    Total NET: -655 mL              LABS:                        10.4   11.69 )-----------( 280      ( 01 Sep 2020 00:20 )             30.6     09-    136  |  100  |  21  ----------------------------<  111<H>  4.2   |  21<L>  |  0.89    Ca    8.7      01 Sep 2020 00:20  Phos  3.1       Mg     1.8             Urinalysis Basic - ( 31 Aug 2020 10:12 )    Color: YELLOW / Appearance: CLEAR / S.023 / pH: 7.0  Gluc: NEGATIVE / Ketone: NEGATIVE  / Bili: NEGATIVE / Urobili: MODERATE   Blood: SMALL / Protein: 30 / Nitrite: NEGATIVE   Leuk Esterase: NEGATIVE / RBC: 11-25 / WBC 0-2   Sq Epi: OCC / Non Sq Epi: x / Bacteria: NEGATIVE          Daily     Daily     MEDICATIONS  (STANDING):  aspirin  chewable 81 milliGRAM(s) Oral daily  atorvastatin 10 milliGRAM(s) Oral at bedtime  digoxin     Tablet 0.125 milliGRAM(s) Oral daily  diltiazem    milliGRAM(s) Oral daily  heparin   Injectable 5000 Unit(s) SubCutaneous every 8 hours  levETIRAcetam  Solution 500 milliGRAM(s) Oral two times a day  lisinopril 40 milliGRAM(s) Oral daily  sodium chloride 0.9% lock flush 3 milliLiter(s) IV Push every 8 hours  tamsulosin 0.4 milliGRAM(s) Oral at bedtime    MEDICATIONS  (PRN):

## 2020-09-01 NOTE — PROGRESS NOTE ADULT - ASSESSMENT
85M p/w right upper extremity stroke-like sx, with severe b/l carotid stenosis, MR significant for acute left-sided infarct no s/p eversion CEA for symptomatic L-ICA stenosis on 8/27, transferred to SICU for post operative hemodynamic monitoring and neuro checks.    PLAN:  -Continue to monitor BP; goals 110-140; cleviprex drip off overnight  -Pain control  -Dysphagia diet  -home medications  -DVT ppx/ASA  -F/U labs  -PT/OT rec rehab   -Remainder of care per SICU; when stable per SICU, patient to be listed for floors    Vascular Surgery  [id number] S1S2  \CTA

## 2020-09-01 NOTE — PROGRESS NOTE ADULT - SUBJECTIVE AND OBJECTIVE BOX
Vascular Surgery AM Progress Note    STATUS POST:  Left carotid endarterectomy      POST OPERATIVE DAY #: 5    SICU 24 hours:  - Pt passed repeat speech and swallow eval - advanced to dysphagia diet   - resumed home digoxin   - weaning cleviprex gtt, off overnight bp 120s-130s  - transitioned to bolus feeds q6hrs   - Straight cath x1 ovn for retention     Subjective:  Pt seen and examined at bedside this AM.  No acute events overnight. Cleviprex drip off overnight  Denies chest pain, shortness of breath, dizziness, nausea, vomiting.    Vital Signs Last 24 Hrs  T(C): 36.6 (01 Sep 2020 08:00), Max: 37.2 (31 Aug 2020 16:00)  T(F): 97.9 (01 Sep 2020 08:00), Max: 98.9 (31 Aug 2020 16:00)  HR: 87 (01 Sep 2020 11:00) (75 - 106)  BP: 111/50 (01 Sep 2020 11:) (89/49 - 142/76)  BP(mean): 64 (01 Sep 2020 11:) (57 - 93)  RR: 18 (01 Sep 2020 11:) (15 - 22)  SpO2: 100% (01 Sep 2020 11:00) (99% - 100%)    Physical Exam:  General Appearance:  Appears well, NAD,  HEENT: incision c/d/i, no hematoma appreciated   Chest: no increased work of breathing   Abdomen: Soft, nondistended, nontender.  Extremities: LE warm, grossly symmetric  Neuro: Cranial nerves intact, no focal deficits      I&O's Summary    31 Aug 2020 07:  -  01 Sep 2020 07:00  --------------------------------------------------------  IN: 609 mL / OUT: 1100 mL / NET: -491 mL    01 Sep 2020 07:  -  01 Sep 2020 11:37  --------------------------------------------------------  IN: 120 mL / OUT: 550 mL / NET: -430 mL         LABS:                        10.4   11.69 )-----------( 280      ( 01 Sep 2020 00:20 )             30.6     09-    136  |  100  |  21  ----------------------------<  111<H>  4.2   |  21<L>  |  0.89    Ca    8.7      01 Sep 2020 00:20  Phos  3.1       Mg     1.8             Urinalysis Basic - ( 31 Aug 2020 10:12 )    Color: YELLOW / Appearance: CLEAR / S.023 / pH: 7.0  Gluc: NEGATIVE / Ketone: NEGATIVE  / Bili: NEGATIVE / Urobili: MODERATE   Blood: SMALL / Protein: 30 / Nitrite: NEGATIVE   Leuk Esterase: NEGATIVE / RBC: 11-25 / WBC 0-2   Sq Epi: OCC / Non Sq Epi: x / Bacteria: NEGATIVE        RADIOLOGY & ADDITIONAL STUDIES:

## 2020-09-01 NOTE — PROGRESS NOTE ADULT - ASSESSMENT
85y Male with PMH HTN, seizure disorder secondary to previous alcohol abuse, SDH s/p right craniotomy, Lap Crystal c/b bilary duct resection s/p hepaticojejunostomy/Small bowel resection who presents to the hospital with R sided arm weakness found to have symptomatic L-ICA stenosis now s/p left carotid endarterectomy.    PLAN:  Neurologic: post op R sided weakness, at baseline  - q4 hr neuro checks, monitor for signs of reperfusion syndrome per vascular  - Pain control with acetaminophen  - Continue Keppra 500mg BID  - PT/OT    Respiratory:   - Saturating well on room air  - OOB/IS    Cardiovascular: hx of HTN, s/p L CEA 8/28  - Strict BP goal 110-140   - Clevidipine infusion, weaned off   - lisinopril inc to 40, cardizem 300mg daily, consider 3rd agent if bp parameters not met   - Continue ASA/statin (lipitor 10mg PO daily home medication resumed)  - C/w home digoxin 0.125mg daily  - Hx paroxysmal a-fib, episode of RVR to 110's, no AC per cardiology    Gastrointestinal/Nutrition:   - Dysphagia I, pureed diet   - No active issues     Renal/Genitourinary:   - SC x1 ovn for retention    - Monitor intake & output  - Replete electrolytes PRN    Hematologic:   - H/H stable  - VTE ppx w/ SQH    Infectious Disease:   - Afebrile >24hrs   - UA and CXR unremarkable   - f/u WBC, monitor for signs of infection     Tubes/Lines/Drains:   - PIV  - L radial arterial line    Endocrine:   - Hgb A1C 5.3  - No active issues    Disposition: Floor 85y Male with PMH HTN, seizure disorder secondary to previous alcohol abuse, SDH s/p right craniotomy, Lap Crystal c/b bilary duct resection s/p hepaticojejunostomy/Small bowel resection who presents to the hospital with R sided arm weakness found to have symptomatic L-ICA stenosis now s/p left carotid endarterectomy.    PLAN:  Neurologic: post op R sided weakness, at baseline  - q4 hr neuro checks, monitor for signs of reperfusion syndrome per vascular  - Pain control with acetaminophen  - Continue Keppra 500mg BID  - PT/OT    Respiratory:   - Saturating well on room air  - OOB/IS    Cardiovascular: hx of HTN, s/p L CEA 8/28  - Strict BP goal 110-140   - Clevidipine infusion, weaned off   - lisinopril inc to 40, cardizem 360mg daily, consider 3rd agent if bp parameters not met   - Continue ASA/statin (lipitor 10mg PO daily home medication resumed)  - C/w home digoxin 0.125mg daily  - Hx paroxysmal a-fib, episode of RVR to 110's, no AC per cardiology    Gastrointestinal/Nutrition:   - passed speech and swallow test  - tolerating a pureed diet   - No active issues     Renal/Genitourinary:   - SC x1 ovn for retention    - Monitor intake & output  - Replete electrolytes PRN    Hematologic:   - H/H stable  - VTE ppx w/ SQH    Infectious Disease:   - Afebrile >24hrs   - UA and CXR unremarkable   - f/u WBC, monitor for signs of infection     Tubes/Lines/Drains:   - PIV  - L radial arterial line    Endocrine:   - Hgb A1C 5.3  - No active issues    Disposition: Floor 85y Male with PMH HTN, seizure disorder secondary to previous alcohol abuse, SDH s/p right craniotomy, Lap Crystal c/b bilary duct resection s/p hepaticojejunostomy/Small bowel resection who presents to the hospital with R sided arm weakness found to have symptomatic L-ICA stenosis now s/p left carotid endarterectomy.    PLAN:  Neurologic: post op R sided weakness, at baseline  - q4 hr neuro checks, monitor for signs of reperfusion syndrome per vascular  - Pain control with acetaminophen  - Continue Keppra 500mg BID  - PT/OT    Respiratory:   - Saturating well on room air  - OOB/IS    Cardiovascular: hx of HTN, s/p L CEA 8/28  - Strict BP goal 110-140   - Clevidipine infusion, weaned off   - lisinopril inc to 40, cardizem 360mg daily, consider 3rd agent if bp parameters not met   - Continue ASA/statin (lipitor 10mg PO daily home medication resumed)  - C/w home digoxin 0.125mg daily  - Hx paroxysmal a-fib, episode of RVR to 110's, no AC per cardiology    Gastrointestinal/Nutrition:   - passed speech and swallow test  - tolerating a pureed diet   - No active issues     Renal/Genitourinary:   - SC x1 ovn for retention    - Monitor intake & output  - Replete electrolytes PRN    Hematologic:   - H/H stable  - VTE ppx w/ SQH    Infectious Disease:   - Afebrile >24hrs   - UA and CXR unremarkable   - f/u WBC, monitor for signs of infection     Tubes/Lines/Drains:   - PIV  - D/c L radial arterial line    Endocrine:   - Hgb A1C 5.3  - No active issues    Disposition: Floor

## 2020-09-01 NOTE — PROGRESS NOTE ADULT - ATTENDING COMMENTS
I saw and examined the patient. I was physically present for the key portions of the evaluation and management (E/M) service provided.  I agree with the above history, physical, and plan which I have reviewed and edited where appropriate.    Hypertension resolving w/ resumption of PO meds, increasing current doses  Neurochecks q4  Medical optimization       López Medina MD  Acute and Critical Care Surgery    The Acute Care Surgery (B Team) Attending Group Practice:  Dr. Miko Mary, Dr. Floyd Shipley, Dr. López Medina, and Dr. Horacio Jin    Urgent issues - spectra [id number] or [id number]  Nonurgent issues - [phone]  Patient appointments or after hours - [phone]

## 2020-09-02 RX ADMIN — HEPARIN SODIUM 5000 UNIT(S): 5000 INJECTION INTRAVENOUS; SUBCUTANEOUS at 05:03

## 2020-09-02 RX ADMIN — SODIUM CHLORIDE 3 MILLILITER(S): 9 INJECTION INTRAMUSCULAR; INTRAVENOUS; SUBCUTANEOUS at 13:31

## 2020-09-02 RX ADMIN — LEVETIRACETAM 500 MILLIGRAM(S): 250 TABLET, FILM COATED ORAL at 05:02

## 2020-09-02 RX ADMIN — LEVETIRACETAM 500 MILLIGRAM(S): 250 TABLET, FILM COATED ORAL at 18:06

## 2020-09-02 RX ADMIN — TAMSULOSIN HYDROCHLORIDE 0.4 MILLIGRAM(S): 0.4 CAPSULE ORAL at 22:58

## 2020-09-02 RX ADMIN — Medication 0.12 MILLIGRAM(S): at 05:03

## 2020-09-02 RX ADMIN — HEPARIN SODIUM 5000 UNIT(S): 5000 INJECTION INTRAVENOUS; SUBCUTANEOUS at 22:58

## 2020-09-02 RX ADMIN — SODIUM CHLORIDE 3 MILLILITER(S): 9 INJECTION INTRAMUSCULAR; INTRAVENOUS; SUBCUTANEOUS at 05:11

## 2020-09-02 RX ADMIN — ATORVASTATIN CALCIUM 10 MILLIGRAM(S): 80 TABLET, FILM COATED ORAL at 22:59

## 2020-09-02 RX ADMIN — Medication 81 MILLIGRAM(S): at 13:32

## 2020-09-02 RX ADMIN — Medication 360 MILLIGRAM(S): at 05:03

## 2020-09-02 RX ADMIN — LISINOPRIL 40 MILLIGRAM(S): 2.5 TABLET ORAL at 05:03

## 2020-09-02 RX ADMIN — SODIUM CHLORIDE 3 MILLILITER(S): 9 INJECTION INTRAMUSCULAR; INTRAVENOUS; SUBCUTANEOUS at 22:57

## 2020-09-02 RX ADMIN — HEPARIN SODIUM 5000 UNIT(S): 5000 INJECTION INTRAVENOUS; SUBCUTANEOUS at 13:32

## 2020-09-02 NOTE — PROVIDER CONTACT NOTE (OTHER) - BACKGROUND
Patient admitted on 8/18 with new onset R arm weakness, history of ETOH abuse, subdural hematoma 2014. s/p carotid endarterectomy, transferred from SICU

## 2020-09-02 NOTE — PROGRESS NOTE ADULT - ASSESSMENT
85M p/w right upper extremity stroke-like sx, with severe b/l carotid stenosis, MR significant for acute left-sided infarct no s/p eversion CEA for symptomatic L-ICA stenosis on 8/27, transferred to SICU for post operative hemodynamic monitoring and neuro checks, now transferred to surgical floor.     PLAN:  -Continue to monitor BP; goals 110-140; has been stable overnight   -Pain control  -Dysphagia diet  -home medications  -DVT ppx/ASA  -F/U labs  -PT/OT rec rehab   -Dispo planning     Vascular Surgery  [id number] 85M p/w right upper extremity stroke-like sx, with severe b/l carotid stenosis, MR significant for acute left-sided infarct no s/p eversion CEA for symptomatic L-ICA stenosis on 8/27, transferred to SICU for post operative hemodynamic monitoring and neuro checks, now transferred to surgical floor.     PLAN:  -Continue to monitor BP; goals 110-140; has been stable overnight   -Pain control  -Dysphagia diet  -home medications  -DVT ppx/ASA  -F/U labs  -PT/OT rec rehab   -Dispo planning ---> Discharge to rehab, ready for discharge.    Vascular Surgery  [id number]

## 2020-09-02 NOTE — PROGRESS NOTE ADULT - SUBJECTIVE AND OBJECTIVE BOX
Vascular Surgery AM Progress Note    STATUS POST:  Left carotid endarterectomy-eversion      POST OPERATIVE DAY #: 6    Subjective:  Pt seen and examined at bedside this AM.  Transferred to floor last night, stable.   Denies chest pain, shortness of breath, dizziness, nausea, vomiting.    Vital Signs Last 24 Hrs  T(C): 36.6 (02 Sep 2020 05:00), Max: 36.6 (01 Sep 2020 08:00)  T(F): 97.8 (02 Sep 2020 05:00), Max: 97.9 (01 Sep 2020 08:00)  HR: 86 (02 Sep 2020 05:00) (70 - 102)  BP: 122/70 (02 Sep 2020 05:00) (93/51 - 142/76)  BP(mean): 81 (01 Sep 2020 22:00) (61 - 91)  RR: 18 (02 Sep 2020 05:00) (15 - 22)  SpO2: 94% (02 Sep 2020 05:00) (94% - 100%)    Physical Exam:  General Appearance:  Appears well, NAD,   HEENT: L neck incision c/d/i,   Chest: No increased work of breathing   Abdomen: Soft, nondistended, nontender.  Extremities: grossly symmetric, Warm and well perfused,   Neuro: Cranial nerves intact, no focal deficits    I&O's Summary    01 Sep 2020 07:01  -  02 Sep 2020 07:00  --------------------------------------------------------  IN: 230 mL / OUT: 1425 mL / NET: -1195 mL         LABS:                        10.4   11.69 )-----------( 280      ( 01 Sep 2020 00:20 )             30.6     09-    136  |  100  |  21  ----------------------------<  111<H>  4.2   |  21<L>  |  0.89    Ca    8.7      01 Sep 2020 00:20  Phos  3.1     09-01  Mg     1.8     09-01        Urinalysis Basic - ( 31 Aug 2020 10:12 )    Color: YELLOW / Appearance: CLEAR / S.023 / pH: 7.0  Gluc: NEGATIVE / Ketone: NEGATIVE  / Bili: NEGATIVE / Urobili: MODERATE   Blood: SMALL / Protein: 30 / Nitrite: NEGATIVE   Leuk Esterase: NEGATIVE / RBC: 11-25 / WBC 0-2   Sq Epi: OCC / Non Sq Epi: x / Bacteria: NEGATIVE        RADIOLOGY & ADDITIONAL STUDIES:

## 2020-09-03 VITALS
OXYGEN SATURATION: 98 % | TEMPERATURE: 98 F | SYSTOLIC BLOOD PRESSURE: 127 MMHG | RESPIRATION RATE: 18 BRPM | DIASTOLIC BLOOD PRESSURE: 62 MMHG | HEART RATE: 78 BPM

## 2020-09-03 LAB
APTT BLD: 28.5 SEC — SIGNIFICANT CHANGE UP (ref 27–36.3)
SARS-COV-2 RNA SPEC QL NAA+PROBE: SIGNIFICANT CHANGE UP
SURGICAL PATHOLOGY STUDY: SIGNIFICANT CHANGE UP

## 2020-09-03 RX ORDER — ASPIRIN/CALCIUM CARB/MAGNESIUM 324 MG
1 TABLET ORAL
Qty: 0 | Refills: 0 | DISCHARGE
Start: 2020-09-03

## 2020-09-03 RX ORDER — TAMSULOSIN HYDROCHLORIDE 0.4 MG/1
1 CAPSULE ORAL
Qty: 0 | Refills: 0 | DISCHARGE
Start: 2020-09-03

## 2020-09-03 RX ADMIN — LEVETIRACETAM 500 MILLIGRAM(S): 250 TABLET, FILM COATED ORAL at 06:05

## 2020-09-03 RX ADMIN — Medication 0.12 MILLIGRAM(S): at 06:02

## 2020-09-03 RX ADMIN — HEPARIN SODIUM 5000 UNIT(S): 5000 INJECTION INTRAVENOUS; SUBCUTANEOUS at 06:05

## 2020-09-03 RX ADMIN — Medication 360 MILLIGRAM(S): at 06:04

## 2020-09-03 RX ADMIN — LISINOPRIL 40 MILLIGRAM(S): 2.5 TABLET ORAL at 06:02

## 2020-09-03 RX ADMIN — Medication 81 MILLIGRAM(S): at 13:52

## 2020-09-03 RX ADMIN — SODIUM CHLORIDE 3 MILLILITER(S): 9 INJECTION INTRAMUSCULAR; INTRAVENOUS; SUBCUTANEOUS at 14:28

## 2020-09-03 RX ADMIN — SODIUM CHLORIDE 3 MILLILITER(S): 9 INJECTION INTRAMUSCULAR; INTRAVENOUS; SUBCUTANEOUS at 06:07

## 2020-09-03 RX ADMIN — HEPARIN SODIUM 5000 UNIT(S): 5000 INJECTION INTRAVENOUS; SUBCUTANEOUS at 13:52

## 2020-09-03 NOTE — PROGRESS NOTE ADULT - SUBJECTIVE AND OBJECTIVE BOX
VASCULAR SURGERY PROGRESS NOTE    POD7: L eversion CEA    SUBJECTIVE:  No acute events overnight  Pt examined at bedside  Denies pain  Denies nausea, vomiting  Is passing gas and having bowel movements. Denies diarrhea  Tolerating diet  Ambulating to chair    OBJECTIVE:  Vital Signs Last 24 Hrs  T(C): 36.8 (03 Sep 2020 06:00), Max: 36.9 (03 Sep 2020 02:00)  T(F): 98.2 (03 Sep 2020 06:00), Max: 98.5 (03 Sep 2020 02:00)  HR: 89 (03 Sep 2020 09:32) (79 - 91)  BP: 147/92 (03 Sep 2020 09:32) (117/64 - 149/52)  BP(mean): --  RR: 18 (03 Sep 2020 09:32) (16 - 18)  SpO2: 98% (03 Sep 2020 09:32) (95% - 100%)    Physical Examination:  GEN: NAD, resting quietly  NEURO: AAOx3, CN II-XII grossly intact, no focal deficits  PULM: symmetric chest rise bilaterally, no increased WOB  ABD: soft, nontender, nondistended  EXTR: no LE erythema, moving all extremities  VASC: LE warm and well-perfused      PTT - ( 03 Sep 2020 06:10 )  PTT:28.5 SEC

## 2020-09-03 NOTE — PROGRESS NOTE ADULT - PROVIDER SPECIALTY LIST ADULT
Anesthesia
Cardiology
Cardiology
Hospitalist
Neurology
SICU
Vascular Surgery
Hospitalist

## 2020-09-03 NOTE — PROGRESS NOTE ADULT - ASSESSMENT
85M p/w right upper extremity stroke-like sx, with severe b/l carotid stenosis, MR significant for acute left-sided infarct no s/p eversion CEA for symptomatic L-ICA stenosis on 8/27, post-SICU for post operative hemodynamic monitoring and neuro checks, now stable on surgical floor.     PLAN:  -Continue to monitor BP; goals 110-140; has been stable overnight   -Moves extremities appropriately and equally  -Pain control  -Dysphagia diet  -home medications  -DVT ppx/ASA  -F/U labs  -PT/OT rec rehab   -Dispo planning ---> Discharge to rehab, ready for discharge.    Vascular Surgery  [id number]

## 2020-09-14 ENCOUNTER — OUTPATIENT (OUTPATIENT)
Dept: OUTPATIENT SERVICES | Facility: HOSPITAL | Age: 85
LOS: 1 days | Discharge: HOME | End: 2020-09-14
Payer: MEDICARE

## 2020-09-14 DIAGNOSIS — I62.01 NONTRAUMATIC ACUTE SUBDURAL HEMORRHAGE: Chronic | ICD-10-CM

## 2020-09-14 DIAGNOSIS — R03.1 NONSPECIFIC LOW BLOOD-PRESSURE READING: ICD-10-CM

## 2020-09-14 DIAGNOSIS — Z98.890 OTHER SPECIFIED POSTPROCEDURAL STATES: Chronic | ICD-10-CM

## 2020-09-14 PROCEDURE — 93010 ELECTROCARDIOGRAM REPORT: CPT

## 2020-10-01 ENCOUNTER — APPOINTMENT (OUTPATIENT)
Dept: VASCULAR SURGERY | Facility: CLINIC | Age: 85
End: 2020-10-01
Payer: MEDICARE

## 2020-10-01 VITALS
SYSTOLIC BLOOD PRESSURE: 119 MMHG | WEIGHT: 174 LBS | TEMPERATURE: 95.6 F | DIASTOLIC BLOOD PRESSURE: 55 MMHG | HEART RATE: 61 BPM | BODY MASS INDEX: 27.31 KG/M2 | HEIGHT: 67 IN

## 2020-10-01 DIAGNOSIS — I65.23 OCCLUSION AND STENOSIS OF BILATERAL CAROTID ARTERIES: ICD-10-CM

## 2020-10-01 PROCEDURE — 93880 EXTRACRANIAL BILAT STUDY: CPT

## 2020-10-02 PROBLEM — I65.23 BILATERAL CAROTID ARTERY STENOSIS: Status: ACTIVE | Noted: 2020-10-02

## 2020-10-02 NOTE — DISCUSSION/SUMMARY
[FreeTextEntry1] : YOSELIN VENTURA is a 85 year old male with bilateral carotid stenosis, right asymptomatic and left symptomatic s/p left carotid endarterectomy here for postoperative visit. Doing well.\par \par - Reviewed carotid duplex findings with patient's girlfriend (МАРИЯ - Lizzeth) and patient. He has asymptomatic right sided stenosis and left s/p CEA is patent. \par - Recommended continue aspirin 81mg, BP control, statin usage\par - No intervention is needed at this time for asymptomatic right sided carotid stenosis\par - Follow up in six months for repeat duplex

## 2020-10-02 NOTE — REASON FOR VISIT
[Friend] : friend [de-identified] : left eversion carotid endarterectomy 8/27/2020 [de-identified] : YOSELIN VENTURA is a 85 year old male who presents followup after left CEA on 8/27/2020 for symptomatic left carotid stenosis. He was discharged to rehab posthospitalization but is now home. Since being home, girlfriend Lizzeth notes that he has generalized weakness and deconditioning but denies any lateralizing symptoms. He  has residual mild right sided weakness from his recent CVA. There are no changes to his speech. \par \par + Carotid stenosis, HTN, seizures\par PCP is Dr. Lito Hampton\par

## 2020-10-02 NOTE — REVIEW OF SYSTEMS
[Recent Weight Loss (___ Lbs)] : recent [unfilled] ~Ulb weight loss [Negative] : Heme/Lymph [FreeTextEntry2] : generalized weakness

## 2020-10-02 NOTE — PHYSICAL EXAM
[JVD] : no jugular venous distention  [Carotid Bruits] : no carotid bruits [Normal Breath Sounds] : Normal breath sounds [Respiratory Effort] : normal respiratory effort [Normal Heart Sounds] : normal heart sounds [Normal Rate and Rhythm] : normal rate and rhythm [2+] : left 2+ [No Rash or Lesion] : No rash or lesion [Alert] : alert [Oriented to Person] : oriented to person [Oriented to Place] : oriented to place [Oriented to Time] : disoriented to time [Calm] : calm [de-identified] : Well-appearing [de-identified] : EOMI, anicteric, CN II-XII grossly intact [de-identified] : Left neck incision healing nicely [de-identified] : soft, nt, nd  [de-identified] : motor and sensation intact in all four extremities

## 2020-11-12 NOTE — CONSULT NOTE ADULT - PROBLEM SELECTOR RECOMMENDATION 2
Chief Complaint   Patient presents with   • Fever     Pt reports family member was covid+. Pt reports fever 100F, body aches, cough x2 days. Pt didn't take meds for symptoms. Pt had flu shot.          SUBJECTIVE  HPI:Corbin Cr is a 71 year old male who presents today with reports the above    Subjective:  Onset: 2 days  Symptomatic relief measures taken: none   Any pain: no  Any skin discoloration: no  Any rash: no  Fevers or chills: fevers   Any overwhelming fatigue: no  Any dysuria: no  Any Cough: no  Any CP, SOB, dizziness, palpitations,or Pedal Edema: no  Any nasal congestion: no  Headaches: no  Facial or dental pain: no  Neurologic deficits: no  Limited ROM: no  Numbness or tingling: no  Weight loss or night sweats: no  Abdominal pain, nausea, vomiting, or diarrhea: no  Any bleeding or discharge: no  Any trauma: no    Allergies to medications: NKA    Pertinent past medical history: Patient reports history of emphysema and heart surgery.    Flu Shot for this season: yes    No results found for: WBC, HCT, HGB, PLT  No results found for: FSTS1, SODIUM, POTASSIUM, CHLORIDE, CO2, ANIONGAP, GLUCOSE, BUN, CREATININE, GFRA, GFRNA, BCRAT, CALCIUM, BILIRUBIN, AST, GPT, ALKPT, TOTPROTEIN, ALBUMIN, GLOB, AGR    PAST MEDICAL HISTORY:  No past medical history on file.    MEDICATIONS:  Current Outpatient Medications   Medication Sig   • losartan (COZAAR) 25 MG tablet Take 25 mg by mouth 2 times daily.   • hydrochlorothiazide (HYDRODIURIL) 25 MG tablet Take 25 mg by mouth daily.     No current facility-administered medications for this visit.        PAST SURGICAL HISTORY:  No past surgical history on file.    FAMILY HISTORY:  No family history on file.    SOCIAL HISTORY:  Social History     Tobacco Use   • Smoking status: Not on file   Substance Use Topics   • Alcohol use: Not on file   • Drug use: Not on file           Allergies: ALLERGIES:  No Known Allergies      Review of Systems   Constitutional: Positive for  fever.   Respiratory: Positive for cough.    Musculoskeletal: Positive for myalgias.   All other systems reviewed and are negative.        OBJECTIVE  Visit Vitals  /75 (BP Location: RUE - Right upper extremity, Patient Position: Sitting, Cuff Size: Large Adult)   Pulse 60   Temp 97.2 °F (36.2 °C) (Temporal)   Ht 6' (1.829 m)   Wt 83.9 kg (185 lb)   SpO2 98%   BMI 25.09 kg/m²         Physical Exam  Physical assessment    Vital Signs and history reviewed at this visit    Constitution: Alert and oriented x 4, afebrile, no sign of distress    SKIN: No pallor, no erythema, no cyanosis, no rash, no lesions, warm and dry, no tenting, no jaundice, no skin breakdown, no nail abnormalities    HEAD: Normal appearance, no sign of trauma, no arteritis, no asymmetry, no deformities    NECK: Normal range of motion, no swelling, no nodules, no thyroid enlargement, neck is supple, no rigidity, trachea midline, submandibular nodes noted    EYES: No pain, no tearing, no pus discharge, no redness or swelling, cardinal movements intact, red light reflex noted bilaterally, PERRL    EARS: External ears appear normal and symmetrical, no discharge, no hearing loss, no sign of mastoiditis, tympanic membranes do not appear red,     NOSE: Sniff intact, nares patent, external nose appears normal, no polyps, no sinus tenderness with palpation, mucosa is erythemic    MOUTH: Lips appear normal, no lesions, mucosa is moist, tongue within defined limits, uvula is midline, no erythema, no exudate, teeth intact, no hoarse voice    CHEST: Lung sounds are clear, no murmurs, no pain with palpation, chest expansion is symmetrical, no retractions or accessory muscle use, no tachypnea, heart rate is within defined  limits and the rhythm is regular, no JVD.    BACK: Trunk flexion, extension, lateral bending, and rotation are all within defined limits, no obvious abnormal spinal curve    ABDOMINAL: Abdomen is round, soft nontender, and  symmetrical    EXTREMITIES: range of motion and sensation intact, no pain or deformity, no swelling, and extremities appear symmetrical bilaterally.    NEURO: Facial features are symmetrical, gait is stable, coordination intact, sensation intact.    Results for orders placed or performed in visit on 11/12/20   POCT SARS-COV-2 ANTIGEN   Result Value    POCT SARS-COV-2 ANTIGEN Detected (A)   POCT INFLUENZA A/B   Result Value    Rapid Influenza A Ag Negative    Rapid Influenza B Ag Negative       ASSESSMENT/PLAN  1. Cough  Influenza is negative  Covid is positive    - POCT SARS-COV-2 ANTIGEN  - POCT INFLUENZA A/B    2. COVID-19 virus detected  Treatment options discussed with the patient  Patient agrees with plan of care  Education provided  Risk of all medications discussed  Patient informed to follow-up with primary care in 3 to 5 days  Patient informed to go to the ER if any worsening symptoms        Natan Lizama CNP           - would give 30 ml/kg bolus  - recheck lactate in 4 hours  - check tte  - would continue Zosyn Q 8 hours

## 2020-12-15 ENCOUNTER — OUTPATIENT (OUTPATIENT)
Dept: OUTPATIENT SERVICES | Facility: HOSPITAL | Age: 85
LOS: 1 days | Discharge: HOME | End: 2020-12-15

## 2020-12-15 ENCOUNTER — APPOINTMENT (OUTPATIENT)
Dept: RADIOLOGY | Facility: HOSPITAL | Age: 85
End: 2020-12-15
Payer: MEDICARE

## 2020-12-15 ENCOUNTER — OUTPATIENT (OUTPATIENT)
Dept: OUTPATIENT SERVICES | Facility: HOSPITAL | Age: 85
LOS: 1 days | End: 2020-12-15

## 2020-12-15 ENCOUNTER — APPOINTMENT (OUTPATIENT)
Dept: SPEECH THERAPY | Facility: HOSPITAL | Age: 85
End: 2020-12-15
Payer: MEDICARE

## 2020-12-15 DIAGNOSIS — I62.01 NONTRAUMATIC ACUTE SUBDURAL HEMORRHAGE: Chronic | ICD-10-CM

## 2020-12-15 DIAGNOSIS — R13.10 DYSPHAGIA, UNSPECIFIED: ICD-10-CM

## 2020-12-15 DIAGNOSIS — Z98.890 OTHER SPECIFIED POSTPROCEDURAL STATES: Chronic | ICD-10-CM

## 2020-12-15 PROCEDURE — 74230 X-RAY XM SWLNG FUNCJ C+: CPT | Mod: 26

## 2020-12-22 ENCOUNTER — NON-APPOINTMENT (OUTPATIENT)
Age: 85
End: 2020-12-22

## 2021-01-08 DIAGNOSIS — R13.12 DYSPHAGIA, OROPHARYNGEAL PHASE: ICD-10-CM

## 2021-03-06 NOTE — PATIENT PROFILE ADULT - NSPRESCRALCAMT_GEN_A_NUR
EXAMINATION TYPE: XR chest 2V

 

DATE OF EXAM: 3/6/2021

 

COMPARISON: 12/12/2020

 

HISTORY: Weakness

 

TECHNIQUE:

 

FINDINGS: There is no heart failure nor confluent pneumonic infiltrate. Heart size is normal. Costoph
renic angles are clear. There are no hilar masses. Bony thorax is intact.

 

IMPRESSION: No active cardiopulmonary disease. Normal heart. No change.
1 or 2

## 2021-04-22 ENCOUNTER — APPOINTMENT (OUTPATIENT)
Dept: VASCULAR SURGERY | Facility: CLINIC | Age: 86
End: 2021-04-22

## 2021-07-23 ENCOUNTER — INPATIENT (INPATIENT)
Facility: HOSPITAL | Age: 86
LOS: 3 days | Discharge: NURSING, REHAB OR LONG-TERM CARE | End: 2021-07-27
Attending: INTERNAL MEDICINE | Admitting: INTERNAL MEDICINE
Payer: MEDICARE

## 2021-07-23 VITALS
DIASTOLIC BLOOD PRESSURE: 78 MMHG | HEIGHT: 67 IN | RESPIRATION RATE: 16 BRPM | OXYGEN SATURATION: 97 % | SYSTOLIC BLOOD PRESSURE: 138 MMHG | TEMPERATURE: 99 F | HEART RATE: 81 BPM

## 2021-07-23 DIAGNOSIS — I62.01 NONTRAUMATIC ACUTE SUBDURAL HEMORRHAGE: Chronic | ICD-10-CM

## 2021-07-23 DIAGNOSIS — Z98.890 OTHER SPECIFIED POSTPROCEDURAL STATES: Chronic | ICD-10-CM

## 2021-07-23 LAB
APPEARANCE UR: CLEAR — SIGNIFICANT CHANGE UP
BACTERIA # UR AUTO: NEGATIVE — SIGNIFICANT CHANGE UP
BILIRUB UR-MCNC: NEGATIVE — SIGNIFICANT CHANGE UP
COLOR SPEC: SIGNIFICANT CHANGE UP
DIFF PNL FLD: NEGATIVE — SIGNIFICANT CHANGE UP
EPI CELLS # UR: 0 /HPF — SIGNIFICANT CHANGE UP (ref 0–5)
GLUCOSE UR QL: NEGATIVE — SIGNIFICANT CHANGE UP
KETONES UR-MCNC: NEGATIVE — SIGNIFICANT CHANGE UP
LEUKOCYTE ESTERASE UR-ACNC: NEGATIVE — SIGNIFICANT CHANGE UP
NITRITE UR-MCNC: NEGATIVE — SIGNIFICANT CHANGE UP
PH UR: 7.5 — SIGNIFICANT CHANGE UP (ref 5–8)
PROT UR-MCNC: ABNORMAL
RBC CASTS # UR COMP ASSIST: 0 /HPF — SIGNIFICANT CHANGE UP (ref 0–4)
SP GR SPEC: 1.01 — SIGNIFICANT CHANGE UP (ref 1.01–1.02)
UROBILINOGEN FLD QL: SIGNIFICANT CHANGE UP
WBC UR QL: 0 /HPF — SIGNIFICANT CHANGE UP (ref 0–5)

## 2021-07-23 PROCEDURE — 71045 X-RAY EXAM CHEST 1 VIEW: CPT | Mod: 26

## 2021-07-23 PROCEDURE — 73562 X-RAY EXAM OF KNEE 3: CPT | Mod: 26,LT

## 2021-07-23 PROCEDURE — 70486 CT MAXILLOFACIAL W/O DYE: CPT | Mod: 26

## 2021-07-23 PROCEDURE — 93010 ELECTROCARDIOGRAM REPORT: CPT | Mod: GC

## 2021-07-23 PROCEDURE — 72125 CT NECK SPINE W/O DYE: CPT | Mod: 26

## 2021-07-23 PROCEDURE — 70450 CT HEAD/BRAIN W/O DYE: CPT | Mod: 26

## 2021-07-23 PROCEDURE — 99285 EMERGENCY DEPT VISIT HI MDM: CPT | Mod: 25,GC

## 2021-07-23 PROCEDURE — 72170 X-RAY EXAM OF PELVIS: CPT | Mod: 26

## 2021-07-23 RX ORDER — ACETAMINOPHEN 500 MG
975 TABLET ORAL ONCE
Refills: 0 | Status: COMPLETED | OUTPATIENT
Start: 2021-07-23 | End: 2021-07-23

## 2021-07-23 RX ADMIN — Medication 975 MILLIGRAM(S): at 21:58

## 2021-07-23 NOTE — ED PROVIDER NOTE - PROGRESS NOTE DETAILS
Nicolás: Tried to walk pt. w/ his cane. Unsteady. Fell over upon getting out of bed (didn't fall b/c I caught him). Walked for a little bit, then, after turning around, unsteady again and seemed to forget to use his cane.

## 2021-07-23 NOTE — ED PROVIDER NOTE - CLINICAL SUMMARY MEDICAL DECISION MAKING FREE TEXT BOX
Nicolás: Dementia. Witnessed fall this afternoon. Abrasion over R eyebrow. R knee pain/erythema/slight limited ROM. CT brain and xrays. Pain meds.

## 2021-07-23 NOTE — ED PROVIDER NOTE - PHYSICAL EXAMINATION
CONSTITUTIONAL: NAD  SKIN: 1cm abrasion over R eyebrow, L knee erythematous w/o ecchymosis, no other lacerations, ecchymosis, abrasions noted  HEAD: skin findings above, no other signs of trauma.   EYES: EOMI, PERRLA, no scleral icterus, conjunctiva pink.   ENT: normal pharynx with no erythema or exudates or bleeding  NECK: Supple; non tender. Full ROM.  CARD: RRR 70, irregular, no murmurs.  RESP: clear to ausculation b/l. No crackles or wheezing.  ABD: soft, non-tender, non-distended, no rebound or guarding.  EXT: L knee ROM limited 2/2 pain, no bony tenderness, no pedal edema, no calf tenderness  NEURO: normal motor. normal sensory.  PSYCH: A/Ox1

## 2021-07-23 NOTE — ED PROVIDER NOTE - OBJECTIVE STATEMENT
86M pmh dementia, ? if on AC presents to ED after fall, pt endorses L knee pain, denies any other symptoms, doesn't remember falling, limited historian, a/ox1. As per triage note, witnessed fall @ yesterday outside a car rest stop. pt w c/o abrasion to right eyebrow and hematoma to R cheek.

## 2021-07-23 NOTE — ED ADULT NURSE NOTE - OBJECTIVE STATEMENT
Pt received in spor 26a. Pt A&Ox2, pmhx dementia    · Chief Complaint Quote	pt had a witnessed fall with family at 430am outside a car rest stop. pt w c/o abrasion to right eyebrow and hematoma to R cheek. pt c/o L knee pain. on ASA daily. pt w Hx of Dementia, walks with cane. AAOx1-2. son#- [phone]. Pt received in spor 26a. Pt A&Ox2, pmhx dementia(alert to name and ), brought in after a witnessed fall at 430am outside a rest area. Pt has a abrasion to r elbow and hematoma to R Cheek. Pt c/o left leg pain. Pt walks with a cane. Pt denies cp. sob, abd pain, ha, dizziness, n/v/d ,fevers/chills. Respirations even and unlabored, no accessory muscle use. Awaiting MD orders.

## 2021-07-23 NOTE — ED ADULT NURSE NOTE - CHIEF COMPLAINT QUOTE
pt had a witnessed fall with family at 430am outside a car rest stop. pt w c/o abrasion to right eyebrow and hematoma to R cheek. pt c/o L knee pain. on ASA daily. pt w Hx of Dementia, walks with cane. AAOx1-2. son#- [phone].

## 2021-07-23 NOTE — ED ADULT TRIAGE NOTE - CHIEF COMPLAINT QUOTE
pt had a witnessed fall with family at 430am outside a car rest stop. pt w c/o abrasion to right eyebrow and hematoma to R cheek. pt c/o L knee pain. on ASA daily. pt w Hx of Dementia, walks with cane. AAOx1-2. son#- [phone].

## 2021-07-23 NOTE — ED PROVIDER NOTE - ATTENDING CONTRIBUTION TO CARE
I performed a face-to-face evaluation of the patient and performed a history and physical examination. I agree with the history and physical examination.    Dementia. Witnessed fall this afternoon. Abrasion over R eyebrow. R knee pain/erythema/slight limited ROM. CT brain and xrays. Pain meds.

## 2021-07-23 NOTE — ED PROVIDER NOTE - PSH
Acute subdural hematoma  (May 2014 - subdural hematoma,  pt had SX)  History of ankle surgery  ORIF with screws and plate

## 2021-07-24 DIAGNOSIS — Z29.9 ENCOUNTER FOR PROPHYLACTIC MEASURES, UNSPECIFIED: ICD-10-CM

## 2021-07-24 DIAGNOSIS — S06.6X0A TRAUMATIC SUBARACHNOID HEMORRHAGE WITHOUT LOSS OF CONSCIOUSNESS, INITIAL ENCOUNTER: ICD-10-CM
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
## 2021-07-24 DIAGNOSIS — I63.9 CEREBRAL INFARCTION, UNSPECIFIED: ICD-10-CM

## 2021-07-24 DIAGNOSIS — S09.90XA UNSPECIFIED INJURY OF HEAD, INITIAL ENCOUNTER: ICD-10-CM

## 2021-07-24 DIAGNOSIS — I61.9 NONTRAUMATIC INTRACEREBRAL HEMORRHAGE, UNSPECIFIED: ICD-10-CM

## 2021-07-24 DIAGNOSIS — I48.0 PAROXYSMAL ATRIAL FIBRILLATION: ICD-10-CM

## 2021-07-24 DIAGNOSIS — R56.9 UNSPECIFIED CONVULSIONS: ICD-10-CM

## 2021-07-24 DIAGNOSIS — I10 ESSENTIAL (PRIMARY) HYPERTENSION: ICD-10-CM

## 2021-07-24 LAB
ALBUMIN SERPL ELPH-MCNC: 4.6 G/DL — SIGNIFICANT CHANGE UP (ref 3.3–5)
ALP SERPL-CCNC: 136 U/L — HIGH (ref 40–120)
ALT FLD-CCNC: 35 U/L — SIGNIFICANT CHANGE UP (ref 4–41)
ANION GAP SERPL CALC-SCNC: 15 MMOL/L — HIGH (ref 7–14)
APTT BLD: 30.7 SEC — SIGNIFICANT CHANGE UP (ref 27–36.3)
AST SERPL-CCNC: 35 U/L — SIGNIFICANT CHANGE UP (ref 4–40)
BASOPHILS # BLD AUTO: 0.04 K/UL — SIGNIFICANT CHANGE UP (ref 0–0.2)
BASOPHILS NFR BLD AUTO: 0.6 % — SIGNIFICANT CHANGE UP (ref 0–2)
BILIRUB SERPL-MCNC: 0.9 MG/DL — SIGNIFICANT CHANGE UP (ref 0.2–1.2)
BUN SERPL-MCNC: 7 MG/DL — SIGNIFICANT CHANGE UP (ref 7–23)
CALCIUM SERPL-MCNC: 9.8 MG/DL — SIGNIFICANT CHANGE UP (ref 8.4–10.5)
CHLORIDE SERPL-SCNC: 99 MMOL/L — SIGNIFICANT CHANGE UP (ref 98–107)
CO2 SERPL-SCNC: 24 MMOL/L — SIGNIFICANT CHANGE UP (ref 22–31)
CREAT SERPL-MCNC: 0.9 MG/DL — SIGNIFICANT CHANGE UP (ref 0.5–1.3)
EOSINOPHIL # BLD AUTO: 0.22 K/UL — SIGNIFICANT CHANGE UP (ref 0–0.5)
EOSINOPHIL NFR BLD AUTO: 3.4 % — SIGNIFICANT CHANGE UP (ref 0–6)
GLUCOSE SERPL-MCNC: 110 MG/DL — HIGH (ref 70–99)
HCT VFR BLD CALC: 41.3 % — SIGNIFICANT CHANGE UP (ref 39–50)
HGB BLD-MCNC: 13.5 G/DL — SIGNIFICANT CHANGE UP (ref 13–17)
IANC: 3.67 K/UL — SIGNIFICANT CHANGE UP (ref 1.5–8.5)
IMM GRANULOCYTES NFR BLD AUTO: 0.3 % — SIGNIFICANT CHANGE UP (ref 0–1.5)
INR BLD: 1.07 RATIO — SIGNIFICANT CHANGE UP (ref 0.88–1.16)
LYMPHOCYTES # BLD AUTO: 1.81 K/UL — SIGNIFICANT CHANGE UP (ref 1–3.3)
LYMPHOCYTES # BLD AUTO: 28.1 % — SIGNIFICANT CHANGE UP (ref 13–44)
MCHC RBC-ENTMCNC: 31.3 PG — SIGNIFICANT CHANGE UP (ref 27–34)
MCHC RBC-ENTMCNC: 32.7 GM/DL — SIGNIFICANT CHANGE UP (ref 32–36)
MCV RBC AUTO: 95.6 FL — SIGNIFICANT CHANGE UP (ref 80–100)
MONOCYTES # BLD AUTO: 0.68 K/UL — SIGNIFICANT CHANGE UP (ref 0–0.9)
MONOCYTES NFR BLD AUTO: 10.6 % — SIGNIFICANT CHANGE UP (ref 2–14)
NEUTROPHILS # BLD AUTO: 3.67 K/UL — SIGNIFICANT CHANGE UP (ref 1.8–7.4)
NEUTROPHILS NFR BLD AUTO: 57 % — SIGNIFICANT CHANGE UP (ref 43–77)
NRBC # BLD: 0 /100 WBCS — SIGNIFICANT CHANGE UP
NRBC # FLD: 0 K/UL — SIGNIFICANT CHANGE UP
PLATELET # BLD AUTO: 263 K/UL — SIGNIFICANT CHANGE UP (ref 150–400)
POTASSIUM SERPL-MCNC: 3.9 MMOL/L — SIGNIFICANT CHANGE UP (ref 3.5–5.3)
POTASSIUM SERPL-SCNC: 3.9 MMOL/L — SIGNIFICANT CHANGE UP (ref 3.5–5.3)
PROT SERPL-MCNC: 8.9 G/DL — HIGH (ref 6–8.3)
PROTHROM AB SERPL-ACNC: 12.3 SEC — SIGNIFICANT CHANGE UP (ref 10.6–13.6)
RBC # BLD: 4.32 M/UL — SIGNIFICANT CHANGE UP (ref 4.2–5.8)
RBC # FLD: 12.8 % — SIGNIFICANT CHANGE UP (ref 10.3–14.5)
SARS-COV-2 RNA SPEC QL NAA+PROBE: SIGNIFICANT CHANGE UP
SODIUM SERPL-SCNC: 138 MMOL/L — SIGNIFICANT CHANGE UP (ref 135–145)
WBC # BLD: 6.44 K/UL — SIGNIFICANT CHANGE UP (ref 3.8–10.5)
WBC # FLD AUTO: 6.44 K/UL — SIGNIFICANT CHANGE UP (ref 3.8–10.5)

## 2021-07-24 PROCEDURE — 99221 1ST HOSP IP/OBS SF/LOW 40: CPT

## 2021-07-24 PROCEDURE — 99223 1ST HOSP IP/OBS HIGH 75: CPT

## 2021-07-24 PROCEDURE — 70450 CT HEAD/BRAIN W/O DYE: CPT | Mod: 26

## 2021-07-24 RX ORDER — LEVETIRACETAM 250 MG/1
500 TABLET, FILM COATED ORAL
Refills: 0 | Status: DISCONTINUED | OUTPATIENT
Start: 2021-07-24 | End: 2021-07-24

## 2021-07-24 RX ORDER — PANTOPRAZOLE SODIUM 20 MG/1
40 TABLET, DELAYED RELEASE ORAL
Refills: 0 | Status: DISCONTINUED | OUTPATIENT
Start: 2021-07-24 | End: 2021-07-27

## 2021-07-24 RX ORDER — DIGOXIN 250 MCG
125 TABLET ORAL DAILY
Refills: 0 | Status: DISCONTINUED | OUTPATIENT
Start: 2021-07-24 | End: 2021-07-24

## 2021-07-24 RX ORDER — FOLIC ACID 0.8 MG
1 TABLET ORAL
Qty: 0 | Refills: 0 | DISCHARGE

## 2021-07-24 RX ORDER — FOLIC ACID 0.8 MG
1 TABLET ORAL DAILY
Refills: 0 | Status: DISCONTINUED | OUTPATIENT
Start: 2021-07-24 | End: 2021-07-27

## 2021-07-24 RX ORDER — DIGOXIN 250 MCG
1 TABLET ORAL
Qty: 0 | Refills: 0 | DISCHARGE

## 2021-07-24 RX ORDER — LABETALOL HCL 100 MG
10 TABLET ORAL EVERY 4 HOURS
Refills: 0 | Status: DISCONTINUED | OUTPATIENT
Start: 2021-07-24 | End: 2021-07-24

## 2021-07-24 RX ORDER — LEVETIRACETAM 250 MG/1
500 TABLET, FILM COATED ORAL EVERY 12 HOURS
Refills: 0 | Status: DISCONTINUED | OUTPATIENT
Start: 2021-07-24 | End: 2021-07-26

## 2021-07-24 RX ORDER — ATORVASTATIN CALCIUM 80 MG/1
1 TABLET, FILM COATED ORAL
Qty: 0 | Refills: 0 | DISCHARGE

## 2021-07-24 RX ORDER — HYDRALAZINE HCL 50 MG
10 TABLET ORAL EVERY 6 HOURS
Refills: 0 | Status: DISCONTINUED | OUTPATIENT
Start: 2021-07-24 | End: 2021-07-24

## 2021-07-24 RX ORDER — SODIUM CHLORIDE 9 MG/ML
1000 INJECTION INTRAMUSCULAR; INTRAVENOUS; SUBCUTANEOUS
Refills: 0 | Status: DISCONTINUED | OUTPATIENT
Start: 2021-07-24 | End: 2021-07-27

## 2021-07-24 RX ORDER — DILTIAZEM HCL 120 MG
1 CAPSULE, EXT RELEASE 24 HR ORAL
Qty: 0 | Refills: 0 | DISCHARGE

## 2021-07-24 RX ORDER — DILTIAZEM HCL 120 MG
240 CAPSULE, EXT RELEASE 24 HR ORAL DAILY
Refills: 0 | Status: DISCONTINUED | OUTPATIENT
Start: 2021-07-24 | End: 2021-07-24

## 2021-07-24 RX ORDER — LANOLIN ALCOHOL/MO/W.PET/CERES
3 CREAM (GRAM) TOPICAL AT BEDTIME
Refills: 0 | Status: DISCONTINUED | OUTPATIENT
Start: 2021-07-24 | End: 2021-07-27

## 2021-07-24 RX ORDER — LISINOPRIL 2.5 MG/1
1 TABLET ORAL
Qty: 0 | Refills: 0 | DISCHARGE

## 2021-07-24 RX ORDER — HYDRALAZINE HCL 50 MG
10 TABLET ORAL
Refills: 0 | Status: DISCONTINUED | OUTPATIENT
Start: 2021-07-24 | End: 2021-07-24

## 2021-07-24 RX ORDER — LEVETIRACETAM 250 MG/1
5 TABLET, FILM COATED ORAL
Qty: 0 | Refills: 0 | DISCHARGE

## 2021-07-24 RX ORDER — ATORVASTATIN CALCIUM 80 MG/1
20 TABLET, FILM COATED ORAL AT BEDTIME
Refills: 0 | Status: DISCONTINUED | OUTPATIENT
Start: 2021-07-24 | End: 2021-07-27

## 2021-07-24 RX ORDER — FERROUS SULFATE 325(65) MG
325 TABLET ORAL DAILY
Refills: 0 | Status: DISCONTINUED | OUTPATIENT
Start: 2021-07-24 | End: 2021-07-27

## 2021-07-24 RX ORDER — ASCORBIC ACID 60 MG
1 TABLET,CHEWABLE ORAL
Qty: 0 | Refills: 0 | DISCHARGE

## 2021-07-24 RX ORDER — DIGOXIN 250 MCG
100 TABLET ORAL DAILY
Refills: 0 | Status: DISCONTINUED | OUTPATIENT
Start: 2021-07-24 | End: 2021-07-25

## 2021-07-24 RX ORDER — ASCORBIC ACID 60 MG
500 TABLET,CHEWABLE ORAL DAILY
Refills: 0 | Status: DISCONTINUED | OUTPATIENT
Start: 2021-07-24 | End: 2021-07-27

## 2021-07-24 RX ORDER — ACETAMINOPHEN 500 MG
650 TABLET ORAL EVERY 6 HOURS
Refills: 0 | Status: DISCONTINUED | OUTPATIENT
Start: 2021-07-24 | End: 2021-07-27

## 2021-07-24 RX ORDER — FERROUS SULFATE 325(65) MG
1 TABLET ORAL
Qty: 0 | Refills: 0 | DISCHARGE

## 2021-07-24 RX ORDER — HYDRALAZINE HCL 50 MG
10 TABLET ORAL
Refills: 0 | Status: DISCONTINUED | OUTPATIENT
Start: 2021-07-24 | End: 2021-07-27

## 2021-07-24 RX ORDER — TAMSULOSIN HYDROCHLORIDE 0.4 MG/1
0.4 CAPSULE ORAL AT BEDTIME
Refills: 0 | Status: DISCONTINUED | OUTPATIENT
Start: 2021-07-24 | End: 2021-07-27

## 2021-07-24 RX ORDER — PANTOPRAZOLE SODIUM 20 MG/1
1 TABLET, DELAYED RELEASE ORAL
Qty: 0 | Refills: 0 | DISCHARGE

## 2021-07-24 RX ORDER — LISINOPRIL 2.5 MG/1
5 TABLET ORAL DAILY
Refills: 0 | Status: DISCONTINUED | OUTPATIENT
Start: 2021-07-24 | End: 2021-07-24

## 2021-07-24 RX ADMIN — LEVETIRACETAM 400 MILLIGRAM(S): 250 TABLET, FILM COATED ORAL at 17:51

## 2021-07-24 RX ADMIN — Medication 10 MILLIGRAM(S): at 22:07

## 2021-07-24 RX ADMIN — SODIUM CHLORIDE 50 MILLILITER(S): 9 INJECTION INTRAMUSCULAR; INTRAVENOUS; SUBCUTANEOUS at 17:39

## 2021-07-24 NOTE — H&P ADULT - NSHPPHYSICALEXAM_GEN_ALL_CORE
GENERAL: NAD, well-developed male  HEAD:  Atraumatic, Normocephalic, temporal wasting, abrasion and dried blood noted on L side of face   EYES: EOMI, PERRLA, conjunctiva and sclera clear  NECK: Supple, No JVD  CHEST/LUNG: Clear to auscultation bilaterally; No wheeze  HEART: Regular rate and rhythm; No murmurs, rubs, or gallops  ABDOMEN: Soft, Nontender, Nondistended; Bowel sounds present  EXTREMITIES:  2+ Peripheral Pulses, No clubbing, cyanosis, or edema  PSYCH: AAOx1- self   NEUROLOGY: non-focal- patient not participating with exam, moves all extremities, speech fluent though repeating "good" when asked questions, looking around the room, no obvious facial droop.   SKIN: No rashes or lesions

## 2021-07-24 NOTE — H&P ADULT - PROBLEM SELECTOR PLAN 2
Unwitnessed fall, assumed mechanical 2/2 difficulty getting out of truck. Pt ambulated in the ED and was quite unsteady   [ ] PT/OT c/s  - Fall precautions

## 2021-07-24 NOTE — H&P ADULT - PROBLEM SELECTOR PLAN 1
Small acute parenchymal hemorrhage noted on CTH, slightly decreased in size on repeat CT head.   - NSGY following, awaiting further recommendations   - ASA on hold   - BP management: will aim for 's w/ home anti-HTN regimen   [ ] PT/OT   [ ] Bedside dysphagia screen

## 2021-07-24 NOTE — H&P ADULT - ASSESSMENT
86 y.o. M w/ a hx of dementia, SDH s/p b/l craniotomies, CVA on ASA, pafib not on AC, HTN, CVA, L ICA stenosis s/p CEA seizure d/o, Etoh abuse, hospitalized after an unwitnessed fall found to have acute parenchymal hemorrhages.

## 2021-07-24 NOTE — CHART NOTE - NSCHARTNOTEFT_GEN_A_CORE
Neurosurgery f/u    repeat CTH shows a Redemonstration of tiny foci of hyperdensity in the right temporal and frontal lobes, slightly smaller in size, likely small hemorrhage. Continued follow-up is recommended.  No acute mass effect or midline shift.  No acute neurosurgcial intervention required . recommend continue keppra x 2 weeks, and hold all blood thinners x 2weeks, call on Monday to schedule a f/u appt. with DR. Pedraza

## 2021-07-24 NOTE — H&P ADULT - NSHPLABSRESULTS_GEN_ALL_CORE
LABS:                        13.5   6.44  )-----------( 263      ( 24 Jul 2021 00:39 )             41.3     24 Jul 2021 00:39    138    |  99     |  7      ----------------------------<  110    3.9     |  24     |  0.90     Ca    9.8        24 Jul 2021 00:39    TPro  8.9    /  Alb  4.6    /  TBili  0.9    /  DBili  x      /  AST  35     /  ALT  35     /  AlkPhos  136    24 Jul 2021 00:39    PT/INR - ( 24 Jul 2021 00:39 )   PT: 12.3 sec;   INR: 1.07 ratio         PTT - ( 24 Jul 2021 00:39 )  PTT:30.7 sec    CTH: results as above

## 2021-07-24 NOTE — H&P ADULT - HISTORY OF PRESENT ILLNESS
86 y.o. M w/ a hx of dementia, SDH s/p b/l craniotomies,________ unclear if on AC v ASA_______, who presents to the ED after a fall, witnessed outside a care rest stop.   In the ED, patient attempted to ambulate with cane, however became unsteady and almost fell getting out of bed.     In the ED, patient underwent CT head which demonstrated small foci of hyperdensity in the R temporal and R frontal lobe c/f acute parenchymal hemorrhage. NSGY evaluated the patient and felt hyperdensities were comparable to prior imaging, recommended repeat CT head in AM.     son#- [phone]. 86 y.o. M w/ a hx of dementia, SDH s/p b/l craniotomies,________ unclear if on AC v ASA_______, pafib, HTN, CVA, L ICA stenosis s/p CEA seizure d/o, Etoh abuse, who presents to the ED after a fall, witnessed outside a care rest stop.   In the ED, patient attempted to ambulate with cane, however became unsteady and almost fell getting out of bed.     In the ED, patient underwent CT head which demonstrated small foci of hyperdensity in the R temporal and R frontal lobe c/f acute parenchymal hemorrhage. NSGY evaluated the patient and felt hyperdensities were comparable to prior imaging, recommended repeat CT head in AM.       son#- [phone]. 86 y.o. M w/ a hx of dementia, SDH s/p b/l craniotomies, CVA on ASA, pafib not on AC, HTN, CVA, L ICA stenosis s/p CEA seizure d/o, Etoh abuse, who presents to the ED after a fall, unwitnessed outside a care rest stop. At baseline, patient lives with family friends and is able to ambulate with a cane, last fall was 5 years ago. Patient was visiting [place] with his nephew Amara Johnson. Pt's nephew was at a rest stop, went inside to use the restroom. When he returned, patient was on the floor. Witnesses in the car next to him said he tried to get out of the car and must have stumbled trying to get out his truck. Nephew estimates about a 2 feet fall. Pt denied any immediate complaints but later reported some knee pain. When patient returned home, caretaker Chuy noted small laceration and pt reported L knee pain so EMS was called.    In the ED, patient underwent CT head which demonstrated small foci of hyperdensity in the R temporal and R frontal lobe c/f acute parenchymal hemorrhage. NSGY evaluated the patient and felt hyperdensities were comparable to prior imaging, recommended repeat CT head in AM.  Patent attempted to ambulate with cane, however became unsteady and almost fell getting out of bed.     Was in [place], came back and fell   [phone]: Amara Mcguire (Nephew)   Last saw 1.5 weeks ago  brought him back, reportedly fell getting out of the car 7/23, knee hurt, face bruised and cut,   Stopped at a rest car, asked if he ahd tourinate, left him in the car  came back- uncle tried to get out the car, fell found him on the floor 2 feet, he tried to use the bathroom, knee hurting   complaining about pain   does not talk much   perseverating  walks with a cane, can go up and down stairs   fell 5 years ago   able to feed himself, shower self  A&Ox 2   eating and drinking normally     son#- [phone]. Chuy, family friends 86 y.o. M w/ a hx of dementia, SDH s/p b/l craniotomies, CVA on ASA, pafib not on AC, HTN, CVA, L ICA stenosis s/p CEA seizure d/o, Etoh abuse, who presents to the ED after a fall, unwitnessed outside a care rest stop. At baseline, patient lives with family friends and is able to ambulate with a cane, last fall was 5 years ago. Caretaker Chuy reports he does not talk much but can make his needs known, knows his name, where he is though he often does not ask him those questions. Patient was visiting [place] with his nephew Amara Johnson. Pt's nephew was at a rest stop, went inside to use the restroom. When he returned, patient was on the floor. Witnesses in the car next to him said he tried to get out of the car and must have stumbled trying to get out his truck. Nephew estimates about a 2 feet fall. Pt denied any immediate complaints but later reported some knee pain. When patient returned home, caretaker Chuy noted small laceration and pt reported L knee pain so EMS was called.    In the ED, patient underwent CT head which demonstrated small foci of hyperdensity in the R temporal and R frontal lobe c/f acute parenchymal hemorrhage. NSGY evaluated the patient and felt hyperdensities were comparable to prior imaging, recommended repeat CT head in AM.  Patent attempted to ambulate with cane, however became unsteady and almost fell getting out of bed.   Patient has no current complaints, denies any headache or pain anywhere. Intermittently answering questions and replying "good" when asked to follow commands. Patient attempted to get out of bed despite being asked not to, became agitated and then  urinated on himself.     [phone]: Amara Mcguire (Nephew)   [phone]. Chuy, family friend and caretaker

## 2021-07-24 NOTE — H&P ADULT - NSICDXPASTMEDICALHX_GEN_ALL_CORE_FT
PAST MEDICAL HISTORY:  Cholelithiasis     Constipation     History of subdural hematoma     Hypertension     Paroxysmal atrial fibrillation     Seizure     Stroke

## 2021-07-24 NOTE — CONSULT NOTE ADULT - ASSESSMENT
85 YO male S/P fall with mild head trauma, rule out TSAH 85 YO male S/P fall with mild head trauma, rule out TSAH. The hyperdensities noted on the current CT are comparable to prior imaging.  There is no neurosurgical intervention required, and the patient does not require any advanced monitoring.

## 2021-07-24 NOTE — H&P ADULT - NSHPREVIEWOFSYSTEMS_GEN_ALL_CORE
ROS:    Constitutional: [ ] fevers [ ] chills [ ] weight loss [ ] weight gain  HEENT: [ ] dry eyes [ ] eye irritation [ ] postnasal drip [ ] nasal congestion  CV: [ ] chest pain [ ] orthopnea [ ] palpitations [ ] murmur  Resp: [ ] cough [ ] shortness of breath [ ] dyspnea [ ] wheezing [ ] sputum [ ] hemoptysis  GI: [ ] nausea [ ] vomiting [ ] diarrhea [ ] constipation [ ] abd pain [ ] dysphagia   : [ ] dysuria [ ] nocturia [ ] hematuria [ ] increased urinary frequency  Musculoskeletal: [ ] back pain [ ] myalgias [x ] arthralgias [ ] fracture  Skin: [ ] rash [ ] itch  Neurological: [ ] headache [ ] dizziness [ ] syncope [ ] weakness [ ] numbness  Psychiatric: [ ] anxiety [ ] depression  Endocrine: [ ] diabetes [ ] thyroid problem  Hematologic/Lymphatic: [ ] anemia [ ] bleeding problem  Allergic/Immunologic: [ ] itchy eyes [ ] nasal discharge [ ] hives [ ] angioedema  [ x] All other systems negative  [ ] Unable to assess ROS because ________

## 2021-07-24 NOTE — CONSULT NOTE ADULT - PROBLEM SELECTOR RECOMMENDATION 9
No neurosurgical intervention required  Repeat CT in AM  If stable will sign off and clear for discharge No neurosurgical intervention or advanced monitoring is required  Repeat CT in AM  If stable will sign off and clear for discharge

## 2021-07-24 NOTE — CONSULT NOTE ADULT - ATTENDING COMMENTS
85 yo male S/P fall with mild head trauma who presents with small hyperdensities on CT head. There is no mass effect or midline shift. No indication for acute neurosurgical intervention.    - please repeat CT head prior to discharge

## 2021-07-24 NOTE — H&P ADULT - NSHPSOCIALHISTORY_GEN_ALL_CORE
Lives at home with Tesha's mother- both family friends and primary caretakers  Tobacco: None   Etoh: Former   Drug: None

## 2021-07-25 RX ORDER — DIGOXIN 250 MCG
100 TABLET ORAL DAILY
Refills: 0 | Status: DISCONTINUED | OUTPATIENT
Start: 2021-07-25 | End: 2021-07-26

## 2021-07-25 RX ADMIN — Medication 325 MILLIGRAM(S): at 13:22

## 2021-07-25 RX ADMIN — Medication 10 MILLIGRAM(S): at 18:09

## 2021-07-25 RX ADMIN — ATORVASTATIN CALCIUM 20 MILLIGRAM(S): 80 TABLET, FILM COATED ORAL at 21:45

## 2021-07-25 RX ADMIN — Medication 500 MILLIGRAM(S): at 13:22

## 2021-07-25 RX ADMIN — LEVETIRACETAM 400 MILLIGRAM(S): 250 TABLET, FILM COATED ORAL at 11:00

## 2021-07-25 RX ADMIN — LEVETIRACETAM 400 MILLIGRAM(S): 250 TABLET, FILM COATED ORAL at 23:33

## 2021-07-25 RX ADMIN — TAMSULOSIN HYDROCHLORIDE 0.4 MILLIGRAM(S): 0.4 CAPSULE ORAL at 21:45

## 2021-07-25 RX ADMIN — Medication 3 MILLIGRAM(S): at 21:45

## 2021-07-25 RX ADMIN — Medication 1 MILLIGRAM(S): at 13:22

## 2021-07-25 RX ADMIN — Medication 100 MICROGRAM(S): at 21:47

## 2021-07-25 NOTE — OCCUPATIONAL THERAPY INITIAL EVALUATION ADULT - RANGE OF MOTION EXAMINATION, UPPER EXTREMITY
except right shoulder 0-90 and left shoulder 0-110 flexion actively/bilateral UE Active ROM was WFL  (within functional limits)

## 2021-07-25 NOTE — PHYSICAL THERAPY INITIAL EVALUATION ADULT - MANUAL MUSCLE TESTING RESULTS, REHAB EVAL
Bilateral UE muscle strength grossly 3/5 Throughout bilateral shoulder strength 3-/5 throughout; Bilateral LE muscle strength grossly 3/5 Throughout.

## 2021-07-25 NOTE — OCCUPATIONAL THERAPY INITIAL EVALUATION ADULT - REFERRAL TO ANOTHER SERVICE NEEDED, OT EVAL
Patient's nursing facility calling asking for orders regarding hedrick removal and voiding trial.       physical therapy

## 2021-07-25 NOTE — PHYSICAL THERAPY INITIAL EVALUATION ADULT - RANGE OF MOTION EXAMINATION, REHAB EVAL
bilateral UE shoulder ROM 0-90 degrees/bilateral upper extremity ROM was WFL (within functional limits)/bilateral lower extremity ROM was WFL (within functional limits)

## 2021-07-25 NOTE — SWALLOW BEDSIDE ASSESSMENT ADULT - SWALLOW EVAL: RECOMMENDED DIET
1. Dysphagia 2 (mechanical soft solids) with thin liquids. 2. Feeding assistance with use of: upright posture during PO intake, reduced rate, small bites/sips, alternate solids/liquids.

## 2021-07-25 NOTE — PHYSICAL THERAPY INITIAL EVALUATION ADULT - IMPAIRMENTS FOUND, PT EVAL
aerobic capacity/endurance/arousal, attention, and cognition/cognitive impairment/gait, locomotion, and balance/muscle strength

## 2021-07-25 NOTE — PHYSICAL THERAPY INITIAL EVALUATION ADULT - PERTINENT HX OF CURRENT PROBLEM, REHAB EVAL
86 year old Male with a history of dementia, SDH s/p craniotomies, CVA, HTN, CVA, Left ICA stenosis, Etoh abuse, who presents to the ED after a fall

## 2021-07-25 NOTE — OCCUPATIONAL THERAPY INITIAL EVALUATION ADULT - PERTINENT HX OF CURRENT PROBLEM, REHAB EVAL
86 y.o. M w/ a hx of dementia, SDH s/p b/l craniotomies, CVA on ASA, pafib not on AC, HTN, CVA, L ICA stenosis s/p CEA seizure d/o, Etoh abuse, hospitalized after an unwitnessed fall found to have acute parenchymal hemorrhages. Pt is a 86 y.o. Male with PMH of dementia, SDH s/p b/l craniotomies, CVA on ASA, pafib not on AC, HTN, CVA, L ICA stenosis s/p CEA seizure d/o, Etoh abuse, hospitalized after an unwitnessed fall found to have acute parenchymal hemorrhages.

## 2021-07-25 NOTE — PHYSICAL THERAPY INITIAL EVALUATION ADULT - ADDITIONAL COMMENTS
Pt. left comfortable in bed with all tubes/lines intact, call bell in reach and in NAD.     Unable to obtain accurate social history secondary to pt. presenting with confusion during subjective history and presenting with difficulty following commands, limited social history obtained through past medical documents, please refer to social work for more attainable social history.

## 2021-07-25 NOTE — SWALLOW BEDSIDE ASSESSMENT ADULT - COMMENTS
H&P 7/24/2021: 86 y.o. M w/ a hx of dementia, SDH s/p b/l craniotomies, CVA on ASA, pafib not on AC, HTN, CVA, L ICA stenosis s/p CEA seizure d/o, Etoh abuse, hospitalized after an unwitnessed fall found to have acute parenchymal hemorrhages.      CT Head 7/24/2021: Redemonstration of tiny foci of hyperdensity in the right temporal and frontal lobes, slightly smaller in size, likely small hemorrhage. Continued follow-up is recommended. No acute mass effect or midline shift.    CXR 7/23/2021: Mild bilateral reticular opacities, unchanged from prior chest radiograph.    Most recent clinical swallow evaluation completed during prior hospitalization (8/31/2020) with recommendations of Dysphagia 1 (puree) with honey-thick liquids. Please see note for details.    Consult received and chart reviewed. Patient seen for clinical swallow evaluation. Patient received sitting upright in bed, awake. Patient is alert and oriented to self and place. Patient is able to follow simple commands and make wants/needs known. Patient reporting having dentures at home.     Called out to team to discuss results and recommendations. NP verbalized understanding.

## 2021-07-26 LAB
ANION GAP SERPL CALC-SCNC: 14 MMOL/L — SIGNIFICANT CHANGE UP (ref 7–14)
BUN SERPL-MCNC: 12 MG/DL — SIGNIFICANT CHANGE UP (ref 7–23)
CALCIUM SERPL-MCNC: 8.8 MG/DL — SIGNIFICANT CHANGE UP (ref 8.4–10.5)
CHLORIDE SERPL-SCNC: 98 MMOL/L — SIGNIFICANT CHANGE UP (ref 98–107)
CO2 SERPL-SCNC: 21 MMOL/L — LOW (ref 22–31)
CREAT SERPL-MCNC: 0.9 MG/DL — SIGNIFICANT CHANGE UP (ref 0.5–1.3)
GLUCOSE SERPL-MCNC: 94 MG/DL — SIGNIFICANT CHANGE UP (ref 70–99)
HCT VFR BLD CALC: 37.6 % — LOW (ref 39–50)
HGB BLD-MCNC: 12.7 G/DL — LOW (ref 13–17)
MAGNESIUM SERPL-MCNC: 2 MG/DL — SIGNIFICANT CHANGE UP (ref 1.6–2.6)
MCHC RBC-ENTMCNC: 31.5 PG — SIGNIFICANT CHANGE UP (ref 27–34)
MCHC RBC-ENTMCNC: 33.8 GM/DL — SIGNIFICANT CHANGE UP (ref 32–36)
MCV RBC AUTO: 93.3 FL — SIGNIFICANT CHANGE UP (ref 80–100)
NRBC # BLD: 0 /100 WBCS — SIGNIFICANT CHANGE UP
NRBC # FLD: 0 K/UL — SIGNIFICANT CHANGE UP
PHOSPHATE SERPL-MCNC: 3.4 MG/DL — SIGNIFICANT CHANGE UP (ref 2.5–4.5)
PLATELET # BLD AUTO: 269 K/UL — SIGNIFICANT CHANGE UP (ref 150–400)
POTASSIUM SERPL-MCNC: 3.8 MMOL/L — SIGNIFICANT CHANGE UP (ref 3.5–5.3)
POTASSIUM SERPL-SCNC: 3.8 MMOL/L — SIGNIFICANT CHANGE UP (ref 3.5–5.3)
RBC # BLD: 4.03 M/UL — LOW (ref 4.2–5.8)
RBC # FLD: 12.5 % — SIGNIFICANT CHANGE UP (ref 10.3–14.5)
SODIUM SERPL-SCNC: 133 MMOL/L — LOW (ref 135–145)
WBC # BLD: 4.59 K/UL — SIGNIFICANT CHANGE UP (ref 3.8–10.5)
WBC # FLD AUTO: 4.59 K/UL — SIGNIFICANT CHANGE UP (ref 3.8–10.5)

## 2021-07-26 RX ORDER — LEVETIRACETAM 250 MG/1
500 TABLET, FILM COATED ORAL
Refills: 0 | Status: DISCONTINUED | OUTPATIENT
Start: 2021-07-26 | End: 2021-07-27

## 2021-07-26 RX ORDER — DIGOXIN 250 MCG
125 TABLET ORAL DAILY
Refills: 0 | Status: DISCONTINUED | OUTPATIENT
Start: 2021-07-27 | End: 2021-07-27

## 2021-07-26 RX ADMIN — LEVETIRACETAM 500 MILLIGRAM(S): 250 TABLET, FILM COATED ORAL at 19:03

## 2021-07-26 RX ADMIN — PANTOPRAZOLE SODIUM 40 MILLIGRAM(S): 20 TABLET, DELAYED RELEASE ORAL at 07:10

## 2021-07-26 RX ADMIN — Medication 325 MILLIGRAM(S): at 13:51

## 2021-07-26 RX ADMIN — Medication 500 MILLIGRAM(S): at 13:51

## 2021-07-26 RX ADMIN — ATORVASTATIN CALCIUM 20 MILLIGRAM(S): 80 TABLET, FILM COATED ORAL at 21:37

## 2021-07-26 RX ADMIN — LEVETIRACETAM 400 MILLIGRAM(S): 250 TABLET, FILM COATED ORAL at 09:52

## 2021-07-26 RX ADMIN — TAMSULOSIN HYDROCHLORIDE 0.4 MILLIGRAM(S): 0.4 CAPSULE ORAL at 21:37

## 2021-07-26 RX ADMIN — Medication 1 MILLIGRAM(S): at 13:52

## 2021-07-26 NOTE — CONSULT NOTE ADULT - TIME BILLING
Agree with above NP note.    Advanced care planning discussed with patient. Advanced care planning forms discussed with patient and/or family.  Risks, benefits, and alternatives of medical/cardiac procedures were discussed in detail with all questions answered.  30 minutes were spent addressing advance care planning.  ECHO 8/18/20: EF 59%, no pfo, nl LV sys fx , mild pulm htn     86 y.o. M w/ a hx of dementia, SDH s/p b/l craniotomies, CVA on ASA, pafib not on AC, HTN, CVA, L ICA stenosis s/p CEA seizure d/o, Etoh abuse, who presents to the ED after a unwitnessed fall, found to have acute parenchymal hemorrhages.     #unwitnessed fall   -likely mechanical given description  -no acs, orthostatic bp when feasible   -check echo     #acute parenchymal hemorrhages   -CT head noted  -management per Neurosx  -keppra per neurosx, no antiplat, a/c x 2 weeks  -med f/u    # Chronic Pafib   -stable, c/w dig iv until able to tolerate PO  -no a/c given falls, hx SDH, and now acute parenchymal hemorrhages.       DVt ppx
management of head trauma, tSAH

## 2021-07-26 NOTE — CONSULT NOTE ADULT - ASSESSMENT
ECHO 8/18/20: EF 59%, no pfo, nl LV sys fx , mild pulm htn     a/p   86 y.o. M w/ a hx of dementia, SDH s/p b/l craniotomies, CVA on ASA, pafib not on AC, HTN, CVA, L ICA stenosis s/p CEA seizure d/o, Etoh abuse, who presents to the ED after a unwitnessed fall, found to have acute parenchymal hemorrhages.     #unwitnessed fall   -likely  mechanical given description r/t age/ dementia   -ecg with no acs    -check orthostatic bp when feasible   -check echo     #acute parenchymal hemorrhages.   -CT head noted  -management per Neurosx : recommended  No acute neurosurgcial intervention required ; keppra x 2 weeks, and hold all blood thinners x 2weeks  -med f/u    # Chronic Pafib   -ecg in NSR with PACS  -c/w  dig ivp until able to tolerate PO   -no acs given falls, hx SDH, now with acute parenchymal hemorrhages.       DVt ppx  ECHO 8/18/20: EF 59%, no pfo, nl LV sys fx , mild pulm htn     a/p   86 y.o. M w/ a hx of dementia, SDH s/p b/l craniotomies, CVA on ASA, pafib not on AC, HTN, CVA, L ICA stenosis s/p CEA seizure d/o, Etoh abuse, who presents to the ED after a unwitnessed fall, found to have acute parenchymal hemorrhages.     #unwitnessed fall   -likely  mechanical given description r/t age/ dementia   -ecg with no acs    -check orthostatic bp when feasible   -check echo     #acute parenchymal hemorrhages.   -CT head noted  -management per Neurosx : recommended  No acute neurosurgcial intervention required ; keppra x 2 weeks, and hold all blood thinners x 2weeks  -med f/u    # Chronic Pafib   -ecg in NSR with PACS  -c/w  dig ivp until able to tolerate PO -- check Dig Level  -no acs given falls, hx SDH, now with acute parenchymal hemorrhages.       DVt ppx  ECHO 8/18/20: EF 59%, no pfo, nl LV sys fx , mild pulm htn     a/p   86 y.o. M w/ a hx of dementia, SDH s/p b/l craniotomies, CVA on ASA, pafib not on AC, HTN, CVA, L ICA stenosis s/p CEA seizure d/o, Etoh abuse, who presents to the ED after a unwitnessed fall, found to have acute parenchymal hemorrhages.     #unwitnessed fall   -likely  mechanical given description r/t age/ dementia   -ecg with no acs    -check orthostatic bp when feasible   -check echo     #acute parenchymal hemorrhages.   -CT head noted  -management per Neurosx : recommended  No acute neurosurgcial intervention required ; keppra x 2 weeks, and hold all blood thinners x 2weeks  -med f/u    # Chronic Pafib   -ecg in NSR with PACS  -c/w  dig ivp until able to tolerate PO -- check Dig Level  -no a/c given falls, hx SDH, now with acute parenchymal hemorrhages.       DVt ppx

## 2021-07-26 NOTE — CONSULT NOTE ADULT - SUBJECTIVE AND OBJECTIVE BOX
CARDIOLOGY CONSULT - Dr. Noel         HPI:  86 y.o. M w/ a hx of dementia, SDH s/p b/l craniotomies, CVA on ASA, pafib not on AC, HTN, CVA, L ICA stenosis s/p CEA seizure d/o, Etoh abuse, who presents to the ED after a fall, unwitnessed outside a care rest stop. pt a/o x1 --- per h/p  last fall was 5 years ago.  pt does not talk much but can make his needs known, knows his name, where he is though he often does not ask him those questions. Patient was visiting [place] with his nephew Amara Johnson. Pt's nephew was at a rest stop, went inside to use the restroom. When he returned, patient was on the floor. Witnesses in the car next to him said he tried to get out of the car and must have stumbled trying to get out his truck. Nephew estimates about a 2 feet fall. Pt denied any immediate complaints but later reported some knee pain. When patient returned home, caretaker Chuy noted small laceration and pt reported L knee pain so EMS was called.  In the ED, patient underwent CT head which demonstrated small foci of hyperdensity in the R temporal and R frontal lobe c/f acute parenchymal hemorrhage.  on exam in NAD. ECHO 8/18/20: EF 59%, no pfo, nl LV sys fx , mild pulm htn  ROS otherwise negative     PAST MEDICAL & SURGICAL HISTORY:  Seizure    Hypertension    Constipation    History of subdural hematoma    Cholelithiasis    Paroxysmal atrial fibrillation    Stroke    Acute subdural hematoma  (May 2014 - subdural hematoma,  pt had SX)    History of ankle surgery  ORIF with screws and plate            PREVIOUS DIAGNOSTIC TESTING:    [ ] Echocardiogram:   ECHO 8/18/20: EF 59%, no pfo, nl LV sys fx , mild pulm htn   [ ]  Catheterization:  [ ] Stress Test:  	    MEDICATIONS:  Home Medications:  aspirin 81 mg oral tablet, chewable: 1 tab(s) orally once a day (24 Jul 2021 09:43)  atorvastatin 20 mg oral tablet: 1 tab(s) orally once a day (24 Jul 2021 09:43)  digoxin 125 mcg (0.125 mg) oral tablet: 1 tab(s) orally once a day (24 Jul 2021 09:43)  diltiazem 24 hour extended release 240 mg/24 hours oral capsule, extended release: 1 cap(s) orally once a day (24 Jul 2021 09:43)  ferrous sulfate 324 mg (65 mg elemental iron) oral tablet: 1 tab(s) orally 2 times a day (24 Jul 2021 09:43)  folic acid 1 mg oral tablet: 1 tab(s) orally once a day (24 Jul 2021 09:43)  Keppra 100 mg/mL oral solution: 5 milliliter(s) orally 2 times a day (24 Jul 2021 09:43)  lisinopril 5 mg oral tablet: 1 tab(s) orally once a day (24 Jul 2021 09:43)  omeprazole 40 mg oral delayed release capsule: 1 cap(s) orally once a day (24 Jul 2021 09:43)  pantoprazole 20 mg oral delayed release tablet: 1 tab(s) orally once a day (24 Jul 2021 09:43)  tamsulosin 0.4 mg oral capsule: 1 cap(s) orally once a day (at bedtime) (24 Jul 2021 09:43)  Vitamin C 500 mg oral tablet: 1 tab(s) orally once a day (24 Jul 2021 09:43)      MEDICATIONS  (STANDING):  ascorbic acid 500 milliGRAM(s) Oral daily  atorvastatin 20 milliGRAM(s) Oral at bedtime  digoxin  Injectable 100 MICROGram(s) IV Push daily  ferrous    sulfate 325 milliGRAM(s) Oral daily  folic acid 1 milliGRAM(s) Oral daily  levETIRAcetam  IVPB 500 milliGRAM(s) IV Intermittent every 12 hours  pantoprazole    Tablet 40 milliGRAM(s) Oral before breakfast  sodium chloride 0.9%. 1000 milliLiter(s) (50 mL/Hr) IV Continuous <Continuous>  tamsulosin 0.4 milliGRAM(s) Oral at bedtime      FAMILY HISTORY:  No pertinent family history in first degree relatives        SOCIAL HISTORY:    unable to obtain    Allergies    No Known Allergies    Intolerances    	    REVIEW OF SYSTEMS:  CONSTITUTIONAL: No fever, weight loss, or fatigue  EYES: No eye pain, visual disturbances, or discharge  ENMT:  No difficulty hearing, tinnitus, vertigo; No sinus or throat pain  NECK: No pain or stiffness  RESPIRATORY: No cough, wheezing, chills or hemoptysis; No Shortness of Breath  CARDIOVASCULAR: No chest pain, palpitations, passing out, dizziness, or leg swelling  GASTROINTESTINAL: No abdominal or epigastric pain. No nausea, vomiting, or hematemesis; No diarrhea or constipation. No melena or hematochezia.  GENITOURINARY: No dysuria, frequency, hematuria, or incontinence  NEUROLOGICAL: No headaches, memory loss, loss of strength, numbness, or tremors  SKIN: No itching, burning, rashes, or lesions   	    [ ] All others negative	  [x ] Unable to obtain    PHYSICAL EXAM:  T(C): 36.4 (07-26-21 @ 07:07), Max: 36.8 (07-25-21 @ 15:15)  HR: 108 (07-26-21 @ 07:07) (67 - 108)  BP: 133/74 (07-26-21 @ 07:07) (133/74 - 153/75)  RR: 16 (07-26-21 @ 07:07) (16 - 16)  SpO2: 97% (07-26-21 @ 07:07) (97% - 99%)  Wt(kg): --  I&O's Summary    25 Jul 2021 07:01  -  26 Jul 2021 07:00  --------------------------------------------------------  IN: 700 mL / OUT: 775 mL / NET: -75 mL        Appearance: NAD 	  Psychiatry: A & O x1-2   HEENT:   Normal oral mucosa, PERRL, EOMI	  Lymphatic: No lymphadenopathy  Cardiovascular: Normal S1 S2,RRR, No JVD, No murmurs  Respiratory: Lungs clear to auscultation	  Gastrointestinal:  Soft, Non-tender, + BS	  Skin: No rashes, No ecchymoses, No cyanosis	  Neurologic: Non-focal  Extremities: Normal range of motion, No clubbing, cyanosis or edema  Vascular: Peripheral pulses palpable 2+ bilaterally    TELEMETRY: 	    ECG:    nsr hr 75 pac	  RADIOLOGY:  < from: CT Head No Cont (07.24.21 @ 05:49) >    IMPRESSION:  Redemonstration of tiny foci of hyperdensity in the right temporal and frontal lobes, slightly smaller in size, likely small hemorrhage. Continued follow-up is recommended.  No acute mass effect or midlineshift.      >    < from: Xray Chest 1 View AP/PA (07.23.21 @ 21:37) >  FINDINGS:  The heart size is normal.    Mild bilateral reticular opacities, unchanged from prior chest radiograph. No pleural effusion or pneumothorax.    Degenerative changes of the spine    IMPRESSION:  Mild bilateral reticular opacities, unchanged from prior chest radiograph.    --- End of Report ---          < end of copied text >    OTHER: 	  	  LABS:	 	    CARDIAC MARKERS:                                  12.7   4.59  )-----------( 269      ( 26 Jul 2021 06:41 )             37.6     07-26    133<L>  |  98  |  12  ----------------------------<  94  3.8   |  21<L>  |  0.90    Ca    8.8      26 Jul 2021 06:41  Phos  3.4     07-26  Mg     2.00     07-26        proBNP:   Lipid Profile:   HgA1c:   TSH:       
86M pmh dementia, Bilateral craniotomies for SDH at [facility] years ago, ? if on AC presents to ED after fall, pt endorses L knee pain, denies any other symptoms, doesn't remember falling, limited historian, a/ox1. As per triage note, witnessed fall @ yesterday outside a car rest stop. pt w c/o abrasion to right eyebrow and hematoma to R cheek.    WDWN male in NAD  Vital Signs Last 24 Hrs  T(C): 36.8 (24 Jul 2021 01:03), Max: 37.2 (23 Jul 2021 15:48)  T(F): 98.3 (24 Jul 2021 01:03), Max: 98.9 (23 Jul 2021 15:48)  HR: 61 (24 Jul 2021 01:03) (53 - 81)  BP: 152/95 (24 Jul 2021 01:03) (132/64 - 152/95)  BP(mean): --  RR: 16 (24 Jul 2021 01:03) (16 - 18)  SpO2: 100% (24 Jul 2021 01:03) (97% - 100%)    AAO to self and "Hospital"  PERRLA, EOMI  CN 2-12 grossly intact  HERNANDEZ strength 5/5 throughout, LLE limited due to left knee pain    < from: CT Head No Cont (07.23.21 @ 23:39) >  BRAIN:  PARENCHYMA: 2 small foci of likely parenchymal hemorrhage are seen in the right temporal lobe (2:18, and the right frontal lobe (2:23) in the setting of encephalomalacic tissue. No mass effect or midline shift. Redemonstrated multifocal encephalomalacia and gliosis involving the bilateral frontal lobes, the right temporal lobe, and the left parietal lobe. Age-related volume loss.  VENTRICLES: No hydrocephalus. Cavum septum pellucidum et vergae.  EXTRA-AXIAL:  No abnormal extraaxial collection.  TYMPANOMASTOID CAVITIES:  Within normal limits.  ORBITS: Within normal limits.  CALVARIUM:  Right pterional and left parietotemporal craniotomies.    CERVICAL SPINE:  ALIGNMENT: The normal cervical lordosis is reversed. Trace lateral listhesis of C3 on C4.  BONES: The bones are diffusely osteopenic. No acute displaced fracture or prevertebral soft tissue swelling. The craniocervical junction is exhibits ligamentous ossification. There is fusion of the left transverse processes from C3-C5.  DISC LEVEL EVALUATION: Extensive multilevel degenerative changes of the cervical spine characterized by marginal osteophyte formation, small disc bulges, and uncovertebral and facet joint arthrosis. Notable severe multilevel loss of disc height throughout the cervical spine. No significant spinal canal or foraminal narrowing at CT.  NECK SOFT TISSUES: Within normal limits.  VISUALIZED LUNGS: Emphysema.    MAXILLOFACIAL:  SOFT TISSUES: Within normal limits.  MAXILLOFACIAL BONES: No acute fracture. There are no maxillary teeth. Most of the mandibular teeth have been removed. The remaining teeth demonstrate numerous dental cavities.  TEMPOROMANDIBULAR JOINTS: Intact.  ORBITS: Within normal limits. No radiopaque orbital foreign body.  PARANASAL SINUSES:  Within normal limits.    IMPRESSION:    Head CT: Small foci of hyperdensity in the right temporal and right frontal lobe concerning for acute parenchymal hemorrhage. The common follow-up CT in 6 to 12 hours.    Cervical spine CT: No acute fracture. Extensive cervical degenerative spondylosis, as described above.    Maxillofacial CT: No acute maxillofacial bone fracture. Poor dentition with multiple cavities and remaining teeth as described above.    < end of copied text >

## 2021-07-27 ENCOUNTER — TRANSCRIPTION ENCOUNTER (OUTPATIENT)
Age: 86
End: 2021-07-27

## 2021-07-27 VITALS
HEART RATE: 86 BPM | OXYGEN SATURATION: 100 % | TEMPERATURE: 98 F | RESPIRATION RATE: 16 BRPM | SYSTOLIC BLOOD PRESSURE: 143 MMHG | DIASTOLIC BLOOD PRESSURE: 74 MMHG

## 2021-07-27 RX ORDER — LISINOPRIL 2.5 MG/1
1 TABLET ORAL
Qty: 0 | Refills: 0 | DISCHARGE

## 2021-07-27 RX ORDER — OMEPRAZOLE 10 MG/1
1 CAPSULE, DELAYED RELEASE ORAL
Qty: 0 | Refills: 0 | DISCHARGE

## 2021-07-27 RX ADMIN — Medication 1 MILLIGRAM(S): at 12:23

## 2021-07-27 RX ADMIN — LEVETIRACETAM 500 MILLIGRAM(S): 250 TABLET, FILM COATED ORAL at 07:05

## 2021-07-27 RX ADMIN — Medication 500 MILLIGRAM(S): at 12:24

## 2021-07-27 RX ADMIN — Medication 325 MILLIGRAM(S): at 12:23

## 2021-07-27 RX ADMIN — PANTOPRAZOLE SODIUM 40 MILLIGRAM(S): 20 TABLET, DELAYED RELEASE ORAL at 07:05

## 2021-07-27 RX ADMIN — Medication 125 MICROGRAM(S): at 07:05

## 2021-07-27 NOTE — DISCHARGE NOTE PROVIDER - NSDCFUSCHEDAPPT_GEN_ALL_CORE_FT
YOSELIN VENTURA ; 08/03/2021 ; NPP Surgonc [address] [facility]  YOSELIN VENTURA ; 08/19/2021 ; NPP OtoLaryng [address] [facility]

## 2021-07-27 NOTE — PROGRESS NOTE ADULT - TIME BILLING
agree with above  cv stable  check orthostatic bp when feasible   check echo   management per Neurosx : recommended  No acute neurosurgcial intervention required ; keppra x 2 weeks, and hold all blood thinners x 2weeks  med f/u  c/w  dig po   no a/c given falls, hx SDH, now with acute parenchymal hemorrhages.   DVt ppx

## 2021-07-27 NOTE — PROGRESS NOTE ADULT - PROBLEM SELECTOR PLAN 3
Hx of stroke s/p L ICA CEA, on ASA, statin at home   - Hold ASA in setting of ICH   - Cont statin

## 2021-07-27 NOTE — PROGRESS NOTE ADULT - PROBLEM SELECTOR PLAN 2
Unwitnessed fall, assumed mechanical 2/2 difficulty getting out of truck. Pt ambulated in the ED and was quite unsteady   PT/OT c/s  - Fall precautions  - DC to [facility]
Unwitnessed fall, assumed mechanical 2/2 difficulty getting out of truck. Pt ambulated in the ED and was quite unsteady   PT/OT c/s  - Fall precautions  - DC to [facility] today
Unwitnessed fall, assumed mechanical 2/2 difficulty getting out of truck. Pt ambulated in the ED and was quite unsteady   PT/OT c/s  - Fall precautions

## 2021-07-27 NOTE — DISCHARGE NOTE PROVIDER - NSDCFUADDAPPT_GEN_ALL_CORE_FT
Continue to take keppra for 2 weeks, and hold all blood thinners for 2 weeks, call on Monday to schedule a follow up appointment with Dr. Pedraza

## 2021-07-27 NOTE — PROGRESS NOTE ADULT - SUBJECTIVE AND OBJECTIVE BOX
CARDIOLOGY FOLLOW UP - Dr. Noel  DATE OF SERVICE: 7/26/21     CC no cp or sob        REVIEW OF SYSTEMS:  CONSTITUTIONAL: No fever, weight loss, or fatigue  RESPIRATORY: No cough, wheezing, chills or hemoptysis; No Shortness of Breath  CARDIOVASCULAR: No chest pain, palpitations, passing out, dizziness, or leg swelling  GASTROINTESTINAL: No abdominal or epigastric pain. No nausea, vomiting, or hematemesis; No diarrhea or constipation. No melena or hematochezia.  VASCULAR: No edema     PHYSICAL EXAM:  T(C): 37.1 (07-27-21 @ 06:56), Max: 37.1 (07-27-21 @ 06:56)  HR: 65 (07-27-21 @ 06:56) (60 - 66)  BP: 146/78 (07-27-21 @ 06:56) (122/56 - 146/78)  RR: 16 (07-27-21 @ 06:56) (16 - 17)  SpO2: 96% (07-27-21 @ 06:56) (96% - 98%)  Wt(kg): --  I&O's Summary      Appearance: Normal	  Cardiovascular: Normal S1 S2,RRR, No JVD, No murmurs  Respiratory: Lungs clear to auscultation	  Gastrointestinal:  Soft, Non-tender, + BS	  Extremities: Normal range of motion, No clubbing, cyanosis or edema      Home Medications:  aspirin 81 mg oral tablet, chewable: 1 tab(s) orally once a day (24 Jul 2021 09:43)  atorvastatin 20 mg oral tablet: 1 tab(s) orally once a day (24 Jul 2021 09:43)  digoxin 125 mcg (0.125 mg) oral tablet: 1 tab(s) orally once a day (24 Jul 2021 09:43)  diltiazem 24 hour extended release 240 mg/24 hours oral capsule, extended release: 1 cap(s) orally once a day (24 Jul 2021 09:43)  ferrous sulfate 324 mg (65 mg elemental iron) oral tablet: 1 tab(s) orally 2 times a day (24 Jul 2021 09:43)  folic acid 1 mg oral tablet: 1 tab(s) orally once a day (24 Jul 2021 09:43)  Keppra 100 mg/mL oral solution: 5 milliliter(s) orally 2 times a day (24 Jul 2021 09:43)  lisinopril 5 mg oral tablet: 1 tab(s) orally once a day (24 Jul 2021 09:43)  omeprazole 40 mg oral delayed release capsule: 1 cap(s) orally once a day (24 Jul 2021 09:43)  pantoprazole 20 mg oral delayed release tablet: 1 tab(s) orally once a day (24 Jul 2021 09:43)  tamsulosin 0.4 mg oral capsule: 1 cap(s) orally once a day (at bedtime) (24 Jul 2021 09:43)  Vitamin C 500 mg oral tablet: 1 tab(s) orally once a day (24 Jul 2021 09:43)      MEDICATIONS  (STANDING):  ascorbic acid 500 milliGRAM(s) Oral daily  atorvastatin 20 milliGRAM(s) Oral at bedtime  digoxin     Tablet 125 MICROGram(s) Oral daily  ferrous    sulfate 325 milliGRAM(s) Oral daily  folic acid 1 milliGRAM(s) Oral daily  levETIRAcetam 500 milliGRAM(s) Oral two times a day  pantoprazole    Tablet 40 milliGRAM(s) Oral before breakfast  sodium chloride 0.9%. 1000 milliLiter(s) (50 mL/Hr) IV Continuous <Continuous>  tamsulosin 0.4 milliGRAM(s) Oral at bedtime      TELEMETRY: 	    ECG:  	  RADIOLOGY:   DIAGNOSTIC TESTING:  [ ] Echocardiogram:  [ ]  Catheterization:  [ ] Stress Test:    OTHER: 	    LABS:	 	                            12.7   4.59  )-----------( 269      ( 26 Jul 2021 06:41 )             37.6     07-26    133<L>  |  98  |  12  ----------------------------<  94  3.8   |  21<L>  |  0.90    Ca    8.8      26 Jul 2021 06:41  Phos  3.4     07-26  Mg     2.00     07-26              
Patient is a 86y old  Male who presents with a chief complaint of Fall (27 Jul 2021 12:58)      SUBJECTIVE / OVERNIGHT EVENTS:    MEDICATIONS  (STANDING):  ascorbic acid 500 milliGRAM(s) Oral daily  atorvastatin 20 milliGRAM(s) Oral at bedtime  digoxin     Tablet 125 MICROGram(s) Oral daily  ferrous    sulfate 325 milliGRAM(s) Oral daily  folic acid 1 milliGRAM(s) Oral daily  levETIRAcetam 500 milliGRAM(s) Oral two times a day  pantoprazole    Tablet 40 milliGRAM(s) Oral before breakfast  sodium chloride 0.9%. 1000 milliLiter(s) (50 mL/Hr) IV Continuous <Continuous>  tamsulosin 0.4 milliGRAM(s) Oral at bedtime    MEDICATIONS  (PRN):  acetaminophen   Tablet .. 650 milliGRAM(s) Oral every 6 hours PRN Temp greater or equal to 38.5C (101.3F), Mild Pain (1 - 3)  aluminum hydroxide/magnesium hydroxide/simethicone Suspension 30 milliLiter(s) Oral every 4 hours PRN Dyspepsia  hydrALAZINE Injectable 10 milliGRAM(s) IV Push four times a day PRN SBP >150  melatonin 3 milliGRAM(s) Oral at bedtime PRN Insomnia      Vital Signs Last 24 Hrs  T(F): 98.1 (07-27-21 @ 12:57), Max: 98.7 (07-27-21 @ 06:56)  HR: 86 (07-27-21 @ 12:57) (65 - 86)  BP: 143/74 (07-27-21 @ 12:57) (122/56 - 146/78)  RR: 16 (07-27-21 @ 12:57) (16 - 17)  SpO2: 100% (07-27-21 @ 12:57) (96% - 100%)  Telemetry:   CAPILLARY BLOOD GLUCOSE        I&O's Summary      PHYSICAL EXAM:  GENERAL: NAD, well-developed  HEAD:  Atraumatic, Normocephalic  EYES: EOMI, PERRLA, conjunctiva and sclera clear  NECK: Supple, No JVD  CHEST/LUNG: Clear to auscultation bilaterally; No wheeze  HEART: Regular rate and rhythm; No murmurs, rubs, or gallops  ABDOMEN: Soft, Nontender, Nondistended; Bowel sounds present  EXTREMITIES:  2+ Peripheral Pulses, No clubbing, cyanosis, or edema  PSYCH: AAOx3  NEUROLOGY: non-focal  SKIN: No rashes or lesions    LABS:                        12.7   4.59  )-----------( 269      ( 26 Jul 2021 06:41 )             37.6     07-26    133<L>  |  98  |  12  ----------------------------<  94  3.8   |  21<L>  |  0.90    Ca    8.8      26 Jul 2021 06:41  Phos  3.4     07-26  Mg     2.00     07-26                RADIOLOGY & ADDITIONAL TESTS:    Imaging Personally Reviewed:    Consultant(s) Notes Reviewed:      Care Discussed with Consultants/Other Providers:  
Patient is a 86y old  Male who presents with a chief complaint of Fall (26 Jul 2021 13:03)      SUBJECTIVE / OVERNIGHT EVENTS: ptn w no new c/o    MEDICATIONS  (STANDING):  ascorbic acid 500 milliGRAM(s) Oral daily  atorvastatin 20 milliGRAM(s) Oral at bedtime  ferrous    sulfate 325 milliGRAM(s) Oral daily  folic acid 1 milliGRAM(s) Oral daily  levETIRAcetam 500 milliGRAM(s) Oral two times a day  pantoprazole    Tablet 40 milliGRAM(s) Oral before breakfast  sodium chloride 0.9%. 1000 milliLiter(s) (50 mL/Hr) IV Continuous <Continuous>  tamsulosin 0.4 milliGRAM(s) Oral at bedtime    MEDICATIONS  (PRN):  acetaminophen   Tablet .. 650 milliGRAM(s) Oral every 6 hours PRN Temp greater or equal to 38.5C (101.3F), Mild Pain (1 - 3)  aluminum hydroxide/magnesium hydroxide/simethicone Suspension 30 milliLiter(s) Oral every 4 hours PRN Dyspepsia  hydrALAZINE Injectable 10 milliGRAM(s) IV Push four times a day PRN SBP >150  melatonin 3 milliGRAM(s) Oral at bedtime PRN Insomnia      Vital Signs Last 24 Hrs  T(F): 97.5 (07-26-21 @ 13:12), Max: 97.9 (07-25-21 @ 21:41)  HR: 60 (07-26-21 @ 13:12) (60 - 108)  BP: 125/59 (07-26-21 @ 13:12) (125/59 - 140/77)  RR: 16 (07-26-21 @ 13:12) (16 - 16)  SpO2: 98% (07-26-21 @ 13:12) (97% - 98%)  Telemetry:   CAPILLARY BLOOD GLUCOSE        I&O's Summary    25 Jul 2021 07:01  -  26 Jul 2021 07:00  --------------------------------------------------------  IN: 700 mL / OUT: 775 mL / NET: -75 mL        PHYSICAL EXAM:  GENERAL: NAD, well-developed  HEAD:  Atraumatic, Normocephalic  EYES: EOMI, PERRLA, conjunctiva and sclera clear  NECK: Supple, No JVD  CHEST/LUNG: Clear to auscultation bilaterally; No wheeze  HEART: Regular rate and rhythm; No murmurs, rubs, or gallops  ABDOMEN: Soft, Nontender, Nondistended; Bowel sounds present  EXTREMITIES:  2+ Peripheral Pulses, No clubbing, cyanosis, or edema  PSYCH: AAOx3  NEUROLOGY: non-focal  SKIN: No rashes or lesions    LABS:                        12.7   4.59  )-----------( 269      ( 26 Jul 2021 06:41 )             37.6     07-26    133<L>  |  98  |  12  ----------------------------<  94  3.8   |  21<L>  |  0.90    Ca    8.8      26 Jul 2021 06:41  Phos  3.4     07-26  Mg     2.00     07-26                RADIOLOGY & ADDITIONAL TESTS:    Imaging Personally Reviewed:    Consultant(s) Notes Reviewed:      Care Discussed with Consultants/Other Providers:  
Patient is a 86y old  Male who presents with a chief complaint of Fall (2021 08:24)      SUBJECTIVE / OVERNIGHT EVENTS:    MEDICATIONS  (STANDING):  ascorbic acid 500 milliGRAM(s) Oral daily  atorvastatin 20 milliGRAM(s) Oral at bedtime  digoxin  Injectable 100 MICROGram(s) IV Push daily  ferrous    sulfate 325 milliGRAM(s) Oral daily  folic acid 1 milliGRAM(s) Oral daily  levETIRAcetam  IVPB 500 milliGRAM(s) IV Intermittent every 12 hours  pantoprazole    Tablet 40 milliGRAM(s) Oral before breakfast  sodium chloride 0.9%. 1000 milliLiter(s) (50 mL/Hr) IV Continuous <Continuous>  tamsulosin 0.4 milliGRAM(s) Oral at bedtime    MEDICATIONS  (PRN):  acetaminophen   Tablet .. 650 milliGRAM(s) Oral every 6 hours PRN Temp greater or equal to 38.5C (101.3F), Mild Pain (1 - 3)  aluminum hydroxide/magnesium hydroxide/simethicone Suspension 30 milliLiter(s) Oral every 4 hours PRN Dyspepsia  hydrALAZINE Injectable 10 milliGRAM(s) IV Push four times a day PRN SBP >150  melatonin 3 milliGRAM(s) Oral at bedtime PRN Insomnia      Vital Signs Last 24 Hrs  T(F): 97.9 (21 @ 21:41), Max: 98.2 (21 @ 15:15)  HR: 76 (21 @ 21:41) (67 - 76)  BP: 140/77 (21 @ 21:41) (140/77 - 153/75)  RR: 16 (21 @ 21:41) (16 - 16)  SpO2: 97% (21 @ 21:41) (97% - 99%)  Telemetry:   CAPILLARY BLOOD GLUCOSE        I&O's Summary    2021 07:  -  2021 07:00  --------------------------------------------------------  IN: 150 mL / OUT: 775 mL / NET: -625 mL    2021 07:01  -  2021 22:29  --------------------------------------------------------  IN: 700 mL / OUT: 775 mL / NET: -75 mL        PHYSICAL EXAM:  GENERAL: NAD, well-developed  HEAD:  Atraumatic, Normocephalic  EYES: EOMI, PERRLA, conjunctiva and sclera clear  NECK: Supple, No JVD  CHEST/LUNG: Clear to auscultation bilaterally; No wheeze  HEART: Regular rate and rhythm; No murmurs, rubs, or gallops  ABDOMEN: Soft, Nontender, Nondistended; Bowel sounds present  EXTREMITIES:  2+ Peripheral Pulses, No clubbing, cyanosis, or edema  PSYCH: AAOx3  NEUROLOGY: non-focal  SKIN: No rashes or lesions    LABS:                        13.5   6.44  )-----------( 263      ( 2021 00:39 )             41.3     07    138  |  99  |  7   ----------------------------<  110<H>  3.9   |  24  |  0.90    Ca    9.8      2021 00:39    TPro  8.9<H>  /  Alb  4.6  /  TBili  0.9  /  DBili  x   /  AST  35  /  ALT  35  /  AlkPhos  136<H>  07-24    PT/INR - ( 2021 00:39 )   PT: 12.3 sec;   INR: 1.07 ratio         PTT - ( 2021 00:39 )  PTT:30.7 sec      Urinalysis Basic - ( 2021 23:50 )    Color: Light Yellow / Appearance: Clear / S.012 / pH: x  Gluc: x / Ketone: Negative  / Bili: Negative / Urobili: <2 mg/dL   Blood: x / Protein: Trace / Nitrite: Negative   Leuk Esterase: Negative / RBC: 0 /HPF / WBC 0 /HPF   Sq Epi: x / Non Sq Epi: 0 /HPF / Bacteria: Negative        RADIOLOGY & ADDITIONAL TESTS:    Imaging Personally Reviewed:    Consultant(s) Notes Reviewed:      Care Discussed with Consultants/Other Providers:

## 2021-07-27 NOTE — DISCHARGE NOTE NURSING/CASE MANAGEMENT/SOCIAL WORK - PATIENT PORTAL LINK FT
You can access the FollowMyHealth Patient Portal offered by [facility] by registering at the following website: [email or web address]. By joining [employer]’s FollowMyHealth portal, you will also be able to view your health information using other applications (apps) compatible with our system.

## 2021-07-27 NOTE — DISCHARGE NOTE NURSING/CASE MANAGEMENT/SOCIAL WORK - NSDCCRNAME_GEN_ALL_CORE_FT
[facility] ([id number] [place] [address])

## 2021-07-27 NOTE — DISCHARGE NOTE PROVIDER - NSDCMRMEDTOKEN_GEN_ALL_CORE_FT
atorvastatin 20 mg oral tablet: 1 tab(s) orally once a day  digoxin 125 mcg (0.125 mg) oral tablet: 1 tab(s) orally once a day  diltiazem 24 hour extended release 240 mg/24 hours oral capsule, extended release: 1 cap(s) orally once a day  ferrous sulfate 324 mg (65 mg elemental iron) oral tablet: 1 tab(s) orally 2 times a day  folic acid 1 mg oral tablet: 1 tab(s) orally once a day  Keppra 100 mg/mL oral solution: 5 milliliter(s) orally 2 times a day  pantoprazole 20 mg oral delayed release tablet: 1 tab(s) orally once a day  tamsulosin 0.4 mg oral capsule: 1 cap(s) orally once a day (at bedtime)  Vitamin C 500 mg oral tablet: 1 tab(s) orally once a day

## 2021-07-27 NOTE — DISCHARGE NOTE PROVIDER - NSDCCPCAREPLAN_GEN_ALL_CORE_FT
PRINCIPAL DISCHARGE DIAGNOSIS  Diagnosis: Head injury  Assessment and Plan of Treatment: You were seen for a fall. Maintain a safe environment. Do not change positions quickly. Particpate in program recommended by physical therapy. Continue medications as prescribed. Follow up with your primary care doctor for further evaluation and management. Please call to make an appointment within 1-2 weeks of discharge.        SECONDARY DISCHARGE DIAGNOSES  Diagnosis: Subarachnoid hemorrhage following injury, no loss of consciousness, initial encounter  Assessment and Plan of Treatment: You were seen for a fall. Continue taking your keppra for 2 weeks and call Dr Pedraza to schedule an appoitntment outpatient within 2 weeks. Maintain a safe environment. Do not change positions quickly. Particpate in program recommended by physical therapy. Continue medications as prescribed. Follow up with your primary care doctor for further evaluation and management. Please call to make an appointment within 1-2 weeks of discharge.    Diagnosis: Stroke  Assessment and Plan of Treatment: You were found to have a stroke and treated accordingly. Please discontinue prescribed blood thinning agents and participate in any recommended physical/occupational therapy to optimize your recovery. Continue with low salt/fat diet and follow-up with your neurologist/primary provider as outpatient within 2 weeks for repeat labs and continued care. Ask your provider about available support groups for stroke survivors for emotional support.      Diagnosis: Fall at home, initial encounter  Assessment and Plan of Treatment: You were seen for a fall. Maintain a safe environment. Do not change positions quickly. Particpate in program recommended by physical therapy. Continue medications as prescribed. Follow up with your primary care doctor for further evaluation and management. Please call to make an appointment within 1-2 weeks of discharge.  You were seen for a fall. Maintain a safe environment. Do not change positions quickly. Particpate in program recommended by physical therapy precautions: keep your area clutter free, place night lights in hallways and stairwells, and add non-slip mats in the kitchen and bathrooms.  Exercise daily to improve muscle strength to avoid deconditioning.      Diagnosis: Seizure  Assessment and Plan of Treatment: You were seen for a fall. Continue taking your keppra for 2 weeks and call Dr Pedraza to schedule an appoitntment outpatient within 2 weeks. Maintain a safe environment.    Diagnosis: Hypertension  Assessment and Plan of Treatment: Continue blood pressure medication regimen as directed. Follow a low salt/cholesterol diet. Monitor for any visual changes, headaches or dizziness.  Monitor blood pressure regularly.  Follow up with your primary care provider for further management for high blood pressure.

## 2021-07-27 NOTE — PROGRESS NOTE ADULT - PROBLEM SELECTOR PLAN 1
Small acute parenchymal hemorrhage noted on CTH, slightly decreased in size on repeat CT head.   - NSGY following, rpt Head ct prior to DC  - ASA on hold , will need NSx clearance prior to resuming  - BP is stable: will aim for 's w/ home anti-HTN regimen
Small acute parenchymal hemorrhage noted on CTH, slightly decreased in size on repeat CT head.   - NSGY following, rpt Head ct prior to DC  - ASA on hold x 2 weeks as per NSx  - BP is stable: will aim for 's w/ home anti-HTN regimen
Small acute parenchymal hemorrhage noted on CTH, slightly decreased in size on repeat CT head.   - NSGY following, rpt Head ct prior to DC  - ASA on hold x 2 weeks as per NSx  - BP is stable: will aim for 's w/ home anti-HTN regimen

## 2021-07-27 NOTE — DISCHARGE NOTE PROVIDER - HOSPITAL COURSE
86 y.o. M w/ a hx of dementia, SDH s/p b/l craniotomies, CVA on ASA, pafib not on AC, HTN, CVA, L ICA stenosis s/p CEA seizure d/o, Etoh abuse, hospitalized after an unwitnessed fall found to have acute parenchymal hemorrhages.     Hemorrhage in the brain.    - Small acute parenchymal hemorrhage noted on CTH, slightly decreased in size on repeat CT head.   - NSGY following, rpt Head ct prior to DC  - ASA on hold x 2 weeks as per NSx  - BP is stable: will aim for 's w/ home anti-HTN regimen.     You were seen for a fall. Maintain a safe environment. Do not change positions quickly. Particpate in program recommended by physical therapy  - Unwitnessed fall, assumed mechanical 2/2 difficulty getting out of truck. Pt ambulated in the ED and was quite unsteady   - PT/OT c/s  - Fall precautions  - DC to [facility].     Stroke.    - Hx of stroke s/p L ICA CEA, on ASA, statin at home   - Hold ASA in setting of ICH   - Cont statin.     Paroxysmal atrial fibrillation.    - Not on AC   - Cont Digoxin and Dilt   - check Dig level.     Hypertension.    - Cont home Lisinopril and Dilt.     Seizure.   - Cont Keppra x 2 weeks   - Follow up with Dr Mejia within 1-2 weeks of dc     Prophylactic measure.    - SCD's     Patient will need med rec with Chuy (caretaker) on D/C. Patient on 2 different PPI's.

## 2021-07-27 NOTE — PROGRESS NOTE ADULT - ASSESSMENT
86 y.o. M w/ a hx of dementia, SDH s/p b/l craniotomies, CVA on ASA, pafib not on AC, HTN, CVA, L ICA stenosis s/p CEA seizure d/o, Etoh abuse, hospitalized after an unwitnessed fall found to have acute parenchymal hemorrhages. 
ECHO 8/18/20: EF 59%, no pfo, nl LV sys fx , mild pulm htn     a/p   86 y.o. M w/ a hx of dementia, SDH s/p b/l craniotomies, CVA on ASA, pafib not on AC, HTN, CVA, L ICA stenosis s/p CEA seizure d/o, Etoh abuse, who presents to the ED after a unwitnessed fall, found to have acute parenchymal hemorrhages.     #unwitnessed fall   -likely  mechanical given description r/t age/ dementia   -ecg with no acs    -check orthostatic bp when feasible   -check echo     #acute parenchymal hemorrhages.   -CT head noted  -management per Neurosx : recommended  No acute neurosurgcial intervention required ; keppra x 2 weeks, and hold all blood thinners x 2weeks  -med f/u    # Chronic Pafib   -ecg in NSR with PACS  -c/w  dig po   -no a/c given falls, hx SDH, now with acute parenchymal hemorrhages.       DVt ppx 
86 y.o. M w/ a hx of dementia, SDH s/p b/l craniotomies, CVA on ASA, pafib not on AC, HTN, CVA, L ICA stenosis s/p CEA seizure d/o, Etoh abuse, hospitalized after an unwitnessed fall found to have acute parenchymal hemorrhages. 
86 y.o. M w/ a hx of dementia, SDH s/p b/l craniotomies, CVA on ASA, pafib not on AC, HTN, CVA, L ICA stenosis s/p CEA seizure d/o, Etoh abuse, hospitalized after an unwitnessed fall found to have acute parenchymal hemorrhages.

## 2021-07-27 NOTE — PROGRESS NOTE ADULT - PROBLEM SELECTOR PLAN 4
Not on AC   - Cont Digoxin and Dilt   check Dig level

## 2021-07-27 NOTE — PROGRESS NOTE ADULT - PROBLEM SELECTOR PLAN 7
SCD's     Patient will need med rec with Chuy (caretaker) on D/C. Patient on 2 different PPI's.

## 2021-07-27 NOTE — DISCHARGE NOTE PROVIDER - CARE PROVIDER_API CALL
Bárbara Pedraza)  [facility] Neurosurgery  General  [address], [address]  [place] [address]  Phone: [phone]  Fax: [phone]  Established Patient  Follow Up Time: 1 week

## 2021-07-28 PROBLEM — I48.0 PAROXYSMAL ATRIAL FIBRILLATION: Chronic | Status: ACTIVE | Noted: 2021-07-24

## 2021-07-28 PROBLEM — I63.9 CEREBRAL INFARCTION, UNSPECIFIED: Chronic | Status: ACTIVE | Noted: 2021-07-24

## 2021-08-03 ENCOUNTER — APPOINTMENT (OUTPATIENT)
Dept: SURGICAL ONCOLOGY | Facility: CLINIC | Age: 86
End: 2021-08-03

## 2021-08-06 NOTE — ASSESSMENT
[FreeTextEntry1] : 86 year old male with multiple medical problems, SDH s/p b/l craniotomies, on ASA for history of stroke admitted \par after an unwitnessed fall found to have small  acute parenchymal hemorrhages on CT head slightly decreased on repeat CT head. ASA held for 2 weeks in the setting of acute hemorrhage, discharged on Keppra for 2 weeks. \par

## 2021-08-06 NOTE — HISTORY OF PRESENT ILLNESS
[de-identified] : 86 year old male with history of dementia, SDH s/p b/l craniotomies, CVA on ASA, a-fib not on AC, HTN, CVA, L ICA stenosis s/p CEA, seizure disorder, ETHO abuse, admitted \par after an unwitnessed fall found to have small  acute parenchymal hemorrhages on CT head slightly decreased on repeat CT head. ASA held in the setting acute ICH, discharged on Keppra x 2 weeks. \par \par \par \par \par \par \par \par \par \par \par

## 2021-08-09 ENCOUNTER — APPOINTMENT (OUTPATIENT)
Dept: NEUROSURGERY | Facility: CLINIC | Age: 86
End: 2021-08-09

## 2021-08-19 ENCOUNTER — APPOINTMENT (OUTPATIENT)
Dept: OTOLARYNGOLOGY | Facility: CLINIC | Age: 86
End: 2021-08-19

## 2021-09-24 ENCOUNTER — APPOINTMENT (OUTPATIENT)
Dept: RADIOLOGY | Facility: HOSPITAL | Age: 86
End: 2021-09-24
Payer: MEDICARE

## 2021-09-24 ENCOUNTER — OUTPATIENT (OUTPATIENT)
Dept: OUTPATIENT SERVICES | Facility: HOSPITAL | Age: 86
LOS: 1 days | Discharge: HOME | End: 2021-09-24

## 2021-09-24 ENCOUNTER — APPOINTMENT (OUTPATIENT)
Dept: SPEECH THERAPY | Facility: HOSPITAL | Age: 86
End: 2021-09-24
Payer: MEDICARE

## 2021-09-24 ENCOUNTER — OUTPATIENT (OUTPATIENT)
Dept: OUTPATIENT SERVICES | Facility: HOSPITAL | Age: 86
LOS: 1 days | End: 2021-09-24

## 2021-09-24 DIAGNOSIS — I62.01 NONTRAUMATIC ACUTE SUBDURAL HEMORRHAGE: Chronic | ICD-10-CM

## 2021-09-24 DIAGNOSIS — Z98.890 OTHER SPECIFIED POSTPROCEDURAL STATES: Chronic | ICD-10-CM

## 2021-09-24 DIAGNOSIS — R13.10 DYSPHAGIA, UNSPECIFIED: ICD-10-CM

## 2021-09-24 PROCEDURE — 74230 X-RAY XM SWLNG FUNCJ C+: CPT | Mod: 26

## 2021-10-22 DIAGNOSIS — R13.12 DYSPHAGIA, OROPHARYNGEAL PHASE: ICD-10-CM

## 2021-11-05 NOTE — PHYSICAL THERAPY INITIAL EVALUATION ADULT - ASSISTIVE DEVICE FOR TRANSFER: STAND/SIT, REHAB EVAL
PRINCIPAL DISCHARGE DIAGNOSIS  Diagnosis: AV fistula infection  Assessment and Plan of Treatment: You underwent a fistulogram and completed course of antibiotics.   Follow-up with vascular as outpatient.   Monitor for any further signs and symptoms of further infection, including but not limited to, fevers/chills, shortness of breath, increased heart rate, dizziness, or abrupt changes in mental status. Follow-up with your primary care provider as outpatient for further recommendations.      SECONDARY DISCHARGE DIAGNOSES  Diagnosis: Rhinovirus infection  Assessment and Plan of Treatment: Supportive care.    Diagnosis: ESRD on dialysis  Assessment and Plan of Treatment: Please continue to follow your dialysis schedule and refer to your primary provider/nephrologist for further care and monitoring of kidney function and electrolytes. Continue a renal restricted diet (Avoiding foods high in potassium and phosphorus), your prescribed medications, and supplementations as directed.    Diagnosis: Benign essential HTN  Assessment and Plan of Treatment: Your Hydralazine was decreased. Continue Procardia and Coreg.  Monitor for any visual changes, headaches or dizziness.  Monitor blood pressure regularly.  Follow up with your primary care provider for further management for high blood pressure.    Diagnosis: Chronic atrial fibrillation  Assessment and Plan of Treatment: Please continue your medications as directed and follow-up with your primary provider/cardiologist to further manage your care.   Monitor for signs/symptoms of uncontrolled atrial fibrillation, such as, increased heart rate, palpitations, chest pain, dizziness, or shortness of breath - Return to emergency room if these signs/symptoms are present.    Diagnosis: Hypocalcemia  Assessment and Plan of Treatment: You calcium levels are low - You were started on supplementation.   Follow-up with kidney specialist as outpatient for routine labs within 1 week upon discharge.    
bilateral UE support

## 2021-11-19 NOTE — PROVIDER CONTACT NOTE (OTHER) - ASSESSMENT
Patient came into office for a nurse visit and stated that a referral to have a MRI was entered and she is scheduled for 12/14/21 but she would like to know if there is any way she can get the MRI sooner than that, she stated she did not want to wait that long, she can be reached at [phone].   [employer] to transport pt home at approximately 11:00 p.m. .  Wife confirmed address of [phone] [place] [address], [place], [place].

## 2021-11-22 NOTE — ED PROVIDER NOTE - PHYSICAL EXAMINATION
Patient roomed by verifying name and date of birth     Gen: no acute distress, well appearing, awake, alert and oriented x 3  Cardiac: regular rate and rhythm, +S1S2, no murmurs rubs or gallops  Pulm: Clear to auscultation bilaterally, equal air entry bilaterally, no wheezes/rales/rhonchibreath sounds  Abd: soft, mild right upper quadrant ttp, nondistended, drainage tube in place, no guarding, rebound, neg Woods's sign, neg McBurney's point ttp  Back: neg CVA ttp, nontender spine

## 2022-03-11 NOTE — ED PROVIDER NOTE - NS ED MD DISPO DISCHARGE CCDA
[facility]  [facility]        I13 0 and E11 22     Chart Reviewed * (Number of) Inbasket suggestions sent to Provider: 2                  Patients insurance company: [employer]
Patient/Caregiver provided printed discharge information.

## 2022-07-29 NOTE — INPATIENT CERTIFICATION FOR MEDICARE PATIENTS - RISKS OF ADVERSE EVENTS
Concern for worsening infectious process Skin normal color for race, warm, dry and intact. No evidence of rash.

## 2022-08-25 NOTE — PROGRESS NOTE ADULT - SUBJECTIVE AND OBJECTIVE BOX
SICU Progress Note    24 HOUR EVENTS:  - Ranjeet cleviprex, currently at rate of 5mg/hr   - Ko feeding tube placed, started tube feeds and home BP meds   - Lisinopril inc 40   - Cardizem inc to 300     85y male with PMH HTN, seizure disorder secondary to previous alcohol abuse, SDH s/p right craniotomy, Lap Crystal c/b bilary duct resection s/p hepaticojejunostomy/Small bowel resection who presents to the hospital with R sided arm weakness found to have symptomatic L-ICA stenosis. He underwent left carotid endarterectomy. Patient slow to wake up from anesthesia. He was transferred directly to SICU for close hemodynamic monitoring, strict blood pressure goals as well as q1hr neurovascular checks.     SUBJECTIVE/ROS:  [x] A ten-point review of systems was otherwise negative except as noted.  [ ] Due to altered mental status/intubation, subjective information were not able to be obtained from the patient. History was obtained, to the extent possible, from review of the chart and collateral sources of information.      NEURO  RASS:     GCS: 15    CAM ICU:  Exam: awake, alert  Meds: levETIRAcetam  Solution 500 milliGRAM(s) Oral two times a day    [x] Adequacy of sedation and pain control has been assessed and adjusted      RESPIRATORY  RR: 20 (30 Aug 2020 23:00) (16 - 24)  SpO2: 98% (30 Aug 2020 23:00) (91% - 100%)  Wt(kg): --  Exam: unlabored, clear to auscultation bilaterally    CARDIOVASCULAR  HR: 87 (08-30 @ 23:00) (78 - 102)  BP: 133/68 (08-30 @ 23:00) (115/60 - 141/74)  ABP: 136/50 (30 Aug 2020 23:00) (98/67 - 144/57)  ABP(mean): 76 (30 Aug 2020 23:00) (64 - 85)  Exam: regular rate and rhythm, L neck incision c/d/i with strikethrough and ANAYELI SS  Cardiac Rhythm: sinus  Perfusion     [x]Adequate   [ ]Inadequate  Mentation   [x]Normal       [ ]Reduced  Extremities  [x]Warm         [ ]Cool    GI/NUTRITION  Exam: soft, nontender, nondistended  Diet: TF via betty    GENITOURINARY  I&O's Detail    29 Aug 2020 07:01  -  30 Aug 2020 07:00  --------------------------------------------------------  IN:    clevidipine Infusion: 418 mL    ns in tub fed  [id number]: 582 mL    Oral Fluid: 50 mL  Total IN: 1050 mL    OUT:    Bulb: 12 mL    Voided: 1200 mL  Total OUT: 1212 mL    Total NET: -162 mL      30 Aug 2020 07:01  -  31 Aug 2020 00:10  --------------------------------------------------------  IN:    clevidipine Infusion: 28 mL    clevidipine Infusion: 144 mL    Enteral Tube Flush: 240 mL    ns in tub fed  [id number]: 912 mL  Total IN: 1324 mL    OUT:    Voided: 800 mL  Total OUT: 800 mL    Total NET: 524 mL      Labs    08-30    139  |  101  |  14  ----------------------------<  125<H>  3.5   |  22  |  0.84    Ca    8.8      30 Aug 2020 00:10  Phos  2.7     08-30  Mg     2.0     08-30            RECENT CULTURES:        PT/INR - ( 30 Aug 2020 00:10 )   PT: 12.9 SEC;   INR: 1.13          PTT - ( 30 Aug 2020 00:10 )  PTT:28.4 SEC SICU Progress Note    24 HOUR EVENTS:  - Ranjeet cleviprex, currently at rate of 5mg/hr   - Ko feeding tube placed, started tube feeds and home BP meds   - Lisinopril inc 40   - Cardizem inc to 300     85y male with PMH HTN, seizure disorder secondary to previous alcohol abuse, SDH s/p right craniotomy, Lap Crystal c/b bilary duct resection s/p hepaticojejunostomy/Small bowel resection who presents to the hospital with R sided arm weakness found to have symptomatic L-ICA stenosis. He underwent left carotid endarterectomy. Patient slow to wake up from anesthesia. He was transferred directly to SICU for close hemodynamic monitoring, strict blood pressure goals as well as q1hr neurovascular checks.     SUBJECTIVE/ROS:  [x] A ten-point review of systems was otherwise negative except as noted.  [ ] Due to altered mental status/intubation, subjective information were not able to be obtained from the patient. History was obtained, to the extent possible, from review of the chart and collateral sources of information.      NEURO  RASS:     GCS: 15    CAM ICU:  Exam: awake, alert  Meds: levETIRAcetam  Solution 500 milliGRAM(s) Oral two times a day    [x] Adequacy of sedation and pain control has been assessed and adjusted      RESPIRATORY  RR: 20 (30 Aug 2020 23:00) (16 - 24)  SpO2: 98% (30 Aug 2020 23:00) (91% - 100%)  Wt(kg): --  Exam: unlabored, clear to auscultation bilaterally    CARDIOVASCULAR  HR: 87 (08-30 @ 23:00) (78 - 102)  BP: 133/68 (08-30 @ 23:00) (115/60 - 141/74)  ABP: 136/50 (30 Aug 2020 23:00) (98/67 - 144/57)  ABP(mean): 76 (30 Aug 2020 23:00) (64 - 85)  Exam: regular rate and rhythm, L neck incision c/d/i with strikethrough and ANAYELI SS  Cardiac Rhythm: sinus  Perfusion     [x]Adequate   [ ]Inadequate  Mentation   [x]Normal       [ ]Reduced  Extremities  [x]Warm         [ ]Cool    GI/NUTRITION  Exam: soft, nontender, nondistended  Diet: TF via betty    GENITOURINARY  I&O's Detail    29 Aug 2020 07:01  -  30 Aug 2020 07:00  --------------------------------------------------------  IN:    clevidipine Infusion: 418 mL    ns in tub fed  [id number]: 582 mL    Oral Fluid: 50 mL  Total IN: 1050 mL    OUT:    Bulb: 12 mL    Voided: 1200 mL  Total OUT: 1212 mL    Total NET: -162 mL      30 Aug 2020 07:01  -  31 Aug 2020 00:10  --------------------------------------------------------  IN:    clevidipine Infusion: 28 mL    clevidipine Infusion: 144 mL    Enteral Tube Flush: 240 mL    ns in tub fed  [id number]: 912 mL  Total IN: 1324 mL    OUT:    Voided: 800 mL  Total OUT: 800 mL    Total NET: 524 mL      HEMATOLOGIC  Meds: aspirin  chewable 81 milliGRAM(s) Oral daily  heparin   Injectable 5000 Unit(s) SubCutaneous every 8 hours    [x] VTE Prophylaxis                                   11.2   11.59 )-----------( 252      ( 30 Aug 2020 00:10 )             32.2       PT/INR - ( 30 Aug 2020 00:10 )   PT: 12.9 SEC;   INR: 1.13          PTT - ( 30 Aug 2020 00:10 )  PTT:28.4 SEC      INFECTIOUS DISEASES  T(C): 37.3 (08-30 @ 20:00), Max: 37.3 (08-30 @ 20:00)  WBC Count : 11.59 K/uL      ACCESS DEVICES:  [x] Peripheral IV  [ ] Central Venous Line	[ ] R	[ ] L	[ ] IJ	[ ] Fem	[ ] SC	Placed:   [x] Arterial Line		[ ] R	[ ] L	[ ] Fem	[ ] Rad	[ ] Ax	Placed:   [ ] PICC:					[ ] Mediport  [ ] Urinary Catheter, Date Placed:   [x] Necessity of urinary, arterial, and venous catheters discussed    OTHER MEDICATIONS:  chlorhexidine 4% Liquid 1 Application(s) Topical daily      Labs    08-30    139  |  101  |  14  ----------------------------<  125<H>  3.5   |  22  |  0.84    Ca    8.8      30 Aug 2020 00:10  Phos  2.7     08-30  Mg     2.0     08-30            RECENT CULTURES:        PT/INR - ( 30 Aug 2020 00:10 )   PT: 12.9 SEC;   INR: 1.13          PTT - ( 30 Aug 2020 00:10 )  PTT:28.4 SEC Topical Sulfur Applications Pregnancy And Lactation Text: This medication is Pregnancy Category C and has an unknown safety profile during pregnancy. It is unknown if this topical medication is excreted in breast milk.

## 2023-01-05 NOTE — ED ADULT NURSE NOTE - NSSUHOSCREENINGYN_ED_ALL_ED
Normal vision: sees adequately in most situations; can see medication labels, newsprint
Yes - the patient is able to be screened

## 2023-03-13 NOTE — CONSULT NOTE ADULT - CONSULT REASON
s/p fall cards eval
S/P fall, rule out traumatic SAH
Current dosing wt: 121.6 kg (3/11)  [facility] wt hx to assess

## 2023-05-20 NOTE — SWALLOW BEDSIDE ASSESSMENT ADULT - COMMENTS
show
As per charting, "84M with PMH of seizure disorder, subdural hematoma s/p craniotomy due to trauma, HTN, ETOH abuse, cholelithiasis, who presented to OSH ED with abdominal pain, nausea/vomiting and was found to have cholecystitis and cholelithiasis on CT. He underwent a laparoscopic cholecystectomy. On POD1, he was noted to have increase in LFTs and bilious output from ANAYELI drain on POD2.  MRCP and HIDA scan were performed at outside hospital. Patient was transferred to [facility] for further care."    Patient was received awake and confused yet cooperative. Sitting upright in yossi chair. RN present for the duration of this assessment. Recommendations discussed with RN and Dr. Baker.

## 2023-06-05 NOTE — ED ADULT NURSE NOTE - NS ED NOTE ABUSE RESPONSE YN
----- Message from Danita Adam sent at 6/5/2023  1:56 PM CDT -----  Regarding: Return Call  .Type:  Patient Returning Call    Who Called: Self     Who Left Message for Patient: not sure     Does the patient know what this is regarding?: Apt that was cancelled. He wants to know why it was. He said that he is trying to est care. He doesn't want to wait another month.     Would the patient rather a call back or a response via [facility]? Call     Best Call Back Number: .[phone]        
Called pt.  He stated he cancelled his first appt to est care on 06/08/23 then called back to get on the scheduled again and someone gave him another appt in June then called back to cancel that appt bc he wasn't already established. He vocalized frustration and I explained he was mistakenly put in a slot without enough time for a new patient so she had to cancel it, and that we secured another appt to est care for him in October.  He said that was too long since he'd been waiting on his original appt on 6/8/23 for months. I reminded him that was the one he canceled, and explained once he cancelled we would've been looking for a new date from that point and a np appt isn't always something we can schedule in the immediate future.  I offered to do a search for him to find an appt with any Dr in our network to get him in sooner, but he declined.  
Yes

## 2023-07-19 NOTE — CHART NOTE - NSCHARTNOTESELECT_GEN_ALL_CORE
Event Note [No Acute Distress] : no acute distress [Well Nourished] : well nourished [Well Developed] : well developed [Well-Appearing] : well-appearing [Normal Sclera/Conjunctiva] : normal sclera/conjunctiva [PERRL] : pupils equal round and reactive to light [EOMI] : extraocular movements intact [No JVD] : no jugular venous distention [No Lymphadenopathy] : no lymphadenopathy [Supple] : supple [Thyroid Normal, No Nodules] : the thyroid was normal and there were no nodules present [No Respiratory Distress] : no respiratory distress  [No Accessory Muscle Use] : no accessory muscle use [Clear to Auscultation] : lungs were clear to auscultation bilaterally [Normal Rate] : normal rate  [Regular Rhythm] : with a regular rhythm [Normal S1, S2] : normal S1 and S2 [No Murmur] : no murmur heard [No Carotid Bruits] : no carotid bruits [No Abdominal Bruit] : a ~M bruit was not heard ~T in the abdomen [No Varicosities] : no varicosities [Pedal Pulses Present] : the pedal pulses are present [No Edema] : there was no peripheral edema [No Palpable Aorta] : no palpable aorta [No Extremity Clubbing/Cyanosis] : no extremity clubbing/cyanosis [Soft] : abdomen soft [Non Tender] : non-tender [Non-distended] : non-distended [No Masses] : no abdominal mass palpated [No HSM] : no HSM [Normal Bowel Sounds] : normal bowel sounds [Normal Posterior Cervical Nodes] : no posterior cervical lymphadenopathy [Normal Anterior Cervical Nodes] : no anterior cervical lymphadenopathy [No CVA Tenderness] : no CVA  tenderness [No Spinal Tenderness] : no spinal tenderness [No Joint Swelling] : no joint swelling [Grossly Normal Strength/Tone] : grossly normal strength/tone [No Rash] : no rash [Coordination Grossly Intact] : coordination grossly intact [No Focal Deficits] : no focal deficits [Normal Gait] : normal gait [Deep Tendon Reflexes (DTR)] : deep tendon reflexes were 2+ and symmetric [Normal Affect] : the affect was normal [Normal Insight/Judgement] : insight and judgment were intact [de-identified] : significant hearing loss

## 2023-08-28 NOTE — ED PROVIDER NOTE - CRITICAL CARE PROVIDED
consultation with other physicians/interpretation of diagnostic studies/documentation/additional history taking/direct patient care (not related to procedure) Calm/Appropriate

## 2023-10-09 NOTE — ASU PREOP CHECKLIST - SKIN PREP
Medication Scribe Admission Medication History    Admission medication history is complete. The information provided in this note is only as accurate as the sources available at the time of the update.    Information Source(s): Patient and Family member via in-person    Pertinent Information: Patient and daughter were present and gave report together,     Changes made to PTA medication list:  Added: Prilosec  Deleted: Roxicodone, B-12, vit E, Tylenol  Changed: Lisinopril 20 mg to 40 mg tablets    Medication Affordability:  Not including over the counter (OTC) medications, was there a time in the past 3 months when you did not take your medications as prescribed because of cost?: No    Allergies reviewed with patient and updates made in EHR: yes     Medication History Completed By: Zach Veloz 10/9/2023 4:37 AM    PTA Med List   Medication Sig Last Dose    cholecalciferol, vitamin D3, 1,000 unit (25 mcg) tablet [CHOLECALCIFEROL, VITAMIN D3, 1,000 UNIT (25 MCG) TABLET] Take 1,000 Units by mouth daily. 10/8/2023 at am    ibuprofen (ADVIL,MOTRIN) 200 MG tablet [IBUPROFEN (ADVIL,MOTRIN) 200 MG TABLET] Take 400 mg by mouth every 8 (eight) hours as needed for pain.  Past Month at prn    lisinopril (ZESTRIL) 40 MG tablet Take 40 mg by mouth daily 10/8/2023 at am    loratadine-pseudoephedrine (LORATADINE-PSEUDOEPHEDRINE) 5-120 mg Tb12 Take 1 tablet by mouth daily as needed for allergies Past Week at prn    omeprazole (PRILOSEC) 20 MG DR capsule Take 20 mg by mouth daily as needed (acid reflux) 10/8/2023 at am       
done
Hospital chart

## 2023-11-09 NOTE — PATIENT PROFILE ADULT - PACKS YRS CALCULATION
Treatment Goal Explanation (Does Not Render In The Note): Stable for the purposes of categorizing medical decision making is defined by the specific treatment goals for an individual patient. A patient that is not at their treatment goal is not stable, even if the condition has not changed and there is no short- term threat to life or function. 0

## 2024-01-22 NOTE — PATIENT PROFILE ADULT - COMPLETE THE FOLLOWING IF THE PATIENT REFUSES THE INFLUENZA VACCINE:
From: Harrison Ann  To: Adina Aida  Sent: 1/22/2024 11:35 AM CST  Subject: Anti-Depressant Discussion     Kahlil Holden,   I would like to consider a daily anti-depressant options. I have been taking the Trazadone which greatly helps with my sleep, but I am still needing something for my day to day. I have heard some good things about Trintellix, do you think this could be a good fit for me?     Let me know your thoughts and if I could try that out.     Thank you,   Harrison   Risks/benefits discussed with patient/surrogate

## 2024-02-09 NOTE — DISCHARGE NOTE ADULT - NS AS DC PROVIDER CONTACT Y/N MULTI
PA approved through 2/8/2025.     Patient's guardian, Irene, notified of this; voiced understanding.   
Received a fax from pharmacy stating [facility] needs PA. This was  completed through EP[employer] .  
Yes

## 2024-04-18 NOTE — ED ADULT NURSE NOTE - NSSEPSISSUSPECTED_ED_A_ED
No care due was identified.  [facility] Embedded Care Due Messages. Reference number: [id number].   4/18/2024 11:59:15 AM CDT   No
